# Patient Record
Sex: MALE | Race: WHITE | NOT HISPANIC OR LATINO | Employment: OTHER | URBAN - NONMETROPOLITAN AREA
[De-identification: names, ages, dates, MRNs, and addresses within clinical notes are randomized per-mention and may not be internally consistent; named-entity substitution may affect disease eponyms.]

---

## 2017-04-13 ENCOUNTER — OFFICE VISIT (OUTPATIENT)
Dept: CARDIOLOGY | Facility: CLINIC | Age: 82
End: 2017-04-13

## 2017-04-13 VITALS
HEIGHT: 72 IN | HEART RATE: 56 BPM | WEIGHT: 242 LBS | BODY MASS INDEX: 32.78 KG/M2 | DIASTOLIC BLOOD PRESSURE: 90 MMHG | SYSTOLIC BLOOD PRESSURE: 138 MMHG

## 2017-04-13 DIAGNOSIS — R06.02 SHORTNESS OF BREATH: ICD-10-CM

## 2017-04-13 DIAGNOSIS — I27.20 PULMONARY HTN (HCC): Primary | ICD-10-CM

## 2017-04-13 DIAGNOSIS — I10 ESSENTIAL HYPERTENSION: ICD-10-CM

## 2017-04-13 DIAGNOSIS — E78.49 OTHER HYPERLIPIDEMIA: ICD-10-CM

## 2017-04-13 PROBLEM — K21.9 GERD (GASTROESOPHAGEAL REFLUX DISEASE): Status: ACTIVE | Noted: 2017-04-13

## 2017-04-13 PROBLEM — E78.5 HYPERLIPEMIA: Status: ACTIVE | Noted: 2017-04-13

## 2017-04-13 PROCEDURE — 99214 OFFICE O/P EST MOD 30 MIN: CPT | Performed by: NURSE PRACTITIONER

## 2017-04-13 RX ORDER — NITROGLYCERIN 0.4 MG/1
TABLET SUBLINGUAL
Qty: 30 TABLET | Refills: 1 | Status: SHIPPED | OUTPATIENT
Start: 2017-04-13 | End: 2018-08-21 | Stop reason: SDUPTHER

## 2017-04-13 RX ORDER — LISINOPRIL 20 MG/1
20 TABLET ORAL DAILY
Qty: 90 TABLET | Refills: 2 | Status: SHIPPED | OUTPATIENT
Start: 2017-04-13 | End: 2018-01-09 | Stop reason: SDUPTHER

## 2017-04-13 RX ORDER — ASCORBIC ACID 500 MG
1000 TABLET ORAL DAILY
COMMUNITY

## 2017-04-13 RX ORDER — ATORVASTATIN CALCIUM 40 MG/1
40 TABLET, FILM COATED ORAL NIGHTLY
Qty: 90 TABLET | Refills: 2 | Status: SHIPPED | OUTPATIENT
Start: 2017-04-13 | End: 2017-10-09 | Stop reason: ALTCHOICE

## 2017-04-13 NOTE — PROGRESS NOTES
Chief Complaint   Patient presents with   • Follow-up     Denies chest pain or palpitations. Needs refills on cardiac meds for 90 days to [facility] pharmacy. Labs per PCP about 6 months ago.    • Shortness of Breath     Has been having problems with being short of breath. States that he had this same problem before but seems to have worsened. When he feels short of breath he feels tight in his chest.        Cardiac Complaints  dyspnea      Subjective   Carlos Preston is a 89 y.o. male with a history of ischemic heart disease diagnosed in 2007 when he underwent bypass surgery followed by cardiac catheterization once and found the grafts were patent. In March 2015, a stress test and echocardiogram were repeated which showed fixed defect and no ischemia and slight improvement of his LV function, his echo did show some mild pulmonary HTN.  He returns today for follow up and reports labs with PCP.  Refills of cardiac meds requested.  He does return today for follow up and denies any chest pain or palpitations.  He does admit to some shortness of breath and states that it seems to have worsened since his last visit.  He states he notices more tightness in his chest with exertion as he is exerting himself.          Cardiac History  Past Surgical History:   Procedure Laterality Date   • CARDIAC CATHETERIZATION  05/16/2007    Dr. Mace: 60% LM and 70% LCX.   • CARDIAC CATHETERIZATION  08/19/2009    %, OM 85-90%,Patent grafts.   • CARDIAC SURGERY  2006    Bypass   • CARDIOVASCULAR STRESS TEST  08/05/2009    ROBIN George: Dr. Mace- EF 29%, Diffuse ischemia.   • CARDIOVASCULAR STRESS TEST  11/07/2012    L. Myoview- Inferoseptal infarct with jon infarct ischemia. EF 41%.   • CARDIOVASCULAR STRESS TEST  03/23/2015    L. Myoview- EF51%,fixed defect , no ischemia burden.   • CORONARY ARTERY BYPASS GRAFT  05/17/2007    CABG:  SVG to LAD and LCX.   • ECHO - CONVERTED  08/06/2009    Dr. Mace- EF 50%.   • ECHO - CONVERTED   02/14/2011    EF 50%, Septal WMA.   • ECHO - CONVERTED  11/07/2012    EF 40-45%, RVSP 33 mmHg.   • ECHO - CONVERTED  03/23/2015    EF 45-50%, RVSP 40mmHg,mild MR, mild PHT   • LUMBAR LAMINECTOMY  2005    Unsure of date   • US CAROTID UNILATERAL  04/16/2007    Mild Plaque in Rt. bulb and RECA.       Current Outpatient Prescriptions   Medication Sig Dispense Refill   • aspirin 81 MG chewable tablet Chew 81 mg daily.     • atorvastatin (LIPITOR) 40 MG tablet Take 1 tablet by mouth Every Night. 1/2 tablet once daily 90 tablet 2   • busPIRone (BUSPAR) 5 MG tablet Take 5 mg by mouth 2 (two) times a day.     • Cholecalciferol (VITAMIN D3) 2000 UNITS tablet Take  by mouth daily.     • lisinopril (PRINIVIL,ZESTRIL) 20 MG tablet Take 1 tablet by mouth Daily. 90 tablet 2   • loratadine (CLARITIN) 10 MG tablet Take 10 mg by mouth daily.     • metoprolol tartrate (LOPRESSOR) 25 MG tablet Take 1 tablet by mouth 2 (Two) Times a Day. 1/2 tablet twice daily 180 tablet 2   • Multiple Vitamins-Minerals (VISION VITAMINS) tablet Take  by mouth 2 (two) times a day.     • omeprazole (PriLOSEC) 20 MG capsule Take 20 mg by mouth daily.     • vitamin C (ASCORBIC ACID) 500 MG tablet Take 500 mg by mouth Daily.     • nitroglycerin (NITROSTAT) 0.4 MG SL tablet 1 under the tongue as needed for angina, may repeat q5mins for up three doses 30 tablet 1     No current facility-administered medications for this visit.        Review of patient's allergies indicates no known allergies.    Past Medical History:   Diagnosis Date   • Cancer    • H/O prostatectomy    • Hearing loss    • Heart disease    • History of back surgery    • Hypercholesterolemia    • Hyperlipidemia    • Hypertension    • IHD (ischemic heart disease)     S/P CABG   • Pulmonary hypertension    • S/P knee replacement    • Sinus disorder    • thyroid ultrasound 08/28/2009    Normal       Social History     Social History   • Marital status:      Spouse name: N/A   • Number of  "children: N/A   • Years of education: N/A     Occupational History   • Not on file.     Social History Main Topics   • Smoking status: Former Smoker     Quit date: 9/22/1996   • Smokeless tobacco: Never Used   • Alcohol use No   • Drug use: No   • Sexual activity: Not on file     Other Topics Concern   • Not on file     Social History Narrative       History reviewed. No pertinent family history.    Review of Systems   Constitutional: Negative.    HENT: Negative.    Respiratory: Positive for shortness of breath.    Cardiovascular: Negative.    Gastrointestinal: Negative.    Endocrine: Negative.    Musculoskeletal: Negative.    Neurological: Negative.    Psychiatric/Behavioral: Negative.        DiabetesNo  Thyroidnormal    Objective     /90  Pulse 56  Ht 72\" (182.9 cm)  Wt 242 lb (110 kg)  BMI 32.82 kg/m2    Physical Exam   Constitutional: He is oriented to person, place, and time. He appears well-developed and well-nourished.   HENT:   Head: Normocephalic and atraumatic.   Eyes: EOM are normal. Pupils are equal, round, and reactive to light.   Neck: Normal range of motion. Neck supple.   Cardiovascular: Normal rate and regular rhythm.    Murmur heard.  Pulmonary/Chest: Effort normal and breath sounds normal.   Abdominal: Soft.   Musculoskeletal: Normal range of motion.   Neurological: He is alert and oriented to person, place, and time.   Skin: Skin is warm and dry.   Psychiatric: He has a normal mood and affect.       Procedures    Assessment/Plan     HR is slightly bradycardic today, most likely related to beta blocker therapy.  BP is stable.  Echo will be advised today to reassess for any worsening pulmonary HTN, more recommendations to follow since he is still short of breath.  Labs he reports with you, could we get next copy?  Good cardiac diet and activity as tolerated advised.  Cardiac refills sent.  6 month follow up advised or sooner if needed.      Problems Addressed this Visit        " Cardiovascular and Mediastinum    Pulmonary HTN - Primary    Relevant Orders    Adult Transthoracic Echo Complete    Hyperlipemia    Relevant Medications    atorvastatin (LIPITOR) 40 MG tablet    HTN (hypertension)    Relevant Medications    metoprolol tartrate (LOPRESSOR) 25 MG tablet    lisinopril (PRINIVIL,ZESTRIL) 20 MG tablet      Other Visit Diagnoses     Shortness of breath        Relevant Orders    Adult Transthoracic Echo Complete                  Electronically signed by JESSA Moreno April 13, 2017 10:09 AM

## 2017-04-17 RX ORDER — BUSPIRONE HYDROCHLORIDE 5 MG/1
TABLET ORAL
Qty: 180 TABLET | Refills: 0 | OUTPATIENT
Start: 2017-04-17

## 2017-04-17 RX ORDER — ISOSORBIDE MONONITRATE 30 MG/1
TABLET, EXTENDED RELEASE ORAL
Qty: 90 TABLET | Refills: 0 | OUTPATIENT
Start: 2017-04-17

## 2017-05-01 ENCOUNTER — HOSPITAL ENCOUNTER (OUTPATIENT)
Dept: CARDIOLOGY | Facility: HOSPITAL | Age: 82
Discharge: HOME | End: 2017-05-01
Admitting: NURSE PRACTITIONER

## 2017-05-01 ENCOUNTER — OUTSIDE FACILITY SERVICE (OUTPATIENT)
Dept: CARDIOLOGY | Facility: CLINIC | Age: 82
End: 2017-05-01

## 2017-05-01 DIAGNOSIS — R06.02 SHORTNESS OF BREATH: ICD-10-CM

## 2017-05-01 DIAGNOSIS — I27.20 PULMONARY HTN (HCC): ICD-10-CM

## 2017-05-01 PROCEDURE — 93306 TTE W/DOPPLER COMPLETE: CPT

## 2017-05-01 PROCEDURE — 93306 TTE W/DOPPLER COMPLETE: CPT | Performed by: INTERNAL MEDICINE

## 2017-05-01 RX ORDER — BUSPIRONE HYDROCHLORIDE 5 MG/1
TABLET ORAL
Qty: 180 TABLET | Refills: 0 | OUTPATIENT
Start: 2017-05-01

## 2017-05-08 RX ORDER — ISOSORBIDE MONONITRATE 30 MG/1
TABLET, EXTENDED RELEASE ORAL
Qty: 90 TABLET | Refills: 0 | OUTPATIENT
Start: 2017-05-08

## 2017-06-26 RX ORDER — OMEPRAZOLE 20 MG/1
CAPSULE, DELAYED RELEASE ORAL
Qty: 90 CAPSULE | Refills: 0 | OUTPATIENT
Start: 2017-06-26

## 2017-10-09 ENCOUNTER — OFFICE VISIT (OUTPATIENT)
Dept: CARDIOLOGY | Facility: CLINIC | Age: 82
End: 2017-10-09

## 2017-10-09 VITALS
BODY MASS INDEX: 32.91 KG/M2 | SYSTOLIC BLOOD PRESSURE: 144 MMHG | HEIGHT: 72 IN | HEART RATE: 55 BPM | WEIGHT: 243 LBS | DIASTOLIC BLOOD PRESSURE: 82 MMHG

## 2017-10-09 DIAGNOSIS — I25.118 CORONARY ARTERY DISEASE INVOLVING NATIVE CORONARY ARTERY OF NATIVE HEART WITH OTHER FORM OF ANGINA PECTORIS (HCC): ICD-10-CM

## 2017-10-09 DIAGNOSIS — R06.02 SHORTNESS OF BREATH: ICD-10-CM

## 2017-10-09 DIAGNOSIS — E78.49 OTHER HYPERLIPIDEMIA: ICD-10-CM

## 2017-10-09 DIAGNOSIS — I10 ESSENTIAL HYPERTENSION: ICD-10-CM

## 2017-10-09 DIAGNOSIS — R94.31 ABNORMAL ELECTROCARDIOGRAM (ECG) (EKG): ICD-10-CM

## 2017-10-09 DIAGNOSIS — I48.91 NEW ONSET ATRIAL FIBRILLATION (HCC): Primary | ICD-10-CM

## 2017-10-09 DIAGNOSIS — R00.1 SINUS BRADYCARDIA: ICD-10-CM

## 2017-10-09 PROCEDURE — 93000 ELECTROCARDIOGRAM COMPLETE: CPT | Performed by: NURSE PRACTITIONER

## 2017-10-09 PROCEDURE — 99214 OFFICE O/P EST MOD 30 MIN: CPT | Performed by: NURSE PRACTITIONER

## 2017-10-09 NOTE — PROGRESS NOTES
Chief Complaint   Patient presents with   • Follow-up     for cardiac management   • Atrial Fibrillation     new onset, see ER notes and EKG.  Went to ER with chest pressure, extremely SOB, elevated BP.  Diagnosed with new onset Afib.  Xarelto started.    • Hypertension     running around 140's-170's/ 70's to 80's at home since trip to ER.    • Medication Problem     concerned due to numerous bruised areas on his hands since starting Xarelto. Also  taking ASA.  Cost is also an issure,  will be about $400.00 a month.        Andres Preston is a 89 y.o. male  with a history of ischemic heart disease diagnosed in 2007 when he underwent bypass surgery followed by cardiac catheterization once and found the grafts were patent. In March 2015, a stress test and echocardiogram were repeated which showed fixed defect and no ischemia and slight improvement of his LV function. At his last office visit he reported shortness of breath. Repeat echocardiogram shows LV ejection fraction in normal range, no AS, mild MR, mild AR and no PHT noted.   Recently on October 3rd, he developed chest pain with increased blood pressure for which he went to ER. Labs were stable. CXR was negative for acute disease or change. EKG showed new onset atrial fibrillation. Xarelto was started.   Today he comes to the office in follow up for new onset atrial fibrillation. He continues Xarelto with report of increased bruising but no active signs of bleeding. Shortness of breath with mild exertion associated chest heaviness and tightness persist. He has not felt palpitations. In general he has more weakness.     HPI         Cardiac History:    Past Surgical History:   Procedure Laterality Date   • CARDIAC CATHETERIZATION  05/16/2007    Dr. Mace: 60% LM and 70% LCX.   • CARDIAC CATHETERIZATION  08/19/2009    %, OM 85-90%,Patent grafts.   • CARDIAC SURGERY  2006    Bypass   • CARDIOVASCULAR STRESS TEST  08/05/2009    A. Myoview:  Dr. Mace- EF 29%, Diffuse ischemia.   • CARDIOVASCULAR STRESS TEST  11/07/2012    L. Myoview- Inferoseptal infarct with jon infarct ischemia. EF 41%.   • CARDIOVASCULAR STRESS TEST  03/23/2015    L. Myoview- EF51%,fixed defect , no ischemia burden.   • CORONARY ARTERY BYPASS GRAFT  05/17/2007    CABG:  SVG to LAD and LCX.   • ECHO - CONVERTED  08/06/2009    Dr. Mace- EF 50%.   • ECHO - CONVERTED  02/14/2011    EF 50%, Septal WMA.   • ECHO - CONVERTED  11/07/2012    EF 40-45%, RVSP 33 mmHg.   • ECHO - CONVERTED  03/23/2015    EF 45-50%, RVSP 40mmHg,mild MR, mild PHT   • ECHO - CONVERTED  05/01/2017    EF 50-55%, mild MR   • LUMBAR LAMINECTOMY  2005    Unsure of date   • US CAROTID UNILATERAL  04/16/2007    Mild Plaque in Rt. bulb and RECA.       Current Outpatient Prescriptions   Medication Sig Dispense Refill   • busPIRone (BUSPAR) 5 MG tablet Take 5 mg by mouth 2 (two) times a day.     • Cholecalciferol (VITAMIN D3) 2000 UNITS tablet Take  by mouth daily.     • lisinopril (PRINIVIL,ZESTRIL) 20 MG tablet Take 1 tablet by mouth Daily. 90 tablet 2   • loratadine (CLARITIN) 10 MG tablet Take 10 mg by mouth daily.     • metoprolol tartrate (LOPRESSOR) 25 MG tablet Take 1 tablet by mouth 2 (Two) Times a Day. 1/2 tablet twice daily 180 tablet 2   • Multiple Vitamins-Minerals (VISION VITAMINS) tablet Take  by mouth 2 (two) times a day.     • nitroglycerin (NITROSTAT) 0.4 MG SL tablet 1 under the tongue as needed for angina, may repeat q5mins for up three doses 30 tablet 1   • omeprazole (PriLOSEC) 20 MG capsule Take 20 mg by mouth daily.     • vitamin C (ASCORBIC ACID) 500 MG tablet Take 500 mg by mouth Daily.     • rivaroxaban (XARELTO) 20 MG tablet Take 1 tablet by mouth Daily. 30 tablet      No current facility-administered medications for this visit.        Review of patient's allergies indicates no known allergies.    Past Medical History:   Diagnosis Date   • Cancer    • H/O prostatectomy    • Hearing loss    •  Heart disease    • History of back surgery    • Hypercholesterolemia    • Hyperlipidemia    • Hypertension    • IHD (ischemic heart disease)     S/P CABG   • Pulmonary hypertension    • S/P knee replacement    • Sinus disorder    • thyroid ultrasound 08/28/2009    Normal       Social History     Social History   • Marital status:      Spouse name: N/A   • Number of children: N/A   • Years of education: N/A     Occupational History   • Not on file.     Social History Main Topics   • Smoking status: Former Smoker     Quit date: 9/22/1996   • Smokeless tobacco: Never Used   • Alcohol use No   • Drug use: No   • Sexual activity: Not on file     Other Topics Concern   • Not on file     Social History Narrative       History reviewed. No pertinent family history.    Review of Systems   Constitution: Positive for weakness and malaise/fatigue. Negative for decreased appetite.   HENT: Negative for congestion, nosebleeds and sore throat.    Eyes: Negative for blurred vision.   Cardiovascular: Positive for chest pain (Heaviness), dyspnea on exertion and irregular heartbeat. Negative for leg swelling, near-syncope and palpitations.   Respiratory: Positive for shortness of breath. Negative for cough and snoring.    Endocrine: Negative for cold intolerance and heat intolerance.   Hematologic/Lymphatic: Negative for bleeding problem. Bruises/bleeds easily.   Skin: Negative for itching and nail changes.   Musculoskeletal: Positive for arthritis. Negative for falls and myalgias.   Gastrointestinal: Negative for abdominal pain, dysphagia, heartburn, melena and nausea.   Genitourinary: Negative for dysuria and hematuria.   Neurological: Negative for dizziness, light-headedness, seizures and vertigo.   Psychiatric/Behavioral: Negative for altered mental status and memory loss. The patient does not have insomnia.    Allergic/Immunologic: Negative for environmental allergies and hives.    Diabetes- No  Thyroid-normal    Objective  "    /82  Pulse 55  Ht 72\" (182.9 cm)  Wt 243 lb (110 kg)  BMI 32.96 kg/m2    Physical Exam   Constitutional: He is oriented to person, place, and time. He appears well-nourished.   HENT:   Head: Normocephalic.   Eyes: Conjunctivae are normal. Pupils are equal, round, and reactive to light.   Neck: Normal range of motion. No JVD present. Carotid bruit is not present.   Cardiovascular: Normal rate, regular rhythm, S1 normal and S2 normal.    No murmur heard.  Pulmonary/Chest: Breath sounds normal. He has no wheezes. He has no rales.   Abdominal: Soft. Bowel sounds are normal. He exhibits no distension. There is no tenderness.   Musculoskeletal: Normal range of motion. He exhibits no edema.   Neurological: He is alert and oriented to person, place, and time.   Skin: Skin is warm and dry. No rash noted. No pallor.   Psychiatric: He has a normal mood and affect. His behavior is normal. Thought content normal.       ECG 12 Lead  Date/Time: 10/9/2017 4:20 PM  Performed by: PEPITO ADORNO  Authorized by: PEPITO ADORNO   Interpreted by ED physician  Comparison: compared with previous ECG from 10/3/2017  Comparison to previous ECG: Atrial fibrillation, 63 bpm  Rhythm: sinus bradycardia  Rate: bradycardic  BPM: 55  QRS axis: normal  Q waves: II, III and aVF  Clinical impression: abnormal ECG                  Assessment/Plan      Carlos was seen today for follow-up, atrial fibrillation, hypertension and medication problem.    Diagnoses and all orders for this visit:    New onset atrial fibrillation  -     rivaroxaban (XARELTO) 20 MG tablet; Take 1 tablet by mouth Daily.  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  -     Adult Transthoracic Echo Complete W/ Cont if Necessary Per Protocol; " Future  -     Stress Test With Myocardial Perfusion One Day; Future    Essential hypertension  -     Adult Transthoracic Echo Complete W/ Cont if Necessary Per Protocol; Future  -     Stress Test With Myocardial Perfusion One Day; Future    Other hyperlipidemia  -     Adult Transthoracic Echo Complete W/ Cont if Necessary Per Protocol; Future  -     Stress Test With Myocardial Perfusion One Day; Future    Shortness of breath  -     TSH; Future  -     Adult Transthoracic Echo Complete W/ Cont if Necessary Per Protocol; Future  -     Stress Test With Myocardial Perfusion One Day; Future    Sinus bradycardia    Abnormal electrocardiogram (ECG) (EKG)    Other orders  -     ECG 12 Lead    EKG today shows sinus angie with heart rate 55 bpm. Blood pressure is stable. At this time, we will continue same cardiac medications. I have requested PA for Xarelto. His creatinine level at ER visit was in normal range therefore will continue same dose of 20 mg. He has increased bruising on his arms and advised to stop aspirin. To assess for ischemic cause of arrhythmia, shortness of breath and chest tightness, a Lexiscan stress test ordered. To reassess LV function and PA pressure an echocardiogram ordered. Further recommendations based on test results.                Electronically signed by JESSA Oh,  October 9, 2017 4:26 PM

## 2017-10-11 ENCOUNTER — HOSPITAL ENCOUNTER (OUTPATIENT)
Dept: CARDIOLOGY | Facility: HOSPITAL | Age: 82
Discharge: HOME | End: 2017-10-11

## 2017-10-11 ENCOUNTER — OUTSIDE FACILITY SERVICE (OUTPATIENT)
Dept: CARDIOLOGY | Facility: CLINIC | Age: 82
End: 2017-10-11

## 2017-10-11 DIAGNOSIS — E78.49 OTHER HYPERLIPIDEMIA: ICD-10-CM

## 2017-10-11 DIAGNOSIS — I48.91 NEW ONSET ATRIAL FIBRILLATION (HCC): ICD-10-CM

## 2017-10-11 DIAGNOSIS — I25.118 CORONARY ARTERY DISEASE INVOLVING NATIVE CORONARY ARTERY OF NATIVE HEART WITH OTHER FORM OF ANGINA PECTORIS (HCC): ICD-10-CM

## 2017-10-11 DIAGNOSIS — I10 ESSENTIAL HYPERTENSION: ICD-10-CM

## 2017-10-11 DIAGNOSIS — R06.02 SHORTNESS OF BREATH: ICD-10-CM

## 2017-10-11 LAB
MAXIMAL PREDICTED HEART RATE: 131 BPM
STRESS TARGET HR: 111 BPM

## 2017-10-11 PROCEDURE — 93017 CV STRESS TEST TRACING ONLY: CPT

## 2017-10-11 PROCEDURE — 93306 TTE W/DOPPLER COMPLETE: CPT

## 2017-10-11 PROCEDURE — 25010000002 REGADENOSON 0.4 MG/5ML SOLUTION: Performed by: INTERNAL MEDICINE

## 2017-10-11 PROCEDURE — 93018 CV STRESS TEST I&R ONLY: CPT | Performed by: INTERNAL MEDICINE

## 2017-10-11 PROCEDURE — 78452 HT MUSCLE IMAGE SPECT MULT: CPT

## 2017-10-11 PROCEDURE — 93306 TTE W/DOPPLER COMPLETE: CPT | Performed by: INTERNAL MEDICINE

## 2017-10-11 PROCEDURE — A9500 TC99M SESTAMIBI: HCPCS | Performed by: INTERNAL MEDICINE

## 2017-10-11 PROCEDURE — 0 TECHNETIUM SESTAMIBI: Performed by: INTERNAL MEDICINE

## 2017-10-11 PROCEDURE — 78452 HT MUSCLE IMAGE SPECT MULT: CPT | Performed by: INTERNAL MEDICINE

## 2017-10-11 RX ADMIN — TECHNETIUM TC-99M SESTAMIBI 1 DOSE: 1 INJECTION INTRAVENOUS at 09:30

## 2017-10-11 RX ADMIN — REGADENOSON 0.4 MG: 0.08 INJECTION, SOLUTION INTRAVENOUS at 09:30

## 2017-10-16 ENCOUNTER — TELEPHONE (OUTPATIENT)
Dept: CARDIOLOGY | Facility: CLINIC | Age: 82
End: 2017-10-16

## 2017-10-16 ENCOUNTER — DOCUMENTATION (OUTPATIENT)
Dept: CARDIOLOGY | Facility: CLINIC | Age: 82
End: 2017-10-16

## 2017-10-16 RX ORDER — ISOSORBIDE MONONITRATE 30 MG/1
30 TABLET, EXTENDED RELEASE ORAL DAILY
Qty: 30 TABLET | Refills: 4 | Status: SHIPPED | OUTPATIENT
Start: 2017-10-16 | End: 2018-01-09 | Stop reason: DRUGHIGH

## 2017-10-16 NOTE — TELEPHONE ENCOUNTER
Patient aware of stress test and echo results and recommendations.  Add Imdur 30 mg daily.  Patient aware to call the office if symptoms persist.

## 2017-10-16 NOTE — PROGRESS NOTES
PA for Xarelto 20 mg completed through [employer] and approved. Attempted to reach patient, no answer unable to leave message

## 2017-11-09 ENCOUNTER — TELEPHONE (OUTPATIENT)
Dept: CARDIOLOGY | Facility: CLINIC | Age: 82
End: 2017-11-09

## 2017-11-09 DIAGNOSIS — I48.91 NEW ONSET ATRIAL FIBRILLATION (HCC): ICD-10-CM

## 2017-11-09 NOTE — TELEPHONE ENCOUNTER
Patients daughter called requesting refill on Xarelto 20mg daily, she states has only been getting 14 tablets at the pharmacy.  Refill on Xarelto 20mg daily sent to pharmacy.

## 2017-11-13 ENCOUNTER — TELEPHONE (OUTPATIENT)
Dept: CARDIOLOGY | Facility: CLINIC | Age: 82
End: 2017-11-13

## 2017-11-13 NOTE — TELEPHONE ENCOUNTER
Dr Garcia's office called asking for cardiac clearance and to hold Xarelto 48 hours prior to epidural steroid injection.

## 2017-11-30 RX ORDER — BUSPIRONE HYDROCHLORIDE 5 MG/1
TABLET ORAL
Qty: 180 TABLET | Refills: 0 | OUTPATIENT
Start: 2017-11-30

## 2018-01-09 ENCOUNTER — OFFICE VISIT (OUTPATIENT)
Dept: CARDIOLOGY | Facility: CLINIC | Age: 83
End: 2018-01-09

## 2018-01-09 ENCOUNTER — TELEPHONE (OUTPATIENT)
Dept: CARDIOLOGY | Facility: CLINIC | Age: 83
End: 2018-01-09

## 2018-01-09 VITALS
HEIGHT: 72 IN | HEART RATE: 56 BPM | BODY MASS INDEX: 32.91 KG/M2 | DIASTOLIC BLOOD PRESSURE: 80 MMHG | SYSTOLIC BLOOD PRESSURE: 150 MMHG | WEIGHT: 243 LBS

## 2018-01-09 DIAGNOSIS — I10 ESSENTIAL HYPERTENSION: ICD-10-CM

## 2018-01-09 DIAGNOSIS — I48.91 NEW ONSET ATRIAL FIBRILLATION (HCC): Primary | ICD-10-CM

## 2018-01-09 DIAGNOSIS — E78.2 MIXED HYPERLIPIDEMIA: ICD-10-CM

## 2018-01-09 DIAGNOSIS — I48.0 PAROXYSMAL ATRIAL FIBRILLATION (HCC): ICD-10-CM

## 2018-01-09 DIAGNOSIS — I25.9 IHD (ISCHEMIC HEART DISEASE): ICD-10-CM

## 2018-01-09 PROCEDURE — 99213 OFFICE O/P EST LOW 20 MIN: CPT | Performed by: NURSE PRACTITIONER

## 2018-01-09 RX ORDER — NAPROXEN SODIUM 220 MG
TABLET ORAL
COMMUNITY
End: 2018-01-09

## 2018-01-09 RX ORDER — ISOSORBIDE MONONITRATE 30 MG/1
30 TABLET, EXTENDED RELEASE ORAL EVERY MORNING
Qty: 90 TABLET | Refills: 3 | Status: SHIPPED | OUTPATIENT
Start: 2018-01-09 | End: 2019-01-08 | Stop reason: SDUPTHER

## 2018-01-09 RX ORDER — ISOSORBIDE MONONITRATE 10 MG/1
10 TABLET ORAL DAILY
Qty: 90 TABLET | Refills: 3 | Status: SHIPPED | OUTPATIENT
Start: 2018-01-09 | End: 2018-01-09 | Stop reason: DRUGHIGH

## 2018-01-09 RX ORDER — LISINOPRIL 20 MG/1
20 TABLET ORAL DAILY
Qty: 90 TABLET | Refills: 2 | Status: SHIPPED | OUTPATIENT
Start: 2018-01-09 | End: 2018-07-03

## 2018-01-09 RX ORDER — ISOSORBIDE MONONITRATE 10 MG/1
10 TABLET ORAL DAILY
COMMUNITY
End: 2018-01-09 | Stop reason: SDUPTHER

## 2018-01-09 NOTE — TELEPHONE ENCOUNTER
Jannet,    Can you call Dr. Preston office to see when he had labs?  He thinks it was 2-3 months ago.  If so, could we get a copy?  I need a CBC.     Thank you

## 2018-01-09 NOTE — PROGRESS NOTES
Chief Complaint   Patient presents with   • Follow-up     For cardiac management. He reports having weakness and pain in legs since starting Xarelto. He reports last 3 months ago with PCP.   • Shortness of Breath     He states about the same, nothing any worse.   • Med Refill     Needs refills on cardiac medication-90 day.       Subjective       Carlos Preston is a 89 y.o. male with a history of ischemic heart disease diagnosed in 2007 when he underwent bypass surgery.  Repeat cath in 2009 showed patent grafts.  He has been managed medically and has remained stable.  On 10/3/17 he developed chest pain and elevated blood pressure, presented to the ER, labs were stable, CXR unremarkable, but he was noted to be in atrial fibrillation with controlled ventricular response which was a new finding.  He did not have any palpitations.  Xarelto was initiated.  He followed up here with work up ordered.  He was back in sinus when he came in for evaluation.  TSH was normal at .97 in August.  He underwent stress test and echocardiogram which showed small inferior wall ischemia with EF around 54%. Echocardiogram also showed LV function normal at 55%.  Imdur was added with plan for cardiac cath if symptoms persisted.  He came in today for his follow up visit.  He has not had any more chest pain.  Shortness of breath is present and unchanged from his baseline.  He has continued Xarelto but discontinued aspirin with our recommendation secondary to excessive bruising.  He generally feels a little fatigued but tries to exercise regularly.  He reports pain and weakness in his legs and has had lumbar injections for spinal problems.  Refill s requested on cardiac meds.  Labs have been managed by his primary care.  Copy received showing 8/25/17 BUN/Cr 23/1.0, glucose 80, electrolytes normal, H/H 13.9/46.9, platelets 145.  He is planning a trip to [place] later today.      HPI         Cardiac History:    Past Surgical History:   Procedure  Laterality Date   • CARDIAC CATHETERIZATION  05/16/2007    Dr. Mace: 60% LM and 70% LCX.   • CARDIAC CATHETERIZATION  08/19/2009    %, OM 85-90%,Patent grafts.   • CARDIAC SURGERY  2006    Bypass   • CARDIOVASCULAR STRESS TEST  08/05/2009    [facility]: Dr. Mace- EF 29%, Diffuse ischemia.   • CARDIOVASCULAR STRESS TEST  11/07/2012    L. Myoview- Inferoseptal infarct with jon infarct ischemia. EF 41%.   • CARDIOVASCULAR STRESS TEST  03/23/2015    L. Myoview- EF51%,fixed defect , no ischemia burden.   • CARDIOVASCULAR STRESS TEST  10/11/2017    L.Myoview- EF 54%. Small inferior Ischemia.   • CORONARY ARTERY BYPASS GRAFT  05/17/2007    CABG:  SVG to LAD and LCX.   • ECHO - CONVERTED  08/06/2009    Dr. Mace- EF 50%.   • ECHO - CONVERTED  02/14/2011    EF 50%, Septal WMA.   • ECHO - CONVERTED  11/07/2012    EF 40-45%, RVSP 33 mmHg.   • ECHO - CONVERTED  03/23/2015    EF 45-50%, RVSP 40mmHg,mild MR, mild PHT   • ECHO - CONVERTED  05/01/2017    EF 50-55%, mild MR   • ECHO - CONVERTED  10/11/2017    EF 55%   • LUMBAR LAMINECTOMY  2005    Unsure of date   • US CAROTID UNILATERAL  04/16/2007    Mild Plaque in Rt. bulb and RECA.       Current Outpatient Prescriptions   Medication Sig Dispense Refill   • busPIRone (BUSPAR) 5 MG tablet Take 5 mg by mouth 2 (two) times a day.     • Cholecalciferol (VITAMIN D3) 2000 UNITS tablet Take  by mouth daily.     • lisinopril (PRINIVIL,ZESTRIL) 20 MG tablet Take 1 tablet by mouth Daily. 90 tablet 2   • loratadine (CLARITIN) 10 MG tablet Take 10 mg by mouth daily.     • metoprolol tartrate (LOPRESSOR) 25 MG tablet Take 1 tablet by mouth Every 12 (Twelve) Hours. 180 tablet 3   • Multiple Vitamins-Minerals (VISION VITAMINS) tablet Take  by mouth 2 (two) times a day.     • nitroglycerin (NITROSTAT) 0.4 MG SL tablet 1 under the tongue as needed for angina, may repeat q5mins for up three doses 30 tablet 1   • omeprazole (PriLOSEC) 20 MG capsule Take 20 mg by mouth daily.     •  rivaroxaban (XARELTO) 20 MG tablet Take 1 tablet by mouth Daily. 90 tablet 3   • vitamin C (ASCORBIC ACID) 500 MG tablet Take 500 mg by mouth Daily.     • isosorbide mononitrate (IMDUR) 30 MG 24 hr tablet Take 1 tablet by mouth Every Morning. 90 tablet 3     No current facility-administered medications for this visit.        Review of patient's allergies indicates no known allergies.    Past Medical History:   Diagnosis Date   • Cancer    • H/O prostatectomy    • Hearing loss    • Heart disease    • History of back surgery    • Hypercholesterolemia    • Hyperlipidemia    • Hypertension    • IHD (ischemic heart disease)     S/P CABG   • Pulmonary hypertension    • S/P knee replacement    • Sinus disorder    • thyroid ultrasound 08/28/2009    Normal       Social History     Social History   • Marital status:      Spouse name: N/A   • Number of children: N/A   • Years of education: N/A     Occupational History   • Not on file.     Social History Main Topics   • Smoking status: Former Smoker     Quit date: 9/22/1996   • Smokeless tobacco: Never Used   • Alcohol use No   • Drug use: No   • Sexual activity: Not on file     Other Topics Concern   • Not on file     Social History Narrative       History reviewed. No pertinent family history.    Review of Systems   Constitution: Negative for weakness and malaise/fatigue.   HENT: Negative.    Eyes: Negative.    Cardiovascular: Negative for chest pain, dyspnea on exertion, leg swelling, orthopnea and palpitations.   Respiratory: Negative for cough and shortness of breath.    Endocrine: Negative.    Hematologic/Lymphatic: Negative for bleeding problem. Bruises/bleeds easily.   Skin: Negative.    Musculoskeletal: Positive for back pain and muscle weakness (bilaterally, legs feel weak ).   Gastrointestinal: Positive for melena (a couple of dark stool, no obvious blood ). Negative for abdominal pain, dysphagia and heartburn.   Genitourinary: Negative for dysuria and  "hematuria.   Neurological: Positive for numbness (mild numbness, tingling in legs bilaterally ).   Psychiatric/Behavioral: Negative for altered mental status and depression.   Allergic/Immunologic: Negative.         Diabetes- No  Thyroid-normal    Objective     /80 (BP Location: Left arm)  Pulse 56  Ht 182.9 cm (72.01\")  Wt 110 kg (243 lb)  BMI 32.95 kg/m2    Physical Exam   Constitutional: He is oriented to person, place, and time. He appears well-developed and well-nourished.   HENT:   Head: Normocephalic.   Eyes: Pupils are equal, round, and reactive to light.   Neck: Normal range of motion.   Cardiovascular: Normal rate, regular rhythm and intact distal pulses.    Murmur heard.  Pulmonary/Chest: Effort normal and breath sounds normal. No respiratory distress. He has no wheezes. He has no rales.   Abdominal: Soft. Bowel sounds are normal. He exhibits no distension. There is no tenderness.   Musculoskeletal: Normal range of motion. He exhibits no edema.   Neurological: He is alert and oriented to person, place, and time.   Skin: Skin is warm and dry. No pallor.   Psychiatric: He has a normal mood and affect.   Nursing note and vitals reviewed.       ECG 12 Lead  Date/Time: 1/12/2018 12:12 PM  Performed by: ANDRÉS FLANNERY  Authorized by: ANDRÉS FLANNERY   Comparison: compared with previous ECG from 10/9/2017  Similar to previous ECG  Comparison to previous ECG:  ms, now first degree AV block   Rhythm: sinus rhythm and sinus bradycardia  Rate: bradycardic  BPM: 55  Conduction: 1st degree  Clinical impression: non-specific ECG  Comments: NV interval 221 ms  QTc 437 ms                 Assessment/Plan    Heart rate stable.  Blood pressure borderline elevated.  He is advised to monitor and report if goes higher than 150 systolic.  Limit sodium intake.  Pedal pulse strong, no edema noted.  His leg weakness could be coming from his lumbar spine. He was advised to discuss with you whether he may need MRI.  He " has noted two separate occasions of dark stool, not tarry or bloody, but not recent.  If reoccurs, he needs to have CBC checked to monitor to blood loss/anemia.  He feels his chest pain symptoms are well controlled at this time and cardiac cath will be deferred.  He was encouraged to remain active and continue regular exercise.   Labs have been managed by you and he would like to continue with this.  With his history of IHD, he is not on a statin.  Could we get a copy of lipids?  He was advised to report any change in symptoms.  Refills were sent.  We will see him back in six months or sooner if needed.       Carlos was seen today for follow-up, shortness of breath and med refill.    Diagnoses and all orders for this visit:    New onset atrial fibrillation  -     rivaroxaban (XARELTO) 20 MG tablet; Take 1 tablet by mouth Daily.    Other orders  -     Discontinue: isosorbide mononitrate (ISMO,MONOKET) 10 MG tablet; Take 1 tablet by mouth Daily.  -     lisinopril (PRINIVIL,ZESTRIL) 20 MG tablet; Take 1 tablet by mouth Daily.  -     metoprolol tartrate (LOPRESSOR) 25 MG tablet; Take 1 tablet by mouth Every 12 (Twelve) Hours.  -     isosorbide mononitrate (IMDUR) 30 MG 24 hr tablet; Take 1 tablet by mouth Every Morning.  -     ECG 12 Lead                    Electronically signed by JESAS Macdonald,  January 12, 2018 12:20 PM

## 2018-01-10 NOTE — TELEPHONE ENCOUNTER
Phoned PCP to obtain labs, office closed until Monday, Reina made aware will continue to try to obtain labs.

## 2018-01-12 PROCEDURE — 93000 ELECTROCARDIOGRAM COMPLETE: CPT | Performed by: NURSE PRACTITIONER

## 2018-07-03 ENCOUNTER — TELEPHONE (OUTPATIENT)
Dept: CARDIOLOGY | Facility: CLINIC | Age: 83
End: 2018-07-03

## 2018-07-03 ENCOUNTER — OFFICE VISIT (OUTPATIENT)
Dept: CARDIOLOGY | Facility: CLINIC | Age: 83
End: 2018-07-03

## 2018-07-03 VITALS
WEIGHT: 242 LBS | HEIGHT: 72 IN | SYSTOLIC BLOOD PRESSURE: 208 MMHG | BODY MASS INDEX: 32.78 KG/M2 | HEART RATE: 91 BPM | DIASTOLIC BLOOD PRESSURE: 102 MMHG

## 2018-07-03 DIAGNOSIS — Z95.1 HX OF CABG: ICD-10-CM

## 2018-07-03 DIAGNOSIS — Z79.01 CURRENT USE OF LONG TERM ANTICOAGULATION: ICD-10-CM

## 2018-07-03 DIAGNOSIS — I25.9 IHD (ISCHEMIC HEART DISEASE): ICD-10-CM

## 2018-07-03 DIAGNOSIS — R00.1 SINUS BRADYCARDIA: ICD-10-CM

## 2018-07-03 DIAGNOSIS — I44.0 FIRST DEGREE AV BLOCK: ICD-10-CM

## 2018-07-03 DIAGNOSIS — E78.2 MIXED HYPERLIPIDEMIA: ICD-10-CM

## 2018-07-03 DIAGNOSIS — R07.89 CHEST TIGHTNESS OR PRESSURE: ICD-10-CM

## 2018-07-03 DIAGNOSIS — I10 ESSENTIAL HYPERTENSION: ICD-10-CM

## 2018-07-03 DIAGNOSIS — R06.02 SHORTNESS OF BREATH: ICD-10-CM

## 2018-07-03 DIAGNOSIS — I48.0 PAROXYSMAL ATRIAL FIBRILLATION (HCC): ICD-10-CM

## 2018-07-03 DIAGNOSIS — I25.118 CORONARY ARTERY DISEASE INVOLVING NATIVE CORONARY ARTERY OF NATIVE HEART WITH OTHER FORM OF ANGINA PECTORIS (HCC): Primary | ICD-10-CM

## 2018-07-03 DIAGNOSIS — R53.83 OTHER FATIGUE: ICD-10-CM

## 2018-07-03 PROCEDURE — 99214 OFFICE O/P EST MOD 30 MIN: CPT | Performed by: NURSE PRACTITIONER

## 2018-07-03 PROCEDURE — 93000 ELECTROCARDIOGRAM COMPLETE: CPT | Performed by: NURSE PRACTITIONER

## 2018-07-03 RX ORDER — METOPROLOL TARTRATE 50 MG/1
50 TABLET, FILM COATED ORAL 2 TIMES DAILY
COMMUNITY
End: 2018-07-03

## 2018-07-03 RX ORDER — NIFEDIPINE 30 MG/1
30 TABLET, EXTENDED RELEASE ORAL DAILY
Qty: 30 TABLET | Refills: 6 | Status: SHIPPED | OUTPATIENT
Start: 2018-07-03 | End: 2018-08-21 | Stop reason: ALTCHOICE

## 2018-07-03 RX ORDER — NAPROXEN SODIUM 220 MG
220 TABLET ORAL 2 TIMES DAILY PRN
COMMUNITY
End: 2018-08-21 | Stop reason: HOSPADM

## 2018-07-03 NOTE — TELEPHONE ENCOUNTER
Please inform Mr. Preston that Dr. Castañeda agrees to proceed with cardiac cath. We had discussed at his visit this morning. Thank you.

## 2018-07-03 NOTE — PATIENT INSTRUCTIONS
Nifedipine capsules  What is this medicine?  NIFEDIPINE (jennifer mathias) is a calcium-channel blocker. It affects the amount of calcium found in your heart and muscle cells. This relaxes your blood vessels, which can reduce the amount of work the heart has to do. This medicine is used to treat chest pain caused by angina.  This medicine may be used for other purposes; ask your health care provider or pharmacist if you have questions.  COMMON BRAND NAME(S): Adalat, Procardia  What should I tell my health care provider before I take this medicine?  They need to know if you have any of these conditions:  -heart problems, low blood pressure, slow or irregular heartbeat  -kidney disease  -liver disease  -previous heart attack  -an unusual or allergic reaction to nifedipine, other medicines, foods, dyes, or preservatives  -pregnant or trying to get pregnant  -breast-feeding  How should I use this medicine?  Take this medicine by mouth with a glass of water. Follow the directions on the prescription label. Swallow whole. Take your doses at regular intervals. Do not take your medicine more often then directed. Do not suddenly stop taking this medicine. Your doctor will tell you how much medicine to take. If your doctor wants you to stop the medicine, the dose will be slowly lowered over time to avoid any side effects.  Talk to your pediatrician regarding the use of this medicine in children. Special care may be needed.  Overdosage: If you think you have taken too much of this medicine contact a poison control center or emergency room at once.  NOTE: This medicine is only for you. Do not share this medicine with others.  What if I miss a dose?  If you miss a dose, take it as soon as you can. If it is almost time for your next dose, take only that dose. Do not take double or extra doses.  What may interact with this medicine?  Do not take this medicine with any of the following medications:  -certain medicines for seizures  like carbamazepine, phenobarbital, phenytoin  -lumacaftor; ivacaftor  -rifabutin  -rifampin  -rifapentine  -Aundrea's Wort  This medicine may also interact with the following medications:  -antiviral medicines for HIV or AIDS  -certain medicines for blood pressure  -certain medicines for diabetes  -certain medicines for erectile dysfunction  -certain medicines for fungal infections like ketoconazole, fluconazole, and itraconazole  -certain medicines for irregular heart beat like flecainide and quinidine  -certain medicines that treat or prevent blood clots like warfarin  -clarithromycin  -digoxin  -dolasetron  -erythromycin  -fluoxetine  -grapefruit juice  -local or general anesthetics  -nefazodone  -orlistat  -quinupristin; dalfopristin  -sirolimus  -stomach acid blockers like cimetidine, ranitidine, omeprazole, or pantoprazole  -tacrolimus  -valproic acid  This list may not describe all possible interactions. Give your health care provider a list of all the medicines, herbs, non-prescription drugs, or dietary supplements you use. Also tell them if you smoke, drink alcohol, or use illegal drugs. Some items may interact with your medicine.  What should I watch for while using this medicine?  Visit your doctor or health care professional for regular check ups. Check your blood pressure and pulse rate regularly. Ask your doctor or health care professional what your blood pressure and pulse rate should be and when you should contact him or her.  You may get drowsy or dizzy. Do not drive, use machinery, or do anything that needs mental alertness until you know how this medicine affects you. Do not stand or sit up quickly, especially if you are an older patient. This reduces the risk of dizzy or fainting spells. Alcohol may interfere with the effect of this medicine. Avoid alcoholic drinks.  What side effects may I notice from receiving this medicine?  Side effects that you should report to your doctor or health care  professional as soon as possible:  -blood in the urine  -difficulty breathing  -fast heartbeat, palpitations, irregular heartbeat, chest pain  -redness, blistering, peeling or loosening of the skin, including inside the mouth  -reduced amount of urine passed  -skin rash  -swelling of the legs and ankles  Side effects that usually do not require medical attention (report to your doctor or health care professional if they continue or are bothersome):  -constipation  -facial flushing  -headache  -weakness or tiredness  This list may not describe all possible side effects. Call your doctor for medical advice about side effects. You may report side effects to FDA at 0-746-FDA-6404.  Where should I keep my medicine?  Keep out of the reach of children.  Store at room temperature between 15 and 25 degrees C (59 and 77 degrees F). Protect from light and moisture. Keep container tightly closed. Throw away any unused medicine after the expiration date.  NOTE: This sheet is a summary. It may not cover all possible information. If you have questions about this medicine, talk to your doctor, pharmacist, or health care provider.  © 2018 [employer] (2016-02-22 10:24:24)  Azilsartan oral tablet  What is this medicine?  AZILSARTAN (AY zil JOANA tan) is used to treat high blood pressure.  This medicine may be used for other purposes; ask your health care provider or pharmacist if you have questions.  COMMON BRAND NAME(S): Amaris  What should I tell my health care provider before I take this medicine?  They need to know if you have any of these conditions:  -heart disease  -low levels of sodium in the blood  -kidney disease  -an unusual or allergic reaction to azilsartan, other medicines, foods, dyes, or preservatives  -pregnant or trying to get pregnant  How should I use this medicine?  Take this medicine by mouth with a glass of water. Follow the directions on the prescription label. You can take it with or without food. If  it upsets your stomach, take it with food. Take your medicine at regular intervals. Do not take it more often than directed. Do not stop taking except on your doctor's advice.  Talk to your pediatrician regarding the use of this medicine in children. Special care may be needed.  Overdosage: If you think you have taken too much of this medicine contact a poison control center or emergency room at once.  NOTE: This medicine is only for you. Do not share this medicine with others.  What if I miss a dose?  If you miss a dose, take it as soon as you can. If it is almost time for your next dose, take only that dose. Do not take double or extra doses.  What may interact with this medicine?  -celecoxib  -certain medicines for blood pressure, heart disease, irregular heart beat  -diuretics  -NSAIDS, medicines for pain and inflammation, like ibuprofen or naproxen  This list may not describe all possible interactions. Give your health care provider a list of all the medicines, herbs, non-prescription drugs, or dietary supplements you use. Also tell them if you smoke, drink alcohol, or use illegal drugs. Some items may interact with your medicine.  What should I watch for while using this medicine?  Visit your doctor or health care professional for regular checks on your progress. Check your blood pressure as directed. Ask your doctor or health care professional what your blood pressure should be and when you should contact him or her.  Do not treat yourself for coughs, colds, or pain while you are using this medicine without asking your doctor or health care professional for advice. Some ingredients may increase your blood pressure.  You may get dizzy. Do not drive, use machinery, or do anything that needs mental alertness until you know how this medicine affects you. Do not stand or sit up quickly, especially if you are an older patient. This reduces the risk of dizzy or fainting spells. Avoid alcoholic drinks; they can make  you more dizzy.  Women should inform their doctor if they wish to become pregnant or think they might be pregnant. There is a potential for serious side effects to an unborn child. Talk to your health care professional or pharmacist for more information.  What side effects may I notice from receiving this medicine?  Side effects that you should report to your doctor or health care professional as soon as possible:  -allergic reactions like skin rash, itching or hives, swelling of the face, lips, or tongue  -dizziness  -feeling faint or lightheaded, falls  Side effects that usually do not require medical attention (report to your doctor or health care professional if they continue or are bothersome):  -diarrhea  -nausea, vomiting  -tiredness  This list may not describe all possible side effects. Call your doctor for medical advice about side effects. You may report side effects to FDA at 1-996-FDA-4391.  Where should I keep my medicine?  Keep out of the reach of children.  Store at room temperature between 15 and 30 degrees C (59 and 86 degrees F). Throw away any unused medicine after the expiration date.  Store this medicine in the original container that you received from your pharmacist or doctor. Do not put this medicine into a different container.  NOTE: This sheet is a summary. It may not cover all possible information. If you have questions about this medicine, talk to your doctor, pharmacist, or health care provider.  © 2018 [employer] (2017-01-19 12:11:05)

## 2018-07-03 NOTE — PROGRESS NOTES
Chief Complaint   Patient presents with   • Follow-up     For cardiac management. Labs per PCP 2 weeks ago. Needs refills on cardiac meds for 90 days to [facility]. Also needs new RX of Nitro.    • Blood pressure issues     BP has been running high. PCP increased lisinopril to 20 mg BID 2 weeks ago. Saw PCP yesterday and advised to increase metoprolol to 50 mg BID. Wanted to ask your opinion first before increasing.    • Dizziness     Dizziness in the morning for the past month.       Subjective       Carlos Preston is a 89 y.o. male  with a history of ischemic heart disease diagnosed in 2007 when he underwent bypass surgery.  Repeat cath in 2009 showed patent grafts. On 10/3/17 he developed chest pain and elevated blood pressure, presented to the ER, labs were stable, CXR unremarkable, but he was noted to be in atrial fibrillation with controlled ventricular response which was a new finding.  He did not have any palpitations.  Xarelto was initiated.  At office followed up he was back in sinus. On 10/11/17, he underwent stress test and echocardiogram which showed small inferior wall ischemia with EF around 54%. Echocardiogram showed LV function normal at 55%.  Imdur was added with plan for cardiac cath if symptoms persisted.  Today he comes to the office for a follow up visit and reports concern over chest tightness, feeling more shortness of breath and becoming more easily fatigued. His blood pressure at home has been increased with systolic 180s /190s and diastolic 100-110 often. He does have a few readings of systolic in 130s and diastolic in 80s. He also has some episodes of dizziness, which he feels is related to blood pressure.      HPI     Cardiac History:    Past Surgical History:   Procedure Laterality Date   • CARDIAC CATHETERIZATION  05/16/2007    Dr. Mace: 60% LM and 70% LCX.   • CARDIAC CATHETERIZATION  08/19/2009    %, OM 85-90%,Patent grafts.   • CARDIAC SURGERY  2006    Bypass   •  CARDIOVASCULAR STRESS TEST  08/05/2009    ROBIN George: Dr. Mace- EF 29%, Diffuse ischemia.   • CARDIOVASCULAR STRESS TEST  11/07/2012    L. Myoview- Inferoseptal infarct with jon infarct ischemia. EF 41%.   • CARDIOVASCULAR STRESS TEST  03/23/2015    L. Myoview- EF51%,fixed defect , no ischemia burden.   • CARDIOVASCULAR STRESS TEST  10/11/2017    L.Myoview- EF 54%. Small inferior Ischemia.   • CORONARY ARTERY BYPASS GRAFT  05/17/2007    CABG:  SVG to LAD and LCX.   • ECHO - CONVERTED  08/06/2009    Dr. Mace- EF 50%.   • ECHO - CONVERTED  02/14/2011    EF 50%, Septal WMA.   • ECHO - CONVERTED  11/07/2012    EF 40-45%, RVSP 33 mmHg.   • ECHO - CONVERTED  03/23/2015    EF 45-50%, RVSP 40mmHg,mild MR, mild PHT   • ECHO - CONVERTED  05/01/2017    EF 50-55%, mild MR   • ECHO - CONVERTED  10/11/2017    EF 55%   • LUMBAR LAMINECTOMY  2005    Unsure of date   • US CAROTID UNILATERAL  04/16/2007    Mild Plaque in Rt. bulb and RECA.       Current Outpatient Prescriptions   Medication Sig Dispense Refill   • busPIRone (BUSPAR) 5 MG tablet Take 5 mg by mouth 2 (two) times a day.     • Cholecalciferol (VITAMIN D3) 2000 UNITS tablet Take  by mouth daily.     • isosorbide mononitrate (IMDUR) 30 MG 24 hr tablet Take 1 tablet by mouth Every Morning. 90 tablet 3   • loratadine (CLARITIN) 10 MG tablet Take 10 mg by mouth daily.     • Multiple Vitamins-Minerals (VISION VITAMINS) tablet Take  by mouth 2 (two) times a day.     • naproxen sodium (ALEVE) 220 MG tablet Take 220 mg by mouth 2 (Two) Times a Day As Needed.     • nitroglycerin (NITROSTAT) 0.4 MG SL tablet 1 under the tongue as needed for angina, may repeat q5mins for up three doses 30 tablet 1   • omeprazole (PriLOSEC) 20 MG capsule Take 20 mg by mouth daily.     • rivaroxaban (XARELTO) 20 MG tablet Take 1 tablet by mouth Daily. 90 tablet 3   • vitamin C (ASCORBIC ACID) 500 MG tablet Take 500 mg by mouth Daily.     • azilsartan medoxomil (EDARBI) 80 MG tablet tablet Take 1  "tablet by mouth Daily. 21 tablet 0   • metoprolol tartrate (LOPRESSOR) 25 MG tablet Take 1 tablet by mouth 2 (Two) Times a Day. 60 tablet 11   • NIFEdipine XL (PROCARDIA XL) 30 MG 24 hr tablet Take 1 tablet by mouth Daily. Hold if systolic blood pressure is less than 120. 30 tablet 6     No current facility-administered medications for this visit.        Patient has no known allergies.    Past Medical History:   Diagnosis Date   • Cancer (CMS/HCC)    • H/O prostatectomy    • Hearing loss    • Heart disease    • History of back surgery    • Hypercholesterolemia    • Hyperlipidemia    • Hypertension    • IHD (ischemic heart disease)     S/P CABG   • Pulmonary hypertension    • S/P knee replacement    • Sinus disorder    • thyroid ultrasound 08/28/2009    Normal       Social History     Social History   • Marital status:      Spouse name: N/A   • Number of children: N/A   • Years of education: N/A     Occupational History   • Not on file.     Social History Main Topics   • Smoking status: Former Smoker     Quit date: 9/22/1996   • Smokeless tobacco: Never Used   • Alcohol use No   • Drug use: No   • Sexual activity: Not on file     Other Topics Concern   • Not on file     Social History Narrative   • No narrative on file       History reviewed. No pertinent family history.    Review of Systems   Constitution: Positive for weakness and malaise/fatigue. Negative for decreased appetite.   HENT: Negative for congestion, hoarse voice and nosebleeds.    Eyes: Negative for redness and visual disturbance.   Cardiovascular: Positive for chest pain (\"tightness/pressure\", substernal) and dyspnea on exertion. Negative for irregular heartbeat, leg swelling, near-syncope and palpitations.   Respiratory: Positive for shortness of breath. Negative for cough and sputum production.    Endocrine: Negative for polydipsia, polyphagia and polyuria.   Hematologic/Lymphatic: Negative for bleeding problem. Bruises/bleeds easily.   Skin: " "Negative for dry skin and itching.   Musculoskeletal: Positive for falls. Negative for muscle cramps.   Gastrointestinal: Negative for change in bowel habit, heartburn, melena and nausea.   Genitourinary: Negative for dysuria and hematuria.   Neurological: Positive for dizziness.   Psychiatric/Behavioral: Negative for memory loss. The patient does not have insomnia and is not nervous/anxious.         Objective     BP (!) 208/102   Pulse 91   Ht 182.9 cm (72\")   Wt 110 kg (242 lb)   BMI 32.82 kg/m²     Physical Exam   Constitutional: He is oriented to person, place, and time. He does not appear ill. No distress.   HENT:   Head: Normocephalic.   Eyes: Conjunctivae are normal. Pupils are equal, round, and reactive to light.   Neck: Neck supple. No JVD present. Carotid bruit is not present. No thyromegaly present.   Cardiovascular: Regular rhythm and S1 normal.  Bradycardia present.    Murmur heard.   Systolic murmur is present with a grade of 2/6   Pulses:       Radial pulses are 2+ on the right side, and 2+ on the left side.   Loud S2   Pulmonary/Chest: Effort normal and breath sounds normal. He has no wheezes. He has no rales.   Abdominal: Soft. He exhibits no distension. There is no tenderness.   Musculoskeletal: He exhibits no edema or tenderness.   Neurological: He is alert and oriented to person, place, and time.   Skin: Skin is warm and dry. No rash noted. No pallor.   Psychiatric: His behavior is normal.        ECG 12 Lead  Date/Time: 7/3/2018 8:02 AM  Performed by: PEPITO ADORNO  Authorized by: PEPITO ADORNO   Comparison: compared with previous ECG from 1/12/2018  Comparison to previous ECG: Sinus angie, first degree AV block, 55 bpm  Rhythm: sinus bradycardia  Rate: bradycardic  BPM: 50  Conduction: 1st degree            Assessment/Plan      Carlos was seen today for follow-up, blood pressure issues and dizziness.    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  -     NIFEdipine XL (PROCARDIA XL) 30 MG 24 hr tablet; Take 1 tablet by mouth Daily. Hold if systolic blood pressure is less than 120.  -     Discontinue: azilsartan medoxomil (EDARBI) 80 MG tablet tablet; Take 1 tablet by mouth Daily.      Patient's Body mass index is 32.82 kg/m². BMI is above normal parameters. Recommendations include: nutrition counseling. Heart healthy diet encouraged.     Initial blood pressure elevated. EKG for management of PAF and medication shows sinus angie with first degree AV block. CHADVASc score is 4, managed with Xarelto.   He denies signs of bleeding but does bruise easily. Advised to try tylenol in place of Aleve. Advised to continue Xarelto. Due to bradycardia and first degree block, advised to decrease beta blocker to 25 mg bid. We will try changing ACE inhibitor to stronger therapy in form of Edrabi 80 mg daily. Samples given. We will also add low dose CCB in form of Procardia and he understands to hold if systolic < 120.     Prior to leaving office BP rechecked being 150/82.     Mr. Preston admits to symptoms suggestive of ischemia. His stress test done 8 months ago showed infarct with mild jon-infarct ischemia. His case was discussed with Dr. Castañeda and advised to proceed with cardiac catheterization. This will be scheduled and patient will be informed of specifics.     He will be seen in the office in follow up after cardiac cath.               Electronically signed by JESSA Oh,  July 3, 2018 3:11 PM

## 2018-07-03 NOTE — TELEPHONE ENCOUNTER
Mr. Muse came to office today for elevated BP. He also admitted to worsening shortness of breath, chest tightness/pressure and fatigue. October stress showed jon-infarct ischemia. EKG today showed sinus angie 50s and first degree AV block.   I changed lisinopril to edarbi, decreased metoprolol and added procardia.   Cath due to worsening symptoms?   thanks

## 2018-07-05 NOTE — TELEPHONE ENCOUNTER
Patient aware cardiac catheterization is scheduled for 7/23/18.  I mailed patient instructions today.  Advised patient when he receives instruction letter, if he has any questions to call me.  We discussed he will need to hold Xarelto 2 days prior to cath and this is on his instruction letter.

## 2018-07-05 NOTE — TELEPHONE ENCOUNTER
I spoke with patient regarding scheduling cardiac catheterization.  We tried to schedule on 7/20/18, but cath schedule at [facility] was full.  Cardiac cath scheduled on 7/23/18.  I will call patient with instructions.

## 2018-07-23 ENCOUNTER — TELEPHONE (OUTPATIENT)
Dept: CARDIOLOGY | Facility: CLINIC | Age: 83
End: 2018-07-23

## 2018-07-23 ENCOUNTER — OUTSIDE FACILITY SERVICE (OUTPATIENT)
Dept: CARDIOLOGY | Facility: CLINIC | Age: 83
End: 2018-07-23

## 2018-07-23 PROCEDURE — 93459 L HRT ART/GRFT ANGIO: CPT | Performed by: INTERNAL MEDICINE

## 2018-08-17 NOTE — PROGRESS NOTES
Chief Complaint   Patient presents with   • Hospital Follow-up     Patient had cardiac cath on 07/23/18 with no stenting to be managed medically. Reports that he does have shortness of breath, but reports that it is no worse than before.    • Aspirin     Patient is not on aspirin.    • Med Refill     Needs refill on nitroglycerin. Would like samples of Edarbi 80 mg if we have, if not would like script sent in to [facility] for 30 day supplys.       Cardiac Complaints  dyspnea      Subjective   Carlos Preston is a 89 y.o. male with HTN, hyperlipidemia, PAF, and ischemic heart disease diagnosed in 2007 when he underwent bypass surgery.  Repeat cath in 2009 showed patent grafts. On 10/3/17 he developed chest pain and elevated blood pressure, presented to the ER, labs were stable, CXR unremarkable, but he was noted to be in atrial fibrillation with controlled ventricular response which was a new finding.  He did not have any palpitations. Xarelto was initiated.  At office followed up he was back in sinus. On 10/11/17, he underwent stress test and echocardiogram which showed small inferior wall ischemia with EF around 54%. Echocardiogram showed LV function normal at 55%.  Imdur was added with plan for cardiac cath if symptoms persisted.  ACE was changed to edarbi for better blood pressure control. Cath was then advised as symptoms persisted and showed EF of 55% and patent grafts.  He comes today for follow up and denies any new concerns.  He does continue to report shortness of breath but states it is no worse than has been for sometime.  He does state he is often dizzy at home and has a blood pressure log that shows elevated blood pressures but bradycardia in the 40's and 50's.  He has remained off of ASA since being on xarelto therapy for PAF management.  Patient would like to have samples of edarbi if possible.  Refills of cardiac meds requested.  Labs done with PCP.          Cardiac History  Past  Surgical History:   Procedure Laterality Date   • CARDIAC CATHETERIZATION  05/16/2007    Dr. Mace: 60% LM and 70% LCX.   • CARDIAC CATHETERIZATION  08/19/2009    %, OM 85-90%,Patent grafts.   • CARDIAC CATHETERIZATION  07/23/2018    Patent Grafts   • CARDIAC SURGERY  2006    Bypass   • CARDIOVASCULAR STRESS TEST  08/05/2009    ROBIN George: Dr. Mace- EF 29%, Diffuse ischemia.   • CARDIOVASCULAR STRESS TEST  11/07/2012    L. Myoview- Inferoseptal infarct with jon infarct ischemia. EF 41%.   • CARDIOVASCULAR STRESS TEST  03/23/2015    L. Myoview- EF51%,fixed defect , no ischemia burden.   • CARDIOVASCULAR STRESS TEST  10/11/2017    L.Myoview- EF 54%. Small inferior Ischemia.   • CORONARY ARTERY BYPASS GRAFT  05/17/2007    CABG:  SVG to LAD and LCX.   • ECHO - CONVERTED  08/06/2009    Dr. Mace- EF 50%.   • ECHO - CONVERTED  02/14/2011    EF 50%, Septal WMA.   • ECHO - CONVERTED  11/07/2012    EF 40-45%, RVSP 33 mmHg.   • ECHO - CONVERTED  03/23/2015    EF 45-50%, RVSP 40mmHg,mild MR, mild PHT   • ECHO - CONVERTED  05/01/2017    EF 50-55%, mild MR   • ECHO - CONVERTED  10/11/2017    EF 55%   • LUMBAR LAMINECTOMY  2005    Unsure of date   • US CAROTID UNILATERAL  04/16/2007    Mild Plaque in Rt. bulb and RECA.       Current Outpatient Prescriptions   Medication Sig Dispense Refill   • azilsartan medoxomil (EDARBI) 80 MG tablet tablet Take 1 tablet by mouth Daily. 14 tablet 0   • busPIRone (BUSPAR) 5 MG tablet Take 5 mg by mouth 2 (two) times a day.     • Cholecalciferol (VITAMIN D3) 2000 UNITS tablet Take  by mouth daily.     • isosorbide mononitrate (IMDUR) 30 MG 24 hr tablet Take 1 tablet by mouth Every Morning. 90 tablet 3   • loratadine (CLARITIN) 10 MG tablet Take 10 mg by mouth daily.     • Multiple Vitamins-Minerals (VISION VITAMINS) tablet Take  by mouth 2 (two) times a day.     • NIFEdipine XL (PROCARDIA XL) 60 MG 24 hr tablet Take 60 mg by mouth Daily.     • nitroglycerin (NITROSTAT) 0.4 MG SL tablet 1  under the tongue as needed for angina, may repeat q5mins for up three doses 30 tablet 1   • omeprazole (PriLOSEC) 20 MG capsule Take 20 mg by mouth daily.     • rivaroxaban (XARELTO) 20 MG tablet Take 1 tablet by mouth Daily. 90 tablet 3   • vitamin C (ASCORBIC ACID) 500 MG tablet Take 500 mg by mouth Daily.     • meloxicam (MOBIC) 7.5 MG tablet Take 1 tablet by mouth Daily. 30 tablet 8   • metoprolol succinate XL (TOPROL-XL) 25 MG 24 hr tablet Take 1 tablet by mouth Daily. 90 tablet 3     No current facility-administered medications for this visit.        Patient has no known allergies.    Past Medical History:   Diagnosis Date   • Cancer (CMS/HCC)    • H/O prostatectomy    • Hearing loss    • Heart disease    • History of back surgery    • Hypercholesterolemia    • Hyperlipidemia    • Hypertension    • IHD (ischemic heart disease)     S/P CABG   • Pulmonary hypertension    • S/P knee replacement    • Sinus disorder    • thyroid ultrasound 08/28/2009    Normal       Social History     Social History   • Marital status:      Spouse name: N/A   • Number of children: N/A   • Years of education: N/A     Occupational History   • Not on file.     Social History Main Topics   • Smoking status: Former Smoker     Quit date: 9/22/1996   • Smokeless tobacco: Never Used   • Alcohol use No   • Drug use: No   • Sexual activity: Not on file     Other Topics Concern   • Not on file     Social History Narrative   • No narrative on file       History reviewed. No pertinent family history.    Review of Systems   Constitution: Negative for weakness and malaise/fatigue.   Cardiovascular: Positive for dyspnea on exertion. Negative for chest pain, irregular heartbeat, near-syncope, orthopnea, palpitations and syncope.   Respiratory: Positive for shortness of breath. Negative for cough and wheezing.    Gastrointestinal: Negative for anorexia, heartburn, nausea and vomiting.   Genitourinary: Negative for dysuria, hematuria,  "hesitancy and nocturia.   Neurological: Positive for dizziness. Negative for light-headedness and loss of balance.           Objective     /98 (BP Location: Left arm)   Pulse (!) 49   Ht 182.9 cm (72.01\")   Wt 111 kg (244 lb)   BMI 33.08 kg/m²     Physical Exam   Constitutional: He is oriented to person, place, and time. He appears well-developed and well-nourished.   HENT:   Head: Normocephalic and atraumatic.   Eyes: Pupils are equal, round, and reactive to light. EOM are normal.   Neck: Normal range of motion. Neck supple.   Cardiovascular: Regular rhythm.  Bradycardia present.    Murmur heard.  Pulmonary/Chest: Effort normal and breath sounds normal.   Musculoskeletal: Normal range of motion.   Neurological: He is alert and oriented to person, place, and time.   Skin: Skin is warm and dry.   Psychiatric: He has a normal mood and affect. His behavior is normal.         ECG 12 Lead  Date/Time: 8/21/2018 10:10 AM  Performed by: PABLO JAY  Authorized by: PABLO JAY   Rhythm: sinus bradycardia  BPM: 49  Conduction: 1st degree  Clinical impression: abnormal ECG  Comments: Normal QT            Assessment/Plan     HR is low today at 49.  Metoprolol therapy will be changed to lopressor 25mg XL as patient does report being symptomatic with dizziness with bradycardia.  EKG done today for PAF shows sinus angie with first degree block.  He will continue on xarelto therapy for anticoagulation.  BP remains elevated at 160/98, patient will be encouraged to d/c his aleve therapy in favor of mobic for better blood pressure control.  He will return on Friday for both EKG and BP check.  If needed, further medication changes will be recommended at that time.  Limited sodium intake recommended. He continues use of CPAP therapy for sleep apnea management. Most recent cath findings with patent grafts and lower limit of normal LV function discussed with patient and daughter. No new cardiac workup will be " recommended at this time as new/worsening cardiac symptoms are denied. Labs are done with your office, could we have most recent for our review?  Refills of cardiac meds sent per request.  BMI elevated at 33.08, good cardiac diet with limited caloric intake, starches, and limited fat recommended.  6 month follow up advised or sooner if needed.         Problems Addressed this Visit        Cardiovascular and Mediastinum    Hyperlipemia    HTN (hypertension)    Relevant Medications    NIFEdipine XL (PROCARDIA XL) 60 MG 24 hr tablet    metoprolol succinate XL (TOPROL-XL) 25 MG 24 hr tablet    Paroxysmal atrial fibrillation (CMS/HCC)    Relevant Medications    NIFEdipine XL (PROCARDIA XL) 60 MG 24 hr tablet    metoprolol succinate XL (TOPROL-XL) 25 MG 24 hr tablet    nitroglycerin (NITROSTAT) 0.4 MG SL tablet    IHD (ischemic heart disease)    Relevant Medications    NIFEdipine XL (PROCARDIA XL) 60 MG 24 hr tablet    metoprolol succinate XL (TOPROL-XL) 25 MG 24 hr tablet    nitroglycerin (NITROSTAT) 0.4 MG SL tablet       Other    Hx of CABG      Other Visit Diagnoses     Sinus bradycardia    -  Primary    Relevant Medications    NIFEdipine XL (PROCARDIA XL) 60 MG 24 hr tablet    metoprolol succinate XL (TOPROL-XL) 25 MG 24 hr tablet    nitroglycerin (NITROSTAT) 0.4 MG SL tablet    Other Relevant Orders    ECG 12 Lead    Dizziness        Obstructive sleep apnea syndrome        Overweight              Patient's Body mass index is 33.08 kg/m². BMI is above normal parameters. Recommendations include: nutrition counseling.          Electronically signed by JESSA Moreno August 21, 2018 11:47 AM

## 2018-08-21 ENCOUNTER — TELEPHONE (OUTPATIENT)
Dept: CARDIOLOGY | Facility: CLINIC | Age: 83
End: 2018-08-21

## 2018-08-21 ENCOUNTER — OFFICE VISIT (OUTPATIENT)
Dept: CARDIOLOGY | Facility: CLINIC | Age: 83
End: 2018-08-21

## 2018-08-21 VITALS
HEIGHT: 72 IN | BODY MASS INDEX: 33.05 KG/M2 | SYSTOLIC BLOOD PRESSURE: 160 MMHG | DIASTOLIC BLOOD PRESSURE: 98 MMHG | WEIGHT: 244 LBS | HEART RATE: 49 BPM

## 2018-08-21 DIAGNOSIS — R00.1 SINUS BRADYCARDIA: Primary | ICD-10-CM

## 2018-08-21 DIAGNOSIS — E78.00 PURE HYPERCHOLESTEROLEMIA: ICD-10-CM

## 2018-08-21 DIAGNOSIS — I10 ESSENTIAL HYPERTENSION: ICD-10-CM

## 2018-08-21 DIAGNOSIS — R42 DIZZINESS: ICD-10-CM

## 2018-08-21 DIAGNOSIS — I25.9 IHD (ISCHEMIC HEART DISEASE): ICD-10-CM

## 2018-08-21 DIAGNOSIS — G47.33 OBSTRUCTIVE SLEEP APNEA SYNDROME: ICD-10-CM

## 2018-08-21 DIAGNOSIS — I48.0 PAROXYSMAL ATRIAL FIBRILLATION (HCC): ICD-10-CM

## 2018-08-21 DIAGNOSIS — E66.3 OVERWEIGHT: ICD-10-CM

## 2018-08-21 DIAGNOSIS — Z95.1 HX OF CABG: ICD-10-CM

## 2018-08-21 PROCEDURE — 99214 OFFICE O/P EST MOD 30 MIN: CPT | Performed by: NURSE PRACTITIONER

## 2018-08-21 PROCEDURE — 93000 ELECTROCARDIOGRAM COMPLETE: CPT | Performed by: NURSE PRACTITIONER

## 2018-08-21 RX ORDER — NITROGLYCERIN 0.4 MG/1
TABLET SUBLINGUAL
Qty: 30 TABLET | Refills: 1 | Status: SHIPPED | OUTPATIENT
Start: 2018-08-21 | End: 2019-07-09 | Stop reason: SDUPTHER

## 2018-08-21 RX ORDER — NIFEDIPINE 60 MG/1
60 TABLET, EXTENDED RELEASE ORAL DAILY
COMMUNITY
End: 2018-11-27 | Stop reason: SDUPTHER

## 2018-08-21 RX ORDER — MELOXICAM 7.5 MG/1
7.5 TABLET ORAL DAILY
Qty: 30 TABLET | Refills: 8 | Status: SHIPPED | OUTPATIENT
Start: 2018-08-21 | End: 2019-01-08

## 2018-08-21 RX ORDER — METOPROLOL SUCCINATE 25 MG/1
25 TABLET, EXTENDED RELEASE ORAL DAILY
Qty: 90 TABLET | Refills: 3 | Status: SHIPPED | OUTPATIENT
Start: 2018-08-21 | End: 2019-01-08 | Stop reason: SDUPTHER

## 2018-08-24 ENCOUNTER — CLINICAL SUPPORT (OUTPATIENT)
Dept: CARDIOLOGY | Facility: CLINIC | Age: 83
End: 2018-08-24

## 2018-08-24 ENCOUNTER — TELEPHONE (OUTPATIENT)
Dept: CARDIOLOGY | Facility: CLINIC | Age: 83
End: 2018-08-24

## 2018-08-24 DIAGNOSIS — I48.0 PAF (PAROXYSMAL ATRIAL FIBRILLATION) (HCC): Primary | ICD-10-CM

## 2018-08-24 PROCEDURE — 93000 ELECTROCARDIOGRAM COMPLETE: CPT | Performed by: NURSE PRACTITIONER

## 2018-08-24 NOTE — PROGRESS NOTES
Procedure     ECG 12 Lead  Date/Time: 8/24/2018 10:56 AM  Performed by: PABLO JAY  Authorized by: PABLO JAY   Rhythm: sinus rhythm  BPM: 63  Conduction: 1st degree  Clinical impression: abnormal ECG

## 2018-09-06 ENCOUNTER — TELEPHONE (OUTPATIENT)
Dept: CARDIOLOGY | Facility: CLINIC | Age: 83
End: 2018-09-06

## 2018-09-06 NOTE — TELEPHONE ENCOUNTER
Patient's daughter, Bettye Hollingsworth, called stating that patient BP seems to have been well controlled with the Edarbi 80 mg QD. She was asking us to send in script to the [facility]. Script sent.

## 2018-11-27 RX ORDER — NIFEDIPINE 60 MG/1
TABLET, EXTENDED RELEASE ORAL
Qty: 30 TABLET | Refills: 11 | Status: SHIPPED | OUTPATIENT
Start: 2018-11-27 | End: 2019-01-08 | Stop reason: SDUPTHER

## 2019-01-07 PROCEDURE — 93000 ELECTROCARDIOGRAM COMPLETE: CPT | Performed by: NURSE PRACTITIONER

## 2019-01-07 NOTE — PROGRESS NOTES
Chief Complaint   Patient presents with   • Follow-up     cardiac management  Pt having hip pain.  He has had testing and is scheduled to see ortho in [place] on Jan. 31st.  pt states he has had labs with PCP within the last few months.  He took meds about 7:30 am.  BP rechecked still 178/98   • Med Refill     pt brought a med list.  Needs refills on Imdur, metoprolol, nifedipine, 90 days [place]        Subjective       Carlos Preston is a 89 y.o. male  with HTN, hyperlipidemia, PAF, and ischemic heart disease diagnosed in 2007 when he underwent bypass surgery.  Repeat cath in 2009 showed patent grafts. On 10/3/17 he developed chest pain and elevated blood pressure, presented to the ER, labs were stable, CXR unremarkable, but he was noted to be in atrial fibrillation with controlled ventricular response which was a new finding.  He did not have any palpitations. Xarelto was initiated.  At office followed up he was back in sinus. On 10/11/17, he underwent stress test and echocardiogram which showed small inferior wall ischemia with EF around 54%. Echocardiogram showed LV function normal at 55%.  Imdur was added with plan for cardiac cath if symptoms persisted.  ACE was changed to edarbi for better blood pressure control. Cath was then advised as symptoms persisted which showed EF of 55% and patent grafts. At his August 2018 office visit, EKG showed sinus angie with first degree AV block and the dose of beta blocker was decreased.     Today he comes to the office for a follow up visit and no cardiac concerns are voiced. His BP is increased today. At home, he reports blood pressure can be low normal 100/50 at times and elevated at times. He admits to taking medication routinely. He continues Xarelto without signs of bleeding. His main complaint today is pain in his hip for which he has had a recent MRI awaiting results. He reports improvement of hip since treatments by chiropractor and does not anticipate  surgery.     HPI     Cardiac History:    Past Surgical History:   Procedure Laterality Date   • CARDIAC CATHETERIZATION  05/16/2007    Dr. Mace: 60% LM and 70% LCX.   • CARDIAC CATHETERIZATION  08/19/2009    %, OM 85-90%,Patent grafts.   • CARDIAC CATHETERIZATION  07/23/2018    Patent Grafts   • CARDIAC SURGERY  2006    Bypass   • CARDIOVASCULAR STRESS TEST  08/05/2009    ROBIN George: Dr. Mace- EF 29%, Diffuse ischemia.   • CARDIOVASCULAR STRESS TEST  11/07/2012    L. Myoview- Inferoseptal infarct with jon infarct ischemia. EF 41%.   • CARDIOVASCULAR STRESS TEST  03/23/2015    L. Myoview- EF51%,fixed defect , no ischemia burden.   • CARDIOVASCULAR STRESS TEST  10/11/2017    L.Myoview- EF 54%. Small inferior Ischemia.   • CORONARY ARTERY BYPASS GRAFT  05/17/2007    CABG:  SVG to LAD and LCX.   • ECHO - CONVERTED  08/06/2009    Dr. Mace- EF 50%.   • ECHO - CONVERTED  02/14/2011    EF 50%, Septal WMA.   • ECHO - CONVERTED  11/07/2012    EF 40-45%, RVSP 33 mmHg.   • ECHO - CONVERTED  03/23/2015    EF 45-50%, RVSP 40mmHg,mild MR, mild PHT   • ECHO - CONVERTED  05/01/2017    EF 50-55%, mild MR   • ECHO - CONVERTED  10/11/2017    EF 55%   • LUMBAR LAMINECTOMY  2005    Unsure of date   • US CAROTID UNILATERAL  04/16/2007    Mild Plaque in Rt. bulb and RECA.       Current Outpatient Medications   Medication Sig Dispense Refill   • acetaminophen (TYLENOL) 650 MG 8 hr tablet Take 650 mg by mouth Daily.     • azilsartan medoxomil (EDARBI) 80 MG tablet tablet Take 1 tablet by mouth Daily. 90 tablet 3   • busPIRone (BUSPAR) 5 MG tablet Take 5 mg by mouth 2 (two) times a day.     • Cholecalciferol (VITAMIN D3) 2000 UNITS tablet Take  by mouth daily.     • isosorbide mononitrate (IMDUR) 30 MG 24 hr tablet Take 1 tablet by mouth Every Morning. 90 tablet 3   • loratadine (CLARITIN) 10 MG tablet Take 10 mg by mouth daily.     • metoprolol succinate XL (TOPROL-XL) 25 MG 24 hr tablet Take 1 tablet by mouth Daily. 90 tablet 3    • Multiple Vitamins-Minerals (VISION VITAMINS) tablet Take  by mouth 2 (two) times a day.     • NIFEdipine XL (PROCARDIA XL) 60 MG 24 hr tablet Take 1 tablet by mouth Daily. 90 tablet 3   • nitroglycerin (NITROSTAT) 0.4 MG SL tablet 1 under the tongue as needed for angina, may repeat q5mins for up three doses 30 tablet 1   • omeprazole (PriLOSEC) 20 MG capsule Take 20 mg by mouth daily.     • rivaroxaban (XARELTO) 20 MG tablet Take 1 tablet by mouth Daily. 90 tablet 3   • vitamin C (ASCORBIC ACID) 500 MG tablet Take 500 mg by mouth Daily.       No current facility-administered medications for this visit.        Patient has no known allergies.    Past Medical History:   Diagnosis Date   • Cancer (CMS/HCC)    • H/O prostatectomy    • Hearing loss    • Heart disease    • History of back surgery    • Hypercholesterolemia    • Hyperlipidemia    • Hypertension    • IHD (ischemic heart disease)     S/P CABG   • Pulmonary hypertension (CMS/HCC)    • S/P knee replacement    • Sinus disorder    • thyroid ultrasound 2009    Normal       Social History     Socioeconomic History   • Marital status:      Spouse name: Not on file   • Number of children: Not on file   • Years of education: Not on file   • Highest education level: Not on file   Social Needs   • Financial resource strain: Not on file   • Food insecurity - worry: Not on file   • Food insecurity - inability: Not on file   • Transportation needs - medical: Not on file   • Transportation needs - non-medical: Not on file   Occupational History   • Not on file   Tobacco Use   • Smoking status: Former Smoker     Last attempt to quit: 1996     Years since quittin.3   • Smokeless tobacco: Never Used   Substance and Sexual Activity   • Alcohol use: No   • Drug use: No   • Sexual activity: Not on file   Other Topics Concern   • Not on file   Social History Narrative   • Not on file       History reviewed. No pertinent family history.    Review of  "Systems   Constitution: Positive for weight gain. Negative for decreased appetite.   HENT: Negative for congestion and nosebleeds.    Eyes: Negative for redness and visual disturbance.   Cardiovascular: Negative for chest pain, leg swelling and palpitations.   Respiratory: Positive for shortness of breath (same). Negative for cough.    Endocrine: Negative for polydipsia, polyphagia and polyuria.   Hematologic/Lymphatic: Negative for bleeding problem. Bruises/bleeds easily.   Skin: Negative for color change and itching.   Musculoskeletal: Positive for joint pain and stiffness. Negative for falls (using cane).   Gastrointestinal: Negative for dysphagia, melena and nausea.   Genitourinary: Negative for dysuria and hematuria.   Neurological: Positive for light-headedness (when BP goes low). Negative for headaches.      Objective     /98 (BP Location: Left arm)   Pulse 65   Ht 182.9 cm (72\")   Wt 114 kg (252 lb)   BMI 34.18 kg/m²     Physical Exam   Constitutional: He is oriented to person, place, and time. He appears well-nourished. No distress.   HENT:   Head: Normocephalic.   Eyes: Conjunctivae are normal. Pupils are equal, round, and reactive to light.   Neck: Normal range of motion. Neck supple. Carotid bruit is not present.   Cardiovascular: Normal rate, regular rhythm and S1 normal.   No murmur heard.  Pulses:       Radial pulses are 2+ on the right side, and 2+ on the left side.   LOud S2   Pulmonary/Chest: Breath sounds normal. He has no wheezes. He has no rales.   Abdominal: Soft. Bowel sounds are normal. He exhibits no distension.   Musculoskeletal: Normal range of motion. He exhibits no edema.   Neurological: He is alert and oriented to person, place, and time.   Skin: Skin is warm and dry. No pallor.   Psychiatric: He has a normal mood and affect. His speech is normal and behavior is normal. Thought content normal.        ECG 12 Lead  Date/Time: 1/7/2019 12:15 PM  Performed by: Ella Escalante, " "APRN  Authorized by: Ella Escalante APRN   Comparison: compared with previous ECG from 8/21/2018  Comparison to previous ECG: Sinus angie, first degree AV block  Rhythm: sinus rhythm  Rate: normal  BPM: 65  Conduction: 1st degree            Assessment/Plan      Carlos was seen today for follow-up and med refill.    Diagnoses and all orders for this visit:    Paroxysmal atrial fibrillation (CMS/HCC)    First degree AV block    Essential hypertension    IHD (ischemic heart disease)    Pure hypercholesterolemia    Hx of CABG    Current use of long term anticoagulation    Other orders  -     ECG 12 Lead  -     isosorbide mononitrate (IMDUR) 30 MG 24 hr tablet; Take 1 tablet by mouth Every Morning.  -     metoprolol succinate XL (TOPROL-XL) 25 MG 24 hr tablet; Take 1 tablet by mouth Daily.  -     NIFEdipine XL (PROCARDIA XL) 60 MG 24 hr tablet; Take 1 tablet by mouth Daily.    EKG for management of PAF and medication today shows sinus with mild first degree AV block. Same dose Metoprolol advised.     His blood pressure is increased today. He monitors BP at home and some days it is lower at which time he does feel \"weaker\". I am afraid to increase BP medication at this time. Mr. Preston agree to monitor BP at home and to call if remains elevated. Consider changing edarbi to edarbyclor if labs are stable.     Mr. Preston states he did have labs, ordered by ortho, drawn at [facility] prior to MRI, but I do not have a copy of results.     Patient's Body mass index is 34.18 kg/m². BMI is above normal parameters. Recommendations include: nutrition counseling.     The report of his cardiac catheterization done about 6 months ago was reviewed which showed patent grafts. He currently denies cardiac concerns or symptoms. No further testing advised today. We will plan to see him back in 6 months or sooner for problems.           Electronically signed by JESSA Oh,  January 8, 2019 11:03 AM  "

## 2019-01-08 ENCOUNTER — OFFICE VISIT (OUTPATIENT)
Dept: CARDIOLOGY | Facility: CLINIC | Age: 84
End: 2019-01-08

## 2019-01-08 VITALS
HEIGHT: 72 IN | BODY MASS INDEX: 34.13 KG/M2 | HEART RATE: 65 BPM | WEIGHT: 252 LBS | SYSTOLIC BLOOD PRESSURE: 178 MMHG | DIASTOLIC BLOOD PRESSURE: 98 MMHG

## 2019-01-08 DIAGNOSIS — I44.0 FIRST DEGREE AV BLOCK: ICD-10-CM

## 2019-01-08 DIAGNOSIS — I48.0 PAROXYSMAL ATRIAL FIBRILLATION (HCC): Primary | ICD-10-CM

## 2019-01-08 DIAGNOSIS — Z79.01 CURRENT USE OF LONG TERM ANTICOAGULATION: ICD-10-CM

## 2019-01-08 DIAGNOSIS — E78.00 PURE HYPERCHOLESTEROLEMIA: ICD-10-CM

## 2019-01-08 DIAGNOSIS — Z95.1 HX OF CABG: ICD-10-CM

## 2019-01-08 DIAGNOSIS — I25.9 IHD (ISCHEMIC HEART DISEASE): ICD-10-CM

## 2019-01-08 DIAGNOSIS — I10 ESSENTIAL HYPERTENSION: ICD-10-CM

## 2019-01-08 PROCEDURE — 99213 OFFICE O/P EST LOW 20 MIN: CPT | Performed by: NURSE PRACTITIONER

## 2019-01-08 RX ORDER — NIFEDIPINE 60 MG/1
60 TABLET, EXTENDED RELEASE ORAL DAILY
Qty: 90 TABLET | Refills: 3 | Status: SHIPPED | OUTPATIENT
Start: 2019-01-08 | End: 2019-07-09 | Stop reason: SDUPTHER

## 2019-01-08 RX ORDER — ISOSORBIDE MONONITRATE 30 MG/1
30 TABLET, EXTENDED RELEASE ORAL EVERY MORNING
Qty: 90 TABLET | Refills: 3 | Status: SHIPPED | OUTPATIENT
Start: 2019-01-08 | End: 2019-07-09 | Stop reason: SDUPTHER

## 2019-01-08 RX ORDER — METOPROLOL SUCCINATE 25 MG/1
25 TABLET, EXTENDED RELEASE ORAL DAILY
Qty: 90 TABLET | Refills: 3 | Status: SHIPPED | OUTPATIENT
Start: 2019-01-08 | End: 2019-07-09 | Stop reason: SDUPTHER

## 2019-01-08 RX ORDER — SENNOSIDES 8.6 MG
650 CAPSULE ORAL DAILY
COMMUNITY
End: 2022-09-08

## 2019-02-12 ENCOUNTER — TELEPHONE (OUTPATIENT)
Dept: CARDIOLOGY | Facility: CLINIC | Age: 84
End: 2019-02-12

## 2019-02-12 DIAGNOSIS — I48.91 NEW ONSET ATRIAL FIBRILLATION (HCC): ICD-10-CM

## 2019-02-12 NOTE — TELEPHONE ENCOUNTER
Patient is needing RF on Xarelto #90. Send to [facility] If any questions [facility] patient [phone]

## 2019-05-28 RX ORDER — MELOXICAM 7.5 MG/1
7.5 TABLET ORAL DAILY
Qty: 30 TABLET | Refills: 0 | OUTPATIENT
Start: 2019-05-28

## 2019-07-09 ENCOUNTER — OFFICE VISIT (OUTPATIENT)
Dept: CARDIOLOGY | Facility: CLINIC | Age: 84
End: 2019-07-09

## 2019-07-09 VITALS
WEIGHT: 242 LBS | HEART RATE: 104 BPM | SYSTOLIC BLOOD PRESSURE: 130 MMHG | HEIGHT: 72 IN | BODY MASS INDEX: 32.78 KG/M2 | DIASTOLIC BLOOD PRESSURE: 80 MMHG

## 2019-07-09 DIAGNOSIS — I48.0 PAROXYSMAL ATRIAL FIBRILLATION (HCC): ICD-10-CM

## 2019-07-09 DIAGNOSIS — Z79.01 LONG TERM (CURRENT) USE OF ANTICOAGULANTS: ICD-10-CM

## 2019-07-09 DIAGNOSIS — I25.9 IHD (ISCHEMIC HEART DISEASE): Primary | ICD-10-CM

## 2019-07-09 DIAGNOSIS — I10 ESSENTIAL HYPERTENSION: ICD-10-CM

## 2019-07-09 DIAGNOSIS — Z95.1 HX OF CABG: ICD-10-CM

## 2019-07-09 DIAGNOSIS — E78.00 PURE HYPERCHOLESTEROLEMIA: ICD-10-CM

## 2019-07-09 DIAGNOSIS — M54.50 BACK PAIN OF THORACOLUMBAR REGION: ICD-10-CM

## 2019-07-09 DIAGNOSIS — I48.0 PAF (PAROXYSMAL ATRIAL FIBRILLATION) (HCC): ICD-10-CM

## 2019-07-09 DIAGNOSIS — M54.6 BACK PAIN OF THORACOLUMBAR REGION: ICD-10-CM

## 2019-07-09 DIAGNOSIS — R06.02 SHORTNESS OF BREATH: ICD-10-CM

## 2019-07-09 DIAGNOSIS — I20.8 CHRONIC STABLE ANGINA (HCC): ICD-10-CM

## 2019-07-09 DIAGNOSIS — E66.9 OBESITY (BMI 30.0-34.9): ICD-10-CM

## 2019-07-09 PROCEDURE — 99214 OFFICE O/P EST MOD 30 MIN: CPT | Performed by: NURSE PRACTITIONER

## 2019-07-09 PROCEDURE — 93000 ELECTROCARDIOGRAM COMPLETE: CPT | Performed by: NURSE PRACTITIONER

## 2019-07-09 RX ORDER — METOPROLOL SUCCINATE 25 MG/1
25 TABLET, EXTENDED RELEASE ORAL DAILY
Qty: 90 TABLET | Refills: 3 | Status: SHIPPED | OUTPATIENT
Start: 2019-07-09 | End: 2020-09-29

## 2019-07-09 RX ORDER — NIFEDIPINE 60 MG/1
60 TABLET, EXTENDED RELEASE ORAL DAILY
Qty: 90 TABLET | Refills: 3 | Status: SHIPPED | OUTPATIENT
Start: 2019-07-09 | End: 2020-03-31 | Stop reason: SDUPTHER

## 2019-07-09 RX ORDER — ISOSORBIDE MONONITRATE 30 MG/1
30 TABLET, EXTENDED RELEASE ORAL EVERY MORNING
Qty: 90 TABLET | Refills: 3 | Status: SHIPPED | OUTPATIENT
Start: 2019-07-09 | End: 2020-09-29

## 2019-07-09 RX ORDER — LISINOPRIL 20 MG/1
20 TABLET ORAL DAILY
COMMUNITY
End: 2019-09-24 | Stop reason: ALTCHOICE

## 2019-07-09 RX ORDER — NITROGLYCERIN 0.4 MG/1
TABLET SUBLINGUAL
Qty: 30 TABLET | Refills: 1 | Status: SHIPPED | OUTPATIENT
Start: 2019-07-09 | End: 2020-01-14 | Stop reason: SDUPTHER

## 2019-07-09 NOTE — PROGRESS NOTES
Chief Complaint   Patient presents with   • Follow-up     For cardiac management. Patient is not on aspirin. Reports that when he gets up at night and lays back down he gets some chest pain, relieved by taking some deep breaths. Reports that he is very short of breath, walking short distances makes him short of breath. Last lab work was done about 6 months ago per PCP, not in chart.    • Med Refill     Needs refills on cardiac medications including nitroglycerin. 90 day supplies to [facility].        Cardiac Complaints  chest pressure/discomfort and dyspnea      Subjective   Carlos Preston is a 89 y.o. male with HTN, hyperlipidemia, PAF, and ischemic heart disease diagnosed in 2007 when he underwent bypass surgery.  Repeat cath in 2009 showed patent grafts. On 10/3/17 he developed chest pain and elevated blood pressure, presented to the ER, labs were stable, CXR unremarkable, but he was noted to be in atrial fibrillation with controlled ventricular response which was a new finding.  He did not have any palpitations. Xarelto was initiated.  At office followed up he was back in sinus. On 10/11/17, he underwent stress test and echocardiogram which showed small inferior wall ischemia with EF around 54%. Echocardiogram showed LV function normal at 55%.  Imdur was added with plan for cardiac cath if symptoms persisted.  ACE was changed to edarbi for better blood pressure control. Cath was then advised as symptoms persisted which showed EF of 55% and patent grafts. At his August 2018 office visit, EKG showed sinus angie with first degree AV block and the dose of beta blocker was decreased. He returns today for follow up and reports some issues with chest pain when he gets up at night relieved by deep breaths.  He does admit to being very winded with any exertion, even short distances.  He is not sure if some of this is caused by his back as he has recently been struggling with a great deal of back pain from  arthritis which he plans to discuss with PCP.  Labs he maintains are followed by PCP and were done most recently 6 months ago.  Refills of cardiac meds requested.                Cardiac History  Past Surgical History:   Procedure Laterality Date   • CARDIAC CATHETERIZATION  05/16/2007    Dr. Mace: 60% LM and 70% LCX.   • CARDIAC CATHETERIZATION  08/19/2009    %, OM 85-90%,Patent grafts.   • CARDIAC CATHETERIZATION  07/23/2018    Patent Grafts   • CARDIAC SURGERY  2006    Bypass   • CARDIOVASCULAR STRESS TEST  08/05/2009    ROBIN George: Dr. Mace- EF 29%, Diffuse ischemia.   • CARDIOVASCULAR STRESS TEST  11/07/2012    L. Myoview- Inferoseptal infarct with jon infarct ischemia. EF 41%.   • CARDIOVASCULAR STRESS TEST  03/23/2015    L. Myoview- EF51%,fixed defect , no ischemia burden.   • CARDIOVASCULAR STRESS TEST  10/11/2017    L.Myoview- EF 54%. Small inferior Ischemia.   • CORONARY ARTERY BYPASS GRAFT  05/17/2007    CABG:  SVG to LAD and LCX.   • ECHO - CONVERTED  08/06/2009    Dr. Mace- EF 50%.   • ECHO - CONVERTED  02/14/2011    EF 50%, Septal WMA.   • ECHO - CONVERTED  11/07/2012    EF 40-45%, RVSP 33 mmHg.   • ECHO - CONVERTED  03/23/2015    EF 45-50%, RVSP 40mmHg,mild MR, mild PHT   • ECHO - CONVERTED  05/01/2017    EF 50-55%, mild MR   • ECHO - CONVERTED  10/11/2017    EF 55%   • LUMBAR LAMINECTOMY  2005    Unsure of date   • US CAROTID UNILATERAL  04/16/2007    Mild Plaque in Rt. bulb and RECA.       Current Outpatient Medications   Medication Sig Dispense Refill   • acetaminophen (TYLENOL) 650 MG 8 hr tablet Take 650 mg by mouth Daily.     • azilsartan medoxomil (EDARBI) 80 MG tablet tablet Take 1 tablet by mouth Daily. 90 tablet 3   • busPIRone (BUSPAR) 5 MG tablet Take 5 mg by mouth 2 (two) times a day.     • Cholecalciferol (VITAMIN D3) 2000 UNITS tablet Take  by mouth daily.     • isosorbide mononitrate (IMDUR) 30 MG 24 hr tablet Take 1 tablet by mouth Every Morning. 90 tablet 3   • lisinopril  (PRINIVIL,ZESTRIL) 20 MG tablet Take 20 mg by mouth Daily.     • loratadine (CLARITIN) 10 MG tablet Take 10 mg by mouth daily.     • metoprolol succinate XL (TOPROL-XL) 25 MG 24 hr tablet Take 1 tablet by mouth Daily. 90 tablet 3   • Multiple Vitamins-Minerals (VISION VITAMINS) tablet Take  by mouth 2 (two) times a day.     • NAPROXEN PO Take  by mouth.     • NIFEdipine XL (PROCARDIA XL) 60 MG 24 hr tablet Take 1 tablet by mouth Daily. 90 tablet 3   • nitroglycerin (NITROSTAT) 0.4 MG SL tablet 1 under the tongue as needed for angina, may repeat q5mins for up three doses 30 tablet 1   • omeprazole (PriLOSEC) 20 MG capsule Take 20 mg by mouth daily.     • rivaroxaban (XARELTO) 20 MG tablet Take 1 tablet by mouth Daily. 90 tablet 3   • vitamin C (ASCORBIC ACID) 500 MG tablet Take 500 mg by mouth Daily.       No current facility-administered medications for this visit.        Patient has no known allergies.    Past Medical History:   Diagnosis Date   • Cancer (CMS/HCC)    • H/O prostatectomy    • Hearing loss    • Heart disease    • History of back surgery    • Hypercholesterolemia    • Hyperlipidemia    • Hypertension    • IHD (ischemic heart disease)     S/P CABG   • Pulmonary hypertension (CMS/HCC)    • S/P knee replacement    • Sinus disorder    • thyroid ultrasound 2009    Normal       Social History     Socioeconomic History   • Marital status:      Spouse name: Not on file   • Number of children: Not on file   • Years of education: Not on file   • Highest education level: Not on file   Tobacco Use   • Smoking status: Former Smoker     Last attempt to quit: 1996     Years since quittin.8   • Smokeless tobacco: Never Used   Substance and Sexual Activity   • Alcohol use: No   • Drug use: No       History reviewed. No pertinent family history.    Review of Systems   Constitution: Positive for malaise/fatigue. Negative for weakness.   Cardiovascular: Positive for chest pain and dyspnea on  "exertion. Negative for claudication, irregular heartbeat, leg swelling, near-syncope, orthopnea, palpitations and syncope.   Respiratory: Positive for shortness of breath. Negative for cough and wheezing.    Musculoskeletal: Positive for back pain and stiffness.   Gastrointestinal: Negative for anorexia, heartburn, nausea and vomiting.   Genitourinary: Negative for dysuria, hematuria, hesitancy and nocturia.   Neurological: Negative for dizziness, headaches, light-headedness and loss of balance.   Psychiatric/Behavioral: Negative for depression and memory loss. The patient is not nervous/anxious.            Objective     /80 (BP Location: Left arm)   Pulse 104   Ht 182.9 cm (72.01\")   Wt 110 kg (242 lb)   BMI 32.81 kg/m²     Physical Exam   Constitutional: He is oriented to person, place, and time. He appears well-developed and well-nourished.   HENT:   Head: Normocephalic and atraumatic.   Eyes: EOM are normal. Pupils are equal, round, and reactive to light.   Neck: Normal range of motion. Neck supple.   Cardiovascular: Normal rate and regular rhythm.   Murmur heard.  Pulmonary/Chest: Effort normal and breath sounds normal.   Abdominal: Soft.   Musculoskeletal: Normal range of motion.   Neurological: He is alert and oriented to person, place, and time.   Skin: Skin is warm.   Psychiatric: He has a normal mood and affect. His behavior is normal.         ECG 12 Lead  Date/Time: 7/9/2019 8:46 AM  Performed by: Shania Perdomo APRN  Authorized by: Shania Perdomo APRN   Comparison: compared with previous ECG from 1/8/2019  Similar to previous ECG  Rhythm: sinus rhythm  BPM: 80  Conduction: non-specific intraventricular conduction delay    Clinical impression: abnormal EKG  Comments: Normal QT            Assessment/Plan     HR is stable today with regular rhythm noted.  EKG done today for PAF shows a NSR with IVCD and normal QT. Xarelto therapy to be continued for anticoagulation as bleeding and bruising " are denied and metoprolol therapy for rate control.  HTN is well managed at present but patient does report a great deal of back pain and states often it is elevated in regards. For now, HTN regimen continued, but patient encouraged to discuss pain management with your office. We discussed repeat cardiac workup vs increase of imdur therapy as he does report chest discomfort and shortness of breath. Patient does not want to do either at present as he does not feel pain is bad enough to warrant and feels most of the symptoms are from pain.  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 He remains off ASA therapy for history of IHD as he is on xarelto therapy as well for PAF and this would increase chance of bleed. Patient aware to call if symptoms should worsen for further recommendations.  Hyperlipidemia is being managed with diet alone.  No recent FLP available but he reports he will be seeing you today for labs and pain management options as it is affecting his daily life. BMI noted at 32.81, good cardiac diet with limited carbs, calories, and activity as tolerated advised. 6 month follow up recommended or sooner if needed. Cardiac refills sent per request.          Problems Addressed this Visit        Cardiovascular and Mediastinum    Hyperlipemia    HTN (hypertension)    Relevant Medications    lisinopril (PRINIVIL,ZESTRIL) 20 MG tablet    azilsartan medoxomil (EDARBI) 80 MG tablet tablet    metoprolol succinate XL (TOPROL-XL) 25 MG 24 hr tablet    NIFEdipine XL (PROCARDIA XL) 60 MG 24 hr tablet    Paroxysmal atrial fibrillation (CMS/HCC)    Relevant Medications    isosorbide mononitrate (IMDUR) 30 MG 24 hr tablet    metoprolol succinate XL (TOPROL-XL) 25 MG 24 hr tablet    NIFEdipine XL (PROCARDIA XL) 60 MG 24 hr tablet    nitroglycerin (NITROSTAT) 0.4 MG SL tablet    Other Relevant Orders    ECG 12 Lead    IHD (ischemic heart disease) - Primary    Relevant Medications    isosorbide mononitrate  (IMDUR) 30 MG 24 hr tablet    metoprolol succinate XL (TOPROL-XL) 25 MG 24 hr tablet    NIFEdipine XL (PROCARDIA XL) 60 MG 24 hr tablet    nitroglycerin (NITROSTAT) 0.4 MG SL tablet       Other    Hx of CABG      Other Visit Diagnoses     Chronic stable angina (CMS/[facility])        Relevant Medications    isosorbide mononitrate (IMDUR) 30 MG 24 hr tablet    metoprolol succinate XL (TOPROL-XL) 25 MG 24 hr tablet    NIFEdipine XL (PROCARDIA XL) 60 MG 24 hr tablet    nitroglycerin (NITROSTAT) 0.4 MG SL tablet    Shortness of breath        PAF (paroxysmal atrial fibrillation) (CMS/[facility])        Relevant Medications    isosorbide mononitrate (IMDUR) 30 MG 24 hr tablet    metoprolol succinate XL (TOPROL-XL) 25 MG 24 hr tablet    NIFEdipine XL (PROCARDIA XL) 60 MG 24 hr tablet    rivaroxaban (XARELTO) 20 MG tablet    nitroglycerin (NITROSTAT) 0.4 MG SL tablet    Long term (current) use of anticoagulants        Back pain of thoracolumbar region        Obesity (BMI 30.0-34.9)              Patient's Body mass index is 32.81 kg/m². BMI is above normal parameters. Recommendations include: nutrition counseling.              Electronically signed by JESSA Moreno July 9, 2019 7:31 PM

## 2019-09-17 RX ORDER — AZILSARTAN KAMEDOXOMIL 80 MG/1
TABLET ORAL
Qty: 360 TABLET | Refills: 11 | OUTPATIENT
Start: 2019-09-17

## 2019-09-20 ENCOUNTER — TELEPHONE (OUTPATIENT)
Dept: CARDIOLOGY | Facility: CLINIC | Age: 84
End: 2019-09-20

## 2019-09-20 RX ORDER — AZILSARTAN KAMEDOXOMIL 80 MG/1
TABLET ORAL
Qty: 360 TABLET | Refills: 0 | Status: SHIPPED | OUTPATIENT
Start: 2019-09-20 | End: 2020-07-16 | Stop reason: SDUPTHER

## 2019-09-20 NOTE — TELEPHONE ENCOUNTER
Jonathan, the speciality pharmacy for [facility] requested refills. You had previously sent in refills but not to them.  When approving the refills there was a contraindication note popped up that pt is also on Lisinopril. Do you want him on both?

## 2019-09-23 NOTE — TELEPHONE ENCOUNTER
On his last visit (7-9-19) they were both actually listed.  It looks like Ella changed his ACE to Edarbi on 7/3/18. Lisinopril just reappeared on his list at this last visit.

## 2019-09-23 NOTE — TELEPHONE ENCOUNTER
I did not send any refills of edarbi.  I only had he was on lisinopril and that is what I refilled.  Edarbi not in his list.

## 2019-09-24 NOTE — TELEPHONE ENCOUNTER
Spoke with patient. He doesn't think he is on Lisinopril but will double check and stop if he is. Aware to continue the Edarbi, stop Lisinopril if indeed taking it.

## 2020-01-14 ENCOUNTER — OFFICE VISIT (OUTPATIENT)
Dept: CARDIOLOGY | Facility: CLINIC | Age: 85
End: 2020-01-14

## 2020-01-14 VITALS
HEART RATE: 60 BPM | HEIGHT: 72 IN | SYSTOLIC BLOOD PRESSURE: 142 MMHG | WEIGHT: 240 LBS | DIASTOLIC BLOOD PRESSURE: 80 MMHG | BODY MASS INDEX: 32.51 KG/M2

## 2020-01-14 DIAGNOSIS — Z95.1 HX OF CABG: ICD-10-CM

## 2020-01-14 DIAGNOSIS — I10 ESSENTIAL HYPERTENSION: ICD-10-CM

## 2020-01-14 DIAGNOSIS — E78.00 PURE HYPERCHOLESTEROLEMIA: ICD-10-CM

## 2020-01-14 DIAGNOSIS — I48.0 PAROXYSMAL ATRIAL FIBRILLATION (HCC): ICD-10-CM

## 2020-01-14 DIAGNOSIS — I25.9 IHD (ISCHEMIC HEART DISEASE): Primary | ICD-10-CM

## 2020-01-14 PROCEDURE — 93000 ELECTROCARDIOGRAM COMPLETE: CPT | Performed by: NURSE PRACTITIONER

## 2020-01-14 PROCEDURE — 99213 OFFICE O/P EST LOW 20 MIN: CPT | Performed by: NURSE PRACTITIONER

## 2020-01-14 RX ORDER — NITROGLYCERIN 0.4 MG/1
TABLET SUBLINGUAL
Qty: 30 TABLET | Refills: 1 | Status: SHIPPED | OUTPATIENT
Start: 2020-01-14

## 2020-01-14 NOTE — PROGRESS NOTES
Chief Complaint   Patient presents with   • Follow-up     Cardiac management.   • Lab     Last labs 3 months ago per PCP and [facility] in [place].   • Edema     He had swelling in right leg [facility], reports had broke blood vessel that was causing the swelling, no clot.   • Fatigue     He has noticed feeling more tired and some weakness in legs at times.       Subjective       Carlos Preston is a 89 y.o. male with HTN, hyperlipidemia, PAF, and IHD diagnosed in 2007 when he underwent bypass surgery.  Repeat cath in 2009 showed patent grafts. On 10/3/17 he developed chest pain and elevated blood pressure, presented to the ER, labs were stable, CXR unremarkable, but he was noted to be in atrial fibrillation with controlled ventricular response which was a new finding.  He did not have any palpitations. Xarelto was initiated.  At office followed up he was back in sinus. On 10/11/17, he underwent stress test and echocardiogram which showed small inferior wall ischemia with EF around 54%. Echocardiogram showed LV function normal at 55%.  Imdur was added with plan for cardiac cath if symptoms persisted.  ACE was changed to Edarbi for better blood pressure control. Cath was then advised as symptoms persisted which showed EF of 55% and patent grafts. At his August 2018 office visit, EKG showed sinus angie with first degree AV block and the dose of beta blocker was decreased. He came today for follow up. He has no cardiac complaints today. No CP, SOB. Denies palpitations, TIA or bleed with Xarelto. He has lumbar spine problems with leg weakness. E is planning for another injection in his back. He was seen at [facility] with R leg swelling, DVT ruled out. Was told he had a broken blood vessel. Labs followed by Dr. Preston.     HPI         Cardiac History:    Past Surgical History:   Procedure Laterality Date   • CARDIAC CATHETERIZATION  05/16/2007    Dr. Mace: 60% LM and 70% LCX.   • CARDIAC  CATHETERIZATION  08/19/2009    %, OM 85-90%,Patent grafts.   • CARDIAC CATHETERIZATION  07/23/2018    Patent Grafts   • CARDIOVASCULAR STRESS TEST  08/05/2009    ROBIN George: Dr. Mace- EF 29%, Diffuse ischemia.   • CARDIOVASCULAR STRESS TEST  11/07/2012    [facility]- Inferoseptal infarct with jon infarct ischemia. EF 41%.   • CARDIOVASCULAR STRESS TEST  03/23/2015    [facility]- EF51%,fixed defect , no ischemia burden.   • CARDIOVASCULAR STRESS TEST  10/11/2017    L.Myoview- EF 54%. Small inferior Ischemia.   • CORONARY ARTERY BYPASS GRAFT  05/17/2007    CABG:  SVG to LAD and LCX.   • ECHO - CONVERTED  08/06/2009    Dr. Mace- EF 50%.   • ECHO - CONVERTED  02/14/2011    EF 50%, Septal WMA.   • ECHO - CONVERTED  11/07/2012    EF 40-45%, RVSP 33 mmHg.   • ECHO - CONVERTED  03/23/2015    EF 45-50%, RVSP 40mmHg,mild MR, mild PHT   • ECHO - CONVERTED  05/01/2017    EF 50-55%, mild MR   • ECHO - CONVERTED  10/11/2017    EF 55%   • US CAROTID UNILATERAL  04/16/2007    Mild Plaque in Rt. bulb and RECA.       Current Outpatient Medications   Medication Sig Dispense Refill   • acetaminophen (TYLENOL) 650 MG 8 hr tablet Take 650 mg by mouth Daily.     • busPIRone (BUSPAR) 5 MG tablet Take 5 mg by mouth 2 (two) times a day.     • Cholecalciferol (VITAMIN D3) 2000 UNITS tablet Take  by mouth daily.     • EDARBI 80 MG tablet tablet TAKE ONE TABLET BY MOUTH DAILY 360 tablet 0   • isosorbide mononitrate (IMDUR) 30 MG 24 hr tablet Take 1 tablet by mouth Every Morning. 90 tablet 3   • loratadine (CLARITIN) 10 MG tablet Take 10 mg by mouth daily.     • metoprolol succinate XL (TOPROL-XL) 25 MG 24 hr tablet Take 1 tablet by mouth Daily. 90 tablet 3   • Multiple Vitamins-Minerals (VISION VITAMINS) tablet Take  by mouth 2 (two) times a day.     • NIFEdipine XL (PROCARDIA XL) 60 MG 24 hr tablet Take 1 tablet by mouth Daily. 90 tablet 3   • nitroglycerin (NITROSTAT) 0.4 MG SL tablet 1 under the tongue as needed for angina, may  repeat q5mins for up three doses 30 tablet 1   • omeprazole (PriLOSEC) 20 MG capsule Take 20 mg by mouth daily.     • rivaroxaban (XARELTO) 20 MG tablet Take 1 tablet by mouth Daily. 90 tablet 3   • vitamin C (ASCORBIC ACID) 500 MG tablet Take 1,000 mg by mouth Daily.       No current facility-administered medications for this visit.        Patient has no known allergies.    Past Medical History:   Diagnosis Date   • Cancer (CMS/HCC)    • H/O prostatectomy    • Hearing loss    • Heart disease    • History of back surgery    • Hypercholesterolemia    • Hyperlipidemia    • Hypertension    • IHD (ischemic heart disease)     S/P CABG   • Osteoarthritis of spine with myelopathy, lumbar region    • Pulmonary hypertension (CMS/HCC)    • S/P knee replacement    • Sinus disorder    • thyroid ultrasound 2009    Normal       Social History     Socioeconomic History   • Marital status:      Spouse name: Not on file   • Number of children: Not on file   • Years of education: Not on file   • Highest education level: Not on file   Tobacco Use   • Smoking status: Former Smoker     Last attempt to quit: 1996     Years since quittin.3   • Smokeless tobacco: Never Used   Substance and Sexual Activity   • Alcohol use: No   • Drug use: No       History reviewed. No pertinent family history.    Review of Systems   Constitution: Positive for weight loss (down 2 lb ). Negative for decreased appetite and malaise/fatigue.   HENT: Negative.    Eyes: Negative.    Cardiovascular: Positive for leg swelling. Negative for chest pain, dyspnea on exertion, palpitations and syncope.   Respiratory: Negative for cough and shortness of breath.    Endocrine: Negative.    Hematologic/Lymphatic: Negative.    Skin: Negative.    Musculoskeletal: Positive for arthritis and back pain. Negative for falls and myalgias.   Gastrointestinal: Negative for abdominal pain and melena.   Genitourinary: Negative for dysuria and hematuria.    "  Neurological: Negative for dizziness.   Psychiatric/Behavioral: Negative for altered mental status and depression.   Allergic/Immunologic: Negative.       Diabetes- No  Thyroid-normal    Objective     /80 (BP Location: Left arm)   Pulse 60   Ht 182.9 cm (72.01\")   Wt 109 kg (240 lb)   BMI 32.54 kg/m²     Physical Exam   Constitutional: He is oriented to person, place, and time. He appears well-developed and well-nourished. No distress.   HENT:   Head: Normocephalic.   Eyes: Pupils are equal, round, and reactive to light.   Neck: Normal range of motion.   Cardiovascular: Normal rate, regular rhythm and intact distal pulses.   Pulmonary/Chest: Effort normal and breath sounds normal. No respiratory distress. He has no wheezes. He has no rales.   Abdominal: Soft. Bowel sounds are normal.   Musculoskeletal: Normal range of motion. He exhibits edema (mild LE edema ).   Neurological: He is alert and oriented to person, place, and time.   Skin: Skin is warm and dry. He is not diaphoretic.   Psychiatric: He has a normal mood and affect.   Nursing note and vitals reviewed.       ECG 12 Lead  Date/Time: 1/20/2020 11:11 AM  Performed by: Reina Moran APRN  Authorized by: Reina Moran APRN   Comparison: compared with previous ECG from 7/9/2019  Similar to previous ECG  Rhythm: sinus rhythm  Rate: normal  BPM: 60  Conduction: 1st degree AV block    Clinical impression: non-specific ECG  Comments:  ms  QTc 437 ms                   Assessment/Plan      Carlos was seen today for follow-up, lab, edema and fatigue.    Diagnoses and all orders for this visit:    IHD (ischemic heart disease)    Essential hypertension    Pure hypercholesterolemia    Paroxysmal atrial fibrillation (CMS/HCC)    Hx of CABG    Other orders  -     nitroglycerin (NITROSTAT) 0.4 MG SL tablet; 1 under the tongue as needed for angina, may repeat q5mins for up three doses  -     ECG 12 Lead    Heart rate normal, rhythm regular. Blood pressure " stable. Continue Edarbi, nifedipine, and metoprolol. Most recent cardiac cath reviewed showing patent grafts. Continue long-acting nitrates for management of chronic stable angina. Continue Xarelto for PAF and CVA prophylaxis. He appears to be in sinus today. He denies bleeding. Will continue current dose. Samples given. He is not on statin therapy and he isn't sure why. He thinks he was taken off but unsure the reason. Labs are followed by PCP. With hx of IHD s/p CABG, statin is indicated unless there is a contraindication. He is going to discuss further with you. SL nitro renewed. He is encouraged to follow heart healthy diet. Remain active. He may hold Xarelto 2-3 days prior to lumbar spine injection as requested. Follow up in six months.     Patient's Body mass index is 32.54 kg/m². BMI is above normal parameters. Recommendations include: nutrition counseling.              Electronically signed by JESSA Macdonald,  January 20, 2020 11:12 AM

## 2020-01-22 ENCOUNTER — TELEPHONE (OUTPATIENT)
Dept: CARDIOLOGY | Facility: CLINIC | Age: 85
End: 2020-01-22

## 2020-03-31 RX ORDER — NIFEDIPINE 60 MG/1
60 TABLET, EXTENDED RELEASE ORAL DAILY
Qty: 90 TABLET | Refills: 3 | Status: SHIPPED | OUTPATIENT
Start: 2020-03-31 | End: 2021-01-18 | Stop reason: SDUPTHER

## 2020-03-31 NOTE — TELEPHONE ENCOUNTER
Nnamdi with [facility] called requesting refills for Nifedipine ER 60 mg daily. Script sent for 90 days supply.

## 2020-06-22 DIAGNOSIS — I48.0 PAF (PAROXYSMAL ATRIAL FIBRILLATION) (HCC): ICD-10-CM

## 2020-06-22 RX ORDER — RIVAROXABAN 20 MG/1
TABLET, FILM COATED ORAL
Qty: 90 TABLET | Refills: 1 | Status: SHIPPED | OUTPATIENT
Start: 2020-06-22 | End: 2021-03-24

## 2020-07-16 ENCOUNTER — OFFICE VISIT (OUTPATIENT)
Dept: CARDIOLOGY | Facility: CLINIC | Age: 85
End: 2020-07-16

## 2020-07-16 VITALS
HEIGHT: 72 IN | TEMPERATURE: 98.6 F | HEART RATE: 66 BPM | SYSTOLIC BLOOD PRESSURE: 132 MMHG | DIASTOLIC BLOOD PRESSURE: 70 MMHG | WEIGHT: 241.2 LBS | BODY MASS INDEX: 32.67 KG/M2

## 2020-07-16 DIAGNOSIS — E78.00 PURE HYPERCHOLESTEROLEMIA: ICD-10-CM

## 2020-07-16 DIAGNOSIS — I48.0 PAROXYSMAL ATRIAL FIBRILLATION (HCC): ICD-10-CM

## 2020-07-16 DIAGNOSIS — Z95.1 HX OF CABG: ICD-10-CM

## 2020-07-16 DIAGNOSIS — I25.9 IHD (ISCHEMIC HEART DISEASE): ICD-10-CM

## 2020-07-16 DIAGNOSIS — I10 ESSENTIAL HYPERTENSION: ICD-10-CM

## 2020-07-16 DIAGNOSIS — Z79.01 CURRENT USE OF LONG TERM ANTICOAGULATION: ICD-10-CM

## 2020-07-16 DIAGNOSIS — E66.9 OBESITY (BMI 30.0-34.9): ICD-10-CM

## 2020-07-16 PROCEDURE — 99214 OFFICE O/P EST MOD 30 MIN: CPT | Performed by: NURSE PRACTITIONER

## 2020-07-16 PROCEDURE — 93000 ELECTROCARDIOGRAM COMPLETE: CPT | Performed by: NURSE PRACTITIONER

## 2020-07-16 RX ORDER — AZILSARTAN KAMEDOXOMIL 80 MG/1
80 TABLET ORAL DAILY
Qty: 90 TABLET | Refills: 3 | Status: SHIPPED | OUTPATIENT
Start: 2020-07-16 | End: 2021-01-18 | Stop reason: SDUPTHER

## 2020-07-16 NOTE — PROGRESS NOTES
Chief Complaint   Patient presents with   • Follow-up     Cardiac mangement . Has no cardiac complaints today   • Med Refill     Needs refills for edarbi 90 day supply to [facility]       Subjective       Carlos Preston is a 89 y.o. male  with HTN, hyperlipidemia, PAF, and IHD diagnosed in 2007 when he underwent bypass surgery.  Repeat cath in 2009 showed patent grafts. On 10/3/17 he developed chest pain and elevated blood pressure, presented to the ER, labs were stable, CXR unremarkable, but he was noted to be in atrial fibrillation with controlled ventricular response which was a new finding.  He did not have any palpitations. Xarelto was initiated.  At office followed up he was back in sinus. On 10/11/17, he underwent stress test and echocardiogram which showed small inferior wall ischemia with EF around 54%. Echocardiogram showed LV function normal at 55%.  Imdur was added with plan for cardiac cath if symptoms persisted.  ACE was changed to Edarbi for better blood pressure control. Cath was then advised as symptoms persisted which showed EF of 55% and patent grafts. At his August 2018 office visit, EKG showed sinus angie with first degree AV block and the dose of beta blocker was decreased.     Today he comes to the office for follow-up visit.  He denies cardiac symptoms or concerns.  His main complaint is arthritic joint pain especially in his hands and lower back.  Currently he uses an over-the-counter salve with some benefit.  He did ask about taking a medication as needed other than Tylenol as it has little benefit.    HPI     Cardiac History:    Past Surgical History:   Procedure Laterality Date   • CARDIAC CATHETERIZATION  05/16/2007    Dr. Mace: 60% LM and 70% LCX.   • CARDIAC CATHETERIZATION  08/19/2009    %, OM 85-90%,Patent grafts.   • CARDIAC CATHETERIZATION  07/23/2018    Patent Grafts   • CARDIOVASCULAR STRESS TEST  08/05/2009    ROBIN George: Dr. Mace- EF 29%, Diffuse ischemia.   •  CARDIOVASCULAR STRESS TEST  11/07/2012    L. Myoview- Inferoseptal infarct with jon infarct ischemia. EF 41%.   • CARDIOVASCULAR STRESS TEST  03/23/2015    [facility] Myoview- EF51%,fixed defect , no ischemia burden.   • CARDIOVASCULAR STRESS TEST  10/11/2017    L.Myoview- EF 54%. Small inferior Ischemia.   • CORONARY ARTERY BYPASS GRAFT  05/17/2007    CABG:  SVG to LAD and LCX.   • ECHO - CONVERTED  08/06/2009    Dr. Mace- EF 50%.   • ECHO - CONVERTED  02/14/2011    EF 50%, Septal WMA.   • ECHO - CONVERTED  11/07/2012    EF 40-45%, RVSP 33 mmHg.   • ECHO - CONVERTED  03/23/2015    EF 45-50%, RVSP 40mmHg,mild MR, mild PHT   • ECHO - CONVERTED  05/01/2017    EF 50-55%, mild MR   • ECHO - CONVERTED  10/11/2017    EF 55%   • US CAROTID UNILATERAL  04/16/2007    Mild Plaque in Rt. bulb and RECA.       Current Outpatient Medications   Medication Sig Dispense Refill   • acetaminophen (TYLENOL) 650 MG 8 hr tablet Take 650 mg by mouth Daily.     • busPIRone (BUSPAR) 5 MG tablet Take 5 mg by mouth 2 (two) times a day.     • Cholecalciferol (VITAMIN D3) 2000 UNITS tablet Take  by mouth daily.     • EDARBI 80 MG tablet tablet Take 1 tablet by mouth Daily. 90 tablet 3   • isosorbide mononitrate (IMDUR) 30 MG 24 hr tablet Take 1 tablet by mouth Every Morning. 90 tablet 3   • loratadine (CLARITIN) 10 MG tablet Take 10 mg by mouth daily.     • metoprolol succinate XL (TOPROL-XL) 25 MG 24 hr tablet Take 1 tablet by mouth Daily. 90 tablet 3   • Multiple Vitamins-Minerals (VISION VITAMINS) tablet Take  by mouth 2 (two) times a day.     • NIFEdipine XL (PROCARDIA XL) 60 MG 24 hr tablet Take 1 tablet by mouth Daily. 90 tablet 3   • nitroglycerin (NITROSTAT) 0.4 MG SL tablet 1 under the tongue as needed for angina, may repeat q5mins for up three doses 30 tablet 1   • omeprazole (PriLOSEC) 20 MG capsule Take 20 mg by mouth daily.     • vitamin C (ASCORBIC ACID) 500 MG tablet Take 1,000 mg by mouth Daily.     • XARELTO 20 MG tablet TAKE 1  TABLET BY MOUTH EVERY DAY 90 tablet 1     No current facility-administered medications for this visit.        Patient has no known allergies.    Past Medical History:   Diagnosis Date   • Cancer (CMS/HCC)    • H/O prostatectomy    • Hearing loss    • Heart disease    • History of back surgery    • Hypercholesterolemia    • Hyperlipidemia    • Hypertension    • IHD (ischemic heart disease)     S/P CABG   • Osteoarthritis of spine with myelopathy, lumbar region    • Pulmonary hypertension (CMS/HCC)    • S/P knee replacement    • Sinus disorder    • thyroid ultrasound 2009    Normal       Social History     Socioeconomic History   • Marital status:      Spouse name: Not on file   • Number of children: Not on file   • Years of education: Not on file   • Highest education level: Not on file   Tobacco Use   • Smoking status: Former Smoker     Last attempt to quit: 1996     Years since quittin.8   • Smokeless tobacco: Never Used   Substance and Sexual Activity   • Alcohol use: No   • Drug use: No       History reviewed. No pertinent family history.    Review of Systems   Constitution: Positive for malaise/fatigue. Negative for decreased appetite, diaphoresis and fever.   HENT: Negative for congestion, hoarse voice, nosebleeds and sore throat.    Eyes: Negative for blurred vision and redness.   Cardiovascular: Negative for chest pain, leg swelling, near-syncope, palpitations and paroxysmal nocturnal dyspnea.   Respiratory: Positive for shortness of breath (with exertion, no worse). Negative for sleep disturbances due to breathing.    Endocrine: Negative for cold intolerance and heat intolerance.   Hematologic/Lymphatic: Negative for bleeding problem. Does not bruise/bleed easily.   Skin: Positive for dry skin (uses daily moisturizer). Negative for color change, itching and rash.   Musculoskeletal: Positive for arthritis, back pain, joint pain and stiffness. Negative for muscle cramps and myalgias.    "  Gastrointestinal: Negative for abdominal pain, change in bowel habit, dysphagia, heartburn, melena and nausea.   Genitourinary: Negative for dysuria and hematuria.   Neurological: Negative for dizziness, headaches and light-headedness.   Psychiatric/Behavioral: Negative for altered mental status and memory loss. The patient does not have insomnia (states \"I sleep good\" ) and is not nervous/anxious.    Allergic/Immunologic: Negative for hives and persistent infections.        Objective     /70 (BP Location: Left arm)   Pulse 66   Temp 98.6 °F (37 °C)   Ht 182.9 cm (72.01\")   Wt 109 kg (241 lb 3.2 oz)   BMI 32.70 kg/m²     Physical Exam   Constitutional: He is oriented to person, place, and time. He appears well-nourished.   HENT:   Head: Normocephalic.   Eyes: Pupils are equal, round, and reactive to light. Conjunctivae are normal.   Neck: Normal range of motion. Neck supple. Carotid bruit is not present.   Cardiovascular: Normal rate, regular rhythm, S1 normal and S2 normal.   No murmur heard.  Pulmonary/Chest: Breath sounds normal. He has no wheezes. He has no rales.   Abdominal: Soft. Bowel sounds are normal.   Musculoskeletal: Normal range of motion. He exhibits no edema.   Neurological: He is alert and oriented to person, place, and time.   Skin: Skin is warm. No pallor.   Psychiatric: He has a normal mood and affect. His behavior is normal.          ECG 12 Lead  Date/Time: 7/16/2020 10:26 AM  Performed by: Ella Escalante APRN  Authorized by: Ella Escalante APRN   Comparison: compared with previous ECG from 1/20/2020  Similar to previous ECG  Comparison to previous ECG: Both show mild first degree AV block and normal QTc  Rhythm: sinus rhythm  BPM: 65  Conduction: 1st degree AV block                Assessment/Plan      Carlos was seen today for follow-up and med refill.    Diagnoses and all orders for this visit:    IHD (ischemic heart disease)    Hx of CABG    Essential hypertension  -     " EDARBI 80 MG tablet tablet; Take 1 tablet by mouth Daily.    Pure hypercholesterolemia    Obesity (BMI 30.0-34.9)    Paroxysmal atrial fibrillation (CMS/HCC)    Current use of long term anticoagulation    Other orders  -     ECG 12 Lead      Patient's main complaint today is arthritic pain which limits his activities during the day.  He is taking Tylenol with little benefit.  He is using over-the-counter ointment with some benefit.  I advised him to further discuss with you.  He is aware to avoid oral NSAIDs as he is on anticoagulation therapy. Consider Voltaren gel?     IHD- cath 2 years ago showed patent grafts.  Patient presents today without cardiac complaints or concerns.  No repeat cardiac testing advised at this time.  He is not on aspirin therapy as he is on anticoagulation therapy for PAF.    Hypertension-his blood pressure today is normal.  Continue same antihypertensives.  Refills for Edarbi faxed to his pharmacy.    Hypercholesterolemia- manages by diet.  I requested a copy of recent labs.  Lipids are in normal range.  No change in management advised at this time.    Obesity-patient's Body mass index is 32.7 kg/m². BMI is above normal parameters. Recommendations include: nutrition counseling.  His weight is same as last visit.  Encouraged diet for weight loss with goal BMI of less than 30.    PAF/medication management- EKG today shows sinus rhythm with mild first-degree AV block.  Normal QTc interval noted.  For stroke prophylaxis he is on Xarelto therapy without signs of bleeding.  Continue same.  Patient denies palpitations or irregular heartbeats.  No further cardiac evaluation ordered at this time.     Carlos RENEA Preston  reports that he quit smoking about 23 years ago. He has never used smokeless tobacco.    A 6-month follow-up visit scheduled.  Please call sooner for any cardiac concerns.           Electronically signed by JESSA Oh,  July 16, 2020 10:54

## 2020-09-10 ENCOUNTER — TELEPHONE (OUTPATIENT)
Dept: CARDIOLOGY | Facility: CLINIC | Age: 85
End: 2020-09-10

## 2020-09-10 NOTE — TELEPHONE ENCOUNTER
Received fax from Dr. Elisa Rojas for cardiac clearance for patient to have dental implant surgery with extraction. Patient is on Xarelto and they are asking how long they can hold AND when to resume. According to our records, I do not see where patient has had any stenting, but patient has had a CABG on 05/17/07. Patient has a history of PAF.      Fax [phone]

## 2020-09-29 RX ORDER — ISOSORBIDE MONONITRATE 30 MG/1
30 TABLET, EXTENDED RELEASE ORAL EVERY MORNING
Qty: 90 TABLET | Refills: 1 | Status: SHIPPED | OUTPATIENT
Start: 2020-09-29 | End: 2021-01-18 | Stop reason: SDUPTHER

## 2020-09-29 RX ORDER — METOPROLOL SUCCINATE 25 MG/1
TABLET, EXTENDED RELEASE ORAL
Qty: 90 TABLET | Refills: 1 | Status: SHIPPED | OUTPATIENT
Start: 2020-09-29 | End: 2021-01-18 | Stop reason: SDUPTHER

## 2021-01-15 NOTE — PROGRESS NOTES
Chief Complaint   Patient presents with   • Follow-up     For cardiac management. Patient is not on aspirin. Last lab work was done sometime last year per PCP, not in chart. States that BP is normal when he wakes up, but runs low after he takes his medication.    • Med Refill     Needs refills on cardiac medications. 90 day supplies to [facility]. Brought medication list with visit.        Cardiac Complaints  none      Subjective   Carlos Preston is a 89 y.o. male  with HTN, hyperlipidemia, PAF, and IHD diagnosed in 2007 when he underwent bypass surgery.  Repeat cath in 2009 showed patent grafts. On 10/3/17 he developed chest pain and elevated blood pressure, presented to the ER, labs were stable, CXR unremarkable, but he was noted to be in atrial fibrillation with controlled ventricular response which was a new finding.  He did not have any palpitations. Xarelto was initiated.  At office followed up he was back in sinus. On 10/11/17, he underwent stress test and echocardiogram which showed small inferior wall ischemia with EF around 54%. Echocardiogram showed LV function normal at 55%.  Imdur was added with plan for cardiac cath if symptoms persisted.  ACE was changed to Edarbi for better blood pressure control. Cath was then advised as symptoms persisted which showed EF of 55% and patent grafts. At his August 2018 office visit, EKG showed sinus angie with first degree AV block and the dose of beta blocker was decreased.     Patient returns today for follow up and denies any new concerns. Chest pain, palpitations, dizziness, syncope, and SOA denied. He does report some issues with hypotension after taking his medication. Labs remain followed by PCP and were done sometimes over the last 6 months, patient can not recall when. Refills of cardiac meds requested for 90 day supply.        Cardiac History  Past Surgical History:   Procedure Laterality Date   • CARDIAC CATHETERIZATION  05/16/2007    Dr. Mace: 60% LM  and 70% LCX.   • CARDIAC CATHETERIZATION  08/19/2009    %, OM 85-90%,Patent grafts.   • CARDIAC CATHETERIZATION  07/23/2018    Patent Grafts   • CARDIOVASCULAR STRESS TEST  08/05/2009    [facility] George: Dr. Mace- EF 29%, Diffuse ischemia.   • CARDIOVASCULAR STRESS TEST  11/07/2012    [facility]- Inferoseptal infarct with jon infarct ischemia. EF 41%.   • CARDIOVASCULAR STRESS TEST  03/23/2015    L. Myoview- EF51%,fixed defect , no ischemia burden.   • CARDIOVASCULAR STRESS TEST  10/11/2017    L.Myoview- EF 54%. Small inferior Ischemia.   • CORONARY ARTERY BYPASS GRAFT  05/17/2007    CABG:  SVG to LAD and LCX.   • ECHO - CONVERTED  08/06/2009    Dr. Mace- EF 50%.   • ECHO - CONVERTED  02/14/2011    EF 50%, Septal WMA.   • ECHO - CONVERTED  11/07/2012    EF 40-45%, RVSP 33 mmHg.   • ECHO - CONVERTED  03/23/2015    EF 45-50%, RVSP 40mmHg,mild MR, mild PHT   • ECHO - CONVERTED  05/01/2017    EF 50-55%, mild MR   • ECHO - CONVERTED  10/11/2017    EF 55%   • US CAROTID UNILATERAL  04/16/2007    Mild Plaque in Rt. bulb and RECA.       Current Outpatient Medications   Medication Sig Dispense Refill   • acetaminophen (TYLENOL) 650 MG 8 hr tablet Take 650 mg by mouth Daily.     • busPIRone (BUSPAR) 5 MG tablet Take 5 mg by mouth 2 (two) times a day.     • Cholecalciferol (VITAMIN D3) 2000 UNITS tablet Take  by mouth daily.     • Edarbi 80 MG tablet tablet Take 1 tablet by mouth Daily. 1/2 to 1 tablet daily 90 tablet 3   • isosorbide mononitrate (IMDUR) 30 MG 24 hr tablet Take 1 tablet by mouth Every Morning. 90 tablet 2   • loratadine (CLARITIN) 10 MG tablet Take 10 mg by mouth daily.     • metoprolol succinate XL (TOPROL-XL) 25 MG 24 hr tablet Take 1 tablet by mouth Daily. 90 tablet 2   • Multiple Vitamins-Minerals (VISION VITAMINS) tablet Take  by mouth 2 (two) times a day.     • NIFEdipine XL (PROCARDIA XL) 60 MG 24 hr tablet Take 1 tablet by mouth Daily. 90 tablet 3   • nitroglycerin (NITROSTAT) 0.4 MG SL tablet 1  under the tongue as needed for angina, may repeat q5mins for up three doses 30 tablet 1   • omeprazole (PriLOSEC) 20 MG capsule Take 20 mg by mouth daily.     • vitamin C (ASCORBIC ACID) 500 MG tablet Take 1,000 mg by mouth Daily.     • XARELTO 20 MG tablet TAKE 1 TABLET BY MOUTH EVERY DAY 90 tablet 1     No current facility-administered medications for this visit.        Patient has no known allergies.    Past Medical History:   Diagnosis Date   • Cancer (CMS/HCC)    • H/O prostatectomy    • Hearing loss    • Heart disease    • History of back surgery    • Hypercholesterolemia    • Hyperlipidemia    • Hypertension    • IHD (ischemic heart disease)     S/P CABG   • Osteoarthritis of spine with myelopathy, lumbar region    • Pulmonary hypertension (CMS/HCC)    • S/P knee replacement    • Sinus disorder    • thyroid ultrasound 2009    Normal       Social History     Socioeconomic History   • Marital status:      Spouse name: Not on file   • Number of children: Not on file   • Years of education: Not on file   • Highest education level: Not on file   Tobacco Use   • Smoking status: Former Smoker     Quit date: 1996     Years since quittin.3   • Smokeless tobacco: Never Used   Substance and Sexual Activity   • Alcohol use: No   • Drug use: No       History reviewed. No pertinent family history.    Review of Systems   Constitution: Negative for malaise/fatigue and night sweats.   Cardiovascular: Negative for chest pain, claudication, dyspnea on exertion, irregular heartbeat, leg swelling, near-syncope, orthopnea, palpitations and syncope.   Respiratory: Negative for cough, shortness of breath and wheezing.    Musculoskeletal: Negative for back pain, joint pain and joint swelling.   Gastrointestinal: Negative for anorexia, heartburn, melena, nausea and vomiting.   Genitourinary: Negative for dysuria, hematuria, hesitancy and nocturia.   Neurological: Negative for dizziness, light-headedness, loss of  "balance and numbness.   Psychiatric/Behavioral: Negative for depression and memory loss. The patient is not nervous/anxious.            Objective     /60 (BP Location: Left arm)   Pulse 65   Temp 97.5 °F (36.4 °C)   Ht 182.9 cm (72.01\")   Wt 111 kg (244 lb)   BMI 33.08 kg/m²     Constitutional:       Appearance: Healthy appearance. Not in distress.   Eyes:      Pupils: Pupils are equal, round, and reactive to light.   HENT:      Nose: Nose normal.   Neck:      Musculoskeletal: Normal range of motion and neck supple.   Pulmonary:      Effort: Pulmonary effort is normal.      Breath sounds: Normal breath sounds.   Cardiovascular:      PMI at left midclavicular line. Normal rate. Regular rhythm.      Murmurs: There is a systolic murmur.   Abdominal:      Palpations: Abdomen is soft.   Musculoskeletal: Normal range of motion.   Skin:     General: Skin is warm and dry.   Neurological:      Mental Status: Oriented to person, place and time.           ECG 12 Lead    Date/Time: 1/18/2021 9:02 AM  Performed by: Shania Perdomo APRN  Authorized by: Shania Perdomo APRN   Comparison: compared with previous ECG from 7/16/2020  Similar to previous ECG  Rhythm: sinus rhythm  BPM: 65  Conduction: 1st degree AV block    Clinical impression: abnormal EKG  Comments: Normal QT            Assessment/Plan     HTN:  Blood pressure low normal. Dose of edarbi to be decreased to 1/2 tablet a day.  Log encouraged. If blood pressure remains low with the change, he will be encouraged to decrease procardia therapy to 1/2 tablet per day. Same metoprolol therapy will be continued.    IHD:  Status stable. No new concerns are voiced. Most recent cath in 2018 showed stents were patent, no repeat workup advised. He is not on ASA therapy as he is taking xarelto for PAF management. No chest pain reported on imdur therapy.    PAF:  EKG done today shows .  He remains on xarelto therapy for anticoagulation and metoprolol for rate control. " Bleeding and bruising denied.     Hyperlipidemia:  Diet controlled. FLP followed by your office. Can we have for review? Good cardiac diet encouraged.     BMI noted at 33.08, good cardiac diet with limited carbs, calories, and activity as tolerated advised.     Refills per request.    6 month follow up recommended or sooner if needed.        Problems Addressed this Visit        Other    Pulmonary HTN (CMS/HCC)    Hyperlipemia    HTN (hypertension)    Relevant Medications    Edarbi 80 MG tablet tablet    metoprolol succinate XL (TOPROL-XL) 25 MG 24 hr tablet    NIFEdipine XL (PROCARDIA XL) 60 MG 24 hr tablet    Paroxysmal atrial fibrillation (CMS/HCC)    Relevant Medications    isosorbide mononitrate (IMDUR) 30 MG 24 hr tablet    metoprolol succinate XL (TOPROL-XL) 25 MG 24 hr tablet    NIFEdipine XL (PROCARDIA XL) 60 MG 24 hr tablet    Other Relevant Orders    ECG 12 Lead    Hx of CABG    IHD (ischemic heart disease) - Primary    Relevant Medications    isosorbide mononitrate (IMDUR) 30 MG 24 hr tablet    metoprolol succinate XL (TOPROL-XL) 25 MG 24 hr tablet    NIFEdipine XL (PROCARDIA XL) 60 MG 24 hr tablet      Other Visit Diagnoses     Long term (current) use of anticoagulants        Obesity (BMI 30.0-34.9)          Diagnoses       Codes Comments    IHD (ischemic heart disease)    -  Primary ICD-10-CM: I25.9  ICD-9-CM: 414.9     Hx of CABG     ICD-10-CM: Z95.1  ICD-9-CM: V45.81     Paroxysmal atrial fibrillation (CMS/HCC)     ICD-10-CM: I48.0  ICD-9-CM: 427.31     Pulmonary HTN (CMS/HCC)     ICD-10-CM: I27.20  ICD-9-CM: 416.8     Essential hypertension     ICD-10-CM: I10  ICD-9-CM: 401.9     Pure hypercholesterolemia     ICD-10-CM: E78.00  ICD-9-CM: 272.0     Long term (current) use of anticoagulants     ICD-10-CM: Z79.01  ICD-9-CM: V58.61     Obesity (BMI 30.0-34.9)     ICD-10-CM: E66.9  ICD-9-CM: 278.00           Patient's Body mass index is 33.08 kg/m². BMI is above normal parameters. Recommendations include:  nutrition counseling.            Electronically signed by Shania Perdomo, JESSA January 18, 2021 10:57 EST

## 2021-01-18 ENCOUNTER — OFFICE VISIT (OUTPATIENT)
Dept: CARDIOLOGY | Facility: CLINIC | Age: 86
End: 2021-01-18

## 2021-01-18 VITALS
HEART RATE: 65 BPM | DIASTOLIC BLOOD PRESSURE: 60 MMHG | WEIGHT: 244 LBS | HEIGHT: 72 IN | BODY MASS INDEX: 33.05 KG/M2 | TEMPERATURE: 97.5 F | SYSTOLIC BLOOD PRESSURE: 100 MMHG

## 2021-01-18 DIAGNOSIS — I25.9 IHD (ISCHEMIC HEART DISEASE): Primary | ICD-10-CM

## 2021-01-18 DIAGNOSIS — E66.9 OBESITY (BMI 30.0-34.9): ICD-10-CM

## 2021-01-18 DIAGNOSIS — I10 ESSENTIAL HYPERTENSION: ICD-10-CM

## 2021-01-18 DIAGNOSIS — I48.0 PAROXYSMAL ATRIAL FIBRILLATION (HCC): ICD-10-CM

## 2021-01-18 DIAGNOSIS — E78.00 PURE HYPERCHOLESTEROLEMIA: ICD-10-CM

## 2021-01-18 DIAGNOSIS — I27.20 PULMONARY HTN (HCC): ICD-10-CM

## 2021-01-18 DIAGNOSIS — Z79.01 LONG TERM (CURRENT) USE OF ANTICOAGULANTS: ICD-10-CM

## 2021-01-18 DIAGNOSIS — Z95.1 HX OF CABG: ICD-10-CM

## 2021-01-18 PROCEDURE — 93000 ELECTROCARDIOGRAM COMPLETE: CPT | Performed by: NURSE PRACTITIONER

## 2021-01-18 PROCEDURE — 99214 OFFICE O/P EST MOD 30 MIN: CPT | Performed by: NURSE PRACTITIONER

## 2021-01-18 RX ORDER — AZILSARTAN KAMEDOXOMIL 80 MG/1
80 TABLET ORAL DAILY
Qty: 90 TABLET | Refills: 3 | Status: SHIPPED | OUTPATIENT
Start: 2021-01-18 | End: 2021-07-22 | Stop reason: SDUPTHER

## 2021-01-18 RX ORDER — NIFEDIPINE 60 MG/1
60 TABLET, EXTENDED RELEASE ORAL DAILY
Qty: 90 TABLET | Refills: 3 | Status: SHIPPED | OUTPATIENT
Start: 2021-01-18 | End: 2021-07-22 | Stop reason: SDUPTHER

## 2021-01-18 RX ORDER — ISOSORBIDE MONONITRATE 30 MG/1
30 TABLET, EXTENDED RELEASE ORAL EVERY MORNING
Qty: 90 TABLET | Refills: 2 | Status: SHIPPED | OUTPATIENT
Start: 2021-01-18 | End: 2021-07-22 | Stop reason: SDUPTHER

## 2021-01-18 RX ORDER — METOPROLOL SUCCINATE 25 MG/1
25 TABLET, EXTENDED RELEASE ORAL DAILY
Qty: 90 TABLET | Refills: 2 | Status: SHIPPED | OUTPATIENT
Start: 2021-01-18 | End: 2021-07-22 | Stop reason: SDUPTHER

## 2021-03-24 DIAGNOSIS — I48.0 PAF (PAROXYSMAL ATRIAL FIBRILLATION) (HCC): ICD-10-CM

## 2021-03-24 RX ORDER — RIVAROXABAN 20 MG/1
TABLET, FILM COATED ORAL
Qty: 90 TABLET | Refills: 3 | Status: SHIPPED | OUTPATIENT
Start: 2021-03-24 | End: 2022-03-01 | Stop reason: SDUPTHER

## 2021-07-22 ENCOUNTER — OFFICE VISIT (OUTPATIENT)
Dept: CARDIOLOGY | Facility: CLINIC | Age: 86
End: 2021-07-22

## 2021-07-22 VITALS
HEIGHT: 72 IN | WEIGHT: 233 LBS | SYSTOLIC BLOOD PRESSURE: 142 MMHG | DIASTOLIC BLOOD PRESSURE: 80 MMHG | HEART RATE: 72 BPM | BODY MASS INDEX: 31.56 KG/M2

## 2021-07-22 DIAGNOSIS — Z95.1 HX OF CABG: ICD-10-CM

## 2021-07-22 DIAGNOSIS — I10 ESSENTIAL HYPERTENSION: ICD-10-CM

## 2021-07-22 DIAGNOSIS — E78.00 PURE HYPERCHOLESTEROLEMIA: Primary | ICD-10-CM

## 2021-07-22 DIAGNOSIS — I25.9 IHD (ISCHEMIC HEART DISEASE): ICD-10-CM

## 2021-07-22 DIAGNOSIS — I48.0 PAROXYSMAL ATRIAL FIBRILLATION (HCC): ICD-10-CM

## 2021-07-22 PROCEDURE — 99214 OFFICE O/P EST MOD 30 MIN: CPT | Performed by: NURSE PRACTITIONER

## 2021-07-22 RX ORDER — METOPROLOL SUCCINATE 25 MG/1
25 TABLET, EXTENDED RELEASE ORAL DAILY
Qty: 90 TABLET | Refills: 3 | Status: SHIPPED | OUTPATIENT
Start: 2021-07-22 | End: 2022-03-01 | Stop reason: SDUPTHER

## 2021-07-22 RX ORDER — NIFEDIPINE 60 MG/1
60 TABLET, EXTENDED RELEASE ORAL DAILY
Qty: 90 TABLET | Refills: 3 | Status: SHIPPED | OUTPATIENT
Start: 2021-07-22 | End: 2022-03-01 | Stop reason: SDUPTHER

## 2021-07-22 RX ORDER — OMEPRAZOLE 20 MG/1
20 CAPSULE, DELAYED RELEASE ORAL DAILY
Qty: 90 CAPSULE | Refills: 3 | Status: SHIPPED | OUTPATIENT
Start: 2021-07-22 | End: 2023-03-14 | Stop reason: SDUPTHER

## 2021-07-22 RX ORDER — AZILSARTAN KAMEDOXOMIL 80 MG/1
TABLET ORAL
Qty: 90 TABLET | Refills: 3 | Status: SHIPPED | OUTPATIENT
Start: 2021-07-22 | End: 2022-03-01 | Stop reason: SDUPTHER

## 2021-07-22 RX ORDER — ISOSORBIDE MONONITRATE 30 MG/1
30 TABLET, EXTENDED RELEASE ORAL EVERY MORNING
Qty: 90 TABLET | Refills: 3 | Status: SHIPPED | OUTPATIENT
Start: 2021-07-22 | End: 2022-03-01 | Stop reason: SDUPTHER

## 2021-07-22 NOTE — PROGRESS NOTES
"Chief Complaint   Patient presents with   • Follow-up     Cardiac management   • Lab     Last labs in chart. He is to follow with Dr Robb next month.   • Shortness of Breath     Has gotten worse for the last couple months. Wears CPAP at night.   • Dizziness     Having some dizziness, nausea after taking morning medications.   • Med Refill     Needs refills on cardiac medications except Xarelto-90 day.     Subjective       Carlos Preston is a 89 y.o. male with HTN, hyperlipidemia, PAF, and IHD diagnosed in 2007 when he underwent bypass surgery.  Repeat cath in 2009 showed patent grafts. On 10/3/17 he developed chest pain and elevated blood pressure, presented to the ER, labs were stable, CXR unremarkable, but he was noted to be in atrial fibrillation with controlled ventricular response which was a new finding.  He did not have any palpitations. Xarelto was initiated.  At office followed up he was back in sinus. On 10/11/17, he underwent stress test and echocardiogram which showed small inferior wall ischemia with EF around 54%. Echocardiogram showed LV function normal at 55%.  Imdur was added with plan for cardiac cath if symptoms persisted.  ACE was changed to Edarbi for better blood pressure control. Cath was then advised as symptoms persisted which showed EF of 55% and patent grafts. At his August 2018 office visit, EKG showed sinus angie with first degree AV block and the dose of beta blocker was decreased.     He returns today for follow up. He feels well \"for his age\". Denies CP, SOB, palpitations, occasional dizziness, no syncope, no falls. Tolerating Xarelto without bleeding. BP log reviewed which shows bp well controlled.  LDL/HDL 71/40 in April 2021 without statin.          Cardiac History:    Past Surgical History:   Procedure Laterality Date   • CARDIAC CATHETERIZATION  05/16/2007    Dr. Mace: 60% LM and 70% LCX.   • CARDIAC CATHETERIZATION  08/19/2009    %, OM 85-90%,Patent grafts.   • " CARDIAC CATHETERIZATION  07/23/2018    Patent Grafts   • CARDIOVASCULAR STRESS TEST  08/05/2009    [facility] George: Dr. Mace- EF 29%, Diffuse ischemia.   • CARDIOVASCULAR STRESS TEST  11/07/2012    L. Myoview- Inferoseptal infarct with jon infarct ischemia. EF 41%.   • CARDIOVASCULAR STRESS TEST  03/23/2015    L. Myoview- EF51%,fixed defect , no ischemia burden.   • CARDIOVASCULAR STRESS TEST  10/11/2017    L.Myoview- EF 54%. Small inferior Ischemia.   • CORONARY ARTERY BYPASS GRAFT  05/17/2007    CABG:  SVG to LAD and LCX.   • ECHO - CONVERTED  08/06/2009    Dr. Mace- EF 50%.   • ECHO - CONVERTED  02/14/2011    EF 50%, Septal WMA.   • ECHO - CONVERTED  11/07/2012    EF 40-45%, RVSP 33 mmHg.   • ECHO - CONVERTED  03/23/2015    EF 45-50%, RVSP 40mmHg,mild MR, mild PHT   • ECHO - CONVERTED  05/01/2017    EF 50-55%, mild MR   • ECHO - CONVERTED  10/11/2017    EF 55%   • US CAROTID UNILATERAL  04/16/2007    Mild Plaque in Rt. bulb and RECA.     Current Outpatient Medications   Medication Sig Dispense Refill   • acetaminophen (TYLENOL) 650 MG 8 hr tablet Take 650 mg by mouth Daily.     • busPIRone (BUSPAR) 5 MG tablet Take 5 mg by mouth 2 (two) times a day.     • Cholecalciferol (VITAMIN D3) 2000 UNITS tablet Take  by mouth daily.     • Edarbi 80 MG tablet tablet 1/2 to 1 tablet daily 90 tablet 3   • isosorbide mononitrate (IMDUR) 30 MG 24 hr tablet Take 1 tablet by mouth Every Morning. 90 tablet 3   • loratadine (CLARITIN) 10 MG tablet Take 10 mg by mouth daily.     • metoprolol succinate XL (TOPROL-XL) 25 MG 24 hr tablet Take 1 tablet by mouth Daily. 90 tablet 3   • Multiple Vitamins-Minerals (VISION VITAMINS) tablet Take  by mouth 2 (two) times a day.     • NIFEdipine XL (PROCARDIA XL) 60 MG 24 hr tablet Take 1 tablet by mouth Daily. 90 tablet 3   • nitroglycerin (NITROSTAT) 0.4 MG SL tablet 1 under the tongue as needed for angina, may repeat q5mins for up three doses 30 tablet 1   • omeprazole (priLOSEC) 20 MG capsule  Take 1 capsule by mouth Daily. 90 capsule 3   • vitamin C (ASCORBIC ACID) 500 MG tablet Take 1,000 mg by mouth Daily.     • Xarelto 20 MG tablet TAKE 1 TABLET BY MOUTH EVERY DAY 90 tablet 3     No current facility-administered medications for this visit.     Patient has no known allergies.    Past Medical History:   Diagnosis Date   • Cancer (CMS/HCC)    • H/O prostatectomy    • Hearing loss    • Heart disease    • History of back surgery    • Hypercholesterolemia    • Hyperlipidemia    • Hypertension    • IHD (ischemic heart disease)     S/P CABG   • Osteoarthritis of spine with myelopathy, lumbar region    • Pulmonary hypertension (CMS/HCC)    • S/P knee replacement    • Sinus disorder    • thyroid ultrasound 2009    Normal     Social History     Socioeconomic History   • Marital status:      Spouse name: Not on file   • Number of children: Not on file   • Years of education: Not on file   • Highest education level: Not on file   Tobacco Use   • Smoking status: Former Smoker     Quit date: 1996     Years since quittin.8   • Smokeless tobacco: Never Used   Vaping Use   • Vaping Use: Never used   Substance and Sexual Activity   • Alcohol use: No   • Drug use: No     History reviewed. No pertinent family history.    Review of Systems   Constitutional: Negative for decreased appetite and malaise/fatigue.   HENT: Negative.    Eyes: Negative for blurred vision.   Cardiovascular: Negative for chest pain, dyspnea on exertion, leg swelling, palpitations and syncope.   Respiratory: Negative for shortness of breath and sleep disturbances due to breathing.    Endocrine: Negative.    Hematologic/Lymphatic: Negative for bleeding problem. Does not bruise/bleed easily.   Skin: Negative.    Musculoskeletal: Negative for falls and myalgias.   Gastrointestinal: Negative for abdominal pain, heartburn and melena.   Genitourinary: Negative for hematuria.   Neurological: Positive for light-headedness (occasional).  "Negative for dizziness.   Psychiatric/Behavioral: Negative for altered mental status.   Allergic/Immunologic: Negative.       Objective     /80 (BP Location: Right arm)   Pulse 72   Ht 182.9 cm (72.01\")   Wt 106 kg (233 lb)   BMI 31.59 kg/m²     Vitals and nursing note reviewed.   Constitutional:       General: Not in acute distress.     Appearance: Well-developed. Not diaphoretic.   Eyes:      Pupils: Pupils are equal, round, and reactive to light.   HENT:      Head: Normocephalic.   Pulmonary:      Effort: Pulmonary effort is normal. No respiratory distress.      Breath sounds: Normal breath sounds.   Cardiovascular:      Normal rate. Regular rhythm.      Murmurs: There is a systolic murmur.   Pulses:     Intact distal pulses.   Edema:     Peripheral edema absent.   Abdominal:      General: Bowel sounds are normal.      Palpations: Abdomen is soft.   Musculoskeletal: Normal range of motion.      Cervical back: Normal range of motion. Skin:     General: Skin is warm and dry.   Neurological:      Mental Status: Alert and oriented to person, place, and time.          ECG 12 Lead    Date/Time: 7/23/2021 11:55 AM  Performed by: Reina Moran APRN  Authorized by: Reina Moran APRN   Comparison: compared with previous ECG from 1/18/2021  Similar to previous ECG  Rhythm: sinus rhythm  Rate: normal  BPM: 72  Conduction: 1st degree AV block  ST Segments: ST segments normal    Clinical impression: non-specific ECG  Comments: QTc 440 ms   ms                   Problem List Items Addressed This Visit        Cardiac and Vasculature    Hyperlipemia - Primary    HTN (hypertension)    Relevant Medications    Edarbi 80 MG tablet tablet    metoprolol succinate XL (TOPROL-XL) 25 MG 24 hr tablet    NIFEdipine XL (PROCARDIA XL) 60 MG 24 hr tablet    Paroxysmal atrial fibrillation (CMS/HCC)    Relevant Medications    metoprolol succinate XL (TOPROL-XL) 25 MG 24 hr tablet    NIFEdipine XL (PROCARDIA XL) 60 MG 24 hr tablet "    isosorbide mononitrate (IMDUR) 30 MG 24 hr tablet    Hx of CABG    IHD (ischemic heart disease)    Relevant Medications    metoprolol succinate XL (TOPROL-XL) 25 MG 24 hr tablet    NIFEdipine XL (PROCARDIA XL) 60 MG 24 hr tablet    isosorbide mononitrate (IMDUR) 30 MG 24 hr tablet         1. IHD- s/p CABG, cath in 2018 showed patent grafts. Stable without angina, continue Imdur.     2. HTN- well controlled, continue Edarbi, nifedipine, metoprolol. Low Na. Continue weight loss.     3.  Hyperlipidemia- lipids appear to be well controlled without statin therapy. Continue heart healthy/meidterranean style diet.     4.  PAF- EKG showed he remains in sinus with beta blocker. Continue Xarelto.     He appears stable. Remains independent. No changes made. No new testing ordered as he is asymptomatic. FU in six months.     Patient's Body mass index is 31.59 kg/m². indicating that he is obese (BMI >30). Obesity-related health conditions include the following: obstructive sleep apnea, hypertension, coronary heart disease and dyslipidemias. Obesity is improving with lifestyle modifications. BMI is is above average; BMI management plan is completed. We discussed portion control and increasing exercise..            Electronically signed by JESSA Macdonald,  July 23, 2021 12:03 EDT

## 2021-07-23 PROCEDURE — 93000 ELECTROCARDIOGRAM COMPLETE: CPT | Performed by: NURSE PRACTITIONER

## 2021-07-28 ENCOUNTER — TELEPHONE (OUTPATIENT)
Dept: CARDIOLOGY | Facility: CLINIC | Age: 86
End: 2021-07-28

## 2021-07-28 NOTE — TELEPHONE ENCOUNTER
Daughter Bettye called concerning Imdur. Patient has been dizzy and weak after taking Imdur. He did not take for a day and had no dizziness or weakness. He is asking if could decrease to 1/2 tablet.     B/P has been 120/60 and 130/70.

## 2021-07-28 NOTE — TELEPHONE ENCOUNTER
Daughter Bettye made aware of recommendations to decrease Imdur to 1/2 tablet daily and take at bedtime, Bettye verbalized understanding.

## 2021-09-17 ENCOUNTER — TELEPHONE (OUTPATIENT)
Dept: CARDIOLOGY | Facility: CLINIC | Age: 86
End: 2021-09-17

## 2021-09-17 NOTE — TELEPHONE ENCOUNTER
Received fax from Dr. Lee for cardiac clearance for patient to have an extraction of tooth #24. Patient is on Xarelto and they are requesting to hold for 2 days prior. According to our records, I do not see where patient has had any stenting. Patient had a CABG on 05/17/07.        Fax [phone]

## 2021-11-16 ENCOUNTER — TELEPHONE (OUTPATIENT)
Dept: CARDIOLOGY | Facility: CLINIC | Age: 86
End: 2021-11-16

## 2022-03-01 ENCOUNTER — OFFICE VISIT (OUTPATIENT)
Dept: CARDIOLOGY | Facility: CLINIC | Age: 87
End: 2022-03-01

## 2022-03-01 ENCOUNTER — TELEPHONE (OUTPATIENT)
Dept: CARDIOLOGY | Facility: CLINIC | Age: 87
End: 2022-03-01

## 2022-03-01 VITALS
WEIGHT: 235.6 LBS | DIASTOLIC BLOOD PRESSURE: 70 MMHG | SYSTOLIC BLOOD PRESSURE: 136 MMHG | HEART RATE: 70 BPM | TEMPERATURE: 97.8 F | BODY MASS INDEX: 31.91 KG/M2 | HEIGHT: 72 IN

## 2022-03-01 DIAGNOSIS — R29.898 WEAKNESS OF BOTH LEGS: ICD-10-CM

## 2022-03-01 DIAGNOSIS — R06.02 SHORTNESS OF BREATH: ICD-10-CM

## 2022-03-01 DIAGNOSIS — I25.9 IHD (ISCHEMIC HEART DISEASE): Primary | ICD-10-CM

## 2022-03-01 DIAGNOSIS — E78.00 PURE HYPERCHOLESTEROLEMIA: ICD-10-CM

## 2022-03-01 DIAGNOSIS — I48.0 PAROXYSMAL ATRIAL FIBRILLATION: ICD-10-CM

## 2022-03-01 DIAGNOSIS — I10 PRIMARY HYPERTENSION: ICD-10-CM

## 2022-03-01 DIAGNOSIS — I48.0 PAF (PAROXYSMAL ATRIAL FIBRILLATION): ICD-10-CM

## 2022-03-01 DIAGNOSIS — I10 ESSENTIAL HYPERTENSION: ICD-10-CM

## 2022-03-01 DIAGNOSIS — Z95.1 HX OF CABG: ICD-10-CM

## 2022-03-01 PROCEDURE — 93000 ELECTROCARDIOGRAM COMPLETE: CPT | Performed by: NURSE PRACTITIONER

## 2022-03-01 PROCEDURE — 99214 OFFICE O/P EST MOD 30 MIN: CPT | Performed by: NURSE PRACTITIONER

## 2022-03-01 RX ORDER — AZILSARTAN KAMEDOXOMIL 80 MG/1
TABLET ORAL
Qty: 90 TABLET | Refills: 3 | Status: SHIPPED | OUTPATIENT
Start: 2022-03-01 | End: 2022-09-08 | Stop reason: SDUPTHER

## 2022-03-01 RX ORDER — NIFEDIPINE 60 MG/1
60 TABLET, EXTENDED RELEASE ORAL DAILY
Qty: 90 TABLET | Refills: 3 | Status: SHIPPED | OUTPATIENT
Start: 2022-03-01 | End: 2022-09-08 | Stop reason: SDUPTHER

## 2022-03-01 RX ORDER — ISOSORBIDE MONONITRATE 30 MG/1
30 TABLET, EXTENDED RELEASE ORAL EVERY MORNING
Qty: 90 TABLET | Refills: 3 | Status: SHIPPED | OUTPATIENT
Start: 2022-03-01 | End: 2022-09-08

## 2022-03-01 RX ORDER — METOPROLOL SUCCINATE 25 MG/1
25 TABLET, EXTENDED RELEASE ORAL DAILY
Qty: 90 TABLET | Refills: 3 | Status: SHIPPED | OUTPATIENT
Start: 2022-03-01 | End: 2022-09-08 | Stop reason: SDUPTHER

## 2022-03-21 ENCOUNTER — HOSPITAL ENCOUNTER (OUTPATIENT)
Dept: CARDIOLOGY | Facility: HOSPITAL | Age: 87
Discharge: HOME | End: 2022-03-21
Admitting: NURSE PRACTITIONER

## 2022-03-21 VITALS — BODY MASS INDEX: 31.95 KG/M2 | WEIGHT: 235.89 LBS | HEIGHT: 72 IN

## 2022-03-21 DIAGNOSIS — I48.0 PAROXYSMAL ATRIAL FIBRILLATION: ICD-10-CM

## 2022-03-21 DIAGNOSIS — R06.02 SHORTNESS OF BREATH: ICD-10-CM

## 2022-03-21 DIAGNOSIS — R29.898 WEAKNESS OF BOTH LEGS: ICD-10-CM

## 2022-03-21 LAB
AORTIC DIMENSIONLESS INDEX: 0.6 (DI)
BH CV ECHO MEAS - ACS: 2.22 CM
BH CV ECHO MEAS - AO MAX PG: 9.1 MMHG
BH CV ECHO MEAS - AO MEAN PG: 4.5 MMHG
BH CV ECHO MEAS - AO ROOT DIAM: 3.5 CM
BH CV ECHO MEAS - AO V2 MAX: 150.7 CM/SEC
BH CV ECHO MEAS - AO V2 VTI: 32.5 CM
BH CV ECHO MEAS - EDV(CUBED): 155.2 ML
BH CV ECHO MEAS - EF(MOD-BP): 50 %
BH CV ECHO MEAS - ESV(CUBED): 65 ML
BH CV ECHO MEAS - FS: 25.2 %
BH CV ECHO MEAS - IVS/LVPW: 0.89 CM
BH CV ECHO MEAS - IVSD: 1.02 CM
BH CV ECHO MEAS - LA DIMENSION: 5.2 CM
BH CV ECHO MEAS - LAT PEAK E' VEL: 8.2 CM/SEC
BH CV ECHO MEAS - LV MASS(C)D: 229.3 GRAMS
BH CV ECHO MEAS - LV MAX PG: 3.2 MMHG
BH CV ECHO MEAS - LV MEAN PG: 1.62 MMHG
BH CV ECHO MEAS - LV V1 MAX: 90 CM/SEC
BH CV ECHO MEAS - LV V1 VTI: 20.8 CM
BH CV ECHO MEAS - LVIDD: 5.4 CM
BH CV ECHO MEAS - LVIDS: 4 CM
BH CV ECHO MEAS - LVPWD: 1.15 CM
BH CV ECHO MEAS - MED PEAK E' VEL: 4.8 CM/SEC
BH CV ECHO MEAS - MV DEC SLOPE: 379.7 CM/SEC2
BH CV ECHO MEAS - MV DEC TIME: 0.29 MSEC
BH CV ECHO MEAS - MV E MAX VEL: 133 CM/SEC
BH CV ECHO MEAS - MV MAX PG: 8.7 MMHG
BH CV ECHO MEAS - MV MEAN PG: 2.08 MMHG
BH CV ECHO MEAS - MV P1/2T: 107 MSEC
BH CV ECHO MEAS - MV V2 VTI: 40 CM
BH CV ECHO MEAS - MVA(P1/2T): 2.1 CM2
BH CV ECHO MEAS - PA V2 MAX: 109.2 CM/SEC
BH CV ECHO MEAS - PI END-D VEL: 171 CM/SEC
BH CV ECHO MEAS - RAP SYSTOLE: 10 MMHG
BH CV ECHO MEAS - RV MAX PG: 1.77 MMHG
BH CV ECHO MEAS - RV V1 MAX: 66.5 CM/SEC
BH CV ECHO MEAS - RV V1 VTI: 14.7 CM
BH CV ECHO MEAS - RVSP: 44 MMHG
BH CV ECHO MEAS - TR MAX PG: 34 MMHG
BH CV ECHO MEAS - TR MAX VEL: 291.7 CM/SEC
BH CV ECHO MEASUREMENTS AVERAGE E/E' RATIO: 20.46
MAXIMAL PREDICTED HEART RATE: 126 BPM
SINUS: 3.2 CM
STRESS TARGET HR: 107 BPM

## 2022-03-21 PROCEDURE — 93306 TTE W/DOPPLER COMPLETE: CPT | Performed by: INTERNAL MEDICINE

## 2022-03-21 PROCEDURE — 93306 TTE W/DOPPLER COMPLETE: CPT

## 2022-06-14 ENCOUNTER — TELEPHONE (OUTPATIENT)
Dept: CARDIOLOGY | Facility: CLINIC | Age: 87
End: 2022-06-14

## 2022-06-14 NOTE — TELEPHONE ENCOUNTER
Received fax from Dr. Adolfo Garcia for cardiac clearance for patient to have a LESI. Patient is on Xarelto and they are requesting to hold for 2 days. According to our records, I do not see where patient has had any stenting. Patient had  CABG on 05/17/07. Patient is a history of PAF.         Fax [phone]

## 2022-08-02 NOTE — TELEPHONE ENCOUNTER
Cardiac clearance letter sent.   
OK to give  
Received fax from [facility] for cardiac clearance for patient to have an epidural steroid injection. They are requesting to hold Xarelto for 48 hours prior. According to our records, patient had a CABG on 05/17/07, I do not see where patient has had any stenting. Patient has a history of PAF.       Fax [phone]  
No

## 2022-09-07 DIAGNOSIS — I25.9 IHD (ISCHEMIC HEART DISEASE): ICD-10-CM

## 2022-09-07 NOTE — PROGRESS NOTES
Chief Complaint   Patient presents with   • Follow-up     Pt is here for cardiac follow up.  He states he does get SOB if he walks a long time.  He denies CP, dizziness or palpitations.  Pt does not take a daily ASA.   • Med Refill     Pt request 90 day refills to be sent to [facility].  Medications were verified by pt med list.     • Lab Work     Pt states their last labs were about a month ago with his PCP.         Cardiac Complaints  dyspnea      Subjective   Carlos Pretson is a 89 y.o. male  with HTN, hyperlipidemia, PAF, and IHD diagnosed in 2007 when he underwent bypass surgery.  Repeat cath in 2009 showed patent grafts. On 10/3/17 he developed chest pain and elevated blood pressure, presented to the ER, labs were stable, CXR unremarkable, but he was noted to be in atrial fibrillation with controlled ventricular response which was a new finding.  He did not have any palpitations. Xarelto was initiated.  At office followed up he was back in sinus. On 10/11/17, he underwent stress test and echocardiogram which showed small inferior wall ischemia with EF around 54%. Echocardiogram showed LV function normal at 55%.  Imdur was added with plan for cardiac cath if symptoms persisted.  ACE was changed to Edarbi for better blood pressure control. Cath in 2018 was then advised as symptoms persisted which showed EF of 55% and patent grafts. Most recent echo 2022 showed EF remained stable at 55%.     He comes today for follow up and denies any new concerns. He does report SOA if he overdoes it, but no worse than prior. No CP, palpitations, dizziness, or syncope noted. His main concern is back pain from his arthritis which he feels limits his mobility. Labs done with PCP about a month ago, no current available. Refills requested.         Cardiac History  Past Surgical History:   Procedure Laterality Date   • CARDIAC CATHETERIZATION  05/16/2007    Dr. Mace: 60% LM and 70% LCX.   • CARDIAC CATHETERIZATION  08/19/2009     %, OM 85-90%,Patent grafts.   • CARDIAC CATHETERIZATION  07/23/2018    Patent Grafts   • CARDIOVASCULAR STRESS TEST  08/05/2009    [facility]: Dr. Mace- EF 29%, Diffuse ischemia.   • CARDIOVASCULAR STRESS TEST  11/07/2012    L. Myoview- Inferoseptal infarct with jon infarct ischemia. EF 41%.   • CARDIOVASCULAR STRESS TEST  03/23/2015    L. Myoview- EF51%,fixed defect , no ischemia burden.   • CARDIOVASCULAR STRESS TEST  10/11/2017    [facility]- EF 54%. Small inferior Ischemia.   • CORONARY ARTERY BYPASS GRAFT  05/17/2007    CABG:  SVG to LAD and LCX.   • ECHO - CONVERTED  08/06/2009    Dr. Mace- EF 50%.   • ECHO - CONVERTED  02/14/2011    EF 50%, Septal WMA.   • ECHO - CONVERTED  11/07/2012    EF 40-45%, RVSP 33 mmHg.   • ECHO - CONVERTED  03/23/2015    EF 45-50%, RVSP 40mmHg,mild MR, mild PHT   • ECHO - CONVERTED  05/01/2017    EF 50-55%, mild MR   • ECHO - CONVERTED  10/11/2017    EF 55%   • ECHO - CONVERTED  03/21/2022    TLS. EF 55%. LA- 5.2 cm. Mild MR. RVSP- 44 mmHg   • US CAROTID UNILATERAL  04/16/2007    Mild Plaque in Rt. bulb and RECA.       Current Outpatient Medications   Medication Sig Dispense Refill   • busPIRone (BUSPAR) 5 MG tablet Take 5 mg by mouth 2 (two) times a day.     • Cholecalciferol (VITAMIN D3) 2000 UNITS tablet Take  by mouth daily.     • Edarbi 80 MG tablet tablet Take 1 tablet by mouth Daily. 90 tablet 2   • isosorbide mononitrate (IMDUR) 30 MG 24 hr tablet Take 1 tablet by mouth Every Morning. 90 tablet 2   • loratadine (CLARITIN) 10 MG tablet Take 10 mg by mouth daily.     • metoprolol succinate XL (TOPROL-XL) 25 MG 24 hr tablet Take 1 tablet by mouth Daily. 90 tablet 3   • Multiple Vitamins-Minerals (VISION VITAMINS) tablet Take  by mouth 2 (two) times a day.     • NIFEdipine XL (PROCARDIA XL) 60 MG 24 hr tablet Take 1 tablet by mouth Daily. 90 tablet 3   • nitroglycerin (NITROSTAT) 0.4 MG SL tablet 1 under the tongue as needed for angina, may repeat q5mins for up three  doses 30 tablet 1   • omeprazole (priLOSEC) 20 MG capsule Take 1 capsule by mouth Daily. 90 capsule 3   • rivaroxaban (Xarelto) 20 MG tablet Take 1 tablet by mouth Daily. 90 tablet 3   • vitamin C (ASCORBIC ACID) 500 MG tablet Take 1,000 mg by mouth Daily.       No current facility-administered medications for this visit.       Patient has no known allergies.    Past Medical History:   Diagnosis Date   • Cancer (HCC)    • H/O prostatectomy    • Hearing loss    • Heart disease    • History of back surgery    • Hypercholesterolemia    • Hyperlipidemia    • Hypertension    • IHD (ischemic heart disease)     S/P CABG   • Osteoarthritis of spine with myelopathy, lumbar region    • Pulmonary hypertension (HCC)    • S/P knee replacement    • Sinus disorder    • thyroid ultrasound 2009    Normal       Social History     Socioeconomic History   • Marital status:    Tobacco Use   • Smoking status: Former Smoker     Quit date: 1996     Years since quittin.9   • Smokeless tobacco: Never Used   Vaping Use   • Vaping Use: Never used   Substance and Sexual Activity   • Alcohol use: No   • Drug use: No       History reviewed. No pertinent family history.    Review of Systems   Constitutional: Positive for malaise/fatigue. Negative for night sweats.   Cardiovascular: Positive for dyspnea on exertion. Negative for chest pain, claudication, irregular heartbeat, leg swelling, near-syncope, orthopnea, palpitations and syncope.   Respiratory: Positive for shortness of breath. Negative for cough and wheezing.    Musculoskeletal: Positive for back pain, joint pain and stiffness.   Gastrointestinal: Negative for anorexia, heartburn, melena, nausea and vomiting.   Genitourinary: Negative for dysuria, hematuria, hesitancy and nocturia.   Neurological: Negative for dizziness, headaches and light-headedness.   Psychiatric/Behavioral: Negative for depression and memory loss. The patient is not nervous/anxious.   "          Objective     BP (!) 164/102 (BP Location: Left arm, Patient Position: Sitting)   Pulse 74   Ht 180.3 cm (71\")   Wt 103 kg (226 lb 9.6 oz)   BMI 31.60 kg/m²     Constitutional:       Appearance: Not in distress.   Eyes:      Pupils: Pupils are equal, round, and reactive to light.   HENT:      Nose: Nose normal.   Pulmonary:      Effort: Pulmonary effort is normal.      Breath sounds: Normal breath sounds.   Cardiovascular:      PMI at left midclavicular line. Normal rate. Irregular rhythm.      Murmurs: There is a systolic murmur.   Abdominal:      Palpations: Abdomen is soft.   Musculoskeletal: Normal range of motion.      Cervical back: Normal range of motion and neck supple. Skin:     General: Skin is warm and dry.   Neurological:      Mental Status: Alert and oriented to person, place and time.           ECG 12 Lead    Date/Time: 9/8/2022 9:22 AM  Performed by: Shania Perdomo APRN  Authorized by: Shania Perdomo APRN   Comparison: compared with previous ECG from 3/1/2022  Similar to previous ECG  Rhythm: atrial fibrillation  BPM: 74    Clinical impression: abnormal EKG                 Diagnoses and all orders for this visit:    1. Paroxysmal atrial fibrillation (HCC) (Primary)  -     ECG 12 Lead    2. IHD (ischemic heart disease)  -     metoprolol succinate XL (TOPROL-XL) 25 MG 24 hr tablet; Take 1 tablet by mouth Daily.  Dispense: 90 tablet; Refill: 3  -     isosorbide mononitrate (IMDUR) 30 MG 24 hr tablet; Take 1 tablet by mouth Every Morning.  Dispense: 90 tablet; Refill: 2    3. Hx of CABG    4. Primary hypertension    5. Pure hypercholesterolemia    6. Essential hypertension  -     NIFEdipine XL (PROCARDIA XL) 60 MG 24 hr tablet; Take 1 tablet by mouth Daily.  Dispense: 90 tablet; Refill: 3  -     metoprolol succinate XL (TOPROL-XL) 25 MG 24 hr tablet; Take 1 tablet by mouth Daily.  Dispense: 90 tablet; Refill: 3  -     Edarbi 80 MG tablet tablet; Take 1 tablet by mouth Daily.  Dispense: " 90 tablet; Refill: 2    7. Prolonged Q-T interval on ECG  -     Magnesium; Future    8. PAF (paroxysmal atrial fibrillation) (HCC)  -     rivaroxaban (Xarelto) 20 MG tablet; Take 1 tablet by mouth Daily.  Dispense: 90 tablet; Refill: 3    9. Obesity (BMI 30.0-34.9)             IHD-status post CABG.  Cath 2018 showed patent grafts.  Chest pain denied on imdur, same continued. He is not on aspirin due to Xarelto therapy.     Hypertension-blood pressure elevated initially, recheck at 149/82, he thinks much of this was from rushing around. Patient reports at home well managed. He will continue to monitor.  Continue current dose Toprol-XL, Edarbi and nifedipine XL. Limited sodium advised.      Hyperlipidemia- Diet controlled. FLP with your office. Limited carb/fried fatty food in his diet advised.      PAF-EKG shows atrial fibrillation with prolonged QTc of 499ms, we will urge to check Magnesium level to assess for concerns. He will remain on xarelto for anticoagulation as bleed and bruise denied.     Refills per request.     BMI noted at 31.60, good cardiac diet with limited carbs, calories, and walking regimen urged. Patient praised for weight loss efforts.    6 month follow up advised or sooner if needed.           Problems Addressed this Visit        Cardiac and Vasculature    Hyperlipemia    HTN (hypertension)    Relevant Medications    NIFEdipine XL (PROCARDIA XL) 60 MG 24 hr tablet    metoprolol succinate XL (TOPROL-XL) 25 MG 24 hr tablet    Edarbi 80 MG tablet tablet    Paroxysmal atrial fibrillation (HCC) - Primary    Relevant Medications    NIFEdipine XL (PROCARDIA XL) 60 MG 24 hr tablet    metoprolol succinate XL (TOPROL-XL) 25 MG 24 hr tablet    isosorbide mononitrate (IMDUR) 30 MG 24 hr tablet    Other Relevant Orders    ECG 12 Lead    Hx of CABG    IHD (ischemic heart disease)    Relevant Medications    NIFEdipine XL (PROCARDIA XL) 60 MG 24 hr tablet    metoprolol succinate XL (TOPROL-XL) 25 MG 24 hr tablet     isosorbide mononitrate (IMDUR) 30 MG 24 hr tablet      Other Visit Diagnoses     Essential hypertension        Relevant Medications    NIFEdipine XL (PROCARDIA XL) 60 MG 24 hr tablet    metoprolol succinate XL (TOPROL-XL) 25 MG 24 hr tablet    Edarbi 80 MG tablet tablet    Prolonged Q-T interval on ECG        Relevant Orders    Magnesium    PAF (paroxysmal atrial fibrillation) (HCC)        Relevant Medications    NIFEdipine XL (PROCARDIA XL) 60 MG 24 hr tablet    rivaroxaban (Xarelto) 20 MG tablet    metoprolol succinate XL (TOPROL-XL) 25 MG 24 hr tablet    isosorbide mononitrate (IMDUR) 30 MG 24 hr tablet    Obesity (BMI 30.0-34.9)          Diagnoses       Codes Comments    Paroxysmal atrial fibrillation (HCC)    -  Primary ICD-10-CM: I48.0  ICD-9-CM: 427.31     IHD (ischemic heart disease)     ICD-10-CM: I25.9  ICD-9-CM: 414.9     Hx of CABG     ICD-10-CM: Z95.1  ICD-9-CM: V45.81     Primary hypertension     ICD-10-CM: I10  ICD-9-CM: 401.9     Pure hypercholesterolemia     ICD-10-CM: E78.00  ICD-9-CM: 272.0     Essential hypertension     ICD-10-CM: I10  ICD-9-CM: 401.9     Prolonged Q-T interval on ECG     ICD-10-CM: R94.31  ICD-9-CM: 794.31     PAF (paroxysmal atrial fibrillation) (HCC)     ICD-10-CM: I48.0  ICD-9-CM: 427.31     Obesity (BMI 30.0-34.9)     ICD-10-CM: E66.9  ICD-9-CM: 278.00                   Electronically signed by Shania Perdomo, APRN September 8, 2022 09:53 EDT

## 2022-09-08 ENCOUNTER — OFFICE VISIT (OUTPATIENT)
Dept: CARDIOLOGY | Facility: CLINIC | Age: 87
End: 2022-09-08

## 2022-09-08 VITALS
WEIGHT: 226.6 LBS | SYSTOLIC BLOOD PRESSURE: 164 MMHG | BODY MASS INDEX: 31.72 KG/M2 | DIASTOLIC BLOOD PRESSURE: 102 MMHG | HEIGHT: 71 IN | HEART RATE: 74 BPM

## 2022-09-08 DIAGNOSIS — I10 ESSENTIAL HYPERTENSION: ICD-10-CM

## 2022-09-08 DIAGNOSIS — R94.31 PROLONGED Q-T INTERVAL ON ECG: ICD-10-CM

## 2022-09-08 DIAGNOSIS — E66.9 OBESITY (BMI 30.0-34.9): ICD-10-CM

## 2022-09-08 DIAGNOSIS — I10 PRIMARY HYPERTENSION: ICD-10-CM

## 2022-09-08 DIAGNOSIS — I25.9 IHD (ISCHEMIC HEART DISEASE): ICD-10-CM

## 2022-09-08 DIAGNOSIS — I48.0 PAF (PAROXYSMAL ATRIAL FIBRILLATION): ICD-10-CM

## 2022-09-08 DIAGNOSIS — E78.00 PURE HYPERCHOLESTEROLEMIA: ICD-10-CM

## 2022-09-08 DIAGNOSIS — Z95.1 HX OF CABG: ICD-10-CM

## 2022-09-08 DIAGNOSIS — I48.0 PAROXYSMAL ATRIAL FIBRILLATION: Primary | ICD-10-CM

## 2022-09-08 PROCEDURE — 99214 OFFICE O/P EST MOD 30 MIN: CPT | Performed by: NURSE PRACTITIONER

## 2022-09-08 PROCEDURE — 93000 ELECTROCARDIOGRAM COMPLETE: CPT | Performed by: NURSE PRACTITIONER

## 2022-09-08 RX ORDER — ISOSORBIDE MONONITRATE 30 MG/1
30 TABLET, EXTENDED RELEASE ORAL EVERY MORNING
Qty: 90 TABLET | Refills: 2 | Status: SHIPPED | OUTPATIENT
Start: 2022-09-08

## 2022-09-08 RX ORDER — AZILSARTAN KAMEDOXOMIL 80 MG/1
80 TABLET ORAL DAILY
Qty: 90 TABLET | Refills: 2 | Status: SHIPPED | OUTPATIENT
Start: 2022-09-08 | End: 2023-02-28

## 2022-09-08 RX ORDER — NIFEDIPINE 60 MG/1
60 TABLET, EXTENDED RELEASE ORAL DAILY
Qty: 90 TABLET | Refills: 3 | Status: SHIPPED | OUTPATIENT
Start: 2022-09-08

## 2022-09-08 RX ORDER — ISOSORBIDE MONONITRATE 30 MG/1
TABLET, EXTENDED RELEASE ORAL
Qty: 90 TABLET | Refills: 2 | Status: SHIPPED | OUTPATIENT
Start: 2022-09-08 | End: 2022-09-08 | Stop reason: SDUPTHER

## 2022-09-08 RX ORDER — METOPROLOL SUCCINATE 25 MG/1
25 TABLET, EXTENDED RELEASE ORAL DAILY
Qty: 90 TABLET | Refills: 3 | Status: SHIPPED | OUTPATIENT
Start: 2022-09-08

## 2022-10-04 DIAGNOSIS — R94.31 PROLONGED Q-T INTERVAL ON ECG: ICD-10-CM

## 2022-12-07 ENCOUNTER — TELEPHONE (OUTPATIENT)
Dept: CARDIOLOGY | Facility: CLINIC | Age: 87
End: 2022-12-07

## 2022-12-07 NOTE — TELEPHONE ENCOUNTER
Caller: Bettye Hollingsworth    Relationship to patient: Emergency Contact    Best call back number: [phone]    Patient is needing: PATIENT HAD A BLADDER BIOPSY DONE LAST Monday. WHEN HE STARTED HIS BLOOD THINNERS BACK LAST Tuesday HE BEGAN PASSING BLOOD IN HIS URINE AND HAS BEEN PASSING BLOOD EVER SINCE. PATIENT IS CONCERNED THAT IT IS RELATED TO HIS BLOOD THINNERS.

## 2022-12-07 NOTE — TELEPHONE ENCOUNTER
Have him hold for the next two days, then resume. Please advise his afib is chronic, so it increases risk of CVA to hold, but with active bleed try holding for two days.

## 2022-12-16 ENCOUNTER — TELEPHONE (OUTPATIENT)
Dept: CARDIOLOGY | Facility: CLINIC | Age: 87
End: 2022-12-16

## 2022-12-16 NOTE — TELEPHONE ENCOUNTER
Patient's daughter, Bettye, called [facility] stating that patient went back on Xarelto after holding it for another 2 days after he had procedure on bladder. She states that when patient urinates it is all blood. She states that Dr. Smith's office told them that it was normal to have bleeding up to 2 weeks after procedure, but she states that it is a lot of blood. She is asking if patient could hold Xarelto for a few more days?

## 2022-12-16 NOTE — TELEPHONE ENCOUNTER
I would recommend holding through the weekend, please be advised, longer it is held higher CVA risk, but if actively bleeding, hold.

## 2022-12-16 NOTE — TELEPHONE ENCOUNTER
Patient's daughter, Bettye, was made aware for patient to hold Xarelto this weekend and see how bleeding is. She was made aware that if when he goes back on Xarelto on Monday if bleeding continues to let us know. She was made aware that longer Xarelto has to be held it does put him at an increase risk of CVA.

## 2023-02-17 DIAGNOSIS — I10 ESSENTIAL HYPERTENSION: ICD-10-CM

## 2023-02-28 RX ORDER — AZILSARTAN KAMEDOXOMIL 80 MG/1
TABLET ORAL
Qty: 180 TABLET | Refills: 0 | Status: SHIPPED | OUTPATIENT
Start: 2023-02-28

## 2023-02-28 NOTE — TELEPHONE ENCOUNTER
[facility] pharmacy called to check status of refill request.  The message stated patient's gets #180 for a 6 month supply at a time.  Refill sent to provider for cosign.

## 2023-03-14 ENCOUNTER — OFFICE VISIT (OUTPATIENT)
Dept: CARDIOLOGY | Facility: CLINIC | Age: 88
End: 2023-03-14
Payer: MEDICARE

## 2023-03-14 VITALS
SYSTOLIC BLOOD PRESSURE: 140 MMHG | BODY MASS INDEX: 29.2 KG/M2 | WEIGHT: 215.6 LBS | HEART RATE: 60 BPM | DIASTOLIC BLOOD PRESSURE: 60 MMHG | HEIGHT: 72 IN

## 2023-03-14 DIAGNOSIS — I25.9 IHD (ISCHEMIC HEART DISEASE): Primary | ICD-10-CM

## 2023-03-14 DIAGNOSIS — E78.00 PURE HYPERCHOLESTEROLEMIA: ICD-10-CM

## 2023-03-14 DIAGNOSIS — Z95.1 HX OF CABG: ICD-10-CM

## 2023-03-14 DIAGNOSIS — K21.9 GASTROESOPHAGEAL REFLUX DISEASE, UNSPECIFIED WHETHER ESOPHAGITIS PRESENT: ICD-10-CM

## 2023-03-14 DIAGNOSIS — I48.0 PAROXYSMAL ATRIAL FIBRILLATION: ICD-10-CM

## 2023-03-14 DIAGNOSIS — I10 PRIMARY HYPERTENSION: ICD-10-CM

## 2023-03-14 PROCEDURE — 93000 ELECTROCARDIOGRAM COMPLETE: CPT | Performed by: NURSE PRACTITIONER

## 2023-03-14 PROCEDURE — 99214 OFFICE O/P EST MOD 30 MIN: CPT | Performed by: NURSE PRACTITIONER

## 2023-03-14 RX ORDER — OMEPRAZOLE 20 MG/1
20 CAPSULE, DELAYED RELEASE ORAL DAILY
Qty: 90 CAPSULE | Refills: 3 | Status: SHIPPED | OUTPATIENT
Start: 2023-03-14

## 2023-05-24 ENCOUNTER — TELEPHONE (OUTPATIENT)
Dept: CARDIOLOGY | Facility: CLINIC | Age: 88
End: 2023-05-24

## 2023-05-24 NOTE — TELEPHONE ENCOUNTER
Caller: Bettye Hollingsworth    Relationship to patient: Emergency Contact    Best call back number: [phone]    Chief complaint: LOW HR WHEN WALKING    Type of visit: FOLLOW UP     Requested date: ASAP    If rescheduling, when is the original appointment: 10.9.23    Additional notes: PT'S DAUGHTER IS CALLING TO SEE IF THE PT CAN BE SEEN SOONER THAN 10.9.23. SHE SAID PHYSICAL THERAPY WAS HELPING HIM WALK AND HIS HEART RATE WENT DOWN TO 48 AND HAS SOB. PT ALSO HAS BEEN FEELING WEAK IN HIS LEGS WHEN WALKING AND HAS TROUBLE WALKING FROM ROOM TO ROOM.

## 2023-05-25 DIAGNOSIS — R00.1 BRADYCARDIA: ICD-10-CM

## 2023-05-25 DIAGNOSIS — I48.0 PAROXYSMAL ATRIAL FIBRILLATION: Primary | ICD-10-CM

## 2023-05-25 NOTE — TELEPHONE ENCOUNTER
I  spoke with Bettye and she is aware to hold Metoprolol and of ordered 3 day Cardiac monitor,he also has follow up appointment  June 19,2023 at 12:15 pm

## 2023-08-01 ENCOUNTER — OFFICE VISIT (OUTPATIENT)
Dept: CARDIOLOGY | Facility: CLINIC | Age: 88
End: 2023-08-01
Payer: MEDICARE

## 2023-08-01 VITALS
HEIGHT: 72 IN | SYSTOLIC BLOOD PRESSURE: 126 MMHG | BODY MASS INDEX: 29.72 KG/M2 | HEART RATE: 68 BPM | DIASTOLIC BLOOD PRESSURE: 70 MMHG | WEIGHT: 219.4 LBS

## 2023-08-01 DIAGNOSIS — Z95.1 HX OF CABG: ICD-10-CM

## 2023-08-01 DIAGNOSIS — I25.9 IHD (ISCHEMIC HEART DISEASE): Primary | ICD-10-CM

## 2023-08-01 DIAGNOSIS — R00.1 BRADYCARDIA: ICD-10-CM

## 2023-08-01 DIAGNOSIS — I48.20 CHRONIC ATRIAL FIBRILLATION: ICD-10-CM

## 2023-08-01 DIAGNOSIS — I10 PRIMARY HYPERTENSION: ICD-10-CM

## 2023-08-01 DIAGNOSIS — K21.9 GASTROESOPHAGEAL REFLUX DISEASE, UNSPECIFIED WHETHER ESOPHAGITIS PRESENT: ICD-10-CM

## 2023-08-01 DIAGNOSIS — F41.9 ANXIETY: ICD-10-CM

## 2023-08-01 PROCEDURE — 99214 OFFICE O/P EST MOD 30 MIN: CPT | Performed by: NURSE PRACTITIONER

## 2023-08-01 RX ORDER — NIFEDIPINE 30 MG/1
30 TABLET, EXTENDED RELEASE ORAL DAILY
Qty: 90 TABLET | Refills: 1 | Status: SHIPPED | OUTPATIENT
Start: 2023-08-01

## 2023-08-01 RX ORDER — BUSPIRONE HYDROCHLORIDE 5 MG/1
5 TABLET ORAL 2 TIMES DAILY
Qty: 180 TABLET | Refills: 2 | Status: SHIPPED | OUTPATIENT
Start: 2023-08-01

## 2023-08-01 RX ORDER — OMEPRAZOLE 20 MG/1
20 CAPSULE, DELAYED RELEASE ORAL DAILY
Qty: 90 CAPSULE | Refills: 3 | Status: SHIPPED | OUTPATIENT
Start: 2023-08-01

## 2023-08-02 PROCEDURE — 93000 ELECTROCARDIOGRAM COMPLETE: CPT | Performed by: NURSE PRACTITIONER

## 2023-08-21 DIAGNOSIS — I10 ESSENTIAL HYPERTENSION: ICD-10-CM

## 2023-08-28 RX ORDER — AZILSARTAN KAMEDOXOMIL 80 MG/1
TABLET ORAL
Qty: 180 TABLET | Refills: 0 | Status: SHIPPED | OUTPATIENT
Start: 2023-08-28

## 2023-08-31 RX ORDER — METOPROLOL SUCCINATE 25 MG/1
TABLET, EXTENDED RELEASE ORAL
Qty: 90 TABLET | Refills: 2 | OUTPATIENT
Start: 2023-08-31

## 2024-01-02 ENCOUNTER — TELEPHONE (OUTPATIENT)
Dept: CARDIOLOGY | Facility: CLINIC | Age: 89
End: 2024-01-02

## 2024-01-02 NOTE — TELEPHONE ENCOUNTER
Caller: Bettye Hollingsworth    Relationship to patient: Emergency Contact    Best call back number: [phone]    Chief complaint: PT'S DAUGHTER HAS NOTICED THAT HER FATHER HAS BEGUN LOSING BREATH AND BECOME VERY LETHARGIC, EVEN GOING TO THE BATHROOM WILL CAUSE HIM TO NEED A REST. LAST WEEKEND THEY WENT TO THE ED AND CAME OUT WITH NO ANSWERS.     Type of visit: FOLLOW UP    Requested date: ASAP     If rescheduling, when is the original appointment: PT HAS APPT IN FEB

## 2024-01-03 ENCOUNTER — CLINICAL SUPPORT (OUTPATIENT)
Dept: CARDIOLOGY | Facility: CLINIC | Age: 89
End: 2024-01-03
Payer: MEDICARE

## 2024-01-03 DIAGNOSIS — I10 PRIMARY HYPERTENSION: ICD-10-CM

## 2024-01-03 DIAGNOSIS — R93.89 ABNORMAL CXR: ICD-10-CM

## 2024-01-03 DIAGNOSIS — R06.02 SHORTNESS OF BREATH: ICD-10-CM

## 2024-01-03 DIAGNOSIS — R04.2 HEMOPTYSIS: ICD-10-CM

## 2024-01-03 DIAGNOSIS — I48.20 CHRONIC ATRIAL FIBRILLATION: Primary | ICD-10-CM

## 2024-01-03 PROCEDURE — 93000 ELECTROCARDIOGRAM COMPLETE: CPT | Performed by: NURSE PRACTITIONER

## 2024-01-03 NOTE — TELEPHONE ENCOUNTER
[facility]- ER  notes obtained and I spoke with patients daughter Bettye and patient is scheduled to come in office this afternoon at 4 :00 pm for EKG

## 2024-01-03 NOTE — PROGRESS NOTES
Procedure     ECG 12 Lead    Date/Time: 1/3/2024 5:12 PM  Performed by: Ella Escalante APRN    Authorized by: Ella Escalante APRN  Comparison: compared with previous ECG from 8/2/2023  Similar to previous ECG  Rhythm: atrial fibrillation  BPM: 76

## 2024-01-10 ENCOUNTER — OFFICE VISIT (OUTPATIENT)
Dept: PULMONOLOGY | Facility: CLINIC | Age: 89
End: 2024-01-10
Payer: MEDICARE

## 2024-01-10 VITALS
TEMPERATURE: 96.8 F | WEIGHT: 215 LBS | SYSTOLIC BLOOD PRESSURE: 110 MMHG | HEIGHT: 71 IN | DIASTOLIC BLOOD PRESSURE: 68 MMHG | HEART RATE: 84 BPM | BODY MASS INDEX: 30.1 KG/M2 | OXYGEN SATURATION: 93 %

## 2024-01-10 DIAGNOSIS — K22.2 ESOPHAGEAL STRICTURE: ICD-10-CM

## 2024-01-10 DIAGNOSIS — R93.89 ABNORMAL CHEST X-RAY: ICD-10-CM

## 2024-01-10 DIAGNOSIS — R06.02 SOB (SHORTNESS OF BREATH): Primary | ICD-10-CM

## 2024-01-10 DIAGNOSIS — I27.20 PULMONARY HTN: ICD-10-CM

## 2024-01-10 NOTE — PROGRESS NOTES
Subjective    Carlos Preston presents for the following Coughing Up Blood, Shortness of Breath, Abnormal Imaging, ESOPHAGEAL STRICTURE , and Sleep Apnea (On CPAP )  .    History of Present Illness   Were you born premature?  no    Any Childhood infections? no      Breathing problems when you were a child? no    Any childhood allergies?    yes             At what age did you begin smoking? 16    Smoking marijuana? no    Any IV drugs? no    How many packs per day? 1    Lung Function Test? NO  Chest X-Ray? yes    CT Chest? no Allergy Test? no    Family hx of Lung disease or Lung Cancer?no    If FHx is posivitive for lung cancer, what is the relationship of the family member? na    Any hospitalization in the last year? no    How far can you walk without getting short of breath? 15 feet    Any coughing? yes    Any wheezing? no    Acid Reflux? yes    Do you snore? no    Daytime Fatigue? no    Any pets? no   Any pet allergies? 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 no    What inhalers are you currently using? None   Above-mentioned questionnaire was reviewed by me in great detail.  Review of Systems  Positive for SOB   Active Problems:  Problem List Items Addressed This Visit          Gastrointestinal Abdominal     Esophageal stricture       Pulmonary and Pneumonias    Pulmonary HTN    SOB (shortness of breath) - Primary    Relevant Orders    CT Chest Without Contrast    Miscellaneous DME    Complete PFT - Pre & Post Bronchodilator    Abnormal chest x-ray       Past Medical History:  Past Medical History:   Diagnosis Date    Cancer     H/O prostatectomy     Hearing loss     Heart disease     History of back surgery     Hypercholesterolemia     Hyperlipidemia     Hypertension     IHD (ischemic heart disease)     S/P CABG    Osteoarthritis of spine with myelopathy, lumbar region     Pulmonary hypertension     S/P knee replacement     Sinus disorder     thyroid ultrasound  2009    Normal       Family History:  Family History   Problem Relation Age of Onset    No Known Problems Mother     No Known Problems Father        Social History:  Social History     Tobacco Use    Smoking status: Former     Packs/day: 1     Types: Cigarettes     Start date:      Quit date: 1996     Years since quittin.3     Passive exposure: Past    Smokeless tobacco: Never   Substance Use Topics    Alcohol use: No       Current Medications:  Current Outpatient Medications   Medication Sig Dispense Refill    albuterol sulfate  (90 Base) MCG/ACT inhaler Inhale 2 puffs Every 4 (Four) Hours As Needed for Wheezing.      busPIRone (BUSPAR) 5 MG tablet Take 1 tablet by mouth 2 (Two) Times a Day. 180 tablet 2    Cholecalciferol (VITAMIN D3) 2000 UNITS tablet Take  by mouth daily.      Edarbi 80 MG tablet tablet TAKE 1/2 TO 1 TABLET BY MOUTH ONCE DAILY 180 tablet 0    ferrous sulfate 324 MG tablet delayed-release Take 1 tablet by mouth Daily With Breakfast.      isosorbide mononitrate (IMDUR) 30 MG 24 hr tablet Take 1 tablet by mouth Every Morning. 90 tablet 3    loratadine (CLARITIN) 10 MG tablet Take 1 tablet by mouth Daily.      Multiple Vitamins-Minerals (VISION VITAMINS) tablet Take  by mouth 2 (two) times a day.      NIFEdipine XL (PROCARDIA XL) 30 MG 24 hr tablet Take 1 tablet by mouth Daily. 90 tablet 1    nitroglycerin (NITROSTAT) 0.4 MG SL tablet 1 under the tongue as needed for angina, may repeat q5mins for up three doses 30 tablet 1    omeprazole (priLOSEC) 20 MG capsule Take 1 capsule by mouth Daily. 90 capsule 3    rivaroxaban (Xarelto) 20 MG tablet Take 1 tablet by mouth Daily. 90 tablet 3    vitamin C (ASCORBIC ACID) 500 MG tablet Take 2 tablets by mouth Daily.      ipratropium (ATROVENT HFA) 17 MCG/ACT inhaler Inhale 2 puffs 4 (Four) Times a Day. 12.9 g 11     No current facility-administered medications for this visit.       Allergies:  No Known Allergies    Vitals:  /68  "  Pulse 84   Temp 96.8 °F (36 °C)   Ht 180.3 cm (71\")   Wt 97.5 kg (215 lb)   SpO2 93%   BMI 29.99 kg/m²     Imaging:    Imaging Results (Most Recent)       None            Pulmonary Functions Testing Results:    No results found for: \"FEV1\", \"FVC\", \"BBF3DGJ\", \"TLC\", \"DLCO\"    Results for orders placed or performed during the hospital encounter of 05/26/23   Holter Monitor - 72 Hour Up To 15 Days   Result Value Ref Range    Target HR (85%) 106 bpm    Max. Pred. HR (100%) 125 bpm       Objective   Physical Exam  General- normal in appearance, not in any acute distress    HEENT- pupils equally reactive to light, normal in size, no scleral icterus    Neck-supple    Respiratory-respirations normal-on auscultation no wheezing no crackles,     Cardiovascular-  Normal S1 and S2. No S3, S4 or murmurs. No JVD, no carotid bruit and no edema, pulses normal bilaterally     GI-nontender nondistended bowel sounds positive    CNS-nonfocal    Musculoskeletal -no edema  Extremities- no obvious deformity noticed     Psychiatric-mood good, good eye contact, alert awake oriented  Skin- no visible rash                Assessment & Plan   Shortness of breath-likely multifactorial likely due to advanced age, deconditioning and recurrent episodes of pneumonia which are likely aspiration pneumonias.  Chest x-ray reviewed and discussed with patient and patient's daughter.  Will get CT chest.  Will get complete pulmonary function test  Will prescribe nebulizer with Atrovent nebulizer.  Will avoid albuterol as patient has history of atrial fibrillation      Esophageal stricture-as per patient's daughter he had the balloon dilatation done few years back and he had a speech and swallow done recently which showed that he is aspirating.  I advised her to follow-up with the GI doctor for the evaluation of esophageal stricture.    Overweight-advised him to cut down on the carbs and stay active.    Pulmonary hypertension-seems to be from " multiple causes due to left atrial cavity dilatation and changes present in the lungs.  Recommend repeating the echo if still present and patient wants treatment will consider right heart cath to look for specific cause.    Results for orders placed during the hospital encounter of 03/21/22    Adult Transthoracic Echo Complete W/ Cont if Necessary Per Protocol    Interpretation Summary  · The study is technically difficult  · Left ventricular wall thickness is consistent with mild concentric hypertrophy.  · Left ventricular ejection fraction appears to be 51 - 55%.  · Left ventricular diastolic function is consistent with (grade I) impaired relaxation.  · The left atrial cavity is moderately dilated. 5.2 Cm  · The right atrial cavity is mildly dilated.  · No aortic valve regurgitation or stenosis is present  · Mild mitral valve regurgitation is present.  · Mild to moderate tricuspid valve regurgitation is present. RVSP- 44 mmHg       ICD-10-CM ICD-9-CM   1. SOB (shortness of breath)  R06.02 786.05   2. Pulmonary HTN  I27.20 416.8   3. Abnormal chest x-ray  R93.89 793.2   4. Esophageal stricture  K22.2 530.3       Return in about 3 months (around 4/10/2024).

## 2024-01-22 ENCOUNTER — TELEPHONE (OUTPATIENT)
Dept: PULMONOLOGY | Facility: CLINIC | Age: 89
End: 2024-01-22

## 2024-01-22 NOTE — TELEPHONE ENCOUNTER
Provider: DR MAXIMILIANO KLINE     Caller: LOUIS WYNN    Relationship to Patient: DAUGHTER    Pharmacy: [facility]     Phone Number: [phone]    Reason for Call: PT RECEIVED NEBULIZER MACHINE TODAY WITH NO MEDICATION AND IT HAS NO INSTRUCTIONS PT'S DAUGHTER WOULD LIKE MORE THEN A 30 DAY SUPPLY PLEASE ADVISE

## 2024-01-22 NOTE — TELEPHONE ENCOUNTER
Caller: LOUIS WYNN     Relationship:DAUGHTER    Callback number: [phone]   Is it ok to leave a message: [x] Yes [] No    Requested medication for samples: ipratropium (ATROVENT HFA) 17 MCG/ACT inhaler     How much medication does the patient currently have left: 0    Who will be picking up the samples: LOUIS WYNN     Do you need information about patient financial assistance for this medication: [] Yes [x] No    Additional details provided: PT'S CO-PAY  AND HE CAN'T AFFORD THIS

## 2024-01-23 ENCOUNTER — TELEPHONE (OUTPATIENT)
Dept: PULMONOLOGY | Facility: CLINIC | Age: 89
End: 2024-01-23
Payer: MEDICARE

## 2024-01-23 RX ORDER — IPRATROPIUM BROMIDE AND ALBUTEROL SULFATE 2.5; .5 MG/3ML; MG/3ML
3 SOLUTION RESPIRATORY (INHALATION)
Qty: 120 ML | Refills: 5 | Status: SHIPPED | OUTPATIENT
Start: 2024-01-23

## 2024-01-23 NOTE — TELEPHONE ENCOUNTER
Called and spoke with patients Daughter to let her know that I spoke with the pharmacy and his inhaler should be cheaper next month. If not we will send in something more affordable. I also let her know that I have sent Dr. Echols a message to request the nebulizer solution be sent in and to check with her pharmacy this afternoon for .

## 2024-02-14 ENCOUNTER — OFFICE VISIT (OUTPATIENT)
Dept: CARDIOLOGY | Facility: CLINIC | Age: 89
End: 2024-02-14
Payer: MEDICARE

## 2024-02-14 VITALS
HEART RATE: 76 BPM | SYSTOLIC BLOOD PRESSURE: 126 MMHG | BODY MASS INDEX: 29.34 KG/M2 | HEIGHT: 71 IN | DIASTOLIC BLOOD PRESSURE: 60 MMHG | WEIGHT: 209.6 LBS

## 2024-02-14 DIAGNOSIS — Z95.1 HX OF CABG: ICD-10-CM

## 2024-02-14 DIAGNOSIS — R04.2 HEMOPTYSIS: ICD-10-CM

## 2024-02-14 DIAGNOSIS — R06.02 SHORTNESS OF BREATH: Primary | ICD-10-CM

## 2024-02-14 DIAGNOSIS — I27.20 PULMONARY HTN: ICD-10-CM

## 2024-02-14 DIAGNOSIS — R93.89 ABNORMAL CXR: ICD-10-CM

## 2024-02-14 DIAGNOSIS — I25.9 IHD (ISCHEMIC HEART DISEASE): ICD-10-CM

## 2024-02-14 DIAGNOSIS — I48.20 CHRONIC ATRIAL FIBRILLATION: ICD-10-CM

## 2024-02-14 DIAGNOSIS — G47.34 NOCTURNAL OXYGEN DESATURATION: ICD-10-CM

## 2024-02-14 DIAGNOSIS — I10 PRIMARY HYPERTENSION: ICD-10-CM

## 2024-02-14 PROCEDURE — 93000 ELECTROCARDIOGRAM COMPLETE: CPT | Performed by: NURSE PRACTITIONER

## 2024-02-14 PROCEDURE — 1159F MED LIST DOCD IN RCRD: CPT | Performed by: NURSE PRACTITIONER

## 2024-02-14 PROCEDURE — 99214 OFFICE O/P EST MOD 30 MIN: CPT | Performed by: NURSE PRACTITIONER

## 2024-02-14 PROCEDURE — 1160F RVW MEDS BY RX/DR IN RCRD: CPT | Performed by: NURSE PRACTITIONER

## 2024-02-15 ENCOUNTER — HOSPITAL ENCOUNTER (OUTPATIENT)
Dept: CARDIOLOGY | Facility: HOSPITAL | Age: 89
Discharge: HOME | End: 2024-02-15
Admitting: NURSE PRACTITIONER
Payer: MEDICARE

## 2024-02-15 VITALS — HEIGHT: 71 IN | WEIGHT: 209.66 LBS | BODY MASS INDEX: 29.35 KG/M2

## 2024-02-15 DIAGNOSIS — Z95.1 HX OF CABG: ICD-10-CM

## 2024-02-15 DIAGNOSIS — I10 PRIMARY HYPERTENSION: ICD-10-CM

## 2024-02-15 DIAGNOSIS — R93.89 ABNORMAL CXR: ICD-10-CM

## 2024-02-15 DIAGNOSIS — R06.02 SHORTNESS OF BREATH: ICD-10-CM

## 2024-02-15 LAB
AORTIC DIMENSIONLESS INDEX: 0.69 (DI)
BH CV ECHO MEAS - ACS: 2.01 CM
BH CV ECHO MEAS - AO MAX PG: 6.9 MMHG
BH CV ECHO MEAS - AO MEAN PG: 3.3 MMHG
BH CV ECHO MEAS - AO ROOT DIAM: 3.8 CM
BH CV ECHO MEAS - AO V2 MAX: 131.4 CM/SEC
BH CV ECHO MEAS - AO V2 VTI: 24.6 CM
BH CV ECHO MEAS - EDV(CUBED): 104.6 ML
BH CV ECHO MEAS - EF(MOD-BP): 52 %
BH CV ECHO MEAS - EF_3D-VOL: 53 %
BH CV ECHO MEAS - ESV(CUBED): 41 ML
BH CV ECHO MEAS - FS: 26.8 %
BH CV ECHO MEAS - IVS/LVPW: 1.08 CM
BH CV ECHO MEAS - IVSD: 1.4 CM
BH CV ECHO MEAS - LA DIMENSION: 5 CM
BH CV ECHO MEAS - LAT PEAK E' VEL: 9.7 CM/SEC
BH CV ECHO MEAS - LV MASS(C)D: 252 GRAMS
BH CV ECHO MEAS - LV MAX PG: 3.3 MMHG
BH CV ECHO MEAS - LV MEAN PG: 1.5 MMHG
BH CV ECHO MEAS - LV V1 MAX: 90.4 CM/SEC
BH CV ECHO MEAS - LV V1 VTI: 16.9 CM
BH CV ECHO MEAS - LVIDD: 4.7 CM
BH CV ECHO MEAS - LVIDS: 3.4 CM
BH CV ECHO MEAS - LVPWD: 1.3 CM
BH CV ECHO MEAS - MED PEAK E' VEL: 4.8 CM/SEC
BH CV ECHO MEAS - MR MAX PG: 42.9 MMHG
BH CV ECHO MEAS - MR MAX VEL: 327.5 CM/SEC
BH CV ECHO MEAS - MV A MAX VEL: 43.1 CM/SEC
BH CV ECHO MEAS - MV DEC SLOPE: 666.7 CM/SEC2
BH CV ECHO MEAS - MV DEC TIME: 0.24 SEC
BH CV ECHO MEAS - MV E MAX VEL: 139 CM/SEC
BH CV ECHO MEAS - MV E/A: 3.2
BH CV ECHO MEAS - MV MAX PG: 9.9 MMHG
BH CV ECHO MEAS - MV MEAN PG: 2.44 MMHG
BH CV ECHO MEAS - MV P1/2T: 78.1 MSEC
BH CV ECHO MEAS - MV V2 VTI: 40.2 CM
BH CV ECHO MEAS - MVA(P1/2T): 2.8 CM2
BH CV ECHO MEAS - PA V2 MAX: 132.3 CM/SEC
BH CV ECHO MEAS - PI END-D VEL: 143.9 CM/SEC
BH CV ECHO MEAS - RAP SYSTOLE: 8 MMHG
BH CV ECHO MEAS - RV MAX PG: 0.63 MMHG
BH CV ECHO MEAS - RV V1 MAX: 39.6 CM/SEC
BH CV ECHO MEAS - RV V1 VTI: 7.9 CM
BH CV ECHO MEAS - RVDD: 3.7 CM
BH CV ECHO MEAS - RVSP: 55.3 MMHG
BH CV ECHO MEAS - TAPSE (>1.6): 1.28 CM
BH CV ECHO MEAS - TR MAX PG: 47.3 MMHG
BH CV ECHO MEAS - TR MAX VEL: 343.9 CM/SEC
BH CV ECHO MEASUREMENTS AVERAGE E/E' RATIO: 19.17
BH CV XLRA - TDI S': 6.2 CM/SEC
SINUS: 3.2 CM

## 2024-02-15 PROCEDURE — 93306 TTE W/DOPPLER COMPLETE: CPT

## 2024-02-19 DIAGNOSIS — I10 PRIMARY HYPERTENSION: ICD-10-CM

## 2024-02-19 DIAGNOSIS — R00.1 BRADYCARDIA: ICD-10-CM

## 2024-02-19 RX ORDER — NIFEDIPINE 60 MG/1
60 TABLET, EXTENDED RELEASE ORAL DAILY
Qty: 90 TABLET | Refills: 2 | OUTPATIENT
Start: 2024-02-19

## 2024-02-19 RX ORDER — NIFEDIPINE 30 MG/1
30 TABLET, EXTENDED RELEASE ORAL DAILY
Qty: 90 TABLET | Refills: 1 | Status: SHIPPED | OUTPATIENT
Start: 2024-02-19

## 2024-05-14 ENCOUNTER — TELEPHONE (OUTPATIENT)
Dept: CARDIOLOGY | Facility: CLINIC | Age: 89
End: 2024-05-14

## 2024-05-14 NOTE — TELEPHONE ENCOUNTER
Caller: Bettye Hollingsworth    Relationship to patient: Emergency Contact    Best call back number:  [phone]    Patient is needing: TO HAVE A RX CALLED IN TO HELP MONITOR HIS ARRHYTHMIA AND WOULD LIKE A CALL BACK TO DISCUSS HIS BREATHING DUE TO THE PT NOT BEING ABLE TO GET ANY OXYGEN. PLEASE ADVISE THANK YOU

## 2024-05-15 DIAGNOSIS — R00.1 BRADYCARDIA: Primary | ICD-10-CM

## 2024-05-15 DIAGNOSIS — R06.02 SHORTNESS OF BREATH: ICD-10-CM

## 2024-05-15 DIAGNOSIS — I48.20 CHRONIC ATRIAL FIBRILLATION: ICD-10-CM

## 2024-05-15 NOTE — TELEPHONE ENCOUNTER
I spoke with Bettye and she will bring her father her  tomorrow afternoon after he has his PFT to have monitor put on.

## 2024-05-15 NOTE — TELEPHONE ENCOUNTER
I spoke with  patient's daughter Bettye , he is currently following with Dr Bartlett pulmonology  and is scheduled for a PFT tomorrow  . He had been using oxygen but has since stopped using it due to feeling it did not help him.   She did ask if he could tolerate or benefit from  an antiarrhythmic to help control AFIB.

## 2024-05-20 ENCOUNTER — TELEPHONE (OUTPATIENT)
Dept: CARDIOLOGY | Facility: CLINIC | Age: 89
End: 2024-05-20
Payer: MEDICARE

## 2024-05-20 NOTE — TELEPHONE ENCOUNTER
Name: Bettye Hollingsworth      Relationship: Emergency Contact      Best Callback Number: [phone]       HUB PROVIDED THE RELAY MESSAGE FROM THE OFFICE      PATIENT: HAS FURTHER QUESTIONS AND WOULD LIKE A CALL BACK AT THE FOLLOWING PHONE [phone]. PATIENT WAS TOLD HE WOULD BE PRESCRIBED FLECAINIDE TO [facility].

## 2024-05-20 NOTE — TELEPHONE ENCOUNTER
Hub to relay    Patient is to wear holter for 3 days so he can take off today. Also, we are currently out of the samples of Xarelto 20 mg daily.

## 2024-05-20 NOTE — TELEPHONE ENCOUNTER
Bettye was made aware that Ella wanted to see what monitor showed before starting new medication. She states that she will go send it back today.

## 2024-05-20 NOTE — TELEPHONE ENCOUNTER
Brenna, I am not sure about the flecainide. I do not see in Ella's note. Do you care to call back?

## 2024-05-29 DIAGNOSIS — I25.9 IHD (ISCHEMIC HEART DISEASE): ICD-10-CM

## 2024-05-29 RX ORDER — ISOSORBIDE MONONITRATE 30 MG/1
30 TABLET, EXTENDED RELEASE ORAL EVERY MORNING
Qty: 90 TABLET | Refills: 2 | Status: SHIPPED | OUTPATIENT
Start: 2024-05-29

## 2024-06-20 ENCOUNTER — TELEPHONE (OUTPATIENT)
Dept: CARDIOLOGY | Facility: CLINIC | Age: 89
End: 2024-06-20
Payer: MEDICARE

## 2024-06-20 NOTE — TELEPHONE ENCOUNTER
Last stress test showed ischemia. Flecainide would be contraindicated with IHD. We can stop edarbi and add Entresto 24/26 mg bid for BP and heart function and monitor for any improvement of symptoms and stable vital signs.

## 2024-06-20 NOTE — TELEPHONE ENCOUNTER
Caller: Bettye Hollingsworth    Relationship: Emergency Contact    Best call back number: [phone]    Caller requesting test results:     What test was performed: MONITOR    When was the test performed: 1 MONTH    Where was the test performed:     Additional notes: ALSO ASKING IF YOU CAN PUT HIM ON FLECANIDE FOR ARRHYTHMIA. PLEASE CALL THE ABOVE TO DISCUSS AND TEST RESULTS.

## 2024-06-20 NOTE — TELEPHONE ENCOUNTER
I spoke with patient's daughter  Bettye about option to change Edarbi to Entresto.   She will discuss this with him but she feels he will prefer to continue taking Edarbi due to cost of entresto.  She said  he  usually does not qualify for any patient assistance . She is open to further discuss at next office visit in August 2024

## 2024-08-17 DIAGNOSIS — I48.0 PAF (PAROXYSMAL ATRIAL FIBRILLATION): ICD-10-CM

## 2024-08-19 RX ORDER — RIVAROXABAN 20 MG/1
20 TABLET, FILM COATED ORAL DAILY
Qty: 30 TABLET | Refills: 0 | Status: SHIPPED | OUTPATIENT
Start: 2024-08-19 | End: 2024-08-21 | Stop reason: SDUPTHER

## 2024-08-21 ENCOUNTER — OFFICE VISIT (OUTPATIENT)
Dept: CARDIOLOGY | Facility: CLINIC | Age: 89
End: 2024-08-21
Payer: MEDICARE

## 2024-08-21 VITALS
HEIGHT: 71 IN | SYSTOLIC BLOOD PRESSURE: 142 MMHG | DIASTOLIC BLOOD PRESSURE: 70 MMHG | HEART RATE: 71 BPM | BODY MASS INDEX: 27.33 KG/M2 | WEIGHT: 195.2 LBS

## 2024-08-21 DIAGNOSIS — F41.9 ANXIETY: ICD-10-CM

## 2024-08-21 DIAGNOSIS — I25.9 IHD (ISCHEMIC HEART DISEASE): ICD-10-CM

## 2024-08-21 DIAGNOSIS — K21.9 GASTROESOPHAGEAL REFLUX DISEASE, UNSPECIFIED WHETHER ESOPHAGITIS PRESENT: ICD-10-CM

## 2024-08-21 DIAGNOSIS — I48.20 ATRIAL FIBRILLATION, CHRONIC: Primary | ICD-10-CM

## 2024-08-21 PROBLEM — I48.0 PAROXYSMAL ATRIAL FIBRILLATION: Status: RESOLVED | Noted: 2018-07-03 | Resolved: 2024-08-21

## 2024-08-21 PROCEDURE — 99214 OFFICE O/P EST MOD 30 MIN: CPT | Performed by: NURSE PRACTITIONER

## 2024-08-21 PROCEDURE — 93000 ELECTROCARDIOGRAM COMPLETE: CPT | Performed by: NURSE PRACTITIONER

## 2024-08-21 PROCEDURE — 1159F MED LIST DOCD IN RCRD: CPT | Performed by: NURSE PRACTITIONER

## 2024-08-21 PROCEDURE — 1160F RVW MEDS BY RX/DR IN RCRD: CPT | Performed by: NURSE PRACTITIONER

## 2024-08-21 RX ORDER — OMEPRAZOLE 20 MG/1
20 CAPSULE, DELAYED RELEASE ORAL DAILY
Qty: 90 CAPSULE | Refills: 3 | Status: SHIPPED | OUTPATIENT
Start: 2024-08-21

## 2024-08-21 RX ORDER — ISOSORBIDE MONONITRATE 30 MG/1
30 TABLET, EXTENDED RELEASE ORAL EVERY MORNING
Qty: 90 TABLET | Refills: 3 | Status: SHIPPED | OUTPATIENT
Start: 2024-08-21

## 2024-08-21 RX ORDER — BUSPIRONE HYDROCHLORIDE 5 MG/1
5 TABLET ORAL 2 TIMES DAILY
Qty: 180 TABLET | Refills: 3 | Status: SHIPPED | OUTPATIENT
Start: 2024-08-21

## 2024-08-21 RX ORDER — IBUPROFEN 800 MG/1
800 TABLET ORAL EVERY 8 HOURS PRN
COMMUNITY

## 2024-08-21 RX ORDER — HYDROCODONE BITARTRATE AND ACETAMINOPHEN 5; 325 MG/1; MG/1
2 TABLET ORAL EVERY 8 HOURS PRN
COMMUNITY

## 2024-08-21 NOTE — TELEPHONE ENCOUNTER
Patient requested samples while in office.    Xarelto 20 mg x14 tablets, LOT: [id number] EXP: 09/2026

## 2024-08-21 NOTE — PROGRESS NOTES
"Chief Complaint   Patient presents with    Follow-up     Cardiac management     Atrial Fibrillation     Patient reports he always is in AFIB and feels it. He has SOA and uses oxygen at night      LABS     Current labs December 2023 , results in chart    Med Refill     Needs refills on Buspar, omeprazole, nifedipine , Imdur and xarelto 90 day supply to [facility] . Also need Nitro Sl tabs to pharmacy        Subjective       Carlos Preston is a 89 y.o. male with HTN, hyperlipidemia, PAF, IHD diagnosed 2007 when he underwent CABG.  In 2017 Xarelto started for PAF.  Cardiac catheterization in 2018 showed EF 55% and patent grafts.  Echo 2021 showed EF stable at 55.  June 2023 cardiac monitor showed 3-second pause, beta-blocker discontinued.  He had anemia secondary to blood loss.  Watchman device considered.  He continued Xarelto and iron supplements.  Due to shortness of breath he is evaluated by pulmonologist.  He is now using supplemental oxygen during the day and mask at night.  Cardiac monitor showed baseline atrial fibrillation and short run of VT.  No pauses were noted.    Today he returns to the office for a follow-up visit.  He admits to tolerating oxygen therapy well, \"seems to be helping\".  Heart rhythm remains irregular but rate controlled.  Blood pressure stable.  He continues Edarbi at half tablet.  Shortness of breath is unchanged and no significant swelling noted.  He admits to generalized weakness but no dizziness or TIA symptoms.  He bruises easily, other signs of bleeding denied.    Cardiac History:    Past Surgical History:   Procedure Laterality Date    CARDIAC CATHETERIZATION  05/16/2007    Dr. Mace: 60% LM and 70% LCX.    CARDIAC CATHETERIZATION  08/19/2009    %, OM 85-90%,Patent grafts.    CARDIAC CATHETERIZATION  07/23/2018    Patent Grafts    CARDIOVASCULAR STRESS TEST  08/05/2009    [facility]: Dr. Mace- EF 29%, Diffuse ischemia.    CARDIOVASCULAR STRESS TEST  11/07/2012    BRYAN" Myoview- Inferoseptal infarct with jon infarct ischemia. EF 41%.    CARDIOVASCULAR STRESS TEST  03/23/2015    L. Myoview- EF51%,fixed defect , no ischemia burden.    CARDIOVASCULAR STRESS TEST  10/11/2017    L.Myoview- EF 54%. Small inferior Ischemia.    CONVERTED (HISTORICAL) HOLTER  06/12/2023    3 days. A.Fib. Avg 60. . 3 VT. One 3 Sec Pause    CONVERTED (HISTORICAL) HOLTER  05/28/2024    3 Days. A.Fib. Avg 70. . 4.6% PVC. 2 VT    CORONARY ARTERY BYPASS GRAFT  05/17/2007    CABG:  SVG to LAD and LCX.    ECHO - CONVERTED  08/06/2009    Dr. Mace- EF 50%.    ECHO - CONVERTED  02/14/2011    EF 50%, Septal WMA.    ECHO - CONVERTED  11/07/2012    EF 40-45%, RVSP 33 mmHg.    ECHO - CONVERTED  03/23/2015    EF 45-50%, RVSP 40mmHg,mild MR, mild PHT    ECHO - CONVERTED  05/01/2017    EF 50-55%, mild MR    ECHO - CONVERTED  10/11/2017    EF 55%    ECHO - CONVERTED  03/21/2022    TLS. EF 55%. LA- 5.2 cm. Mild MR. RVSP- 44 mmHg    ECHO - CONVERTED  02/15/2024    TLS. EF 55%.RHE. LA- 5.0. Mild MR.AO- 3.8. RVSP- 56 mmHg    US CAROTID UNILATERAL  04/16/2007    Mild Plaque in Rt. bulb and RECA.       Current Outpatient Medications   Medication Sig Dispense Refill    albuterol sulfate  (90 Base) MCG/ACT inhaler Inhale 2 puffs Every 4 (Four) Hours As Needed for Wheezing.      busPIRone (BUSPAR) 5 MG tablet Take 1 tablet by mouth 2 (Two) Times a Day. 180 tablet 3    Cholecalciferol (VITAMIN D3) 2000 UNITS tablet Take  by mouth daily.      Edarbi 80 MG tablet tablet TAKE 1/2 TO 1 TABLET BY MOUTH ONCE DAILY 180 tablet 0    ferrous sulfate 324 MG tablet delayed-release Take 1 tablet by mouth Daily With Breakfast.      HYDROcodone-acetaminophen (NORCO) 5-325 MG per tablet Take 2 tablets by mouth Every 8 (Eight) Hours As Needed for Severe Pain.      ibuprofen (ADVIL,MOTRIN) 800 MG tablet Take 1 tablet by mouth Every 8 (Eight) Hours As Needed for Mild Pain.      ipratropium (ATROVENT HFA) 17 MCG/ACT inhaler Inhale 2  puffs 4 (Four) Times a Day. 12.9 g 11    ipratropium-albuterol (DUO-NEB) 0.5-2.5 mg/3 ml nebulizer Take 3 mL by nebulization 4 (Four) Times a Day. 120 mL 5    isosorbide mononitrate (IMDUR) 30 MG 24 hr tablet Take 1 tablet by mouth Every Morning. 90 tablet 3    loratadine (CLARITIN) 10 MG tablet Take 1 tablet by mouth Daily.      Multiple Vitamins-Minerals (VISION VITAMINS) tablet Take  by mouth 2 (two) times a day.      NIFEdipine XL (PROCARDIA XL) 30 MG 24 hr tablet Take 1 tablet by mouth Daily. 90 tablet 1    nitroglycerin (NITROSTAT) 0.4 MG SL tablet 1 under the tongue as needed for angina, may repeat q5mins for up three doses 30 tablet 1    omeprazole (priLOSEC) 20 MG capsule Take 1 capsule by mouth Daily. 90 capsule 3    rivaroxaban (Xarelto) 20 MG tablet Take 1 tablet by mouth Daily. 90 tablet 3    vitamin C (ASCORBIC ACID) 500 MG tablet Take 2 tablets by mouth Daily.       No current facility-administered medications for this visit.       Patient has no known allergies.    Past Medical History:   Diagnosis Date    Cancer     H/O prostatectomy     Hearing loss     Heart disease     History of back surgery     Hypercholesterolemia     Hyperlipidemia     Hypertension     IHD (ischemic heart disease)     S/P CABG    Osteoarthritis of spine with myelopathy, lumbar region     Paroxysmal atrial fibrillation 2018    Pulmonary hypertension     S/P knee replacement     Sinus disorder     thyroid ultrasound 2009    Normal       Social History     Socioeconomic History    Marital status:    Tobacco Use    Smoking status: Former     Current packs/day: 0.00     Average packs/day: 1 pack/day for 52.7 years (52.7 ttl pk-yrs)     Types: Cigarettes     Start date:      Quit date: 1996     Years since quittin.9     Passive exposure: Past    Smokeless tobacco: Never   Vaping Use    Vaping status: Never Used   Substance and Sexual Activity    Alcohol use: No    Drug use: No    Sexual activity:  "Defer       Family History   Problem Relation Age of Onset    No Known Problems Mother     No Known Problems Father        Review of Systems   Constitutional: Positive for malaise/fatigue and weight loss. Negative for diaphoresis and fever.   Cardiovascular:  Positive for irregular heartbeat, leg swelling (Mild, no worse) and palpitations. Negative for chest pain and near-syncope.   Respiratory:  Positive for shortness of breath and sleep disturbances due to breathing.    Hematologic/Lymphatic: Negative for bleeding problem. Bruises/bleeds easily.   Musculoskeletal:  Negative for falls.   Neurological:  Positive for loss of balance (Ambulates with use of cane) and weakness. Negative for dizziness.   Psychiatric/Behavioral:  Negative for memory loss.         BP Readings from Last 5 Encounters:   08/21/24 142/70   02/14/24 126/60   01/10/24 110/68   08/01/23 126/70   06/19/23 110/60       Wt Readings from Last 5 Encounters:   08/21/24 88.5 kg (195 lb 3.2 oz)   02/15/24 95.1 kg (209 lb 10.5 oz)   02/14/24 95.1 kg (209 lb 9.6 oz)   01/10/24 97.5 kg (215 lb)   08/01/23 99.5 kg (219 lb 6.4 oz)       Objective     /70 (BP Location: Left arm, Patient Position: Sitting)   Pulse 71   Ht 180 cm (70.87\")   Wt 88.5 kg (195 lb 3.2 oz)   BMI 27.32 kg/m²     Vitals and nursing note reviewed.   Constitutional:       Appearance: Not in distress. Frail.   Eyes:      Conjunctiva/sclera: Conjunctivae normal.      Pupils: Pupils are equal, round, and reactive to light.   HENT:      Head: Normocephalic.   Neck:      Vascular: No carotid bruit.   Pulmonary:      Effort: Pulmonary effort is normal.      Breath sounds: Decreased breath sounds present. No rales.   Cardiovascular:      PMI at left midclavicular line. Normal rate. Irregular rhythm.   Edema:     Ankle: bilateral trace edema of the ankle.  Abdominal:      General: Bowel sounds are normal.      Palpations: Abdomen is soft.   Musculoskeletal:      Cervical back: Neck " supple. Skin:     General: Skin is warm and dry.   Neurological:      Mental Status: Alert, oriented to person, place, and time and oriented to person, place and time.   Psychiatric:         Mood and Affect: Mood normal.         Speech: Speech normal.         Behavior: Behavior normal.         Cognition and Memory: Memory normal.            ECG 12 Lead    Date/Time: 8/21/2024 10:13 AM  Performed by: Ella Escalante APRN    Authorized by: Ella Escalante APRN  Comparison: compared with previous ECG from 2/14/2024  Similar to previous ECG  Rhythm: atrial fibrillation  Rate: normal  BPM: 71               Assessment & Plan   Diagnoses and all orders for this visit:    1. Atrial fibrillation, chronic (Primary)  -     rivaroxaban (Xarelto) 20 MG tablet; Take 1 tablet by mouth Daily.  Dispense: 90 tablet; Refill: 3  -     ECG 12 Lead    2. IHD (ischemic heart disease)  -     isosorbide mononitrate (IMDUR) 30 MG 24 hr tablet; Take 1 tablet by mouth Every Morning.  Dispense: 90 tablet; Refill: 3    3. Gastroesophageal reflux disease, unspecified whether esophagitis present  -     omeprazole (priLOSEC) 20 MG capsule; Take 1 capsule by mouth Daily.  Dispense: 90 capsule; Refill: 3    4. Anxiety  -     busPIRone (BUSPAR) 5 MG tablet; Take 1 tablet by mouth 2 (Two) Times a Day.  Dispense: 180 tablet; Refill: 3      Chronic atrial fibrillation  -EKG A-fib with normal ventricular rate  -Denies signs of bleeding.  Continue Xarelto for stroke prevention    IHD/history CABG  -Denies anginal symptoms  -Nitrostat is up-to-date    HTN  -BP well-controlled  -Continue Procardia, low-dose ARB    GERD  -Denies recent heartburn  -Continue omeprazole  -Admits to decrease in appetite  -Has history of swallowing problems  -Encouraged supplemental dietary drinks such as Ensure    SOA  -Followed by pulmonologist  -Compliant with oxygen therapy    Patient appears stable.  Continue current cardiac plan of care.  Patient and his daughter agree to  call sooner for any cardiac concerns.    Patient's PCP has recently moved and plans to reestablish care.  They understand to contact the office once new PCP known.             Electronically signed by JESSA Oh,  August 21, 2024 10:19 EDT    Dictated Utilizing Dragon Dictation: Part of this note may be an electronic transcription/translation of spoken language to printed text using the Dragon Dictation System.

## 2024-08-27 DIAGNOSIS — R00.1 BRADYCARDIA: ICD-10-CM

## 2024-08-27 DIAGNOSIS — I10 PRIMARY HYPERTENSION: ICD-10-CM

## 2024-08-28 RX ORDER — NIFEDIPINE 30 MG/1
30 TABLET, EXTENDED RELEASE ORAL DAILY
Qty: 90 TABLET | Refills: 2 | Status: SHIPPED | OUTPATIENT
Start: 2024-08-28

## 2025-03-06 ENCOUNTER — TELEPHONE (OUTPATIENT)
Dept: CARDIOLOGY | Facility: CLINIC | Age: OVER 89
End: 2025-03-06
Payer: MEDICARE

## 2025-03-06 NOTE — TELEPHONE ENCOUNTER
Received office note from Dr Hankins .  Patient had been lightheaded , PCP had him to break the edarbi in 1/4 piece to equal 20 mg and to see you in 1 month

## 2025-03-11 ENCOUNTER — APPOINTMENT (OUTPATIENT)
Facility: HOSPITAL | Age: OVER 89
End: 2025-03-11
Payer: MEDICARE

## 2025-03-11 ENCOUNTER — HOSPITAL ENCOUNTER (INPATIENT)
Facility: HOSPITAL | Age: OVER 89
LOS: 15 days | Discharge: HOME | End: 2025-03-26
Attending: EMERGENCY MEDICINE | Admitting: INTERNAL MEDICINE
Payer: MEDICARE

## 2025-03-11 DIAGNOSIS — R79.89 ELEVATED TROPONIN: ICD-10-CM

## 2025-03-11 DIAGNOSIS — J21.0 RSV (ACUTE BRONCHIOLITIS DUE TO RESPIRATORY SYNCYTIAL VIRUS): ICD-10-CM

## 2025-03-11 DIAGNOSIS — M54.50 LOW BACK PAIN, UNSPECIFIED BACK PAIN LATERALITY, UNSPECIFIED CHRONICITY, UNSPECIFIED WHETHER SCIATICA PRESENT: ICD-10-CM

## 2025-03-11 DIAGNOSIS — J18.9 PNEUMONIA OF BOTH LUNGS DUE TO INFECTIOUS ORGANISM, UNSPECIFIED PART OF LUNG: Primary | ICD-10-CM

## 2025-03-11 DIAGNOSIS — Z78.9 IMPAIRED MOBILITY AND ADLS: ICD-10-CM

## 2025-03-11 DIAGNOSIS — T79.6XXA TRAUMATIC RHABDOMYOLYSIS, INITIAL ENCOUNTER: ICD-10-CM

## 2025-03-11 DIAGNOSIS — R13.12 OROPHARYNGEAL DYSPHAGIA: ICD-10-CM

## 2025-03-11 DIAGNOSIS — Z74.09 IMPAIRED MOBILITY AND ADLS: ICD-10-CM

## 2025-03-11 PROBLEM — M62.82 RHABDOMYOLYSIS: Status: ACTIVE | Noted: 2025-03-11

## 2025-03-11 LAB
ALBUMIN SERPL-MCNC: 3.1 G/DL (ref 3.5–5.2)
ALBUMIN/GLOB SERPL: 0.8 G/DL
ALP SERPL-CCNC: 135 U/L (ref 39–117)
ALT SERPL W P-5'-P-CCNC: 59 U/L (ref 1–41)
AMORPH URATE CRY URNS QL MICRO: ABNORMAL /HPF
ANION GAP SERPL CALCULATED.3IONS-SCNC: 14.7 MMOL/L (ref 5–15)
AST SERPL-CCNC: 172 U/L (ref 1–40)
BACTERIA UR QL AUTO: ABNORMAL /HPF
BASOPHILS # BLD AUTO: 0.01 10*3/MM3 (ref 0–0.2)
BASOPHILS NFR BLD AUTO: 0 % (ref 0–1.5)
BILIRUB SERPL-MCNC: 1.3 MG/DL (ref 0–1.2)
BILIRUB UR QL STRIP: ABNORMAL
BUN SERPL-MCNC: 63 MG/DL (ref 8–23)
BUN/CREAT SERPL: 44.4 (ref 7–25)
CALCIUM SPEC-SCNC: 9.5 MG/DL (ref 8.2–9.6)
CHLORIDE SERPL-SCNC: 108 MMOL/L (ref 98–107)
CK SERPL-CCNC: 2458 U/L (ref 20–200)
CLARITY UR: CLEAR
CO2 SERPL-SCNC: 20.3 MMOL/L (ref 22–29)
COLOR UR: ABNORMAL
CREAT SERPL-MCNC: 1.42 MG/DL (ref 0.76–1.27)
D-LACTATE SERPL-SCNC: 1.8 MMOL/L (ref 0.5–2)
DEPRECATED RDW RBC AUTO: 53.5 FL (ref 37–54)
EGFRCR SERPLBLD CKD-EPI 2021: 45.2 ML/MIN/1.73
EOSINOPHIL # BLD AUTO: 0 10*3/MM3 (ref 0–0.4)
EOSINOPHIL NFR BLD AUTO: 0 % (ref 0.3–6.2)
ERYTHROCYTE [DISTWIDTH] IN BLOOD BY AUTOMATED COUNT: 15.8 % (ref 12.3–15.4)
FLUAV RNA RESP QL NAA+PROBE: NOT DETECTED
FLUBV RNA RESP QL NAA+PROBE: NOT DETECTED
GEN 5 1HR TROPONIN T REFLEX: 123 NG/L
GLOBULIN UR ELPH-MCNC: 3.8 GM/DL
GLUCOSE SERPL-MCNC: 108 MG/DL (ref 65–99)
GLUCOSE UR STRIP-MCNC: NEGATIVE MG/DL
HCT VFR BLD AUTO: 29.6 % (ref 37.5–51)
HGB BLD-MCNC: 9.2 G/DL (ref 13–17.7)
HGB UR QL STRIP.AUTO: ABNORMAL
HOLD SPECIMEN: NORMAL
HYALINE CASTS UR QL AUTO: ABNORMAL /LPF
IMM GRANULOCYTES # BLD AUTO: 0.07 10*3/MM3 (ref 0–0.05)
IMM GRANULOCYTES NFR BLD AUTO: 0.3 % (ref 0–0.5)
KETONES UR QL STRIP: ABNORMAL
LEUKOCYTE ESTERASE UR QL STRIP.AUTO: NEGATIVE
LYMPHOCYTES # BLD AUTO: 0.45 10*3/MM3 (ref 0.7–3.1)
LYMPHOCYTES NFR BLD AUTO: 1.9 % (ref 19.6–45.3)
MAGNESIUM SERPL-MCNC: 2.3 MG/DL (ref 1.7–2.3)
MCH RBC QN AUTO: 28.8 PG (ref 26.6–33)
MCHC RBC AUTO-ENTMCNC: 31.1 G/DL (ref 31.5–35.7)
MCV RBC AUTO: 92.8 FL (ref 79–97)
MONOCYTES # BLD AUTO: 1.13 10*3/MM3 (ref 0.1–0.9)
MONOCYTES NFR BLD AUTO: 4.8 % (ref 5–12)
MUCOUS THREADS URNS QL MICRO: ABNORMAL /HPF
NEUTROPHILS NFR BLD AUTO: 21.84 10*3/MM3 (ref 1.7–7)
NEUTROPHILS NFR BLD AUTO: 93 % (ref 42.7–76)
NITRITE UR QL STRIP: NEGATIVE
NT-PROBNP SERPL-MCNC: ABNORMAL PG/ML (ref 0–1800)
PH UR STRIP.AUTO: 5.5 [PH] (ref 5–8)
PLATELET # BLD AUTO: 103 10*3/MM3 (ref 140–450)
PMV BLD AUTO: 12.1 FL (ref 6–12)
POTASSIUM SERPL-SCNC: 3.8 MMOL/L (ref 3.5–5.2)
PROCALCITONIN SERPL-MCNC: 1.17 NG/ML (ref 0–0.25)
PROT SERPL-MCNC: 6.9 G/DL (ref 6–8.5)
PROT UR QL STRIP: ABNORMAL
RBC # BLD AUTO: 3.19 10*6/MM3 (ref 4.14–5.8)
RBC # UR STRIP: ABNORMAL /HPF
REF LAB TEST METHOD: ABNORMAL
RSV RNA RESP QL NAA+PROBE: DETECTED
SARS-COV-2 RNA RESP QL NAA+PROBE: NOT DETECTED
SODIUM SERPL-SCNC: 143 MMOL/L (ref 136–145)
SP GR UR STRIP: >=1.03 (ref 1–1.03)
SQUAMOUS #/AREA URNS HPF: ABNORMAL /HPF
TROPONIN T % DELTA: -1
TROPONIN T NUMERIC DELTA: -1 NG/L
TROPONIN T SERPL HS-MCNC: 124 NG/L
UROBILINOGEN UR QL STRIP: ABNORMAL
WBC # UR STRIP: ABNORMAL /HPF
WBC NRBC COR # BLD AUTO: 23.5 10*3/MM3 (ref 3.4–10.8)
WHOLE BLOOD HOLD COAG: NORMAL
WHOLE BLOOD HOLD SPECIMEN: NORMAL

## 2025-03-11 PROCEDURE — 71045 X-RAY EXAM CHEST 1 VIEW: CPT

## 2025-03-11 PROCEDURE — 25010000002 AZITHROMYCIN PER 500 MG: Performed by: PHYSICIAN ASSISTANT

## 2025-03-11 PROCEDURE — 36415 COLL VENOUS BLD VENIPUNCTURE: CPT

## 2025-03-11 PROCEDURE — 84145 PROCALCITONIN (PCT): CPT | Performed by: PHYSICIAN ASSISTANT

## 2025-03-11 PROCEDURE — 83880 ASSAY OF NATRIURETIC PEPTIDE: CPT | Performed by: PHYSICIAN ASSISTANT

## 2025-03-11 PROCEDURE — 80053 COMPREHEN METABOLIC PANEL: CPT | Performed by: EMERGENCY MEDICINE

## 2025-03-11 PROCEDURE — 99223 1ST HOSP IP/OBS HIGH 75: CPT | Performed by: FAMILY MEDICINE

## 2025-03-11 PROCEDURE — 81001 URINALYSIS AUTO W/SCOPE: CPT | Performed by: EMERGENCY MEDICINE

## 2025-03-11 PROCEDURE — 74176 CT ABD & PELVIS W/O CONTRAST: CPT

## 2025-03-11 PROCEDURE — 93005 ELECTROCARDIOGRAM TRACING: CPT | Performed by: EMERGENCY MEDICINE

## 2025-03-11 PROCEDURE — 87040 BLOOD CULTURE FOR BACTERIA: CPT | Performed by: PHYSICIAN ASSISTANT

## 2025-03-11 PROCEDURE — 83735 ASSAY OF MAGNESIUM: CPT | Performed by: EMERGENCY MEDICINE

## 2025-03-11 PROCEDURE — 71250 CT THORAX DX C-: CPT

## 2025-03-11 PROCEDURE — 25810000003 SODIUM CHLORIDE 0.9 % SOLUTION: Performed by: PHYSICIAN ASSISTANT

## 2025-03-11 PROCEDURE — 51798 US URINE CAPACITY MEASURE: CPT

## 2025-03-11 PROCEDURE — 84484 ASSAY OF TROPONIN QUANT: CPT | Performed by: EMERGENCY MEDICINE

## 2025-03-11 PROCEDURE — 82550 ASSAY OF CK (CPK): CPT | Performed by: PHYSICIAN ASSISTANT

## 2025-03-11 PROCEDURE — 73502 X-RAY EXAM HIP UNI 2-3 VIEWS: CPT

## 2025-03-11 PROCEDURE — 25810000003 SODIUM CHLORIDE 0.9 % SOLUTION 250 ML FLEX CONT: Performed by: PHYSICIAN ASSISTANT

## 2025-03-11 PROCEDURE — 85025 COMPLETE CBC W/AUTO DIFF WBC: CPT | Performed by: EMERGENCY MEDICINE

## 2025-03-11 PROCEDURE — 70450 CT HEAD/BRAIN W/O DYE: CPT

## 2025-03-11 PROCEDURE — 25010000002 CEFTRIAXONE PER 250 MG: Performed by: PHYSICIAN ASSISTANT

## 2025-03-11 PROCEDURE — 99285 EMERGENCY DEPT VISIT HI MDM: CPT

## 2025-03-11 PROCEDURE — 87637 SARSCOV2&INF A&B&RSV AMP PRB: CPT | Performed by: PHYSICIAN ASSISTANT

## 2025-03-11 PROCEDURE — 83605 ASSAY OF LACTIC ACID: CPT | Performed by: PHYSICIAN ASSISTANT

## 2025-03-11 RX ORDER — CETIRIZINE HYDROCHLORIDE 10 MG/1
10 TABLET ORAL DAILY
Status: DISCONTINUED | OUTPATIENT
Start: 2025-03-12 | End: 2025-03-26 | Stop reason: HOSPADM

## 2025-03-11 RX ORDER — TRAMADOL HYDROCHLORIDE 50 MG/1
50 TABLET ORAL EVERY 12 HOURS PRN
Status: DISCONTINUED | OUTPATIENT
Start: 2025-03-11 | End: 2025-03-15

## 2025-03-11 RX ORDER — BISACODYL 10 MG
10 SUPPOSITORY, RECTAL RECTAL DAILY PRN
Status: DISCONTINUED | OUTPATIENT
Start: 2025-03-11 | End: 2025-03-15

## 2025-03-11 RX ORDER — SODIUM CHLORIDE 9 MG/ML
40 INJECTION, SOLUTION INTRAVENOUS AS NEEDED
Status: DISCONTINUED | OUTPATIENT
Start: 2025-03-11 | End: 2025-03-26 | Stop reason: HOSPADM

## 2025-03-11 RX ORDER — SODIUM CHLORIDE 0.9 % (FLUSH) 0.9 %
10 SYRINGE (ML) INJECTION EVERY 12 HOURS SCHEDULED
Status: DISCONTINUED | OUTPATIENT
Start: 2025-03-11 | End: 2025-03-26 | Stop reason: HOSPADM

## 2025-03-11 RX ORDER — NIFEDIPINE 30 MG/1
30 TABLET, EXTENDED RELEASE ORAL DAILY
Status: DISCONTINUED | OUTPATIENT
Start: 2025-03-12 | End: 2025-03-26 | Stop reason: HOSPADM

## 2025-03-11 RX ORDER — POLYETHYLENE GLYCOL 3350 17 G/17G
17 POWDER, FOR SOLUTION ORAL DAILY PRN
Status: DISCONTINUED | OUTPATIENT
Start: 2025-03-11 | End: 2025-03-15

## 2025-03-11 RX ORDER — SODIUM CHLORIDE 9 MG/ML
100 INJECTION, SOLUTION INTRAVENOUS CONTINUOUS
Status: ACTIVE | OUTPATIENT
Start: 2025-03-11 | End: 2025-03-13

## 2025-03-11 RX ORDER — SODIUM CHLORIDE 0.9 % (FLUSH) 0.9 %
10 SYRINGE (ML) INJECTION AS NEEDED
Status: DISCONTINUED | OUTPATIENT
Start: 2025-03-11 | End: 2025-03-11

## 2025-03-11 RX ORDER — AMOXICILLIN 250 MG
2 CAPSULE ORAL 2 TIMES DAILY PRN
Status: DISCONTINUED | OUTPATIENT
Start: 2025-03-11 | End: 2025-03-15

## 2025-03-11 RX ORDER — VALSARTAN 160 MG/1
320 TABLET ORAL
Status: DISCONTINUED | OUTPATIENT
Start: 2025-03-12 | End: 2025-03-12

## 2025-03-11 RX ORDER — SODIUM CHLORIDE 0.9 % (FLUSH) 0.9 %
10 SYRINGE (ML) INJECTION AS NEEDED
Status: DISCONTINUED | OUTPATIENT
Start: 2025-03-11 | End: 2025-03-26 | Stop reason: HOSPADM

## 2025-03-11 RX ORDER — BISACODYL 5 MG/1
5 TABLET, DELAYED RELEASE ORAL DAILY PRN
Status: DISCONTINUED | OUTPATIENT
Start: 2025-03-11 | End: 2025-03-15

## 2025-03-11 RX ORDER — DOXYCYCLINE 100 MG/1
100 CAPSULE ORAL EVERY 12 HOURS SCHEDULED
Status: DISCONTINUED | OUTPATIENT
Start: 2025-03-12 | End: 2025-03-11

## 2025-03-11 RX ORDER — ISOSORBIDE MONONITRATE 30 MG/1
30 TABLET, EXTENDED RELEASE ORAL EVERY MORNING
Status: DISCONTINUED | OUTPATIENT
Start: 2025-03-12 | End: 2025-03-26 | Stop reason: HOSPADM

## 2025-03-11 RX ORDER — NITROGLYCERIN 0.4 MG/1
0.4 TABLET SUBLINGUAL
Status: DISCONTINUED | OUTPATIENT
Start: 2025-03-11 | End: 2025-03-26 | Stop reason: HOSPADM

## 2025-03-11 RX ORDER — IPRATROPIUM BROMIDE AND ALBUTEROL SULFATE 2.5; .5 MG/3ML; MG/3ML
3 SOLUTION RESPIRATORY (INHALATION) EVERY 4 HOURS PRN
Status: DISCONTINUED | OUTPATIENT
Start: 2025-03-11 | End: 2025-03-26 | Stop reason: HOSPADM

## 2025-03-11 RX ORDER — BUSPIRONE HYDROCHLORIDE 10 MG/1
5 TABLET ORAL
Status: DISCONTINUED | OUTPATIENT
Start: 2025-03-12 | End: 2025-03-16

## 2025-03-11 RX ORDER — DOXYCYCLINE 100 MG/1
100 CAPSULE ORAL EVERY 12 HOURS SCHEDULED
Status: DISCONTINUED | OUTPATIENT
Start: 2025-03-11 | End: 2025-03-11

## 2025-03-11 RX ORDER — HYDROMORPHONE HYDROCHLORIDE 1 MG/ML
0.5 INJECTION, SOLUTION INTRAMUSCULAR; INTRAVENOUS; SUBCUTANEOUS
Status: DISCONTINUED | OUTPATIENT
Start: 2025-03-11 | End: 2025-03-15

## 2025-03-11 RX ADMIN — SODIUM CHLORIDE 1000 ML: 900 INJECTION, SOLUTION INTRAVENOUS at 18:35

## 2025-03-11 RX ADMIN — AZITHROMYCIN DIHYDRATE 500 MG: 500 INJECTION, POWDER, LYOPHILIZED, FOR SOLUTION INTRAVENOUS at 17:11

## 2025-03-11 RX ADMIN — SODIUM CHLORIDE 2000 MG: 900 INJECTION INTRAVENOUS at 16:03

## 2025-03-11 NOTE — [facility] PROVIDER NOTE
Subjective  History of Present Illness:    This is a 89-year-old male who presents with complaints of generalized weakness, multiple falls over the past 2 days and cough.  Patient's daughter states that she found him on the ground twice over the past 2 days.  He has been laying on the ground for hours at a time.  Today, he had worsening cough and weakness.  She noticed that his speech seemed slightly slurred which she states has been ongoing for the past 7 days but gotten worse over the past couple of days.  Patient has not had a fever.  Denies any nausea or vomiting.  Patient has no significant past long history.  History of hypertension, hyperlipidemia, coronary artery disease, status post CABG, atrial fibrillation.  He is on Eliquis.  He denies hitting his head or losing consciousness.  He is not sure what made him fall.      Nurses Notes reviewed and agree, including vitals, allergies, social history and prior medical history.     REVIEW OF SYSTEMS: All systems reviewed and not pertinent unless noted.  Review of Systems    Past Medical History:   Diagnosis Date    Cancer     H/O prostatectomy     Hearing loss     Heart disease     History of back surgery     Hypercholesterolemia     Hyperlipidemia     Hypertension     IHD (ischemic heart disease)     S/P CABG    Osteoarthritis of spine with myelopathy, lumbar region     Paroxysmal atrial fibrillation 07/03/2018    Pulmonary hypertension     S/P knee replacement     Sinus disorder     thyroid ultrasound 08/28/2009    Normal       Allergies:    Patient has no known allergies.      Past Surgical History:   Procedure Laterality Date    CARDIAC CATHETERIZATION  05/16/2007    Dr. Mace: 60% LM and 70% LCX.    CARDIAC CATHETERIZATION  08/19/2009    %, OM 85-90%,Patent grafts.    CARDIAC CATHETERIZATION  07/23/2018    Patent Grafts    CARDIOVASCULAR STRESS TEST  08/05/2009    [facility]: Dr. Mace- EF 29%, Diffuse ischemia.    CARDIOVASCULAR STRESS TEST   "2012    L. Myoview- Inferoseptal infarct with jon infarct ischemia. EF 41%.    CARDIOVASCULAR STRESS TEST  2015    L. Myoview- EF51%,fixed defect , no ischemia burden.    CARDIOVASCULAR STRESS TEST  10/11/2017    L.Myoview- EF 54%. Small inferior Ischemia.    CONVERTED (HISTORICAL) HOLTER  2023    3 days. A.Fib. Avg 60. . 3 VT. One 3 Sec Pause    CONVERTED (HISTORICAL) HOLTER  2024    3 Days. A.Fib. Avg 70. . 4.6% PVC. 2 VT    CORONARY ARTERY BYPASS GRAFT  2007    CABG:  SVG to LAD and LCX.    ECHO - CONVERTED  2009    Dr. Mace- EF 50%.    ECHO - CONVERTED  2011    EF 50%, Septal WMA.    ECHO - CONVERTED  2012    EF 40-45%, RVSP 33 mmHg.    ECHO - CONVERTED  2015    EF 45-50%, RVSP 40mmHg,mild MR, mild PHT    ECHO - CONVERTED  2017    EF 50-55%, mild MR    ECHO - CONVERTED  10/11/2017    EF 55%    ECHO - CONVERTED  2022    TLS. EF 55%. LA- 5.2 cm. Mild MR. RVSP- 44 mmHg    ECHO - CONVERTED  02/15/2024    TLS. EF 55%.RHE. LA- 5.0. Mild MR.AO- 3.8. RVSP- 56 mmHg    US CAROTID UNILATERAL  2007    Mild Plaque in Rt. bulb and RECA.         Social History     Socioeconomic History    Marital status:    Tobacco Use    Smoking status: Former     Current packs/day: 0.00     Average packs/day: 1 pack/day for 52.7 years (52.7 ttl pk-yrs)     Types: Cigarettes     Start date:      Quit date: 1996     Years since quittin.4     Passive exposure: Past    Smokeless tobacco: Never   Vaping Use    Vaping status: Never Used   Substance and Sexual Activity    Alcohol use: No    Drug use: No    Sexual activity: Defer         Family History   Problem Relation Age of Onset    No Known Problems Mother     No Known Problems Father        Objective  Physical Exam:  /61   Pulse 93   Temp 97.5 °F (36.4 °C) (Oral)   Resp 16   Ht 182.9 cm (72\")   Wt 83.9 kg (185 lb)   SpO2 95%   BMI 25.09 kg/m²      Physical Exam  Vitals and " nursing note reviewed.   Constitutional:       Appearance: He is ill-appearing.   HENT:      Mouth/Throat:      Mouth: Mucous membranes are moist.   Cardiovascular:      Rate and Rhythm: Normal rate. Rhythm irregular.   Pulmonary:      Effort: Pulmonary effort is normal.      Breath sounds: Rhonchi (Bilaterally, worse in the left lung fields) present.   Abdominal:      General: Abdomen is flat.      Palpations: Abdomen is soft.   Skin:     General: Skin is warm.   Neurological:      Mental Status: He is alert.      Comments: Right upper extremity ataxia         Procedures    ED Course:    ED Course as of 03/11/25 2020   Tue Mar 11, 2025   1624 RSV, PCR(!): Detected [EM]   1624 Procalcitonin(!): 1.17 [EM]   1624 HS Troponin T(!!): 124 [EM]      ED Course User Index  [EM] Bisi Rosas PA-C       Lab Results (last 24 hours)       Procedure Component Value Units Date/Time    CBC & Differential [[id number]]  (Abnormal) Collected: 03/11/25 1430    Specimen: Blood Updated: 03/11/25 1436    Narrative:      The following orders were created for panel order CBC & Differential.  Procedure                               Abnormality         Status                     ---------                               -----------         ------                     CBC Auto Differential[[id number]]        Abnormal            Final result                 Please view results for these tests on the individual orders.    Comprehensive Metabolic Panel [[id number]]  (Abnormal) Collected: 03/11/25 1430    Specimen: Blood Updated: 03/11/25 1514     Glucose 108 mg/dL      BUN 63 mg/dL      Creatinine 1.42 mg/dL      Sodium 143 mmol/L      Potassium 3.8 mmol/L      Chloride 108 mmol/L      CO2 20.3 mmol/L      Calcium 9.5 mg/dL      Total Protein 6.9 g/dL      Albumin 3.1 g/dL      ALT (SGPT) 59 U/L      AST (SGOT) 172 U/L      Alkaline Phosphatase 135 U/L      Total Bilirubin 1.3 mg/dL      Globulin 3.8 gm/dL      A/G Ratio 0.8 g/dL      BUN/Creatinine  Ratio 44.4     Anion Gap 14.7 mmol/L      eGFR 45.2 mL/min/1.73     Narrative:      GFR Categories in Chronic Kidney Disease (CKD)      GFR Category          GFR (mL/min/1.73)    Interpretation  G1                     90 or greater         Normal or high (1)  G2                      60-89                Mild decrease (1)  G3a                   45-59                Mild to moderate decrease  G3b                   30-44                Moderate to severe decrease  G4                    15-29                Severe decrease  G5                    14 or less           Kidney failure          (1)In the absence of evidence of kidney disease, neither GFR category G1 or G2 fulfill the criteria for CKD.    eGFR calculation 2021 CKD-EPI creatinine equation, which does not include race as a factor    High Sensitivity Troponin T [[id number]]  (Abnormal) Collected: 03/11/25 1430    Specimen: Blood Updated: 03/11/25 1456     HS Troponin T 124 ng/L     Magnesium [[id number]]  (Normal) Collected: 03/11/25 1430    Specimen: Blood Updated: 03/11/25 1513     Magnesium 2.3 mg/dL     CBC Auto Differential [[id number]]  (Abnormal) Collected: 03/11/25 1430    Specimen: Blood Updated: 03/11/25 1436     WBC 23.50 10*3/mm3      RBC 3.19 10*6/mm3      Hemoglobin 9.2 g/dL      Hematocrit 29.6 %      MCV 92.8 fL      MCH 28.8 pg      MCHC 31.1 g/dL      RDW 15.8 %      RDW-SD 53.5 fl      MPV 12.1 fL      Platelets 103 10*3/mm3      Neutrophil % 93.0 %      Lymphocyte % 1.9 %      Monocyte % 4.8 %      Eosinophil % 0.0 %      Basophil % 0.0 %      Immature Grans % 0.3 %      Neutrophils, Absolute 21.84 10*3/mm3      Lymphocytes, Absolute 0.45 10*3/mm3      Monocytes, Absolute 1.13 10*3/mm3      Eosinophils, Absolute 0.00 10*3/mm3      Basophils, Absolute 0.01 10*3/mm3      Immature Grans, Absolute 0.07 10*3/mm3     Lactic Acid, Plasma [[id number]]  (Normal) Collected: 03/11/25 1430    Specimen: Blood Updated: 03/11/25 1450     Lactate 1.8 mmol/L      Procalcitonin [[id number]]  (Abnormal) Collected: 03/11/25 1430    Specimen: Blood Updated: 03/11/25 1459     Procalcitonin 1.17 ng/mL     CK [[id number]]  (Abnormal) Collected: 03/11/25 1430    Specimen: Blood Updated: 03/11/25 1527     Creatine Kinase 2,458 U/L     BNP [[id number]]  (Abnormal) Collected: 03/11/25 1430    Specimen: Blood Updated: 03/11/25 1601     proBNP 29,350.0 pg/mL     Narrative:      This assay is used as an aid in the diagnosis of individuals suspected of having heart failure. It can be used as an aid in the diagnosis of acute decompensated heart failure (ADHF) in patients presenting with signs and symptoms of ADHF to the emergency department (ED). In addition, NT-proBNP of <300 pg/mL indicates ADHF is not likely.    Age Range Result Interpretation  NT-proBNP Concentration (pg/mL:      <50             Positive            >450                   Gray                 300-450                    Negative             <300    50-75           Positive            >900                  Gray                300-900                  Negative            <300      >75             Positive            >1800                  Gray                300-1800                  Negative            <300    COVID-19, FLU A/B, RSV PCR 1 HR TAT - Swab, Nasopharynx [[id number]]  (Abnormal) Collected: 03/11/25 1431    Specimen: Swab from Nasopharynx Updated: 03/11/25 1518     COVID19 Not Detected     Influenza A PCR Not Detected     Influenza B PCR Not Detected     RSV, PCR Detected    Narrative:      Fact sheet for providers: https://www.fda.gov/media/704197/download    Fact sheet for patients: https://www.fda.gov/media/722755/download    Test performed by PCR.    High Sensitivity Troponin T 1Hr [[id number]]  (Abnormal) Collected: 03/11/25 1528    Specimen: Blood from Arm, Left Updated: 03/11/25 1555     HS Troponin T 123 ng/L      Troponin T Numeric Delta -1 ng/L      Troponin T % Delta -1    Narrative:      High Sensitive  Troponin T Reference Range:  <14.0 ng/L- Negative Female for AMI  <22.0 ng/L- Negative Male for AMI  >=14 - Abnormal Female indicating possible myocardial injury.  >=22 - Abnormal Male indicating possible myocardial injury.   Clinicians would have to utilize clinical acumen, EKG, Troponin, and serial changes to determine if it is an Acute Myocardial Infarction or myocardial injury due to an underlying chronic condition.         Urinalysis With Microscopic If Indicated (No Culture) - Urine, Clean Catch [[id number]]  (Abnormal) Collected: 03/11/25 1604    Specimen: Urine, Clean Catch Updated: 03/11/25 1631     Color, UA Callahan     Appearance, UA Clear     pH, UA 5.5     Specific Gravity, UA >=1.030     Glucose, UA Negative     Ketones, UA Trace     Bilirubin, UA Small (1+)     Blood, UA Moderate (2+)     Protein, UA 30 mg/dL (1+)     Leuk Esterase, UA Negative     Nitrite, UA Negative     Urobilinogen, UA 1.0 E.U./dL    Urinalysis, Microscopic Only - Urine, Clean Catch [[id number]]  (Abnormal) Collected: 03/11/25 1604    Specimen: Urine, Clean Catch Updated: 03/11/25 1636     RBC, UA 3-5 /HPF      WBC, UA 0-2 /HPF      Bacteria, UA Trace /HPF      Squamous Epithelial Cells, UA 0-2 /HPF      Hyaline Casts, UA 0-2 /LPF      Amorphous Crystals, UA Small/1+ /HPF      Mucus, UA Trace /HPF      Methodology Manual Light Microscopy             CT Chest Without Contrast Diagnostic  Result Date: 3/11/2025  CT CHEST WO CONTRAST DIAGNOSTIC, CT ABDOMEN PELVIS WO CONTRAST Date of Exam: 3/11/2025 3:42 PM EDT Indication: cough, shortness of breath. Elevated liver enzymes Comparison: None available. Technique: Axial CT images were obtained of the chest, abdomen/pelvis without contrast administration.  Reconstructed coronal and sagittal images were also obtained. Automated exposure control and iterative construction methods were used. Findings: CT SCAN OF THE CHEST WITHOUT CONTRAST: Chronic appearing changes of fibrosis are seen in the  lung periphery. There is moderate cylindrical bronchiectasis of the lower lobes, on the right with no obvious inflammation, on the left with evidence of active lingular and lower lobe bronchial inflammation and some patchy airspace disease consistent with active infection. The larger order airways appear normally patent. The small diameter peripheral airways in the left lower lobe appear partially opacified. There is no pleural effusion. Images of the mediastinum show shotty rounded preaortic lymph nodes, which may be reactive. Poststernotomy changes are noted. Bony structures appear to be intact.     Impression: 1. Chronic appearing peripheral lung changes of the lower lungs, consistent with fibrosis, and moderate cylindrical bronchiectasis. 2. Superimposed left lower lobe bronchitis perhaps and suspect early changes of bronchopneumonia. Mild reactive mediastinal lymphadenopathy. CT SCAN OF THE ABDOMEN PELVIS WITHOUT CONTRAST: CT scan  film shows diffusely increased bowel gas in a pattern suggesting ileus. Axial images show moderate gaseous distention of nondependent large and small bowel loops again favoring ileus, without an obvious transition point. There is mild distention of the sigmoid and rectum, with what appears to be semisolid stool rather than formed stool, and very little evidence of stool elsewhere. Impaction is not suspected. No bowel wall edema or pneumatosis  is seen. Image detail of the liver is limited both due to patient motion and nonenhanced scan. No gross abnormalities are appreciated. Gallbladder is surgically absent. Common duct appears dilated to approximately 2 cm, somewhat greater than would be expected for  previous cholecystectomy. There is no obvious distal common duct stone, mass or other potential cause of obstruction. Pancreas is atrophic, and the low-density nodular appearance of the pancreatic tail, images 44 through 35 suggest diffuse ductal dilatation and side branch  dilatation of unknown cause. No pancreatic mass or inflammation is apparent. Spleen, adrenal glands, and kidneys appear unremarkable except for a large benign-appearing water density exophytic cyst in the left renal midpole. No upper abdominal mass, adenopathy or acute inflammatory change is seen. Regarding the lower abdomen and pelvis, the bladder is moderately distended but normal in appearance. Prostate and seminal vesicles are not enlarged, actually small and difficult to delineate. No intrapelvic mass or inflammatory change is seen. Bony structures appear to be intact. There is advanced multilevel degenerative disc disease throughout the lumbar spine. Impression: 1. Previous cholecystectomy. Greater than expected dilatation of the common bile duct for cholecystectomy, up to approximately 2 cm. No obvious cause of obstruction. 2. Atrophic pancreas, and what appears to be diffuse ductal dilatation and cystic change, at least of the pancreatic tail, and of uncertain etiology. No directly visible mass or inflammation. 3. Increased bowel gas in a pattern favoring mild ileus over obstruction. No visible bowel wall inflammation. Electronically Signed: Marlon Álvarez MD  3/11/2025 4:21 PM EDT  Workstation ID: [id number]    CT Abdomen Pelvis Without Contrast  Result Date: 3/11/2025  CT CHEST WO CONTRAST DIAGNOSTIC, CT ABDOMEN PELVIS WO CONTRAST Date of Exam: 3/11/2025 3:42 PM EDT Indication: cough, shortness of breath. Elevated liver enzymes Comparison: None available. Technique: Axial CT images were obtained of the chest, abdomen/pelvis without contrast administration.  Reconstructed coronal and sagittal images were also obtained. Automated exposure control and iterative construction methods were used. Findings: CT SCAN OF THE CHEST WITHOUT CONTRAST: Chronic appearing changes of fibrosis are seen in the lung periphery. There is moderate cylindrical bronchiectasis of the lower lobes, on the right with no obvious inflammation,  on the left with evidence of active lingular and lower lobe bronchial inflammation and some patchy airspace disease consistent with active infection. The larger order airways appear normally patent. The small diameter peripheral airways in the left lower lobe appear partially opacified. There is no pleural effusion. Images of the mediastinum show shotty rounded preaortic lymph nodes, which may be reactive. Poststernotomy changes are noted. Bony structures appear to be intact.     Impression: 1. Chronic appearing peripheral lung changes of the lower lungs, consistent with fibrosis, and moderate cylindrical bronchiectasis. 2. Superimposed left lower lobe bronchitis perhaps and suspect early changes of bronchopneumonia. Mild reactive mediastinal lymphadenopathy. CT SCAN OF THE ABDOMEN PELVIS WITHOUT CONTRAST: CT scan  film shows diffusely increased bowel gas in a pattern suggesting ileus. Axial images show moderate gaseous distention of nondependent large and small bowel loops again favoring ileus, without an obvious transition point. There is mild distention of the sigmoid and rectum, with what appears to be semisolid stool rather than formed stool, and very little evidence of stool elsewhere. Impaction is not suspected. No bowel wall edema or pneumatosis  is seen. Image detail of the liver is limited both due to patient motion and nonenhanced scan. No gross abnormalities are appreciated. Gallbladder is surgically absent. Common duct appears dilated to approximately 2 cm, somewhat greater than would be expected for  previous cholecystectomy. There is no obvious distal common duct stone, mass or other potential cause of obstruction. Pancreas is atrophic, and the low-density nodular appearance of the pancreatic tail, images 44 through 35 suggest diffuse ductal dilatation and side branch dilatation of unknown cause. No pancreatic mass or inflammation is apparent. Spleen, adrenal glands, and kidneys appear  unremarkable except for a large benign-appearing water density exophytic cyst in the left renal midpole. No upper abdominal mass, adenopathy or acute inflammatory change is seen. Regarding the lower abdomen and pelvis, the bladder is moderately distended but normal in appearance. Prostate and seminal vesicles are not enlarged, actually small and difficult to delineate. No intrapelvic mass or inflammatory change is seen. Bony structures appear to be intact. There is advanced multilevel degenerative disc disease throughout the lumbar spine. Impression: 1. Previous cholecystectomy. Greater than expected dilatation of the common bile duct for cholecystectomy, up to approximately 2 cm. No obvious cause of obstruction. 2. Atrophic pancreas, and what appears to be diffuse ductal dilatation and cystic change, at least of the pancreatic tail, and of uncertain etiology. No directly visible mass or inflammation. 3. Increased bowel gas in a pattern favoring mild ileus over obstruction. No visible bowel wall inflammation. Electronically Signed: Marlon Álvarez MD  3/11/2025 4:21 PM EDT  Workstation ID: [id number]    XR Hip With or Without Pelvis 2 - 3 View Right  Result Date: 3/11/2025  XR HIP W OR WO PELVIS 2-3 VIEW RIGHT Date of Exam: 3/11/2025 2:30 PM EDT Indication: Fall, hip pain Comparison: None available. Findings: There is narrowing of both hip joints. An acute osseous abnormality of the right hip is not identified. There are surgical clips within the pelvis. The pelvic bones look intact. There are multilevel degenerative changes involving the lumbar spine.     Impression: Impression: 1.There is some narrowing of both hip joints. 2.Multilevel degenerative change lumbar spine. Electronically Signed: Virgil Leon MD  3/11/2025 3:19 PM EDT  Workstation ID: [id number]    XR Chest 1 View  Result Date: 3/11/2025  XR CHEST 1 VW Date of Exam: 3/11/2025 2:10 PM EDT Indication: Weak/Dizzy/AMS triage protocol Comparison: None available.  Findings: Sternotomy wires are noted. The heart is not enlarged. There are some interstitial change within the left lung which are nonspecific. This could be seen with inflammatory infectious process, sequela to recent trauma, atelectasis or some underlying edema.  There are no pleural effusions. The right lung seems relatively clear.     Impression: Impression: There are some interstitial change within the left lung which are nonspecific and should be correlated with patient history Electronically Signed: Virgil Leon MD  3/11/2025 3:16 PM EDT  Workstation ID: [id number]    CT Head Without Contrast  Result Date: 3/11/2025  CT HEAD WO CONTRAST Date of Exam: 3/11/2025 2:43 PM EDT Indication: AMS, falls, weakness. Comparison: None available. Technique: Axial CT images were obtained of the head without contrast administration.  Automated exposure control and iterative construction methods were used. Findings: There is no evidence of hemorrhage. There is no mass effect or midline shift. Age-related involutional changes are visualized. Vascular calcifications are noted. There is no extra-axial collections. Ventricles are normal in size and configuration for patient's stated age. Posterior fossa is within normal limits. Calvarium and skull base appear intact. Visualized sinuses show no air fluid levels. The mastoid air cells are clear. Visualized orbits are unremarkable.     Impression: Impression: No acute intracranial abnormality identified. Electronically Signed: Javier Osorio MD  3/11/2025 3:07 PM EDT  Workstation ID: [id number]         MDM     Amount and/or Complexity of Data Reviewed  Clinical lab tests: reviewed  Tests in the radiology section of CPT®: reviewed  Tests in the medicine section of CPT®: reviewed        Initial impression of presenting illness: This is a 89-year-old male presents with complaints of weakness, frequent falls, cough.  Patient found to have rhabdomyolysis, elevated troponin at 124, RSV,  pneumonia on CT.  White blood cell count was 23, he was given Zithromax and Rocephin along with a liter of fluids.  I spoke with Dr. Clifton at [facility] who excepted patient for admission.  I spoke with the family who are agreeable to admission.    DDX: includes but is not limited to: Pneumonia, rhabdomyolysis, NSTEMI, urinary tract infection      Medications   sodium chloride 0.9 % flush 10 mL (has no administration in time range)   cefTRIAXone (ROCEPHIN) 2,000 mg in sodium chloride 0.9 % 100 mL MBP (0 mg Intravenous Stopped 3/11/25 1645)   azithromycin (ZITHROMAX) 500 mg in sodium chloride 0.9 % 250 mL IVPB-VTB (0 mg Intravenous Stopped 3/11/25 1835)   sodium chloride 0.9 % bolus 1,000 mL (0 mL Intravenous Stopped 3/11/25 1940)       Results/clinical rationale were discussed with Dr. Mullins, recommended we treat the elevated troponin as a demand ischemia, did not recommend further anticoagulation      Data interpreted: Nursing notes reviewed, vital signs reviewed.  Labs independently interpreted by me (CBC, CMP, lipase, UA, troponin, ABG, lactic acid, procalcitonin).  Imaging independently interpreted by me (x-ray, CT scan).  EKG independently interpreted by me.  O2 saturation: 92    Counseling: Discussed the results above with the patient regarding need for admission or discharge.  Patient understands and agrees plan of care.      -----  ED Disposition       ED Disposition   Decision to Admit    Condition   --    Comment   Level of Care: Telemetry [5]   Accepting Provider:: NEYDA CLIFTON [[id number]]               Final diagnoses:   Pneumonia of both lungs due to infectious organism, unspecified part of lung   Traumatic rhabdomyolysis, initial encounter   Elevated troponin   RSV (acute bronchiolitis due to respiratory syncytial virus)     Your Follow-Up Providers    Follow-up information has not been specified.       Contact information for after-discharge care    Follow-up information has not been  specified.          Your medication list        CONTINUE taking these medications        Instructions Last Dose Given Next Dose Due   albuterol sulfate  (90 Base) MCG/ACT inhaler  Commonly known as: PROVENTIL HFA;VENTOLIN HFA;PROAIR HFA      Inhale 2 puffs Every 4 (Four) Hours As Needed for Wheezing.       busPIRone 5 MG tablet  Commonly known as: BUSPAR      Take 1 tablet by mouth 2 (Two) Times a Day.       Edarbi 80 MG tablet tablet  Generic drug: azilsartan medoxomil      TAKE 1/2 TO 1 TABLET BY MOUTH ONCE DAILY       ferrous sulfate 324 MG tablet delayed-release      Take 1 tablet by mouth Daily With Breakfast.       HYDROcodone-acetaminophen 5-325 MG per tablet  Commonly known as: NORCO      Take 2 tablets by mouth Every 8 (Eight) Hours As Needed for Severe Pain.       ibuprofen 800 MG tablet  Commonly known as: ADVIL,MOTRIN      Take 1 tablet by mouth Every 8 (Eight) Hours As Needed for Mild Pain.       ipratropium 17 MCG/ACT inhaler  Commonly known as: ATROVENT HFA      Inhale 2 puffs 4 (Four) Times a Day.       ipratropium-albuterol 0.5-2.5 mg/3 ml nebulizer  Commonly known as: DUO-NEB      Take 3 mL by nebulization 4 (Four) Times a Day.       isosorbide mononitrate 30 MG 24 hr tablet  Commonly known as: IMDUR      Take 1 tablet by mouth Every Morning.       loratadine 10 MG tablet  Commonly known as: CLARITIN      Take 1 tablet by mouth Daily.       NIFEdipine XL 30 MG 24 hr tablet  Commonly known as: PROCARDIA XL      TAKE 1 TABLET BY MOUTH EVERY DAY       nitroglycerin 0.4 MG SL tablet  Commonly known as: NITROSTAT      1 under the tongue as needed for angina, may repeat q5mins for up three doses       omeprazole 20 MG capsule  Commonly known as: priLOSEC      Take 1 capsule by mouth Daily.       rivaroxaban 20 MG tablet  Commonly known as: Xarelto      Take 1 tablet by mouth Daily.       Vision Vitamins tablet tablet  Generic drug: multivitamin with minerals      Take  by mouth 2 (two) times a  day.       vitamin C 500 MG tablet  Commonly known as: ASCORBIC ACID      Take 2 tablets by mouth Daily.       Vitamin D3 50 MCG (2000 UT) tablet      Take  by mouth daily.

## 2025-03-11 NOTE — LETTER
EMS Transport Request  For use at [facility], [place], [place], [place], and [place] only   Patient Name: Carlos Preston : 3/20/1928   Weight:87.4 kg (192 lb 9.6 oz) Pick-up Location: [facility] BLS/ALS: BLS/ALS: BLS   Insurance: MEDICARE Auth End Date:    Pre-Cert #: D/C Summary complete:    Destination: Home How many stairs 0, Will the patient be on the main level yes, Is there a ramp available no, Can the patient stand and pivot no, Address [address] [place] [address] , and Name/contact number for who will be present Bettye Hollingsworth [phone]   Contact Precautions: None   Equipment (O2, Fluids, etc.): None - will have PEG tube    Arrive By Date/Time: Tomorrow, preferably after 3 PM Stretcher/WC: Stretcher   CM Requesting: Agnes Hodge RN Ext: [phone]   Notes/Medical Necessity: Weak as a kitten     ______________________________________________________________________    *Only 2 patient bags OR 1 carry-on size bag are permitted.  Wheelchairs and walkers CANNOT transported with the patient. Acknowledge: Yes

## 2025-03-11 NOTE — LETTER
EMS Transport Request  For use at [facility], [place], [place], [place], and [place] only   Patient Name: Carlos Preston : 3/20/1928   Weight:83.5 kg (184 lb) Pick-up Location: [facility] BLS/ALS: BLS/ALS: BLS   Insurance: MEDICARE Auth End Date:    Pre-Cert #: D/C Summary complete:    Destination: Home How many stairs zero, Will the patient be on the main level yes, Is there a ramp available no, Can the patient stand and pivot no, Address [address] [place] [address] [place], and Name/contact number for who will be present Bettye Hollingsworth [phone]   Contact Precautions: None   Equipment (O2, Fluids, etc.): None   Arrive By Date/Time:  Stretcher/WC: Stretcher   [place] Requesting: Agnes Hodge RN Ext: [phone]   Notes/Medical Necessity: weakness     ______________________________________________________________________    *Only 2 patient bags OR 1 carry-on size bag are permitted.  Wheelchairs and walkers CANNOT transported with the patient. Acknowledge: Yes

## 2025-03-12 LAB
ABO GROUP BLD: NORMAL
ABO GROUP BLD: NORMAL
ALBUMIN SERPL-MCNC: 2.5 G/DL (ref 3.5–5.2)
ALBUMIN SERPL-MCNC: 2.6 G/DL (ref 3.5–5.2)
ALBUMIN SERPL-MCNC: 2.7 G/DL (ref 3.5–5.2)
ALBUMIN/GLOB SERPL: 0.7 G/DL
ALBUMIN/GLOB SERPL: 0.8 G/DL
ALP SERPL-CCNC: 108 U/L (ref 39–117)
ALP SERPL-CCNC: 114 U/L (ref 39–117)
ALP SERPL-CCNC: 126 U/L (ref 39–117)
ALP SERPL-CCNC: 127 U/L (ref 39–117)
ALP SERPL-CCNC: 135 U/L (ref 39–117)
ALT SERPL W P-5'-P-CCNC: 46 U/L (ref 1–41)
ALT SERPL W P-5'-P-CCNC: 49 U/L (ref 1–41)
ALT SERPL W P-5'-P-CCNC: 51 U/L (ref 1–41)
ALT SERPL W P-5'-P-CCNC: 51 U/L (ref 1–41)
ALT SERPL W P-5'-P-CCNC: 53 U/L (ref 1–41)
ANION GAP SERPL CALCULATED.3IONS-SCNC: 11 MMOL/L (ref 5–15)
ANION GAP SERPL CALCULATED.3IONS-SCNC: 12 MMOL/L (ref 5–15)
ANION GAP SERPL CALCULATED.3IONS-SCNC: 13 MMOL/L (ref 5–15)
ANION GAP SERPL CALCULATED.3IONS-SCNC: 13 MMOL/L (ref 5–15)
ANION GAP SERPL CALCULATED.3IONS-SCNC: 16 MMOL/L (ref 5–15)
AST SERPL-CCNC: 103 U/L (ref 1–40)
AST SERPL-CCNC: 105 U/L (ref 1–40)
AST SERPL-CCNC: 106 U/L (ref 1–40)
AST SERPL-CCNC: 111 U/L (ref 1–40)
AST SERPL-CCNC: 132 U/L (ref 1–40)
BILIRUB SERPL-MCNC: 0.8 MG/DL (ref 0–1.2)
BILIRUB SERPL-MCNC: 0.9 MG/DL (ref 0–1.2)
BILIRUB SERPL-MCNC: 0.9 MG/DL (ref 0–1.2)
BILIRUB SERPL-MCNC: 1 MG/DL (ref 0–1.2)
BILIRUB SERPL-MCNC: 1 MG/DL (ref 0–1.2)
BLD GP AB SCN SERPL QL: NEGATIVE
BUN SERPL-MCNC: 61 MG/DL (ref 8–23)
BUN SERPL-MCNC: 62 MG/DL (ref 8–23)
BUN SERPL-MCNC: 63 MG/DL (ref 8–23)
BUN SERPL-MCNC: 65 MG/DL (ref 8–23)
BUN SERPL-MCNC: 68 MG/DL (ref 8–23)
BUN/CREAT SERPL: 50.8 (ref 7–25)
BUN/CREAT SERPL: 53.8 (ref 7–25)
BUN/CREAT SERPL: 55.4 (ref 7–25)
BUN/CREAT SERPL: 57 (ref 7–25)
BUN/CREAT SERPL: 61.8 (ref 7–25)
CALCIUM SPEC-SCNC: 8.4 MG/DL (ref 8.2–9.6)
CALCIUM SPEC-SCNC: 9 MG/DL (ref 8.2–9.6)
CALCIUM SPEC-SCNC: 9.1 MG/DL (ref 8.2–9.6)
CHLORIDE SERPL-SCNC: 113 MMOL/L (ref 98–107)
CHLORIDE SERPL-SCNC: 114 MMOL/L (ref 98–107)
CHLORIDE SERPL-SCNC: 114 MMOL/L (ref 98–107)
CK SERPL-CCNC: 1213 U/L (ref 20–200)
CO2 SERPL-SCNC: 17 MMOL/L (ref 22–29)
CO2 SERPL-SCNC: 17 MMOL/L (ref 22–29)
CO2 SERPL-SCNC: 18 MMOL/L (ref 22–29)
CO2 SERPL-SCNC: 19 MMOL/L (ref 22–29)
CO2 SERPL-SCNC: 20 MMOL/L (ref 22–29)
CREAT SERPL-MCNC: 1.1 MG/DL (ref 0.76–1.27)
CREAT SERPL-MCNC: 1.12 MG/DL (ref 0.76–1.27)
CREAT SERPL-MCNC: 1.14 MG/DL (ref 0.76–1.27)
CREAT SERPL-MCNC: 1.17 MG/DL (ref 0.76–1.27)
CREAT SERPL-MCNC: 1.2 MG/DL (ref 0.76–1.27)
DEPRECATED RDW RBC AUTO: 54.7 FL (ref 37–54)
EGFRCR SERPLBLD CKD-EPI 2021: 55.3 ML/MIN/1.73
EGFRCR SERPLBLD CKD-EPI 2021: 57.1 ML/MIN/1.73
EGFRCR SERPLBLD CKD-EPI 2021: 58.9 ML/MIN/1.73
EGFRCR SERPLBLD CKD-EPI 2021: 60.1 ML/MIN/1.73
EGFRCR SERPLBLD CKD-EPI 2021: 61.4 ML/MIN/1.73
ERYTHROCYTE [DISTWIDTH] IN BLOOD BY AUTOMATED COUNT: 15.9 % (ref 12.3–15.4)
FERRITIN SERPL-MCNC: 238.6 NG/ML (ref 30–400)
FOLATE SERPL-MCNC: 8.44 NG/ML (ref 4.78–24.2)
GLOBULIN UR ELPH-MCNC: 3.1 GM/DL
GLOBULIN UR ELPH-MCNC: 3.5 GM/DL
GLOBULIN UR ELPH-MCNC: 3.6 GM/DL
GLUCOSE BLDC GLUCOMTR-MCNC: 106 MG/DL (ref 70–130)
GLUCOSE BLDC GLUCOMTR-MCNC: 72 MG/DL (ref 70–130)
GLUCOSE BLDC GLUCOMTR-MCNC: 82 MG/DL (ref 70–130)
GLUCOSE BLDC GLUCOMTR-MCNC: 93 MG/DL (ref 70–130)
GLUCOSE SERPL-MCNC: 75 MG/DL (ref 65–99)
GLUCOSE SERPL-MCNC: 80 MG/DL (ref 65–99)
GLUCOSE SERPL-MCNC: 81 MG/DL (ref 65–99)
GLUCOSE SERPL-MCNC: 81 MG/DL (ref 65–99)
GLUCOSE SERPL-MCNC: 86 MG/DL (ref 65–99)
HCT VFR BLD AUTO: 25.7 % (ref 37.5–51)
HCT VFR BLD AUTO: 27.7 % (ref 37.5–51)
HCT VFR BLD AUTO: 27.9 % (ref 37.5–51)
HGB BLD-MCNC: 7.6 G/DL (ref 13–17.7)
HGB BLD-MCNC: 8.3 G/DL (ref 13–17.7)
HGB BLD-MCNC: 8.3 G/DL (ref 13–17.7)
IRON 24H UR-MRATE: 9 MCG/DL (ref 59–158)
IRON SATN MFR SERPL: 4 % (ref 20–50)
LDH SERPL-CCNC: 340 U/L (ref 135–225)
MCH RBC QN AUTO: 28.1 PG (ref 26.6–33)
MCHC RBC AUTO-ENTMCNC: 29.6 G/DL (ref 31.5–35.7)
MCV RBC AUTO: 95.2 FL (ref 79–97)
MRSA DNA SPEC QL NAA+PROBE: NEGATIVE
PLATELET # BLD AUTO: 93 10*3/MM3 (ref 140–450)
PMV BLD AUTO: 13.2 FL (ref 6–12)
POTASSIUM SERPL-SCNC: 3.7 MMOL/L (ref 3.5–5.2)
POTASSIUM SERPL-SCNC: 3.7 MMOL/L (ref 3.5–5.2)
POTASSIUM SERPL-SCNC: 3.8 MMOL/L (ref 3.5–5.2)
POTASSIUM SERPL-SCNC: 3.8 MMOL/L (ref 3.5–5.2)
POTASSIUM SERPL-SCNC: 4.5 MMOL/L (ref 3.5–5.2)
PROT SERPL-MCNC: 5.6 G/DL (ref 6–8.5)
PROT SERPL-MCNC: 6.1 G/DL (ref 6–8.5)
PROT SERPL-MCNC: 6.2 G/DL (ref 6–8.5)
PROT SERPL-MCNC: 6.2 G/DL (ref 6–8.5)
PROT SERPL-MCNC: 6.3 G/DL (ref 6–8.5)
RBC # BLD AUTO: 2.7 10*6/MM3 (ref 4.14–5.8)
RETICS # AUTO: 0.08 10*6/MM3 (ref 0.02–0.13)
RETICS/RBC NFR AUTO: 3.12 % (ref 0.7–1.9)
RH BLD: POSITIVE
RH BLD: POSITIVE
SODIUM SERPL-SCNC: 143 MMOL/L (ref 136–145)
SODIUM SERPL-SCNC: 144 MMOL/L (ref 136–145)
SODIUM SERPL-SCNC: 144 MMOL/L (ref 136–145)
SODIUM SERPL-SCNC: 146 MMOL/L (ref 136–145)
SODIUM SERPL-SCNC: 146 MMOL/L (ref 136–145)
T&S EXPIRATION DATE: NORMAL
TIBC SERPL-MCNC: 222 MCG/DL (ref 298–536)
TRANSFERRIN SERPL-MCNC: 149 MG/DL (ref 200–360)
TROPONIN T SERPL HS-MCNC: 96 NG/L
VIT B12 BLD-MCNC: 1188 PG/ML (ref 211–946)
WBC NRBC COR # BLD AUTO: 15.52 10*3/MM3 (ref 3.4–10.8)

## 2025-03-12 PROCEDURE — 87449 NOS EACH ORGANISM AG IA: CPT | Performed by: FAMILY MEDICINE

## 2025-03-12 PROCEDURE — 82948 REAGENT STRIP/BLOOD GLUCOSE: CPT

## 2025-03-12 PROCEDURE — 80053 COMPREHEN METABOLIC PANEL: CPT | Performed by: FAMILY MEDICINE

## 2025-03-12 PROCEDURE — 97161 PT EVAL LOW COMPLEX 20 MIN: CPT

## 2025-03-12 PROCEDURE — 85018 HEMOGLOBIN: CPT | Performed by: INTERNAL MEDICINE

## 2025-03-12 PROCEDURE — 94640 AIRWAY INHALATION TREATMENT: CPT

## 2025-03-12 PROCEDURE — 99222 1ST HOSP IP/OBS MODERATE 55: CPT | Performed by: PHYSICIAN ASSISTANT

## 2025-03-12 PROCEDURE — 25810000003 SODIUM CHLORIDE 0.9 % SOLUTION 250 ML FLEX CONT: Performed by: INTERNAL MEDICINE

## 2025-03-12 PROCEDURE — 83540 ASSAY OF IRON: CPT | Performed by: INTERNAL MEDICINE

## 2025-03-12 PROCEDURE — 85014 HEMATOCRIT: CPT | Performed by: INTERNAL MEDICINE

## 2025-03-12 PROCEDURE — 25810000003 SODIUM CHLORIDE 0.9 % SOLUTION: Performed by: FAMILY MEDICINE

## 2025-03-12 PROCEDURE — 94664 DEMO&/EVAL PT USE INHALER: CPT

## 2025-03-12 PROCEDURE — 25010000002 HYDROMORPHONE PER 4 MG: Performed by: FAMILY MEDICINE

## 2025-03-12 PROCEDURE — 94799 UNLISTED PULMONARY SVC/PX: CPT

## 2025-03-12 PROCEDURE — 82746 ASSAY OF FOLIC ACID SERUM: CPT | Performed by: INTERNAL MEDICINE

## 2025-03-12 PROCEDURE — 86923 COMPATIBILITY TEST ELECTRIC: CPT

## 2025-03-12 PROCEDURE — 83615 LACTATE (LD) (LDH) ENZYME: CPT | Performed by: INTERNAL MEDICINE

## 2025-03-12 PROCEDURE — 86900 BLOOD TYPING SEROLOGIC ABO: CPT | Performed by: INTERNAL MEDICINE

## 2025-03-12 PROCEDURE — 25010000002 NA FERRIC GLUC CPLX PER 12.5 MG: Performed by: INTERNAL MEDICINE

## 2025-03-12 PROCEDURE — 85027 COMPLETE CBC AUTOMATED: CPT | Performed by: FAMILY MEDICINE

## 2025-03-12 PROCEDURE — 84466 ASSAY OF TRANSFERRIN: CPT | Performed by: INTERNAL MEDICINE

## 2025-03-12 PROCEDURE — 25010000002 PIPERACILLIN SOD-TAZOBACTAM PER 1 G: Performed by: INTERNAL MEDICINE

## 2025-03-12 PROCEDURE — 25010000002 METHYLPREDNISOLONE PER 40 MG: Performed by: INTERNAL MEDICINE

## 2025-03-12 PROCEDURE — 25010000002 HEPARIN (PORCINE) PER 1000 UNITS: Performed by: FAMILY MEDICINE

## 2025-03-12 PROCEDURE — 84484 ASSAY OF TROPONIN QUANT: CPT | Performed by: FAMILY MEDICINE

## 2025-03-12 PROCEDURE — 99232 SBSQ HOSP IP/OBS MODERATE 35: CPT | Performed by: INTERNAL MEDICINE

## 2025-03-12 PROCEDURE — 87899 AGENT NOS ASSAY W/OPTIC: CPT | Performed by: FAMILY MEDICINE

## 2025-03-12 PROCEDURE — 92610 EVALUATE SWALLOWING FUNCTION: CPT

## 2025-03-12 PROCEDURE — 82728 ASSAY OF FERRITIN: CPT | Performed by: INTERNAL MEDICINE

## 2025-03-12 PROCEDURE — 86900 BLOOD TYPING SEROLOGIC ABO: CPT

## 2025-03-12 PROCEDURE — 25010000002 AMPICILLIN-SULBACTAM PER 1.5 G: Performed by: FAMILY MEDICINE

## 2025-03-12 PROCEDURE — 87641 MR-STAPH DNA AMP PROBE: CPT | Performed by: FAMILY MEDICINE

## 2025-03-12 PROCEDURE — 94761 N-INVAS EAR/PLS OXIMETRY MLT: CPT

## 2025-03-12 PROCEDURE — 97535 SELF CARE MNGMENT TRAINING: CPT | Performed by: OCCUPATIONAL THERAPIST

## 2025-03-12 PROCEDURE — 86901 BLOOD TYPING SEROLOGIC RH(D): CPT | Performed by: INTERNAL MEDICINE

## 2025-03-12 PROCEDURE — 87070 CULTURE OTHR SPECIMN AEROBIC: CPT | Performed by: INTERNAL MEDICINE

## 2025-03-12 PROCEDURE — 36430 TRANSFUSION BLD/BLD COMPNT: CPT

## 2025-03-12 PROCEDURE — 86850 RBC ANTIBODY SCREEN: CPT | Performed by: INTERNAL MEDICINE

## 2025-03-12 PROCEDURE — 82607 VITAMIN B-12: CPT | Performed by: INTERNAL MEDICINE

## 2025-03-12 PROCEDURE — 87205 SMEAR GRAM STAIN: CPT | Performed by: INTERNAL MEDICINE

## 2025-03-12 PROCEDURE — 86901 BLOOD TYPING SEROLOGIC RH(D): CPT

## 2025-03-12 PROCEDURE — P9016 RBC LEUKOCYTES REDUCED: HCPCS

## 2025-03-12 PROCEDURE — 85045 AUTOMATED RETICULOCYTE COUNT: CPT | Performed by: INTERNAL MEDICINE

## 2025-03-12 PROCEDURE — 82550 ASSAY OF CK (CPK): CPT | Performed by: FAMILY MEDICINE

## 2025-03-12 PROCEDURE — 97166 OT EVAL MOD COMPLEX 45 MIN: CPT | Performed by: OCCUPATIONAL THERAPIST

## 2025-03-12 RX ORDER — IBUPROFEN 600 MG/1
1 TABLET ORAL
Status: DISCONTINUED | OUTPATIENT
Start: 2025-03-12 | End: 2025-03-15 | Stop reason: SDUPTHER

## 2025-03-12 RX ORDER — NICOTINE POLACRILEX 4 MG
15 LOZENGE BUCCAL
Status: DISCONTINUED | OUTPATIENT
Start: 2025-03-12 | End: 2025-03-15 | Stop reason: SDUPTHER

## 2025-03-12 RX ORDER — HEPARIN SODIUM 5000 [USP'U]/ML
5000 INJECTION, SOLUTION INTRAVENOUS; SUBCUTANEOUS EVERY 12 HOURS SCHEDULED
Status: DISCONTINUED | OUTPATIENT
Start: 2025-03-12 | End: 2025-03-13

## 2025-03-12 RX ORDER — ONDANSETRON 2 MG/ML
4 INJECTION INTRAMUSCULAR; INTRAVENOUS EVERY 4 HOURS PRN
Status: DISCONTINUED | OUTPATIENT
Start: 2025-03-12 | End: 2025-03-26 | Stop reason: HOSPADM

## 2025-03-12 RX ORDER — METHYLPREDNISOLONE SODIUM SUCCINATE 40 MG/ML
40 INJECTION, POWDER, LYOPHILIZED, FOR SOLUTION INTRAMUSCULAR; INTRAVENOUS EVERY 12 HOURS
Status: DISCONTINUED | OUTPATIENT
Start: 2025-03-12 | End: 2025-03-15

## 2025-03-12 RX ORDER — DEXTROSE MONOHYDRATE 25 G/50ML
25 INJECTION, SOLUTION INTRAVENOUS
Status: DISCONTINUED | OUTPATIENT
Start: 2025-03-12 | End: 2025-03-15 | Stop reason: SDUPTHER

## 2025-03-12 RX ADMIN — HEPARIN SODIUM 5000 UNITS: 5000 INJECTION INTRAVENOUS; SUBCUTANEOUS at 09:29

## 2025-03-12 RX ADMIN — METHYLPREDNISOLONE SODIUM SUCCINATE 40 MG: 40 INJECTION INTRAMUSCULAR; INTRAVENOUS at 12:19

## 2025-03-12 RX ADMIN — HEPARIN SODIUM 5000 UNITS: 5000 INJECTION INTRAVENOUS; SUBCUTANEOUS at 20:34

## 2025-03-12 RX ADMIN — SODIUM CHLORIDE 250 MG: 9 INJECTION, SOLUTION INTRAVENOUS at 14:57

## 2025-03-12 RX ADMIN — PIPERACILLIN AND TAZOBACTAM 4.5 G: 4; .5 INJECTION, POWDER, FOR SOLUTION INTRAVENOUS at 21:40

## 2025-03-12 RX ADMIN — SODIUM CHLORIDE 100 ML/HR: 9 INJECTION, SOLUTION INTRAVENOUS at 00:08

## 2025-03-12 RX ADMIN — DOXYCYCLINE 100 MG: 100 INJECTION, POWDER, LYOPHILIZED, FOR SOLUTION INTRAVENOUS at 21:40

## 2025-03-12 RX ADMIN — Medication 10 ML: at 09:30

## 2025-03-12 RX ADMIN — IPRATROPIUM BROMIDE AND ALBUTEROL SULFATE 3 ML: 2.5; .5 SOLUTION RESPIRATORY (INHALATION) at 11:07

## 2025-03-12 RX ADMIN — SODIUM CHLORIDE 100 ML/HR: 9 INJECTION, SOLUTION INTRAVENOUS at 22:46

## 2025-03-12 RX ADMIN — HYDROMORPHONE HYDROCHLORIDE 0.5 MG: 1 INJECTION, SOLUTION INTRAMUSCULAR; INTRAVENOUS; SUBCUTANEOUS at 00:18

## 2025-03-12 RX ADMIN — SODIUM CHLORIDE 100 ML/HR: 9 INJECTION, SOLUTION INTRAVENOUS at 12:18

## 2025-03-12 RX ADMIN — AMPICILLIN SODIUM AND SULBACTAM SODIUM 3 G: 2; 1 INJECTION, POWDER, FOR SOLUTION INTRAMUSCULAR; INTRAVENOUS at 00:07

## 2025-03-12 RX ADMIN — HYDROMORPHONE HYDROCHLORIDE 0.5 MG: 1 INJECTION, SOLUTION INTRAMUSCULAR; INTRAVENOUS; SUBCUTANEOUS at 12:19

## 2025-03-12 RX ADMIN — AMPICILLIN SODIUM AND SULBACTAM SODIUM 3 G: 2; 1 INJECTION, POWDER, FOR SOLUTION INTRAMUSCULAR; INTRAVENOUS at 05:15

## 2025-03-12 RX ADMIN — METHYLPREDNISOLONE SODIUM SUCCINATE 40 MG: 40 INJECTION INTRAMUSCULAR; INTRAVENOUS at 22:47

## 2025-03-12 RX ADMIN — PIPERACILLIN AND TAZOBACTAM 4.5 G: 4; .5 INJECTION, POWDER, FOR SOLUTION INTRAVENOUS at 12:19

## 2025-03-12 RX ADMIN — HEPARIN SODIUM 5000 UNITS: 5000 INJECTION INTRAVENOUS; SUBCUTANEOUS at 00:49

## 2025-03-12 NOTE — THERAPY EVALUATION
Acute Care - Speech Language Pathology   Swallow Initial Evaluation [facility]     Patient Name: Carlos Preston  : 3/20/1928  MRN: [id number]  Today's Date: 3/12/2025               Admit Date: 3/11/2025    Visit Dx:     ICD-10-CM ICD-9-CM   1. Pneumonia of both lungs due to infectious organism, unspecified part of lung  J18.9 483.8   2. Traumatic rhabdomyolysis, initial encounter  T79.6XXA 958.6   3. Elevated troponin  R79.89 790.6   4. RSV (acute bronchiolitis due to respiratory syncytial virus)  J21.0 466.11   5. Impaired mobility and ADLs  Z74.09 V49.89    Z78.9      Patient Active Problem List   Diagnosis    Pulmonary HTN    Hyperlipemia    HTN (hypertension)    GERD (gastroesophageal reflux disease)    Hx of CABG    IHD (ischemic heart disease)    SOB (shortness of breath)    Abnormal chest x-ray    Esophageal stricture    Rhabdomyolysis     Past Medical History:   Diagnosis Date    Cancer     H/O prostatectomy     Hearing loss     Heart disease     History of back surgery     Hypercholesterolemia     Hyperlipidemia     Hypertension     IHD (ischemic heart disease)     S/P CABG    Osteoarthritis of spine with myelopathy, lumbar region     Paroxysmal atrial fibrillation 2018    Pulmonary hypertension     S/P knee replacement     Sinus disorder     thyroid ultrasound 2009    Normal     Past Surgical History:   Procedure Laterality Date    CARDIAC CATHETERIZATION  2007    Dr. Mace: 60% LM and 70% LCX.    CARDIAC CATHETERIZATION  2009    %, OM 85-90%,Patent grafts.    CARDIAC CATHETERIZATION  2018    Patent Grafts    CARDIOVASCULAR STRESS TEST  2009    ROBIN George: Dr. Mace- EF 29%, Diffuse ischemia.    CARDIOVASCULAR STRESS TEST  2012    L. Myoview- Inferoseptal infarct with jon infarct ischemia. EF 41%.    CARDIOVASCULAR STRESS TEST  2015    L. Myoview- EF51%,fixed defect , no ischemia burden.    CARDIOVASCULAR STRESS TEST  10/11/2017    L.Myoview-  EF 54%. Small inferior Ischemia.    CONVERTED (HISTORICAL) HOLTER  06/12/2023    3 days. A.Fib. Avg 60. . 3 VT. One 3 Sec Pause    CONVERTED (HISTORICAL) HOLTER  05/28/2024    3 Days. A.Fib. Avg 70. . 4.6% PVC. 2 VT    CORONARY ARTERY BYPASS GRAFT  05/17/2007    CABG:  SVG to LAD and LCX.    ECHO - CONVERTED  08/06/2009    Dr. Mace- EF 50%.    ECHO - CONVERTED  02/14/2011    EF 50%, Septal WMA.    ECHO - CONVERTED  11/07/2012    EF 40-45%, RVSP 33 mmHg.    ECHO - CONVERTED  03/23/2015    EF 45-50%, RVSP 40mmHg,mild MR, mild PHT    ECHO - CONVERTED  05/01/2017    EF 50-55%, mild MR    ECHO - CONVERTED  10/11/2017    EF 55%    ECHO - CONVERTED  03/21/2022    TLS. EF 55%. LA- 5.2 cm. Mild MR. RVSP- 44 mmHg    ECHO - CONVERTED  02/15/2024    TLS. EF 55%.RHE. LA- 5.0. Mild MR.AO- 3.8. RVSP- 56 mmHg    US CAROTID UNILATERAL  04/16/2007    Mild Plaque in Rt. bulb and RECA.       SLP Recommendation and Plan  SLP Swallowing Diagnosis: R/O pharyngeal dysphagia, suspected pharyngeal dysphagia, suspected esophageal dysphagia (03/12/25 1100)  SLP Diet Recommendation: NPO, ice chips between meals after oral care, with supervision, water between meals after oral care, with supervision (03/12/25 1100)  Recommended Precautions and Strategies: upright posture during/after eating, small bites of food and sips of liquid (sips and chips only) (03/12/25 1100)  SLP Rec. for Method of Medication Administration: meds crushed, with puree, meds via alternate route (03/12/25 1100)     Monitor for Signs of Aspiration: notify SLP if any concerns, yes (03/12/25 1100)  Recommended Diagnostics: reassess via clinical swallow evaluation (03/12/25 1100)  Swallow Criteria for Skilled Therapeutic Interventions Met: demonstrates skilled criteria (03/12/25 1100)  Anticipated Discharge Disposition (SLP): inpatient rehabilitation facility (03/12/25 1100)  Rehab Potential/Prognosis, Swallowing: adequate, monitor progress closely (03/12/25  1100)  Therapy Frequency (Swallow): PRN (03/12/25 1100)  Predicted Duration Therapy Intervention (Days): until discharge (03/12/25 1100)  Oral Care Recommendations: Oral Care BID/PRN, Swab, Suction toothbrush (03/12/25 1100)                                        Progress: no change      SWALLOW EVALUATION (Last 72 Hours)       SLP Adult Swallow Evaluation       Row Name 03/12/25 1100                   Rehab Evaluation    Document Type evaluation  -AW        Subjective Information no complaints  -AW        Patient Observations alert;cooperative  -AW        Patient/Family/Caregiver Comments/Observations grandchildren at bedside  -AW        Patient Effort adequate  -AW        Symptoms Noted During/After Treatment none  -AW        Oral Care swabbed with sterile water  -AW           General Information    Patient Profile Reviewed yes  -AW        Pertinent History Of Current Problem rhabdomylosis, RSV, pna  -AW        Current Method of Nutrition NPO  reg/thins at home  -AW        Precautions/Limitations, Vision WFL  -AW        Precautions/Limitations, Hearing WFL  -AW        Prior Level of Function-Communication WFL  -AW        Prior Level of Function-Swallowing no diet consistency restrictions  -AW        Plans/Goals Discussed with patient;patient and family;agreed upon  -AW        Barriers to Rehab medically complex;previous functional deficit  -AW        Patient's Goals for Discharge return to PO diet  -AW           Pain    Pretreatment Pain Rating 0/10 - no pain  -AW        Posttreatment Pain Rating 0/10 - no pain  -AW           Oral Motor Structure and Function    Dentition Assessment natural, present and adequate  -AW        Secretion Management WNL/WFL  -AW        Mucosal Quality moist, healthy  -AW        Volitional Swallow delayed;weak  -AW        Volitional Cough weak;WFL  -AW           Oral Musculature and Cranial Nerve Assessment    Oral Motor General Assessment WFL  -AW           General Eating/Swallowing  Observations    Respiratory Support Currently in Use nasal cannula  -AW        Eating/Swallowing Skills fed by SLP  -AW        Positioning During Eating upright in bed;upright 90 degree  -AW        Utensils Used spoon;straw  -AW        Consistencies Trialed thin liquids;ice chips  water only d/t GI concerns  -AW           Clinical Swallow Eval    Oral Prep Phase WFL  -AW        Oral Transit WFL  -AW        Oral Residue WFL  -AW        Pharyngeal Phase suspected pharyngeal impairment  -AW        Esophageal Phase suspected esophageal impairment  -AW        Clinical Swallow Evaluation Summary Pt with wet coughing, productive, suctioning mouth. He exh multiple swallows, cough with larger drinks of thin. Pt acknowledged that one of the sips didn''t go down well. Pt at high risk of aspiration at this time, family wants pt to be able to have some sips, which is reasonable to keep pt comfortable. Advised oral care prior. Will f/u after GI POC in place and give further trials of solids. Pt is too weak for an instrumental swallow exam at this time.  -AW           Pharyngeal Phase Concerns    Pharyngeal Phase Concerns wet vocal quality;multiple swallows;cough;throat clear;other (see comments)  dysphonia  -AW        Wet Vocal Quality thin  -AW        Multiple Swallows thin  -AW        Cough thin  -AW        Throat Clear thin  -AW           Esophageal Phase Concerns    Esophageal Phase Concerns belching  -AW        Belching thin  -AW           SLP Evaluation Clinical Impression    SLP Swallowing Diagnosis R/O pharyngeal dysphagia;suspected pharyngeal dysphagia;suspected esophageal dysphagia  -AW        Functional Impact risk of aspiration/pneumonia;risk of malnutrition;risk of dehydration  -AW        Rehab Potential/Prognosis, Swallowing adequate, monitor progress closely  -AW        Swallow Criteria for Skilled Therapeutic Interventions Met demonstrates skilled criteria  -AW           Recommendations    Therapy Frequency  (Swallow) PRN  -AW        Predicted Duration Therapy Intervention (Days) until discharge  -AW        SLP Diet Recommendation NPO;ice chips between meals after oral care, with supervision;water between meals after oral care, with supervision  -AW        Recommended Diagnostics reassess via clinical swallow evaluation  -AW        Recommended Precautions and Strategies upright posture during/after eating;small bites of food and sips of liquid  sips and chips only  -AW        Oral Care Recommendations Oral Care BID/PRN;Swab;Suction toothbrush  -AW        SLP Rec. for Method of Medication Administration meds crushed;with puree;meds via alternate route  -AW        Monitor for Signs of Aspiration notify SLP if any concerns;yes  -AW        Anticipated Discharge Disposition (SLP) inpatient rehabilitation facility  -                  User Key  (r) = Recorded By, (t) = Taken By, (c) = Cosigned By      Initials Name Effective Dates    Malena Urbina MS CCC-SLP 02/03/23 -                     EDUCATION  The patient has been educated in the following areas:   Dysphagia (Swallowing Impairment).                Time Calculation:    Time Calculation- SLP       Row Name 03/12/25 1116             Time Calculation- SLP    SLP Start Time 1010  -AW      SLP Stop Time 1110  -AW      SLP Time Calculation (min) 60 min  -AW      SLP Received On 03/12/25  -                User Key  (r) = Recorded By, (t) = Taken By, (c) = Cosigned By      Initials Name Provider Type    Malena Urbina MS CCC-SLP Speech and Language Pathologist                    Therapy Charges for Today       Code Description Service Date Service Provider Modifiers Qty    [id number] HC ST EVAL ORAL PHARYNG SWALLOW 4 3/12/2025 Malena Carmona MS CCC-SLP GN 1                 Malena Carmona MS CCC-SLP  3/12/2025

## 2025-03-12 NOTE — PLAN OF CARE
Problem: Adult Inpatient Plan of Care  Goal: Plan of Care Review  Recent Flowsheet Documentation  Taken 3/12/2025 0953 by Gianna Santiago OT  Progress: no change  Outcome Evaluation: OT IE completed. Pt. presents below functional baseline in strength, balance, occupational endurance, functional mobility, and ADL independence. Pt. requiring min A for EOB sitting balance, with posterior lean in sitting & standing. At baseline pt. requires no AD for functional moblity or assist for ADL's. OT skilled services are warranted to return to PLOF. Recommend IPRF upon d/c.   Goal Outcome Evaluation:  Plan of Care Reviewed With: patient, grandchild(ruma) (granddtr. present & answering questions)        Progress: no change  Outcome Evaluation: OT IE completed. Pt. presents below functional baseline in strength, balance, occupational endurance, functional mobility, and ADL independence. Pt. requiring min A for EOB sitting balance, with posterior lean in sitting & standing. At baseline pt. requires no AD for functional moblity or assist for ADL's. OT skilled services are warranted to return to PLOF. Recommend IPRF upon d/c.    Anticipated Discharge Disposition (OT): inpatient rehabilitation facility

## 2025-03-12 NOTE — ED NOTES
Carlos Preston    Nursing Report ED to Floor:  Mental status: A&OX4  Ambulatory status: X2 Assist  Oxygen Therapy:  RA  Cardiac Rhythm: Afib  Admitted from: [facility]  Safety Concerns:  Fall Risk  Precautions: Droplet & Contact  Social Issues: NA  ED Room #:  8    ED Nurse Phone Extension - [phone] or may call [phone].      HPI:   Chief Complaint   Patient presents with    Extremity Weakness    Fall       Past Medical History:  Past Medical History:   Diagnosis Date    Cancer     H/O prostatectomy     Hearing loss     Heart disease     History of back surgery     Hypercholesterolemia     Hyperlipidemia     Hypertension     IHD (ischemic heart disease)     S/P CABG    Osteoarthritis of spine with myelopathy, lumbar region     Paroxysmal atrial fibrillation 07/03/2018    Pulmonary hypertension     S/P knee replacement     Sinus disorder     thyroid ultrasound 08/28/2009    Normal        Past Surgical History:  Past Surgical History:   Procedure Laterality Date    CARDIAC CATHETERIZATION  05/16/2007    Dr. Mace: 60% LM and 70% LCX.    CARDIAC CATHETERIZATION  08/19/2009    %, OM 85-90%,Patent grafts.    CARDIAC CATHETERIZATION  07/23/2018    Patent Grafts    CARDIOVASCULAR STRESS TEST  08/05/2009    ROBIN George: Dr. Mace- EF 29%, Diffuse ischemia.    CARDIOVASCULAR STRESS TEST  11/07/2012    L. Myoview- Inferoseptal infarct with jon infarct ischemia. EF 41%.    CARDIOVASCULAR STRESS TEST  03/23/2015    [facility] [place]- EF51%,fixed defect , no ischemia burden.    CARDIOVASCULAR STRESS TEST  10/11/2017    [facility]- EF 54%. Small inferior Ischemia.    CONVERTED (HISTORICAL) HOLTER  06/12/2023    3 days. A.Fib. Avg 60. . 3 VT. One 3 Sec Pause    CONVERTED (HISTORICAL) HOLTER  05/28/2024    3 Days. A.Fib. Avg 70. . 4.6% PVC. 2 VT    CORONARY ARTERY BYPASS GRAFT  05/17/2007    CABG:  SVG to LAD and LCX.    ECHO - CONVERTED  08/06/2009    Dr. Mace- EF 50%.    ECHO - CONVERTED  02/14/2011    EF 50%, Septal WMA.     ECHO - CONVERTED  11/07/2012    EF 40-45%, RVSP 33 mmHg.    ECHO - CONVERTED  03/23/2015    EF 45-50%, RVSP 40mmHg,mild MR, mild PHT    ECHO - CONVERTED  05/01/2017    EF 50-55%, mild MR    ECHO - CONVERTED  10/11/2017    EF 55%    ECHO - CONVERTED  03/21/2022    TLS. EF 55%. LA- 5.2 cm. Mild MR. RVSP- 44 mmHg    ECHO - CONVERTED  02/15/2024    TLS. EF 55%.RHE. LA- 5.0. Mild MR.AO- 3.8. RVSP- 56 mmHg    US CAROTID UNILATERAL  04/16/2007    Mild Plaque in Rt. bulb and RECA.        Admitting Doctor:   No admitting provider for patient encounter.    Consulting Provider(s):  Consults       No orders found from 2/10/2025 to 3/12/2025.             Admitting Diagnosis:   The primary encounter diagnosis was Pneumonia of both lungs due to infectious organism, unspecified part of lung. Diagnoses of Traumatic rhabdomyolysis, initial encounter, Elevated troponin, and RSV (acute bronchiolitis due to respiratory syncytial virus) were also pertinent to this visit.    Most Recent Vitals:   Vitals:    03/11/25 1830 03/11/25 1900 03/11/25 1931 03/11/25 2009   BP: 114/88 114/55 122/61    BP Location:       Patient Position:       Pulse: 81 80 86 93   Resp:       Temp:       TempSrc:       SpO2: 92% 93% 95%    Weight:       Height:           Active LDAs/IV Access:   Lines, Drains & Airways       Active LDAs       Name Placement date Placement time Site Days    Peripheral IV 03/11/25 1435 Left Antecubital 03/11/25  1435  Antecubital  less than 1                    Labs (abnormal labs have a star):   Labs Reviewed   COVID-19/FLUA&B/RSV, NP SWAB IN TRANSPORT MEDIA 1 HR TAT - Abnormal; Notable for the following components:       Result Value    RSV, PCR Detected (*)     All other components within normal limits    Narrative:     Fact sheet for providers: https://www.fda.gov/media/481729/download    Fact sheet for patients: https://www.fda.gov/media/901801/download    Test performed by PCR.   COMPREHENSIVE METABOLIC PANEL - Abnormal;  Notable for the following components:    Glucose 108 (*)     BUN 63 (*)     Creatinine 1.42 (*)     Chloride 108 (*)     CO2 20.3 (*)     Albumin 3.1 (*)     ALT (SGPT) 59 (*)     AST (SGOT) 172 (*)     Alkaline Phosphatase 135 (*)     Total Bilirubin 1.3 (*)     BUN/Creatinine Ratio 44.4 (*)     eGFR 45.2 (*)     All other components within normal limits    Narrative:     GFR Categories in Chronic Kidney Disease (CKD)      GFR Category          GFR (mL/min/1.73)    Interpretation  G1                     90 or greater         Normal or high (1)  G2                      60-89                Mild decrease (1)  G3a                   45-59                Mild to moderate decrease  G3b                   30-44                Moderate to severe decrease  G4                    15-29                Severe decrease  G5                    14 or less           Kidney failure          (1)In the absence of evidence of kidney disease, neither GFR category G1 or G2 fulfill the criteria for CKD.    eGFR calculation 2021 CKD-EPI creatinine equation, which does not include race as a factor   TROPONIN - Abnormal; Notable for the following components:    HS Troponin T 124 (*)     All other components within normal limits   URINALYSIS W/ MICROSCOPIC IF INDICATED (NO CULTURE) - Abnormal; Notable for the following components:    Color, UA Orange (*)     Ketones, UA Trace (*)     Bilirubin, UA Small (1+) (*)     Blood, UA Moderate (2+) (*)     Protein, UA 30 mg/dL (1+) (*)     All other components within normal limits   CBC WITH AUTO DIFFERENTIAL - Abnormal; Notable for the following components:    WBC 23.50 (*)     RBC 3.19 (*)     Hemoglobin 9.2 (*)     Hematocrit 29.6 (*)     MCHC 31.1 (*)     RDW 15.8 (*)     MPV 12.1 (*)     Platelets 103 (*)     Neutrophil % 93.0 (*)     Lymphocyte % 1.9 (*)     Monocyte % 4.8 (*)     Eosinophil % 0.0 (*)     Neutrophils, Absolute 21.84 (*)     Lymphocytes, Absolute 0.45 (*)     Monocytes, Absolute  1.13 (*)     Immature Grans, Absolute 0.07 (*)     All other components within normal limits   PROCALCITONIN - Abnormal; Notable for the following components:    Procalcitonin 1.17 (*)     All other components within normal limits   CK - Abnormal; Notable for the following components:    Creatine Kinase 2,458 (*)     All other components within normal limits   HIGH SENSITIVITIY TROPONIN T 1HR - Abnormal; Notable for the following components:    HS Troponin T 123 (*)     All other components within normal limits    Narrative:     High Sensitive Troponin T Reference Range:  <14.0 ng/L- Negative Female for AMI  <22.0 ng/L- Negative Male for AMI  >=14 - Abnormal Female indicating possible myocardial injury.  >=22 - Abnormal Male indicating possible myocardial injury.   Clinicians would have to utilize clinical acumen, EKG, Troponin, and serial changes to determine if it is an Acute Myocardial Infarction or myocardial injury due to an underlying chronic condition.        BNP (IN-HOUSE) - Abnormal; Notable for the following components:    proBNP 29,350.0 (*)     All other components within normal limits    Narrative:     This assay is used as an aid in the diagnosis of individuals suspected of having heart failure. It can be used as an aid in the diagnosis of acute decompensated heart failure (ADHF) in patients presenting with signs and symptoms of ADHF to the emergency department (ED). In addition, NT-proBNP of <300 pg/mL indicates ADHF is not likely.    Age Range Result Interpretation  NT-proBNP Concentration (pg/mL:      <50             Positive            >450                   Gray                 300-450                    Negative             <300    50-75           Positive            >900                  Gray                300-900                  Negative            <300      >75             Positive            >1800                  Gray                300-1800                  Negative            <300    URINALYSIS, MICROSCOPIC ONLY - Abnormal; Notable for the following components:    RBC, UA 3-5 (*)     Bacteria, UA Trace (*)     All other components within normal limits   MAGNESIUM - Normal   LACTIC ACID, PLASMA - Normal   BLOOD CULTURE   BLOOD CULTURE   RAINBOW DRAW    Narrative:     The following orders were created for panel order [place] Draw.  Procedure                               Abnormality         Status                     ---------                               -----------         ------                     Green Top (Gel)[[id number]]                                  Final result               Lavender Top[[id number]]                                     Final result               Gold Top - SST[[id number]]                                   Final result               Gray Top[[id number]]                                         Final result               Light Blue Top[[id number]]                                   Final result                 Please view results for these tests on the individual orders.   CBC AND DIFFERENTIAL    Narrative:     The following orders were created for panel order CBC & Differential.  Procedure                               Abnormality         Status                     ---------                               -----------         ------                     CBC Auto Differential[[id number]]        Abnormal            Final result                 Please view results for these tests on the individual orders.   GREEN TOP   LAVENDER TOP   GOLD TOP - SST   GRAY TOP   LIGHT BLUE TOP       Meds Given in ED:   Medications   sodium chloride 0.9 % flush 10 mL (has no administration in time range)   cefTRIAXone (ROCEPHIN) 2,000 mg in sodium chloride 0.9 % 100 mL MBP (0 mg Intravenous Stopped 3/11/25 1645)   azithromycin (ZITHROMAX) 500 mg in sodium chloride 0.9 % 250 mL IVPB-VTB (0 mg Intravenous Stopped 3/11/25 1835)   sodium chloride 0.9 % bolus 1,000 mL (0 mL Intravenous Stopped 3/11/25  1940)           Last NIH score:                                                          Dysphagia screening results:        Harley Coma Scale:  No data recorded     CIWA:        Restraint Type:            Isolation Status:  Contact and Droplet

## 2025-03-12 NOTE — CONSULTS
[facility] Consult    Referring Provider: Maksim Delgado MD     PCP: Newton Hankins MD    Reason for Consultation: Dysphagia     Chief complaint: Weakness, recent falls.  Found down at home     History of present illness:    Carlos Preston is a 89 y.o. male who is admitted with rhabdomyolysis.   He has had two falls at home in the last four days and was found down by his daughter yesterday.   It was suspected he was on the floor for five hours.    Work up in the ER revealed rhabdomyolysis and pneumonia secondary to RSV.       As part of his ER work up he underwent a noncontrast CT of the abdomen that showed dilation of the common bile duct to 2 cm without obvious cause of obstruction.   He is status post remote cholecystectomy.   Atrophic changes of the pancreas noted with diffuse pancreatic duct dilation.   No visibile mass or inflammation.   There is also increased bowel gas suggestive of mild ileus.       The patient is currently lethargic and mildly disoriented upon waking.  History obtained by his daughter and grandchildren in the room.   They voice an 80 lb unintentional weight loss in the last year.   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       Patient is unable to provide details regarding dysphagia concerns.   He has been seen by speech therapy at the bedside earlier today with concerns of coughing while drinking.        Allergies:  Patient has no known allergies.    Scheduled Meds:  [Held by provider] busPIRone, 5 mg, Oral, Daily With Lunch  [Held by provider] cetirizine, 10 mg, Oral, Daily  ferric gluconate, 250 mg, Intravenous, Daily  heparin (porcine), 5,000 Units, Subcutaneous, Q12H  [Held by provider] isosorbide mononitrate, 30 mg, Oral, QAM  methylPREDNISolone sodium succinate, 40 mg, Intravenous, Q12H  [Held by provider] NIFEdipine XL, 30 mg, Oral, Daily  piperacillin-tazobactam, 4.5 g, Intravenous, Q8H  [Held by provider]  rivaroxaban, 20 mg, Oral, Daily  sodium chloride, 10 mL, Intravenous, Q12H         Infusions:  sodium chloride, 100 mL/hr, Last Rate: 100 mL/hr (03/12/25 1218)        PRN Meds:    senna-docusate sodium **AND** polyethylene glycol **AND** bisacodyl **AND** bisacodyl    dextrose    dextrose    glucagon (human recombinant)    HYDROmorphone    ipratropium-albuterol    melatonin    nitroglycerin    ondansetron    sodium chloride    sodium chloride    sodium chloride    traMADol    Home Meds:  Medications Prior to Admission   Medication Sig Dispense Refill Last Dose/Taking    albuterol sulfate  (90 Base) MCG/ACT inhaler Inhale 2 puffs Every 4 (Four) Hours As Needed for Wheezing.       busPIRone (BUSPAR) 5 MG tablet Take 1 tablet by mouth 2 (Two) Times a Day. 180 tablet 3     Cholecalciferol (VITAMIN D3) 2000 UNITS tablet Take  by mouth daily.       Edarbi 80 MG tablet tablet TAKE 1/2 TO 1 TABLET BY MOUTH ONCE DAILY 180 tablet 0     ferrous sulfate 324 MG tablet delayed-release Take 1 tablet by mouth Daily With Breakfast.       HYDROcodone-acetaminophen (NORCO) 5-325 MG per tablet Take 2 tablets by mouth Every 8 (Eight) Hours As Needed for Severe Pain.       ibuprofen (ADVIL,MOTRIN) 800 MG tablet Take 1 tablet by mouth Every 8 (Eight) Hours As Needed for Mild Pain.       ipratropium (ATROVENT HFA) 17 MCG/ACT inhaler Inhale 2 puffs 4 (Four) Times a Day. 12.9 g 11     ipratropium-albuterol (DUO-NEB) 0.5-2.5 mg/3 ml nebulizer Take 3 mL by nebulization 4 (Four) Times a Day. 120 mL 5     isosorbide mononitrate (IMDUR) 30 MG 24 hr tablet Take 1 tablet by mouth Every Morning. 90 tablet 3     loratadine (CLARITIN) 10 MG tablet Take 1 tablet by mouth Daily.       Multiple Vitamins-Minerals (VISION VITAMINS) tablet Take  by mouth 2 (two) times a day.       NIFEdipine XL (PROCARDIA XL) 30 MG 24 hr tablet TAKE 1 TABLET BY MOUTH EVERY DAY 90 tablet 2     nitroglycerin (NITROSTAT) 0.4 MG SL tablet 1 under the tongue as needed for  angina, may repeat q5mins for up three doses 30 tablet 1     omeprazole (priLOSEC) 20 MG capsule Take 1 capsule by mouth Daily. 90 capsule 3     rivaroxaban (Xarelto) 20 MG tablet Take 1 tablet by mouth Daily. 90 tablet 3     vitamin C (ASCORBIC ACID) 500 MG tablet Take 2 tablets by mouth Daily.          ROS: Review of Systems   Unable to perform ROS: Mental status change       PAST MED HX:  Past Medical History:   Diagnosis Date    Cancer     H/O prostatectomy     Hearing loss     Heart disease     History of back surgery     Hypercholesterolemia     Hyperlipidemia     Hypertension     IHD (ischemic heart disease)     S/P CABG    Osteoarthritis of spine with myelopathy, lumbar region     Paroxysmal atrial fibrillation 07/03/2018    Pulmonary hypertension     S/P knee replacement     Sinus disorder     thyroid ultrasound 08/28/2009    Normal       PAST SURG HX:  Past Surgical History:   Procedure Laterality Date    CARDIAC CATHETERIZATION  05/16/2007    Dr. Mace: 60% LM and 70% LCX.    CARDIAC CATHETERIZATION  08/19/2009    %, OM 85-90%,Patent grafts.    CARDIAC CATHETERIZATION  07/23/2018    Patent Grafts    CARDIOVASCULAR STRESS TEST  08/05/2009    ROBIN George: Dr. Mace- EF 29%, Diffuse ischemia.    CARDIOVASCULAR STRESS TEST  11/07/2012    L. Myoview- Inferoseptal infarct with jon infarct ischemia. EF 41%.    CARDIOVASCULAR STRESS TEST  03/23/2015    L. Myoview- EF51%,fixed defect , no ischemia burden.    CARDIOVASCULAR STRESS TEST  10/11/2017    L.Myoview- EF 54%. Small inferior Ischemia.    CONVERTED (HISTORICAL) HOLTER  06/12/2023    3 days. A.Fib. Avg 60. . 3 VT. One 3 Sec Pause    CONVERTED (HISTORICAL) HOLTER  05/28/2024    3 Days. A.Fib. Avg 70. . 4.6% PVC. 2 VT    CORONARY ARTERY BYPASS GRAFT  05/17/2007    CABG:  SVG to LAD and LCX.    ECHO - CONVERTED  08/06/2009    Dr. Mace- EF 50%.    ECHO - CONVERTED  02/14/2011    EF 50%, Septal WMA.    ECHO - CONVERTED  11/07/2012    EF  "40-45%, RVSP 33 mmHg.    ECHO - CONVERTED  2015    EF 45-50%, RVSP 40mmHg,mild MR, mild PHT    ECHO - CONVERTED  2017    EF 50-55%, mild MR    ECHO - CONVERTED  10/11/2017    EF 55%    ECHO - CONVERTED  2022    TLS. EF 55%. LA- 5.2 cm. Mild MR. RVSP- 44 mmHg    ECHO - CONVERTED  02/15/2024    TLS. EF 55%.RHE. LA- 5.0. Mild MR.AO- 3.8. RVSP- 56 mmHg    US CAROTID UNILATERAL  2007    Mild Plaque in Rt. bulb and RECA.       FAM HX:  Family History   Problem Relation Age of Onset    No Known Problems Mother     No Known Problems Father        SOC HX:  Social History     Socioeconomic History    Marital status:    Tobacco Use    Smoking status: Former     Current packs/day: 0.00     Average packs/day: 1 pack/day for 52.7 years (52.7 ttl pk-yrs)     Types: Cigarettes     Start date:      Quit date: 1996     Years since quittin.4     Passive exposure: Past    Smokeless tobacco: Never   Vaping Use    Vaping status: Never Used   Substance and Sexual Activity    Alcohol use: No    Drug use: No    Sexual activity: Defer       PHYSICAL EXAM  /56 (BP Location: Right arm, Patient Position: Lying)   Pulse 85   Temp 99.5 °F (37.5 °C) (Oral)   Resp 24   Ht 182.9 cm (72\")   Wt 83.9 kg (185 lb)   SpO2 100%   BMI 25.09 kg/m²   Wt Readings from Last 3 Encounters:   25 83.9 kg (185 lb)   24 88.5 kg (195 lb 3.2 oz)   02/15/24 95.1 kg (209 lb 10.5 oz)   ,body mass index is 25.09 kg/m².  Physical Exam  Constitutional:       Appearance: He is ill-appearing.   Cardiovascular:      Rate and Rhythm: Normal rate and regular rhythm.   Pulmonary:      Breath sounds: Rhonchi present.   Abdominal:      General: Bowel sounds are normal.      Palpations: Abdomen is soft.      Tenderness: There is no abdominal tenderness. There is no guarding.   Skin:     Coloration: Skin is pale.   Neurological:      Mental Status: He is lethargic.      Comments: Awakens at times and mildly " "disoriented.            Results Review:   I reviewed the patient's new clinical results.    Lab Results   Component Value Date    WBC 15.52 (H) 03/12/2025    HGB 8.3 (L) 03/12/2025    HGB 7.6 (L) 03/12/2025    HGB 9.2 (L) 03/11/2025    HCT 27.7 (L) 03/12/2025    MCV 95.2 03/12/2025    PLT 93 (L) 03/12/2025     No results found for: \"INR\"    Lab Results   Component Value Date    GLUCOSE 81 03/12/2025    BUN 63 (H) 03/12/2025    CREATININE 1.17 03/12/2025    BCR 53.8 (H) 03/12/2025     (H) 03/12/2025    K 3.8 03/12/2025    CO2 19.0 (L) 03/12/2025    CALCIUM 9.1 03/12/2025    ALBUMIN 2.7 (L) 03/12/2025    ALKPHOS 135 (H) 03/12/2025    BILITOT 1.0 03/12/2025    ALT 51 (H) 03/12/2025     (H) 03/12/2025     CT Abdomen/Pelvis (noncontrast)  T SCAN OF THE ABDOMEN PELVIS WITHOUT CONTRAST: CT scan  film shows diffusely increased bowel gas in a pattern suggesting ileus. Axial images show moderate gaseous distention of nondependent large and small bowel loops again favoring ileus, without   an obvious transition point. There is mild distention of the sigmoid and rectum, with what appears to be semisolid stool rather than formed stool, and very little evidence of stool elsewhere. Impaction is not suspected. No bowel wall edema or pneumatosis   is seen.     Image detail of the liver is limited both due to patient motion and nonenhanced scan. No gross abnormalities are appreciated. Gallbladder is surgically absent. Common duct appears dilated to approximately 2 cm, somewhat greater than would be expected for previous cholecystectomy. There is no obvious distal common duct stone, mass or other potential cause of obstruction.      Pancreas is atrophic, and the low-density nodular appearance of the pancreatic tail, images 44 through 35 suggest diffuse ductal dilatation and side branch dilatation of unknown cause. No pancreatic mass or inflammation is apparent.     Spleen, adrenal glands, and kidneys appear " unremarkable except for a large benign-appearing water density exophytic cyst in the left renal midpole. No upper abdominal mass, adenopathy or acute inflammatory change is seen.     Regarding the lower abdomen and pelvis, the bladder is moderately distended but normal in appearance. Prostate and seminal vesicles are not enlarged, actually small and difficult to delineate. No intrapelvic mass or inflammatory change is seen. Bony   structures appear to be intact. There is advanced multilevel degenerative disc disease throughout the lumbar spine.        Impression:     1. Previous cholecystectomy. Greater than expected dilatation of the common bile duct for cholecystectomy, up to approximately 2 cm. No obvious cause of obstruction.     2. Atrophic pancreas, and what appears to be diffuse ductal dilatation and cystic change, at least of the pancreatic tail, and of uncertain etiology. No directly visible mass or inflammation.     3. Increased bowel gas in a pattern favoring mild ileus over obstruction. No visible bowel wall inflammation.    ASSESSMENTS/PLANS    Abnormal CT abdomen with common bile duct dilation as well as pancreas duct dilation.   No focal mass or obstructive process seen on noncontrast CT.    Total bilirubin is normal.    Unintentional weight loss  Elevated LFTs  Rhabdomyolysis  RSV pneumonia  Atrial fibrillation, on anticoagulation with Eliquis   Recent falls   Dysphagia, suspect oropharyngeal at this time     Reviewed noncontrast abdominal CT findings with patient's family.    Common bile duct is dilated to 2 cm, greater than expected for post cholecystectomy state.   Pancreas is atrophic with diffuse ductal dilatation.    His unintentional weight loss raises concern of underlying malignancy which is not visualized on noncontrast imaging.       >> Consider MRI abdomen with and without contrast, with MRCP sequence when hemodynamically stable.      >> Speech therapy evaluation for dysphagia, concerns  of aspiration.      >> Patient had a large bowel movement last night.    Will continue bowel regimen.   >> Management of rhabdomyolysis and RSV pneumonia per primary team     I discussed the patient's findings and my recommendations with patient, his daughter and grandchildren whom are present at the bedside.      LEW Soto  03/12/25  13:20 EDT

## 2025-03-12 NOTE — THERAPY EVALUATION
Patient Name: Carlos Preston  : 3/20/1928    MRN: [id number]                              Today's Date: 3/12/2025       Admit Date: 3/11/2025    Visit Dx:     ICD-10-CM ICD-9-CM   1. Pneumonia of both lungs due to infectious organism, unspecified part of lung  J18.9 483.8   2. Traumatic rhabdomyolysis, initial encounter  T79.6XXA 958.6   3. Elevated troponin  R79.89 790.6   4. RSV (acute bronchiolitis due to respiratory syncytial virus)  J21.0 466.11   5. Impaired mobility and ADLs  Z74.09 V49.89    Z78.9      Patient Active Problem List   Diagnosis    Pulmonary HTN    Hyperlipemia    HTN (hypertension)    GERD (gastroesophageal reflux disease)    Hx of CABG    IHD (ischemic heart disease)    SOB (shortness of breath)    Abnormal chest x-ray    Esophageal stricture    Rhabdomyolysis     Past Medical History:   Diagnosis Date    Cancer     H/O prostatectomy     Hearing loss     Heart disease     History of back surgery     Hypercholesterolemia     Hyperlipidemia     Hypertension     IHD (ischemic heart disease)     S/P CABG    Osteoarthritis of spine with myelopathy, lumbar region     Paroxysmal atrial fibrillation 2018    Pulmonary hypertension     S/P knee replacement     Sinus disorder     thyroid ultrasound 2009    Normal     Past Surgical History:   Procedure Laterality Date    CARDIAC CATHETERIZATION  2007    Dr. Mace: 60% LM and 70% LCX.    CARDIAC CATHETERIZATION  2009    %, OM 85-90%,Patent grafts.    CARDIAC CATHETERIZATION  2018    Patent Grafts    CARDIOVASCULAR STRESS TEST  2009    [facility]: Dr. Mace- EF 29%, Diffuse ischemia.    CARDIOVASCULAR STRESS TEST  2012    L. Myoview- Inferoseptal infarct with jon infarct ischemia. EF 41%.    CARDIOVASCULAR STRESS TEST  2015    [facility]- EF51%,fixed defect , no ischemia burden.    CARDIOVASCULAR STRESS TEST  10/11/2017    [facility]- EF 54%. Small inferior Ischemia.    CONVERTED (HISTORICAL)  HOLTER  06/12/2023    3 days. A.Fib. Avg 60. . 3 VT. One 3 Sec Pause    CONVERTED (HISTORICAL) HOLTER  05/28/2024    3 Days. A.Fib. Avg 70. . 4.6% PVC. 2 VT    CORONARY ARTERY BYPASS GRAFT  05/17/2007    CABG:  SVG to LAD and LCX.    ECHO - CONVERTED  08/06/2009    Dr. Mace- EF 50%.    ECHO - CONVERTED  02/14/2011    EF 50%, Septal WMA.    ECHO - CONVERTED  11/07/2012    EF 40-45%, RVSP 33 mmHg.    ECHO - CONVERTED  03/23/2015    EF 45-50%, RVSP 40mmHg,mild MR, mild PHT    ECHO - CONVERTED  05/01/2017    EF 50-55%, mild MR    ECHO - CONVERTED  10/11/2017    EF 55%    ECHO - CONVERTED  03/21/2022    TLS. EF 55%. LA- 5.2 cm. Mild MR. RVSP- 44 mmHg    ECHO - CONVERTED  02/15/2024    TLS. EF 55%.RHE. LA- 5.0. Mild MR.AO- 3.8. RVSP- 56 mmHg    US CAROTID UNILATERAL  04/16/2007    Mild Plaque in Rt. bulb and RECA.      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  -SD     Existing Precautions/Restrictions fall;oxygen therapy device and L/min;other (see comments)  droplet/contact precautions  -SD     Barriers to Rehab none identified  -SD       Row Name 03/12/25 0853          Living Environment    Current Living Arrangements home  -SD     People in Home alone  -SD       Row Name 03/12/25 0853          Home Main Entrance    Number of Stairs, Main Entrance none  ramp  -SD     Stair Railings, Main Entrance none  -SD       Row Name 03/12/25 0853          Stairs Within Home, Primary    Number of Stairs,  Within Home, Primary none  -SD     Stair Railings, Within Home, Primary none  -SD       Row Name 03/12/25 0853          Cognition    Orientation Status (Cognition) oriented x 3  -SD       Row Name 03/12/25 0853          Safety Issues/Impairments Affecting Functional Mobility    Safety Issues Affecting Function (Mobility) insight into deficits/self-awareness;safety precaution awareness;safety precautions follow-through/compliance;sequencing abilities  -SD     Impairments Affecting Function (Mobility) balance;cognition;coordination;endurance/activity tolerance;shortness of breath;strength;postural/trunk control  -SD     Cognitive Impairments, Mobility Safety/Performance attention;insight into deficits/self-awareness;safety precaution awareness;safety precaution follow-through;sequencing abilities  -SD               User Key  (r) = Recorded By, (t) = Taken By, (c) = Cosigned By      Initials Name Provider Type    Gianna Toro OT Occupational Therapist                     Mobility/ADL's       Row Name 03/12/25 0945          Bed Mobility    Bed Mobility rolling right;scooting/bridging;supine-sit;sit-supine  -SD     Rolling Right [place] (Bed Mobility) minimum assist (75% patient effort);1 person assist;verbal cues;nonverbal cues (demo/gesture)  -SD     Scooting/Bridging [place] (Bed Mobility) moderate assist (50% patient effort);verbal cues;1 person assist  -SD     Supine-Sit [place] (Bed Mobility) moderate assist (50% patient effort);verbal cues;1 person assist  -SD     Sit-Supine [place] (Bed Mobility) maximum assist (25% patient effort);verbal cues;1 person assist  -SD     Bed Mobility, Safety Issues impaired trunk control for bed mobility  -SD     Assistive Device (Bed Mobility) bed rails;head of bed elevated;repositioning sheet  -SD       Row Name 03/12/25 3459          Transfers    Transfers sit-stand transfer;stand-sit transfer  -SD       Row Name 03/12/25 0961          Sit-Stand  Transfer    Sit-Stand [place] (Transfers) minimum assist (75% patient effort);2 person assist;verbal cues;nonverbal cues (demo/gesture)  -SD     Assistive Device (Sit-Stand Transfers) walker, front-wheeled  -SD       Row Name 03/12/25 0948          Stand-Sit Transfer    Stand-Sit [place] (Transfers) minimum assist (75% patient effort);2 person assist;nonverbal cues (demo/gesture)  -SD     Assistive Device (Stand-Sit Transfers) walker, front-wheeled  -SD     Comment, (Stand-Sit Transfer) in standing, pt. leaning posteriorly & unable to maintain space between his feet; returned to sitting EOB & pt. began to lean posteriorly and c/o feeling nauseated, returned pt. to supine  -SD       Row Name 03/12/25 0948          Functional Mobility    Patient was able to Ambulate no, other medical factors prevent ambulation  -SD     Reason Patient was unable to Ambulate Nausea/Vomiting;Excessive Weakness  -SD       Row Name 03/12/25 0948          Activities of Daily Living    BADL Assessment/Intervention upper body dressing;lower body dressing;grooming;toileting  -SD       Row Name 03/12/25 0948          Upper Body Dressing Assessment/Training    [place] Level (Upper Body Dressing) don;pajama/robe;moderate assist (50% patient effort);verbal cues  -SD     Position (Upper Body Dressing) edge of bed sitting  -SD       Row Name 03/12/25 0948          Lower Body Dressing Assessment/Training    [place] Level (Lower Body Dressing) doff;don;pants/bottoms;dependent (less than 25% patient effort);verbal cues  doff/don depends  -SD     Position (Lower Body Dressing) edge of bed sitting;supported standing  -SD       Row Name 03/12/25 0948          Grooming Assessment/Training    [place] Level (Grooming) wash face, hands;minimum assist (75% patient effort)  -SD     Position (Grooming) sitting up in bed  -SD       Row Name 03/12/25 0948          Toileting Assessment/Training    [place] Level (Toileting) moderate  assist (50% patient effort)  -SD     Assistive Devices (Toileting) urinal  -SD     Position (Toileting) sitting up in bed  -SD               User Key  (r) = Recorded By, (t) = Taken By, (c) = Cosigned By      Initials Name Provider Type    Gianna Toro OT Occupational Therapist                   Obj/Interventions       Row Name 03/12/25 0952          Sensory Assessment (Somatosensory)    Sensory Assessment (Somatosensory) UE sensation intact  -SD       Row Name 03/12/25 0952          Vision Assessment/Intervention    Visual Impairment/Limitations WFL  -SD       Row Name 03/12/25 0952          Range of Motion Comprehensive    General Range of Motion bilateral upper extremity ROM WFL  -SD       Row Name 03/12/25 0952          Strength Comprehensive (MMT)    General Manual Muscle Testing (MMT) Assessment upper extremity strength deficits identified  -SD     Comment, General Manual Muscle Testing (MMT) Assessment B UE Strength: 4-/5 grossly  -SD       Row Name 03/12/25 0952          Balance    Balance Assessment sitting static balance;sitting dynamic balance;sit to stand dynamic balance;standing static balance;standing dynamic balance  -SD     Static Sitting Balance contact guard  -SD     Dynamic Sitting Balance minimal assist  -SD     Position, Sitting Balance sitting edge of bed  -SD     Sit to Stand Dynamic Balance minimal assist;2-person assist;verbal cues  -SD     Static Standing Balance minimal assist;2-person assist  -SD     Position/Device Used, Standing Balance supported;walker, front-wheeled  -SD               User Key  (r) = Recorded By, (t) = Taken By, (c) = Cosigned By      Initials Name Provider Type    Gianna Toro OT Occupational Therapist                   Goals/Plan       Row Name 03/12/25 0957          Bed Mobility Goal 1 (OT)    Activity/Assistive Device (Bed Mobility Goal 1, OT) rolling to left;rolling to right;scooting;sit to supine/supine to sit  -SD     [place]  Level/Cues Needed (Bed Mobility Goal 1, OT) contact guard required;verbal cues required  -SD     Time Frame (Bed Mobility Goal 1, OT) long term goal (LTG);10 days  -SD     Progress/Outcomes (Bed Mobility Goal 1, OT) goal ongoing  -SD       Row Name 03/12/25 0957          Transfer Goal 1 (OT)    Activity/Assistive Device (Transfer Goal 1, OT) sit-to-stand/stand-to-sit;bed-to-chair/chair-to-bed;toilet;walker, rolling;commode;commode, bedside without drop arms  -SD     [place] Level/Cues Needed (Transfer Goal 1, OT) contact guard required;verbal cues required  -SD     Time Frame (Transfer Goal 1, OT) long term goal (LTG);10 days  -SD     Progress/Outcome (Transfer Goal 1, OT) goal ongoing  -SD       Row Name 03/12/25 0957          Dressing Goal 1 (OT)    Activity/Device (Dressing Goal 1, OT) dressing skills, all  -SD     [place]/Cues Needed (Dressing Goal 1, OT) minimum assist (75% or more patient effort);verbal cues required  -SD     Time Frame (Dressing Goal 1, OT) long term goal (LTG);10 days  -SD     Progress/Outcome (Dressing Goal 1, OT) goal ongoing  -SD       Row Name 03/12/25 0957          Toileting Goal 1 (OT)    Activity/Device (Toileting Goal 1, OT) toileting skills, all;adjust/manage clothing;perform perineal hygiene;commode;commode chair;grab bar/safety frame  -SD     [place] Level/Cues Needed (Toileting Goal 1, OT) contact guard required;verbal cues required  -SD     Time Frame (Toileting Goal 1, OT) long term goal (LTG);10 days  -SD     Progress/Outcome (Toileting Goal 1, OT) goal ongoing  -SD       Row Name 03/12/25 0957          Therapy Assessment/Plan (OT)    Planned Therapy Interventions (OT) activity tolerance training;BADL retraining;cognitive/visual perception retraining;functional balance retraining;occupation/activity based interventions;patient/caregiver education/training;ROM/therapeutic exercise;strengthening exercise;transfer/mobility retraining  -SD               User Key   (r) = Recorded By, (t) = Taken By, (c) = Cosigned By      Initials Name Provider Type    Gianna Toro, OT Occupational Therapist                   Clinical Impression       Row Name 03/12/25 0953          Pain Assessment    Pretreatment Pain Rating 0/10 - no pain  -SD     Posttreatment Pain Rating 0/10 - no pain  -SD       Row Name 03/12/25 0953          Plan of Care Review    Plan of Care Reviewed With patient;grandchild(ruma)  granddtr. present & answering questions  -SD     Progress no change  -SD     Outcome Evaluation OT IE completed. Pt. presents below functional baseline in strength, balance, occupational endurance, functional mobility, and ADL independence. Pt. requiring min A for EOB sitting balance, with posterior lean in sitting & standing. At baseline pt. requires no AD for functional moblity or assist for ADL's. OT skilled services are warranted to return to PLOF. Recommend IPRF upon d/c.  -SD       Row Name 03/12/25 0953          Therapy Assessment/Plan (OT)    Rehab Potential (OT) good  -SD     Criteria for Skilled Therapeutic Interventions Met (OT) yes;meets criteria;skilled treatment is necessary  -SD     Therapy Frequency (OT) daily  -SD       Row Name 03/12/25 0953          Therapy Plan Review/Discharge Plan (OT)    Anticipated Discharge Disposition (OT) inpatient rehabilitation facility  -SD       Row Name 03/12/25 0953          Vital Signs    Pre Systolic BP Rehab 110  -SD     Pre Treatment Diastolic BP 56  -SD     Post Systolic BP Rehab 131  -SD     Post Treatment Diastolic BP 78  -SD     Posttreatment Heart Rate (beats/min) 89  -SD     Pre SpO2 (%) 100  -SD     O2 Delivery Pre Treatment nasal cannula  -SD     O2 Delivery Intra Treatment nasal cannula  -SD     Post SpO2 (%) 96  -SD     O2 Delivery Post Treatment nasal cannula  -SD     Pre Patient Position Supine  -SD     Intra Patient Position Standing  -SD     Post Patient Position Supine  -SD       Row Name 03/12/25 0953           Positioning and Restraints    Pre-Treatment Position in bed  -SD     Post Treatment Position bed  -SD     In Bed notified nsg;fowlers;call light within reach;encouraged to call for assist;exit alarm on;with family/caregiver  -SD               User Key  (r) = Recorded By, (t) = Taken By, (c) = Cosigned By      Initials Name Provider Type    Gianna Toro OT Occupational Therapist                   Outcome Measures       Row Name 03/12/25 0853          How much help from another is currently needed...    Putting on and taking off regular lower body clothing? 1  -SD     Bathing (including washing, rinsing, and drying) 1  -SD     Toileting (which includes using toilet bed pan or urinal) 2  -SD     Putting on and taking off regular upper body clothing 2  -SD     Taking care of personal grooming (such as brushing teeth) 3  -SD     Eating meals 2  pt. is NPO  -SD     AM-PAC 6 Clicks Score (OT) 11  -SD       Row Name 03/11/25 2224          How much help from another person do you currently need...    Turning from your back to your side while in flat bed without using bedrails? 2  -EW     Moving from lying on back to sitting on the side of a flat bed without bedrails? 2  -EW     Moving to and from a bed to a chair (including a wheelchair)? 2  -EW     Standing up from a chair using your arms (e.g., wheelchair, bedside chair)? 2  -EW     Climbing 3-5 steps with a railing? 2  -EW     To walk in hospital room? 2  -EW     AM-PAC 6 Clicks Score (PT) 12  -EW       Row Name 03/12/25 0885          Functional Assessment    Outcome Measure Options AM-PAC 6 Clicks Daily Activity (OT)  -SD               User Key  (r) = Recorded By, (t) = Taken By, (c) = Cosigned By      Initials Name Provider Type    Gianna Toro OT Occupational Therapist    Anahi Diana RN Registered Nurse                    Occupational Therapy Education       Title: PT OT SLP Therapies (Done)       Topic: Occupational Therapy (Done)        Point: ADL training (Done)       Learning Progress Summary            Patient Acceptance, E, VU,NR by SD at 3/12/2025 0959    Comment: Pt. and granddtr. educated on role of OT and both are agreeable with OT POC.   Family Acceptance, E, VU,NR by SD at 3/12/2025 0959    Comment: Pt. and granddtr. educated on role of OT and both are agreeable with OT POC.                      Point: Home exercise program (Done)       Learning Progress Summary            Patient Acceptance, E, VU,NR by SD at 3/12/2025 0959    Comment: Pt. and granddtr. educated on role of OT and both are agreeable with OT POC.   Family Acceptance, E, VU,NR by SD at 3/12/2025 0959    Comment: Pt. and granddtr. educated on role of OT and both are agreeable with OT POC.                      Point: Precautions (Done)       Learning Progress Summary            Patient Acceptance, E, VU,NR by SD at 3/12/2025 0959    Comment: Pt. and granddtr. educated on role of OT and both are agreeable with OT POC.   Family Acceptance, E, VU,NR by SD at 3/12/2025 0959    Comment: Pt. and granddtr. educated on role of OT and both are agreeable with OT POC.                      Point: Body mechanics (Done)       Learning Progress Summary            Patient Acceptance, E, VU,NR by SD at 3/12/2025 0959    Comment: Pt. and granddtr. educated on role of OT and both are agreeable with OT POC.   Family Acceptance, E, VU,NR by SD at 3/12/2025 0959    Comment: Pt. and granddtr. educated on role of OT and both are agreeable with OT POC.                                      User Key       Initials Effective Dates Name Provider Type Discipline    SD 07/11/23 -  Gianna Santiago OT Occupational Therapist OT                  OT Recommendation and Plan  Planned Therapy Interventions (OT): activity tolerance training, BADL retraining, cognitive/visual perception retraining, functional balance retraining, occupation/activity based interventions, patient/caregiver  education/training, ROM/therapeutic exercise, strengthening exercise, transfer/mobility retraining  Therapy Frequency (OT): daily  Plan of Care Review  Plan of Care Reviewed With: patient, grandchild(ruma) (granddtr. present & answering questions)  Progress: no change  Outcome Evaluation: OT IE completed. Pt. presents below functional baseline in strength, balance, occupational endurance, functional mobility, and ADL independence. Pt. requiring min A for EOB sitting balance, with posterior lean in sitting & standing. At baseline pt. requires no AD for functional moblity or assist for ADL's. OT skilled services are warranted to return to PLOF. Recommend IPRF upon d/c.     Time Calculation:   Evaluation Complexity (OT)  Review Occupational Profile/Medical/Therapy History Complexity: expanded/moderate complexity  Assessment, Occupational Performance/Identification of Deficit Complexity: 5 or more performance deficits  Clinical Decision Making Complexity (OT): detailed assessment/moderate complexity  Overall Complexity of Evaluation (OT): moderate complexity     Time Calculation- OT       Row Name 03/12/25 1000             Time Calculation- OT    OT Received On 03/12/25  -SD      OT Goal Re-Cert Due Date 03/22/25  -SD         Timed Charges    29654 - OT Therapeutic Activity Minutes 10  -SD      57717 - OT Self Care/Mgmt Minutes 11  -SD         Untimed Charges    OT Eval/Re-eval Minutes 46  -SD         Total Minutes    Timed Charges Total Minutes 21  -SD      Untimed Charges Total Minutes 46  -SD       Total Minutes 67  -SD                User Key  (r) = Recorded By, (t) = Taken By, (c) = Cosigned By      Initials Name Provider Type    Gianna Toro OT Occupational Therapist                  Therapy Charges for Today       Code Description Service Date Service Provider Modifiers Qty    [id number]  OT SELF CARE/MGMT/TRAIN EA 15 MIN 3/12/2025 Gianna Santiago OT GO 1    [id number]  OT EVAL MOD  COMPLEXITY 4 3/12/2025 Gianna Santiago, OT GO 1                 Gianna Santiago, OT  3/12/2025

## 2025-03-12 NOTE — PROGRESS NOTES
[facility] Medicine Services  PROGRESS NOTE    Patient Name: Carlos Preston  : 3/20/1928  MRN: [id number]    Date of Admission: 3/11/2025  Primary Care Physician: Newton Hankins MD    Subjective   Subjective     CC:  Follow-up for shortness of breath    HPI:  Patient seen and examined this morning.  Very weak and debilitated.  Persistently coughing yellowish-brownish sputum at the time of the encounter.  No pain or discomfort.  Daughter at bedside updated      Objective   Objective     Vital Signs:   Temp:  [98.3 °F (36.8 °C)-100.2 °F (37.9 °C)] 100.2 °F (37.9 °C)  Heart Rate:  [78-99] 91  Resp:  [16-24] 18  BP: ()/(52-88) 121/64  Flow (L/min) (Oxygen Therapy):  [2] 2     Physical Exam:  General: Comfortable, not in distress, conversant and cooperative  Head: Atraumatic and normocephalic  Eyes: No Icterus. No pallor  Ears:  Ears appear intact with no abnormalities noted  Throat: No oral lesions, no thrush  Neck: Supple, trachea midline  Lungs: Bilateral basal coarse crackles with bronchial breathing.  Heart:  Normal S1 and S2, no murmur, no gallop, No JVD, no lower extremity swelling  Abdomen:  Soft, no tenderness, no organomegaly, normal bowel sounds, no organomegaly  Extremities: pulses equal bilaterally  Skin: No bleeding, bruising or rash, normal skin turgor and elasticity  Neurologic: Cranial nerves appear intact with no evidence of facial asymmetry, normal motor and sensory functions in all 4 extremities  Psych: Alert and oriented x 3, normal mood    Results Reviewed:  LAB RESULTS:      Lab 25  1530 25  1150 25  0417 25  1528 25  1430   WBC  --   --  15.52*  --  23.50*   HEMOGLOBIN 8.3* 8.3* 7.6*  --  9.2*   HEMATOCRIT 27.9* 27.7* 25.7*  --  29.6*   PLATELETS  --   --  93*  --  103*   NEUTROS ABS  --   --   --   --  21.84*   IMMATURE GRANS (ABS)  --   --   --   --  0.07*   LYMPHS ABS  --   --   --   --  0.45*   MONOS ABS  --   --   --   --   1.13*   EOS ABS  --   --   --   --  0.00   MCV  --   --  95.2  --  92.8   PROCALCITONIN  --   --   --   --  1.17*   LACTATE  --   --   --   --  1.8   LDH  --   --  340*  --   --    HSTROP T  --   --  96* 123* 124*         Lab 03/12/25  1151 03/12/25  0833 03/12/25 0417 03/12/25  0002 03/11/25  1430   SODIUM 146* 144 144 143 143   POTASSIUM 3.8 3.8 3.7 4.5 3.8   CHLORIDE 114* 113* 114* 113* 108*   CO2 19.0* 20.0* 17.0* 18.0* 20.3*   ANION GAP 13.0 11.0 13.0 12.0 14.7   BUN 63* 68* 65* 61* 63*   CREATININE 1.17 1.10 1.14 1.20 1.42*   EGFR 57.1* 61.4 58.9* 55.3* 45.2*   GLUCOSE 81 80 75 81 108*   CALCIUM 9.1 9.1 8.4 9.0 9.5   MAGNESIUM  --   --   --   --  2.3         Lab 03/12/25  1151 03/12/25  0833 03/12/25 0417 03/12/25  0002 03/11/25  1430   TOTAL PROTEIN 6.2 6.1 5.6* 6.2 6.9   ALBUMIN 2.7* 2.6* 2.5* 2.7* 3.1*   GLOBULIN 3.5 3.5 3.1 3.5 3.8   ALT (SGPT) 51* 49* 46* 53* 59*   AST (SGOT) 105* 106* 111* 132* 172*   BILIRUBIN 1.0 0.9 0.8 0.9 1.3*   ALK PHOS 135* 126* 108 114 135*         Lab 03/12/25  0417 03/11/25  1528 03/11/25  1430   PROBNP  --   --  29,350.0*   HSTROP T 96* 123* 124*             Lab 03/12/25  1150 03/12/25  0833 03/12/25 0417   IRON  --   --  9*   IRON SATURATION (TSAT)  --   --  4*   TIBC  --   --  222*   TRANSFERRIN  --   --  149*   FERRITIN  --   --  238.60   FOLATE  --  8.44  --    VITAMIN B 12  --  1,188*  --    ABO TYPING O  --  O   RH TYPING Positive  --  Positive   ANTIBODY SCREEN Negative  --   --          Brief Urine Lab Results  (Last result in the past 365 days)        Color   Clarity   Blood   Leuk Est   Nitrite   Protein   CREAT   Urine HCG        03/11/25 1604 [place]   Clear   Moderate (2+)   Negative   Negative   30 mg/dL (1+)                   Microbiology Results Abnormal       Procedure Component Value - Date/Time    COVID-19, FLU A/B, RSV PCR 1 HR TAT - Swab, Nasopharynx [[id number]]  (Abnormal) Collected: 03/11/25 1431    Lab Status: Final result Specimen: Swab from  Nasopharynx Updated: 03/11/25 1518     COVID19 Not Detected     Influenza A PCR Not Detected     Influenza B PCR Not Detected     RSV, PCR Detected    Narrative:      Fact sheet for providers: https://www.fda.gov/media/865980/download    Fact sheet for patients: https://www.fda.gov/media/439043/download    Test performed by PCR.            CT Chest Without Contrast Diagnostic  Result Date: 3/11/2025  CT CHEST WO CONTRAST DIAGNOSTIC, CT ABDOMEN PELVIS WO CONTRAST Date of Exam: 3/11/2025 3:42 PM EDT Indication: cough, shortness of breath. Elevated liver enzymes Comparison: None available. Technique: Axial CT images were obtained of the chest, abdomen/pelvis without contrast administration.  Reconstructed coronal and sagittal images were also obtained. Automated exposure control and iterative construction methods were used. Findings: CT SCAN OF THE CHEST WITHOUT CONTRAST: Chronic appearing changes of fibrosis are seen in the lung periphery. There is moderate cylindrical bronchiectasis of the lower lobes, on the right with no obvious inflammation, on the left with evidence of active lingular and lower lobe bronchial inflammation and some patchy airspace disease consistent with active infection. The larger order airways appear normally patent. The small diameter peripheral airways in the left lower lobe appear partially opacified. There is no pleural effusion. Images of the mediastinum show shotty rounded preaortic lymph nodes, which may be reactive. Poststernotomy changes are noted. Bony structures appear to be intact.     Impression: 1. Chronic appearing peripheral lung changes of the lower lungs, consistent with fibrosis, and moderate cylindrical bronchiectasis. 2. Superimposed left lower lobe bronchitis perhaps and suspect early changes of bronchopneumonia. Mild reactive mediastinal lymphadenopathy. CT SCAN OF THE ABDOMEN PELVIS WITHOUT CONTRAST: CT scan  film shows diffusely increased bowel gas in a pattern  suggesting ileus. Axial images show moderate gaseous distention of nondependent large and small bowel loops again favoring ileus, without an obvious transition point. There is mild distention of the sigmoid and rectum, with what appears to be semisolid stool rather than formed stool, and very little evidence of stool elsewhere. Impaction is not suspected. No bowel wall edema or pneumatosis  is seen. Image detail of the liver is limited both due to patient motion and nonenhanced scan. No gross abnormalities are appreciated. Gallbladder is surgically absent. Common duct appears dilated to approximately 2 cm, somewhat greater than would be expected for  previous cholecystectomy. There is no obvious distal common duct stone, mass or other potential cause of obstruction. Pancreas is atrophic, and the low-density nodular appearance of the pancreatic tail, images 44 through 35 suggest diffuse ductal dilatation and side branch dilatation of unknown cause. No pancreatic mass or inflammation is apparent. Spleen, adrenal glands, and kidneys appear unremarkable except for a large benign-appearing water density exophytic cyst in the left renal midpole. No upper abdominal mass, adenopathy or acute inflammatory change is seen. Regarding the lower abdomen and pelvis, the bladder is moderately distended but normal in appearance. Prostate and seminal vesicles are not enlarged, actually small and difficult to delineate. No intrapelvic mass or inflammatory change is seen. Bony structures appear to be intact. There is advanced multilevel degenerative disc disease throughout the lumbar spine. Impression: 1. Previous cholecystectomy. Greater than expected dilatation of the common bile duct for cholecystectomy, up to approximately 2 cm. No obvious cause of obstruction. 2. Atrophic pancreas, and what appears to be diffuse ductal dilatation and cystic change, at least of the pancreatic tail, and of uncertain etiology. No directly visible  mass or inflammation. 3. Increased bowel gas in a pattern favoring mild ileus over obstruction. No visible bowel wall inflammation. Electronically Signed: Marlon Álvarez MD  3/11/2025 4:21 PM EDT  Workstation ID: [id number]    CT Abdomen Pelvis Without Contrast  Result Date: 3/11/2025  CT CHEST WO CONTRAST DIAGNOSTIC, CT ABDOMEN PELVIS WO CONTRAST Date of Exam: 3/11/2025 3:42 PM EDT Indication: cough, shortness of breath. Elevated liver enzymes Comparison: None available. Technique: Axial CT images were obtained of the chest, abdomen/pelvis without contrast administration.  Reconstructed coronal and sagittal images were also obtained. Automated exposure control and iterative construction methods were used. Findings: CT SCAN OF THE CHEST WITHOUT CONTRAST: Chronic appearing changes of fibrosis are seen in the lung periphery. There is moderate cylindrical bronchiectasis of the lower lobes, on the right with no obvious inflammation, on the left with evidence of active lingular and lower lobe bronchial inflammation and some patchy airspace disease consistent with active infection. The larger order airways appear normally patent. The small diameter peripheral airways in the left lower lobe appear partially opacified. There is no pleural effusion. Images of the mediastinum show shotty rounded preaortic lymph nodes, which may be reactive. Poststernotomy changes are noted. Bony structures appear to be intact.     Impression: 1. Chronic appearing peripheral lung changes of the lower lungs, consistent with fibrosis, and moderate cylindrical bronchiectasis. 2. Superimposed left lower lobe bronchitis perhaps and suspect early changes of bronchopneumonia. Mild reactive mediastinal lymphadenopathy. CT SCAN OF THE ABDOMEN PELVIS WITHOUT CONTRAST: CT scan  film shows diffusely increased bowel gas in a pattern suggesting ileus. Axial images show moderate gaseous distention of nondependent large and small bowel loops again favoring  ileus, without an obvious transition point. There is mild distention of the sigmoid and rectum, with what appears to be semisolid stool rather than formed stool, and very little evidence of stool elsewhere. Impaction is not suspected. No bowel wall edema or pneumatosis  is seen. Image detail of the liver is limited both due to patient motion and nonenhanced scan. No gross abnormalities are appreciated. Gallbladder is surgically absent. Common duct appears dilated to approximately 2 cm, somewhat greater than would be expected for  previous cholecystectomy. There is no obvious distal common duct stone, mass or other potential cause of obstruction. Pancreas is atrophic, and the low-density nodular appearance of the pancreatic tail, images 44 through 35 suggest diffuse ductal dilatation and side branch dilatation of unknown cause. No pancreatic mass or inflammation is apparent. Spleen, adrenal glands, and kidneys appear unremarkable except for a large benign-appearing water density exophytic cyst in the left renal midpole. No upper abdominal mass, adenopathy or acute inflammatory change is seen. Regarding the lower abdomen and pelvis, the bladder is moderately distended but normal in appearance. Prostate and seminal vesicles are not enlarged, actually small and difficult to delineate. No intrapelvic mass or inflammatory change is seen. Bony structures appear to be intact. There is advanced multilevel degenerative disc disease throughout the lumbar spine. Impression: 1. Previous cholecystectomy. Greater than expected dilatation of the common bile duct for cholecystectomy, up to approximately 2 cm. No obvious cause of obstruction. 2. Atrophic pancreas, and what appears to be diffuse ductal dilatation and cystic change, at least of the pancreatic tail, and of uncertain etiology. No directly visible mass or inflammation. 3. Increased bowel gas in a pattern favoring mild ileus over obstruction. No visible bowel wall  inflammation. Electronically Signed: Marlon Álvarez MD  3/11/2025 4:21 PM EDT  Workstation ID: [id number]    XR Hip With or Without Pelvis 2 - 3 View Right  Result Date: 3/11/2025  XR HIP W OR WO PELVIS 2-3 VIEW RIGHT Date of Exam: 3/11/2025 2:30 PM EDT Indication: Fall, hip pain Comparison: None available. Findings: There is narrowing of both hip joints. An acute osseous abnormality of the right hip is not identified. There are surgical clips within the pelvis. The pelvic bones look intact. There are multilevel degenerative changes involving the lumbar spine.     Impression: Impression: 1.There is some narrowing of both hip joints. 2.Multilevel degenerative change lumbar spine. Electronically Signed: Virgil Leon MD  3/11/2025 3:19 PM EDT  Workstation ID: [id number]    XR Chest 1 View  Result Date: 3/11/2025  XR CHEST 1 VW Date of Exam: 3/11/2025 2:10 PM EDT Indication: Weak/Dizzy/AMS triage protocol Comparison: None available. Findings: Sternotomy wires are noted. The heart is not enlarged. There are some interstitial change within the left lung which are nonspecific. This could be seen with inflammatory infectious process, sequela to recent trauma, atelectasis or some underlying edema.  There are no pleural effusions. The right lung seems relatively clear.     Impression: Impression: There are some interstitial change within the left lung which are nonspecific and should be correlated with patient history Electronically Signed: Virgil Leon MD  3/11/2025 3:16 PM EDT  Workstation ID: [id number]    CT Head Without Contrast  Result Date: 3/11/2025  CT HEAD WO CONTRAST Date of Exam: 3/11/2025 2:43 PM EDT Indication: AMS, falls, weakness. Comparison: None available. Technique: Axial CT images were obtained of the head without contrast administration.  Automated exposure control and iterative construction methods were used. Findings: There is no evidence of hemorrhage. There is no mass effect or midline shift. Age-related  involutional changes are visualized. Vascular calcifications are noted. There is no extra-axial collections. Ventricles are normal in size and configuration for patient's stated age. Posterior fossa is within normal limits. Calvarium and skull base appear intact. Visualized sinuses show no air fluid levels. The mastoid air cells are clear. Visualized orbits are unremarkable.     Impression: Impression: No acute intracranial abnormality identified. Electronically Signed: Javier Osorio MD  3/11/2025 3:07 PM EDT  Workstation ID: [id number]      Results for orders placed during the hospital encounter of 02/15/24    Adult Transthoracic Echo Complete W/ Cont if Necessary Per Protocol    Interpretation Summary    Atrial fibrillation was the predominant rhythm observed during the procedure.    The study is technically difficult    Left ventricular wall thickness is consistent with mild concentric hypertrophy.    Left ventricular ejection fraction appears to be 51 - 55%.    Left ventricular diastolic function is consistent with (grade I) impaired relaxation.    The right ventricular cavity is borderline dilated.    The left atrial cavity is moderately dilated.  5.0 cm    The right atrial cavity is mildly  dilated.    No aortic valve regurgitation or stenosis is present    Mild mitral valve regurgitation with MAC is present.    Moderate tricuspid valve regurgitation is present.  RVSP- 56 mmHg    Borderline dilation of the aortic root is present.  3.8 cm      Current medications:  Scheduled Meds:[Held by provider] busPIRone, 5 mg, Oral, Daily With Lunch  [Held by provider] cetirizine, 10 mg, Oral, Daily  doxycycline, 100 mg, Intravenous, Q12H  ferric gluconate, 250 mg, Intravenous, Daily  heparin (porcine), 5,000 Units, Subcutaneous, Q12H  [Held by provider] isosorbide mononitrate, 30 mg, Oral, QAM  methylPREDNISolone sodium succinate, 40 mg, Intravenous, Q12H  [Held by provider] NIFEdipine XL, 30 mg, Oral,  Daily  piperacillin-tazobactam, 4.5 g, Intravenous, Q8H  [Held by provider] rivaroxaban, 20 mg, Oral, Daily  sodium chloride, 10 mL, Intravenous, Q12H      Continuous Infusions:sodium chloride, 100 mL/hr, Last Rate: 100 mL/hr (03/12/25 1218)      PRN Meds:.  senna-docusate sodium **AND** polyethylene glycol **AND** bisacodyl **AND** bisacodyl    dextrose    dextrose    glucagon (human recombinant)    HYDROmorphone    ipratropium-albuterol    melatonin    nitroglycerin    ondansetron    sodium chloride    sodium chloride    sodium chloride    traMADol    Assessment & Plan   Assessment & Plan     Active Hospital Problems    Diagnosis  POA    **Rhabdomyolysis [M62.82]  Yes      Resolved Hospital Problems   No resolved problems to display.        Brief Hospital Course to date:  Carlos Preston is a 89 y.o. male with a history of heart disease, hyperlipidemia, ischemic heart disease status post CABG, paroxysmal A-fib on Eliquis presented to the hospital with progressive weakness.  Found to have bilateral pneumonia and acute rhabdomyolysis    Acute hypoxia respiratory insufficiency  RSV infection  Superimposed left lower lobe bacterial pneumonia, unspecified  Bilateral basal lung fibrosis concerning for chronic dysphagia  CT chest with bilateral chronic fibrotic changes in both lung bases and developing left lower lobe pneumonia.  Patient is forced for RSV.  Hypoxic requiring 4 L nasal cannula  Continue IV Zosyn and azithromycin  IV Solu-Medrol 40 mg twice daily  DuoNebs, Mucinex and I-S  Follow pneumonia workup    Chronic dysphagia  History of esophageal stricture  Speech team consulted  GI team consulted    Acute rhabdomyolysis  Elevated troponin  Patient fell x 2 and was on the floor for 4 to 5 hours  CPK on admission 2500.  Trending down with IV fluids and currently at 1200  Continue IV fluids  Elevated troponin felt to be demand ischemia and secondary to rhabdomyolysis.  Patient with no chest pain or ischemic  changes in EKG.  Defer ischemic workup given his frailty and age       Severe iron deficiency anemia   Symptomatic anemia   Patient is anemic with hemoglobin of 7.6 on admission  Iron studies consistent with severe iron deficiency with iron saturation 4%  Will give him 1 unit of blood  Start IV iron infusion  No evidence of GI bleed    A-fib  Hx of CABG  HTN  On Xarelto at home. Family and patient aware of risk/benefits of holding Xarelto in the setting of concern for swallow safety. Heparin SC ordered  CT head no acute intracranial abnormality identified  ECHO: 02/2024: EF: 51-55%     Debility  PT/OT  Will likely need rehab          Expected Discharge Location and Transportation: IRF  Expected Discharge   Expected Discharge Date: 3/17/2025; Expected Discharge Time:      VTE Prophylaxis:  Pharmacologic & mechanical VTE prophylaxis orders are present.         AM-PAC 6 Clicks Score (PT): 13 (03/12/25 1605)    CODE STATUS:   Code Status and Medical Interventions: CPR (Attempt to Resuscitate); Full Support   Ordered at: 03/11/25 4814     Code Status (Patient has no pulse and is not breathing):    CPR (Attempt to Resuscitate)     Medical Interventions (Patient has pulse or is breathing):    Full Support     Level Of Support Discussed With:    Patient       Maksim Delgado MD  03/12/25

## 2025-03-12 NOTE — PLAN OF CARE
Goal Outcome Evaluation:  Plan of Care Reviewed With: patient, family        Progress: no change  Outcome Evaluation: PT IE complete. Pt presents below baseline with balance deficits, generalized weakness, cognitive impairments, and decreased functional endurance warranting IPPT. Pt req ModA for SPT w/ B UE support. PT rec IRF at d/c when medically appropriate, will continue to monitor progress.    Anticipated Discharge Disposition (PT): inpatient rehabilitation facility

## 2025-03-12 NOTE — CASE MANAGEMENT/SOCIAL WORK
Discharge Planning Assessment  [facility]     Patient Name: Carlos Preston  MRN: [id number]  Today's Date: 3/12/2025    Admit Date: 3/11/2025    Plan: Home with home health, private sitter   Discharge Needs Assessment       Row Name 03/12/25 1435       Living Environment    People in Home alone    Current Living Arrangements condominium    Primary Care Provided by self    Provides Primary Care For no one    Family Caregiver if Needed child(ruma), adult    Family Caregiver Names Bettye Quiñonesddtyrese, daughter    Quality of Family Relationships helpful;involved;supportive    Able to Return to Prior Arrangements yes       Transition Planning    Patient/Family Anticipates Transition to home with family;home;home with help/services;inpatient rehabilitation facility    Patient/Family Anticipated Services at Transition        Discharge Needs Assessment    Equipment Currently Used at Home walker, rolling;cane, straight;wheelchair;cpap;oxygen;nebulizer    Concerns to be Addressed denies needs/concerns at this time    Discharge Coordination/Progress Lives in a condo in [place] by himself, can call on his family for any assistance.  Has a cane, walker, wheelchair, cpap, oxygen and nebulizer machine at home.  Not current with home health.                   Discharge Plan       Row Name 03/12/25 1438       Plan    Plan Home with home health, private sitter    Patient/Family in Agreement with Plan yes    Plan Comments I spoke with Mr Muse's daughter Bettye at bedside as Mr Muse's was sleeping.  Mr Preston resides in a condo in [place] by himself, but can call on family if needed.  He has a cane, walker, wheelchair, cpap, oxygen and nebulizer machine at home.  He is not current with home health at this time.  The company that provides his cpap is [employer].  His PCP is Newton Hankins, and he gets his prescriptions filled at [facility] in [place].  Mr Muse's insurance is Medicare with [employer] as secondary,  and there have not been any issues or lapses in coverage.  His insurance does help with prescription costs.  At this time family would prefer to take Mr Luz home with home health if possible.  They also mentioned hiring a private sitter/obtaining a private contract with home health so that Mr Luz could get more PT.  Case management will continue to follow.    Final Discharge Disposition Code 01 - home or self-care                       Demographic Summary    No documentation.                  Functional Status       Row Name 03/12/25 1434       Functional Status    Usual Activity Tolerance moderate    Current Activity Tolerance moderate       Functional Status, IADL    Medications assistive equipment    Meal Preparation assistive equipment    Housekeeping assistive equipment    Laundry assistive equipment    Shopping assistive equipment       Mental Status    General Appearance WDL WDL                   Psychosocial    No documentation.                  Abuse/Neglect    No documentation.                  Legal    No documentation.                  Substance Abuse    No documentation.                  Patient Forms    No documentation.                     Agnes Hodge RN

## 2025-03-12 NOTE — PLAN OF CARE
Goal Outcome Evaluation:  Plan of Care Reviewed With: patient        Progress: no change  Outcome Evaluation: Patient arrived from [place] overnight. A&Ox4. Currently on 2L nasal cannula. Afib on tele. PRN dilaudid given x1 for pain. Normal Saline currently infusing. Patient failed bedside dysphagia screen, NPO for speech consult. Skin interventions in place. Continue plan of care.

## 2025-03-12 NOTE — H&P
[facility] Medicine Services  HISTORY AND PHYSICAL    Patient Name: Carlos Preston  : 3/20/1928  MRN: [id number]  Primary Care Physician: Newton Hankins MD  Date of admission: 3/11/2025      Subjective   Subjective     Chief Complaint:  Generalized weakness    HPI:  Carlos Preston is a 89 y.o. male with a history of heart disease, hyperlipidemia, ischemic heart disease status post CABG, paroxysmal A-fib on Eliquis presented to [place] emergency department due to worsening cough and weakness.  He did have a fall but denied hitting his head or losing consciousness.  On labs he was found to be in rhabdomyolysis with elevated troponins without chest pain shortness of breath and palpitations.    Patient seen at [facility] with family at bedside. He reports above history. +Coughing with difficulty swallowing.  He reports that he has had esophageal dilatation x 2 and thinks he may need to have another dilatation.  He also endorses laryngitis for the last few days.      Personal History     Past Medical History:   Diagnosis Date    Cancer     H/O prostatectomy     Hearing loss     Heart disease     History of back surgery     Hypercholesterolemia     Hyperlipidemia     Hypertension     IHD (ischemic heart disease)     S/P CABG    Osteoarthritis of spine with myelopathy, lumbar region     Paroxysmal atrial fibrillation 2018    Pulmonary hypertension     S/P knee replacement     Sinus disorder     thyroid ultrasound 2009    Normal           Past Surgical History:   Procedure Laterality Date    CARDIAC CATHETERIZATION  2007    Dr. Mace: 60% LM and 70% LCX.    CARDIAC CATHETERIZATION  2009    %, OM 85-90%,Patent grafts.    CARDIAC CATHETERIZATION  2018    Patent Grafts    CARDIOVASCULAR STRESS TEST  2009    [facility]: Dr. Mace- EF 29%, Diffuse ischemia.    CARDIOVASCULAR STRESS TEST  2012    L. Myoview- Inferoseptal infarct with jon infarct  ischemia. EF 41%.    CARDIOVASCULAR STRESS TEST  03/23/2015    L. Myoview- EF51%,fixed defect , no ischemia burden.    CARDIOVASCULAR STRESS TEST  10/11/2017    L.Myoview- EF 54%. Small inferior Ischemia.    CONVERTED (HISTORICAL) HOLTER  06/12/2023    3 days. A.Fib. Avg 60. . 3 VT. One 3 Sec Pause    CONVERTED (HISTORICAL) HOLTER  05/28/2024    3 Days. A.Fib. Avg 70. . 4.6% PVC. 2 VT    CORONARY ARTERY BYPASS GRAFT  05/17/2007    CABG:  SVG to LAD and LCX.    ECHO - CONVERTED  08/06/2009    Dr. Mace- EF 50%.    ECHO - CONVERTED  02/14/2011    EF 50%, Septal WMA.    ECHO - CONVERTED  11/07/2012    EF 40-45%, RVSP 33 mmHg.    ECHO - CONVERTED  03/23/2015    EF 45-50%, RVSP 40mmHg,mild MR, mild PHT    ECHO - CONVERTED  05/01/2017    EF 50-55%, mild MR    ECHO - CONVERTED  10/11/2017    EF 55%    ECHO - CONVERTED  03/21/2022    TLS. EF 55%. LA- 5.2 cm. Mild MR. RVSP- 44 mmHg    ECHO - CONVERTED  02/15/2024    TLS. EF 55%.RHE. LA- 5.0. Mild MR.AO- 3.8. RVSP- 56 mmHg    US CAROTID UNILATERAL  04/16/2007    Mild Plaque in Rt. bulb and RECA.       Family History: family history includes No Known Problems in his father and mother.     Social History:  reports that he quit smoking about 28 years ago. His smoking use included cigarettes. He started smoking about 81 years ago. He has a 52.7 pack-year smoking history. He has been exposed to tobacco smoke. He has never used smokeless tobacco. He reports that he does not drink alcohol and does not use drugs.  Social History     Social History Narrative    Not on file       Medications:  Available home medication information reviewed.  HYDROcodone-acetaminophen, NIFEdipine XL, Vitamin D3, albuterol sulfate HFA, azilsartan medoxomil, busPIRone, ferrous sulfate, ibuprofen, ipratropium, ipratropium-albuterol, isosorbide mononitrate, loratadine, multivitamin with minerals, nitroglycerin, omeprazole, rivaroxaban, and vitamin C    No Known Allergies    Objective   Objective      Vital Signs:   Temp:  [97.5 °F (36.4 °C)-98.5 °F (36.9 °C)] 98.4 °F (36.9 °C)  Heart Rate:  [78-99] 84  Resp:  [16-24] 16  BP: ()/(52-89) 129/64  Flow (L/min) (Oxygen Therapy):  [2] 2       Physical Exam   Constitutional: Elderly  male No acute distress, awake, alert  HENT: NCAT, mucous membranes dry, raspy voice  Respiratory: +Rhonchi, respiratory effort normal   Cardiovascular: Irregular rate irregular rhythm gallops  Gastrointestinal: Positive bowel sounds, soft, nontender, nondistended  Musculoskeletal: +1 edema bilaterally  Psychiatric: Appropriate affect, cooperative  Neurologic: Oriented x 3, speech clear  Skin: No rashes    Result Review:  I have personally reviewed the results from the time of this admission to 3/12/2025 00:39 EDT and agree with these findings:  [x]  Laboratory list / accordion  []  Microbiology  [x]  Radiology  []  EKG/Telemetry   []  Cardiology/Vascular   []  Pathology  []  Old records  []  Other:        LAB RESULTS:      Lab 03/11/25  1430   WBC 23.50*   HEMOGLOBIN 9.2*   HEMATOCRIT 29.6*   PLATELETS 103*   NEUTROS ABS 21.84*   IMMATURE GRANS (ABS) 0.07*   LYMPHS ABS 0.45*   MONOS ABS 1.13*   EOS ABS 0.00   MCV 92.8   PROCALCITONIN 1.17*   LACTATE 1.8         Lab 03/11/25  1430   SODIUM 143   POTASSIUM 3.8   CHLORIDE 108*   CO2 20.3*   ANION GAP 14.7   BUN 63*   CREATININE 1.42*   EGFR 45.2*   GLUCOSE 108*   CALCIUM 9.5   MAGNESIUM 2.3         Lab 03/11/25  1430   TOTAL PROTEIN 6.9   ALBUMIN 3.1*   GLOBULIN 3.8   ALT (SGPT) 59*   AST (SGOT) 172*   BILIRUBIN 1.3*   ALK PHOS 135*         Lab 03/11/25  1528 03/11/25  1430   PROBNP  --  29,350.0*   HSTROP T 123* 124*                 UA          3/11/2025    16:04   Urinalysis   Squamous Epithelial Cells, UA 0-2    Specific Gravity, UA >=1.030    Ketones, UA Trace    Blood, UA Moderate (2+)    Leukocytes, UA Negative    Nitrite, UA Negative    RBC, UA 3-5    WBC, UA 0-2    Bacteria, UA Trace        Microbiology Results  (last 10 days)       Procedure Component Value - Date/Time    COVID-19, FLU A/B, RSV PCR 1 HR TAT - Swab, Nasopharynx [[id number]]  (Abnormal) Collected: 03/11/25 1431    Lab Status: Final result Specimen: Swab from Nasopharynx Updated: 03/11/25 1518     COVID19 Not Detected     Influenza A PCR Not Detected     Influenza B PCR Not Detected     RSV, PCR Detected    Narrative:      Fact sheet for providers: https://www.fda.gov/media/182754/download    Fact sheet for patients: https://www.fda.gov/media/899723/download    Test performed by PCR.            CT Chest Without Contrast Diagnostic  Result Date: 3/11/2025  CT CHEST WO CONTRAST DIAGNOSTIC, CT ABDOMEN PELVIS WO CONTRAST Date of Exam: 3/11/2025 3:42 PM EDT Indication: cough, shortness of breath. Elevated liver enzymes Comparison: None available. Technique: Axial CT images were obtained of the chest, abdomen/pelvis without contrast administration.  Reconstructed coronal and sagittal images were also obtained. Automated exposure control and iterative construction methods were used. Findings: CT SCAN OF THE CHEST WITHOUT CONTRAST: Chronic appearing changes of fibrosis are seen in the lung periphery. There is moderate cylindrical bronchiectasis of the lower lobes, on the right with no obvious inflammation, on the left with evidence of active lingular and lower lobe bronchial inflammation and some patchy airspace disease consistent with active infection. The larger order airways appear normally patent. The small diameter peripheral airways in the left lower lobe appear partially opacified. There is no pleural effusion. Images of the mediastinum show shotty rounded preaortic lymph nodes, which may be reactive. Poststernotomy changes are noted. Bony structures appear to be intact.     Impression: 1. Chronic appearing peripheral lung changes of the lower lungs, consistent with fibrosis, and moderate cylindrical bronchiectasis. 2. Superimposed left lower lobe bronchitis  perhaps and suspect early changes of bronchopneumonia. Mild reactive mediastinal lymphadenopathy. CT SCAN OF THE ABDOMEN PELVIS WITHOUT CONTRAST: CT scan  film shows diffusely increased bowel gas in a pattern suggesting ileus. Axial images show moderate gaseous distention of nondependent large and small bowel loops again favoring ileus, without an obvious transition point. There is mild distention of the sigmoid and rectum, with what appears to be semisolid stool rather than formed stool, and very little evidence of stool elsewhere. Impaction is not suspected. No bowel wall edema or pneumatosis  is seen. Image detail of the liver is limited both due to patient motion and nonenhanced scan. No gross abnormalities are appreciated. Gallbladder is surgically absent. Common duct appears dilated to approximately 2 cm, somewhat greater than would be expected for  previous cholecystectomy. There is no obvious distal common duct stone, mass or other potential cause of obstruction. Pancreas is atrophic, and the low-density nodular appearance of the pancreatic tail, images 44 through 35 suggest diffuse ductal dilatation and side branch dilatation of unknown cause. No pancreatic mass or inflammation is apparent. Spleen, adrenal glands, and kidneys appear unremarkable except for a large benign-appearing water density exophytic cyst in the left renal midpole. No upper abdominal mass, adenopathy or acute inflammatory change is seen. Regarding the lower abdomen and pelvis, the bladder is moderately distended but normal in appearance. Prostate and seminal vesicles are not enlarged, actually small and difficult to delineate. No intrapelvic mass or inflammatory change is seen. Bony structures appear to be intact. There is advanced multilevel degenerative disc disease throughout the lumbar spine. Impression: 1. Previous cholecystectomy. Greater than expected dilatation of the common bile duct for cholecystectomy, up to  approximately 2 cm. No obvious cause of obstruction. 2. Atrophic pancreas, and what appears to be diffuse ductal dilatation and cystic change, at least of the pancreatic tail, and of uncertain etiology. No directly visible mass or inflammation. 3. Increased bowel gas in a pattern favoring mild ileus over obstruction. No visible bowel wall inflammation. Electronically Signed: Marlon Álvarez MD  3/11/2025 4:21 PM EDT  Workstation ID: [id number]    CT Abdomen Pelvis Without Contrast  Result Date: 3/11/2025  CT CHEST WO CONTRAST DIAGNOSTIC, CT ABDOMEN PELVIS WO CONTRAST Date of Exam: 3/11/2025 3:42 PM EDT Indication: cough, shortness of breath. Elevated liver enzymes Comparison: None available. Technique: Axial CT images were obtained of the chest, abdomen/pelvis without contrast administration.  Reconstructed coronal and sagittal images were also obtained. Automated exposure control and iterative construction methods were used. Findings: CT SCAN OF THE CHEST WITHOUT CONTRAST: Chronic appearing changes of fibrosis are seen in the lung periphery. There is moderate cylindrical bronchiectasis of the lower lobes, on the right with no obvious inflammation, on the left with evidence of active lingular and lower lobe bronchial inflammation and some patchy airspace disease consistent with active infection. The larger order airways appear normally patent. The small diameter peripheral airways in the left lower lobe appear partially opacified. There is no pleural effusion. Images of the mediastinum show shotty rounded preaortic lymph nodes, which may be reactive. Poststernotomy changes are noted. Bony structures appear to be intact.     Impression: 1. Chronic appearing peripheral lung changes of the lower lungs, consistent with fibrosis, and moderate cylindrical bronchiectasis. 2. Superimposed left lower lobe bronchitis perhaps and suspect early changes of bronchopneumonia. Mild reactive mediastinal lymphadenopathy. CT SCAN OF THE  ABDOMEN PELVIS WITHOUT CONTRAST: CT scan  film shows diffusely increased bowel gas in a pattern suggesting ileus. Axial images show moderate gaseous distention of nondependent large and small bowel loops again favoring ileus, without an obvious transition point. There is mild distention of the sigmoid and rectum, with what appears to be semisolid stool rather than formed stool, and very little evidence of stool elsewhere. Impaction is not suspected. No bowel wall edema or pneumatosis  is seen. Image detail of the liver is limited both due to patient motion and nonenhanced scan. No gross abnormalities are appreciated. Gallbladder is surgically absent. Common duct appears dilated to approximately 2 cm, somewhat greater than would be expected for  previous cholecystectomy. There is no obvious distal common duct stone, mass or other potential cause of obstruction. Pancreas is atrophic, and the low-density nodular appearance of the pancreatic tail, images 44 through 35 suggest diffuse ductal dilatation and side branch dilatation of unknown cause. No pancreatic mass or inflammation is apparent. Spleen, adrenal glands, and kidneys appear unremarkable except for a large benign-appearing water density exophytic cyst in the left renal midpole. No upper abdominal mass, adenopathy or acute inflammatory change is seen. Regarding the lower abdomen and pelvis, the bladder is moderately distended but normal in appearance. Prostate and seminal vesicles are not enlarged, actually small and difficult to delineate. No intrapelvic mass or inflammatory change is seen. Bony structures appear to be intact. There is advanced multilevel degenerative disc disease throughout the lumbar spine. Impression: 1. Previous cholecystectomy. Greater than expected dilatation of the common bile duct for cholecystectomy, up to approximately 2 cm. No obvious cause of obstruction. 2. Atrophic pancreas, and what appears to be diffuse ductal dilatation  and cystic change, at least of the pancreatic tail, and of uncertain etiology. No directly visible mass or inflammation. 3. Increased bowel gas in a pattern favoring mild ileus over obstruction. No visible bowel wall inflammation. Electronically Signed: Marlon Álvarez MD  3/11/2025 4:21 PM EDT  Workstation ID: [id number]    XR Hip With or Without Pelvis 2 - 3 View Right  Result Date: 3/11/2025  XR HIP W OR WO PELVIS 2-3 VIEW RIGHT Date of Exam: 3/11/2025 2:30 PM EDT Indication: Fall, hip pain Comparison: None available. Findings: There is narrowing of both hip joints. An acute osseous abnormality of the right hip is not identified. There are surgical clips within the pelvis. The pelvic bones look intact. There are multilevel degenerative changes involving the lumbar spine.     Impression: Impression: 1.There is some narrowing of both hip joints. 2.Multilevel degenerative change lumbar spine. Electronically Signed: Virgil Leon MD  3/11/2025 3:19 PM EDT  Workstation ID: [id number]    XR Chest 1 View  Result Date: 3/11/2025  XR CHEST 1 VW Date of Exam: 3/11/2025 2:10 PM EDT Indication: Weak/Dizzy/AMS triage protocol Comparison: None available. Findings: Sternotomy wires are noted. The heart is not enlarged. There are some interstitial change within the left lung which are nonspecific. This could be seen with inflammatory infectious process, sequela to recent trauma, atelectasis or some underlying edema.  There are no pleural effusions. The right lung seems relatively clear.     Impression: Impression: There are some interstitial change within the left lung which are nonspecific and should be correlated with patient history Electronically Signed: Virgil Leon MD  3/11/2025 3:16 PM EDT  Workstation ID: [id number]    CT Head Without Contrast  Result Date: 3/11/2025  CT HEAD WO CONTRAST Date of Exam: 3/11/2025 2:43 PM EDT Indication: AMS, falls, weakness. Comparison: None available. Technique: Axial CT images were obtained of the  head without contrast administration.  Automated exposure control and iterative construction methods were used. Findings: There is no evidence of hemorrhage. There is no mass effect or midline shift. Age-related involutional changes are visualized. Vascular calcifications are noted. There is no extra-axial collections. Ventricles are normal in size and configuration for patient's stated age. Posterior fossa is within normal limits. Calvarium and skull base appear intact. Visualized sinuses show no air fluid levels. The mastoid air cells are clear. Visualized orbits are unremarkable.     Impression: Impression: No acute intracranial abnormality identified. Electronically Signed: Javier Osorio MD  3/11/2025 3:07 PM EDT  Workstation ID: [id number]      Results for orders placed during the hospital encounter of 02/15/24    Adult Transthoracic Echo Complete W/ Cont if Necessary Per Protocol    Interpretation Summary    Atrial fibrillation was the predominant rhythm observed during the procedure.    The study is technically difficult    Left ventricular wall thickness is consistent with mild concentric hypertrophy.    Left ventricular ejection fraction appears to be 51 - 55%.    Left ventricular diastolic function is consistent with (grade I) impaired relaxation.    The right ventricular cavity is borderline dilated.    The left atrial cavity is moderately dilated.  5.0 cm    The right atrial cavity is mildly  dilated.    No aortic valve regurgitation or stenosis is present    Mild mitral valve regurgitation with MAC is present.    Moderate tricuspid valve regurgitation is present.  RVSP- 56 mmHg    Borderline dilation of the aortic root is present.  3.8 cm      Assessment & Plan   Assessment & Plan     Carlos Preston is a 89 y.o. male with a history of heart disease, hyperlipidemia, ischemic heart disease status post CABG, paroxysmal A-fib on Eliquis presented to [place] emergency department due to worsening cough and  weakness.  He did have a fall but denied hitting his head or losing consciousness.  On labs he was found to be in rhabdomyolysis with elevated troponins without chest pain shortness of breath and palpitations.    Rhabdomyolysis  Elevated troponins  Elevated Cr  -[facility] called cardiology who felt elevated troponins are due to demand ischemia   -no known hx of CKD  -continue with serial chemistries and IVF. Watch for fluid overload   -follow up repeat troponin in AM  -Patient denies CP, SOA    RSV  Superimposed LLL bronchitis, bronchopneumonia   Laryngitis  Concerning for aspiration PNA  -follow blood cultures  -[facility] ER gave 1 dose of azithromycin x 1 and ceftriaxone 2000 mg  -Concerning for aspiration pneumonia will change antibiotics to Unasyn  -strep pneumo Ag, legionella, and MRSA pending   -continuous pulse ox   -contact and droplet    Dysphagia with weight loss  Hx of esophageal dilation xs 2  -failed nursing screen  -SLP in AM   -NPO for now  -GI consult in AM     Chronic anemia  -unknown baseline. Data insufficient   -denies hematuria/hematochezia, melena    A-fib  Hx of CABG  HTN  -On Xarelto at home. Family and patient aware of risk/benefits of holding Xarelto in the setting of concern for swallow safety. Heparin SC ordered  -CT head no acute intracranial abnormality identified  -ECHO: 02/2024: EF: 51-55%    Debility  -PT/OT    VTE Prophylaxis:  Pharmacologic & mechanical VTE prophylaxis orders are present.          CODE STATUS:    Code Status and Medical Interventions: CPR (Attempt to Resuscitate); Full Support   Ordered at: 03/11/25 0509     Code Status (Patient has no pulse and is not breathing):    CPR (Attempt to Resuscitate)     Medical Interventions (Patient has pulse or is breathing):    Full Support     Level Of Support Discussed With:    Patient       Expected Discharge   Expected discharge date/ time has not been documented.     Nataly Renteria MD  03/12/25

## 2025-03-12 NOTE — PLAN OF CARE
Problem: Adult Inpatient Plan of Care  Goal: Plan of Care Review  Outcome: Progressing  Flowsheets (Taken 3/12/2025 1116)  Progress: no change  Plan of Care Reviewed With:   patient   grandchild(ruma)   Goal Outcome Evaluation:  Plan of Care Reviewed With: patient, grandchild(ruma)        Progress: no change       Anticipated Discharge Disposition (SLP): inpatient rehabilitation facility          SLP Swallowing Diagnosis: R/O pharyngeal dysphagia, suspected pharyngeal dysphagia, suspected esophageal dysphagia (03/12/25 1100)           SLP evaluation completed. Will address dysphagia. Please see note for further details and recommendations.

## 2025-03-12 NOTE — THERAPY EVALUATION
Patient Name: Carlos Preston  : 3/20/1928    MRN: [id number]                              Today's Date: 3/12/2025       Admit Date: 3/11/2025    Visit Dx:     ICD-10-CM ICD-9-CM   1. Pneumonia of both lungs due to infectious organism, unspecified part of lung  J18.9 483.8   2. Traumatic rhabdomyolysis, initial encounter  T79.6XXA 958.6   3. Elevated troponin  R79.89 790.6   4. RSV (acute bronchiolitis due to respiratory syncytial virus)  J21.0 466.11   5. Impaired mobility and ADLs  Z74.09 V49.89    Z78.9      Patient Active Problem List   Diagnosis    Pulmonary HTN    Hyperlipemia    HTN (hypertension)    GERD (gastroesophageal reflux disease)    Hx of CABG    IHD (ischemic heart disease)    SOB (shortness of breath)    Abnormal chest x-ray    Esophageal stricture    Rhabdomyolysis     Past Medical History:   Diagnosis Date    Cancer     H/O prostatectomy     Hearing loss     Heart disease     History of back surgery     Hypercholesterolemia     Hyperlipidemia     Hypertension     IHD (ischemic heart disease)     S/P CABG    Osteoarthritis of spine with myelopathy, lumbar region     Paroxysmal atrial fibrillation 2018    Pulmonary hypertension     S/P knee replacement     Sinus disorder     thyroid ultrasound 2009    Normal     Past Surgical History:   Procedure Laterality Date    CARDIAC CATHETERIZATION  2007    Dr. Mace: 60% LM and 70% LCX.    CARDIAC CATHETERIZATION  2009    %, OM 85-90%,Patent grafts.    CARDIAC CATHETERIZATION  2018    Patent Grafts    CARDIOVASCULAR STRESS TEST  2009    [facility]: Dr. Mace- EF 29%, Diffuse ischemia.    CARDIOVASCULAR STRESS TEST  2012    L. Myoview- Inferoseptal infarct with jon infarct ischemia. EF 41%.    CARDIOVASCULAR STRESS TEST  2015    [facility]- EF51%,fixed defect , no ischemia burden.    CARDIOVASCULAR STRESS TEST  10/11/2017    [facility]- EF 54%. Small inferior Ischemia.    CONVERTED (HISTORICAL)  HOLTER  06/12/2023    3 days. A.Fib. Avg 60. . 3 VT. One 3 Sec Pause    CONVERTED (HISTORICAL) HOLTER  05/28/2024    3 Days. A.Fib. Avg 70. . 4.6% PVC. 2 VT    CORONARY ARTERY BYPASS GRAFT  05/17/2007    CABG:  SVG to LAD and LCX.    ECHO - CONVERTED  08/06/2009    Dr. Mace- EF 50%.    ECHO - CONVERTED  02/14/2011    EF 50%, Septal WMA.    ECHO - CONVERTED  11/07/2012    EF 40-45%, RVSP 33 mmHg.    ECHO - CONVERTED  03/23/2015    EF 45-50%, RVSP 40mmHg,mild MR, mild PHT    ECHO - CONVERTED  05/01/2017    EF 50-55%, mild MR    ECHO - CONVERTED  10/11/2017    EF 55%    ECHO - CONVERTED  03/21/2022    TLS. EF 55%. LA- 5.2 cm. Mild MR. RVSP- 44 mmHg    ECHO - CONVERTED  02/15/2024    TLS. EF 55%.RHE. LA- 5.0. Mild MR.AO- 3.8. RVSP- 56 mmHg    US CAROTID UNILATERAL  04/16/2007    Mild Plaque in Rt. bulb and RECA.      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  -KE     Existing Precautions/Restrictions fall;oxygen therapy device and L/min;other (see comments)  droplet/contact precautions  -KE     Barriers to Rehab medically complex  -KE       Row Name 03/12/25 5792          Living Environment    Current Living Arrangements condominium  -KE     People in Home alone  -KE       Row Name 03/12/25 1550          Home Main Entrance    Number of Stairs, Main Entrance other (see comments)  ramp  -KE     Stair Railings, Main Entrance none  -KE       Row Name 03/12/25 1420          Stairs Within Home, Primary    Number of Stairs, Within Home, Primary none  -KE       Row Name  03/12/25 1554          Cognition    Orientation Status (Cognition) oriented to;person;disoriented to;place;time  -KE       Row Name 03/12/25 1554          Safety Issues/Impairments Affecting Functional Mobility    Safety Issues Affecting Function (Mobility) awareness of need for assistance;safety precaution awareness;safety precautions follow-through/compliance;insight into deficits/self-awareness;sequencing abilities;problem-solving  -KE     Impairments Affecting Function (Mobility) balance;cognition;coordination;endurance/activity tolerance;shortness of breath;strength;postural/trunk control;motor planning  -KE     Cognitive Impairments, Mobility Safety/Performance awareness, need for assistance;safety precaution awareness;safety precaution follow-through;insight into deficits/self-awareness;problem-solving/reasoning  -KE               User Key  (r) = Recorded By, (t) = Taken By, (c) = Cosigned By      Initials Name Provider Type    Flory Begum PT Physical Therapist                   Mobility       Row Name 03/12/25 1557          Bed Mobility    Bed Mobility supine-sit  -KE     Supine-Sit [place] (Bed Mobility) moderate assist (50% patient effort);verbal cues;1 person assist  -KE     Assistive Device (Bed Mobility) bed rails;head of bed elevated;repositioning sheet  -KE     Comment, (Bed Mobility) VCs for sequencing, increased time and effort  -KE       Row Name 03/12/25 1557          Bed-Chair Transfer    Bed-Chair [place] (Transfers) moderate assist (50% patient effort);1 person assist  -KE     Assistive Device (Bed-Chair Transfers) other (see comments)  B UE support  -KE       Row Name 03/12/25 1557          Sit-Stand Transfer    Sit-Stand [place] (Transfers) minimum assist (75% patient effort);1 person assist  -KE     Assistive Device (Sit-Stand Transfers) other (see comments)  B UE support  -KE               User Key  (r) = Recorded By, (t) = Taken By, (c) = Cosigned By      Initials  Name Provider Type    Flory Begum, PT Physical Therapist                   Obj/Interventions       Row Name 03/12/25 1557          Range of Motion Comprehensive    General Range of Motion bilateral lower extremity ROM WFL  -KE       Row Name 03/12/25 1557          Strength Comprehensive (MMT)    General Manual Muscle Testing (MMT) Assessment lower extremity strength deficits identified  -KE     Comment, General Manual Muscle Testing (MMT) Assessment B LEs grossly 4-/5  -KE       Row Name 03/12/25 1557          Balance    Balance Assessment sitting static balance;sitting dynamic balance;standing static balance;standing dynamic balance  -KE     Static Sitting Balance contact guard  -KE     Dynamic Sitting Balance minimal assist  -KE     Position, Sitting Balance sitting edge of bed  -KE     Static Standing Balance minimal assist;2-person assist  -KE     Dynamic Standing Balance moderate assist;1-person assist  -KE     Position/Device Used, Standing Balance supported;other (see comments)  BUE support  -KE     Balance Interventions sitting;sit to stand;standing;supported;static;dynamic  -KE     Comment, Balance mild posterior lean  -KE       Row Name 03/12/25 1557          Sensory Assessment (Somatosensory)    Sensory Assessment (Somatosensory) LE sensation intact  -               User Key  (r) = Recorded By, (t) = Taken By, (c) = Cosigned By      Initials Name Provider Type    Flory Begum, PT Physical Therapist                   Goals/Plan       Row Name 03/12/25 1604          Bed Mobility Goal 1 (PT)    Activity/Assistive Device (Bed Mobility Goal 1, PT) sit to supine/supine to sit  -KE     [place] Level/Cues Needed (Bed Mobility Goal 1, PT) contact guard required  -WHITNEY     Time Frame (Bed Mobility Goal 1, PT) short term goal (STG);5 days  -KE     Progress/Outcomes (Bed Mobility Goal 1, PT) new goal  -       Row Name 03/12/25 1604          Transfer Goal 1 (PT)    Activity/Assistive Device  "(Transfer Goal 1, PT) sit-to-stand/stand-to-sit;bed-to-chair/chair-to-bed  -KE     [place] Level/Cues Needed (Transfer Goal 1, PT) standby assist  -KE     Time Frame (Transfer Goal 1, PT) long term goal (LTG);10 days  -KE     Progress/Outcome (Transfer Goal 1, PT) new [facility]  -       Row Name 03/12/25 1609          Gait Training Goal 1 (PT)    Activity/Assistive Device (Gait Training Goal 1, PT) gait (walking locomotion);assistive device use;improve balance and speed  -KE     [place] Level (Gait Training Goal 1, PT) contact guard required  -KE     Distance (Gait Training Goal 1, PT) 100  -KE     Time Frame (Gait Training Goal 1, PT) long term goal (LTG);10 days  -KE     Progress/Outcome (Gait Training Goal 1, PT) new [facility]  -       Row Name 03/12/25 1116          Therapy Assessment/Plan (PT)    Planned Therapy Interventions (PT) home exercise program;balance training;bed mobility training;gait training;neuromuscular re-education;patient/family education;postural re-education;transfer training;stretching;strengthening;stair training;ROM (range of motion)  -KE               User Key  (r) = Recorded By, (t) = Taken By, (c) = Cosigned By      Initials Name Provider Type    Flory Begum, PT Physical Therapist                   Clinical Impression       Row Name 03/12/25 0023          Pain    Pretreatment Pain Rating 5/10  -KE     Posttreatment Pain Rating 5/10  -KE     Pain Location other (see comments)  \"everywhere\"  -KE     Pain Side/Orientation generalized  -KE     Pain Management Interventions exercise or physical activity utilized;positioning techniques utilized  -KE     Response to Pain Interventions activity participation with tolerable pain  -KE       Row Name 03/12/25 5700          Plan of Care Review    Plan of Care Reviewed With patient;family  -KE     Progress no change  -KE     Outcome Evaluation PT IE complete. Pt presents below baseline with balance deficits, generalized weakness, " cognitive impairments, and decreased functional endurance warranting IPPT. Pt req ModA for SPT w/ B UE support. PT rec IRF at d/c when medically appropriate, will continue to monitor progress.  -       Row Name 03/12/25 9066          Therapy Assessment/Plan (PT)    Patient/Family Therapy Goals Statement (PT) return to PLOF  -KE     Rehab Potential (PT) fair  -KE     Criteria for Skilled Interventions Met (PT) yes;meets criteria;skilled treatment is necessary  -KE     Therapy Frequency (PT) daily  -KE     Predicted Duration of Therapy Intervention (PT) 10 days  -KE       Row Name 03/12/25 9599          Vital Signs    Post Systolic BP Rehab 115  -KE     Post Treatment Diastolic BP 88  -KE     Pretreatment Heart Rate (beats/min) 90  -KE     Posttreatment Heart Rate (beats/min) 91  -KE     Pre SpO2 (%) 95  -KE     O2 Delivery Pre Treatment supplemental O2  -KE     O2 Delivery Intra Treatment supplemental O2  -KE     Post SpO2 (%) 93  -KE     O2 Delivery Post Treatment supplemental O2  -KE     Pre Patient Position Supine  -KE     Intra Patient Position Standing  -KE     Post Patient Position Sitting  -KE       Row Name 03/12/25 3881          Positioning and Restraints    Pre-Treatment Position in bed  -KE     Post Treatment Position chair  -KE     In Chair notified nsg;reclined;waffle cushion;on mechanical lift sling;call light within reach;encouraged to call for assist;exit alarm on;with family/caregiver;legs elevated  -KE               User Key  (r) = Recorded By, (t) = Taken By, (c) = Cosigned By      Initials Name Provider Type    Flory Begum, PT Physical Therapist                   Outcome Measures       Row Name 03/12/25 160          How much help from another person do you currently need...    Turning from your back to your side while in flat bed without using bedrails? 3  -KE     Moving from lying on back to sitting on the side of a flat bed without bedrails? 2  -KE     Moving to and from a bed to a  chair (including a wheelchair)? 2  -KE     Standing up from a chair using your arms (e.g., wheelchair, bedside chair)? 3  -KE     Climbing 3-5 steps with a railing? 1  -KE     To walk in hospital room? 2  -KE     AM-PAC 6 Clicks Score (PT) 13  -KE     Highest Level of Mobility Goal 4 --> Transfer to chair/commode  -KE       Row Name 03/12/25 1605 03/12/25 0853       Functional Assessment    Outcome Measure Options AM-PAC 6 Clicks Basic Mobility (PT)  -KE AM-PAC 6 Clicks Daily Activity (OT)  -SD              User Key  (r) = Recorded By, (t) = Taken By, (c) = Cosigned By      Initials Name Provider Type    Gianna Toro, OT Occupational Therapist    Flory Begum, PT Physical Therapist                                 Physical Therapy Education       Title: PT OT SLP Therapies (In Progress)       Topic: Physical Therapy (In Progress)       Point: Mobility training (In Progress)       Learning Progress Summary            Patient Acceptance, E, NR by WHITNEY at 3/12/2025 1606                      Point: Home exercise program (In Progress)       Learning Progress Summary            Patient Acceptance, E, NR by WHITNEY at 3/12/2025 1606                      Point: Body mechanics (In Progress)       Learning Progress Summary            Patient Acceptance, E, NR by WHITNEY at 3/12/2025 1606                      Point: Precautions (In Progress)       Learning Progress Summary            Patient Acceptance, E, NR by WHITNEY at 3/12/2025 1606                                      User Key       Initials Effective Dates Name Provider Type Discipline    WHITNEY 11/16/23 -  Flory Little, PT Physical Therapist PT                  PT Recommendation and Plan  Planned Therapy Interventions (PT): home exercise program, balance training, bed mobility training, gait training, neuromuscular re-education, patient/family education, postural re-education, transfer training, stretching, strengthening, stair training, ROM (range of  motion)  Progress: no change  Outcome Evaluation: PT IE complete. Pt presents below baseline with balance deficits, generalized weakness, cognitive impairments, and decreased functional endurance warranting IPPT. Pt req ModA for SPT w/ B UE support. PT rec IRF at d/c when medically appropriate, will continue to monitor progress.     Time Calculation:   PT Evaluation Complexity  History, PT Evaluation Complexity: 1-2 personal factors and/or comorbidities  Examination of Body Systems (PT Eval Complexity): 1-2 elements  Clinical Presentation (PT Evaluation Complexity): stable  Clinical Decision Making (PT Evaluation Complexity): low complexity  Overall Complexity (PT Evaluation Complexity): low complexity     PT Charges       Row Name 03/12/25 1606             Time Calculation    Start Time 1123  -KE      PT Received On 03/12/25  -KE      PT Goal Re-Cert Due Date 03/22/25  -KE         Untimed Charges    PT Eval/Re-eval Minutes 49  -KE         Total Minutes    Untimed Charges Total Minutes 49  -KE       Total Minutes 49  -KE                User Key  (r) = Recorded By, (t) = Taken By, (c) = Cosigned By      Initials Name Provider Type    Flory Begum PT Physical Therapist                  Therapy Charges for Today       Code Description Service Date Service Provider Modifiers Qty    [id number] HC PT EVAL LOW COMPLEXITY 4 3/12/2025 Flory Little, PT GP 1            PT G-Codes  Outcome Measure Options: AM-PAC 6 Clicks Basic Mobility (PT)  AM-PAC 6 Clicks Score (PT): 13  AM-PAC 6 Clicks Score (OT): 11  PT Discharge Summary  Anticipated Discharge Disposition (PT): inpatient rehabilitation facility    Flory Little PT  3/12/2025

## 2025-03-13 ENCOUNTER — APPOINTMENT (OUTPATIENT)
Dept: GENERAL RADIOLOGY | Facility: HOSPITAL | Age: OVER 89
End: 2025-03-13
Payer: MEDICARE

## 2025-03-13 LAB
ALBUMIN SERPL-MCNC: 2.2 G/DL (ref 3.5–5.2)
ALBUMIN SERPL-MCNC: 2.5 G/DL (ref 3.5–5.2)
ALBUMIN SERPL-MCNC: 2.5 G/DL (ref 3.5–5.2)
ALBUMIN SERPL-MCNC: 2.6 G/DL (ref 3.5–5.2)
ALBUMIN SERPL-MCNC: 2.6 G/DL (ref 3.5–5.2)
ALBUMIN/GLOB SERPL: 0.6 G/DL
ALBUMIN/GLOB SERPL: 0.7 G/DL
ALBUMIN/GLOB SERPL: 0.7 G/DL
ALBUMIN/GLOB SERPL: 0.8 G/DL
ALBUMIN/GLOB SERPL: 0.9 G/DL
ALP SERPL-CCNC: 109 U/L (ref 39–117)
ALP SERPL-CCNC: 110 U/L (ref 39–117)
ALP SERPL-CCNC: 117 U/L (ref 39–117)
ALP SERPL-CCNC: 118 U/L (ref 39–117)
ALP SERPL-CCNC: 118 U/L (ref 39–117)
ALT SERPL W P-5'-P-CCNC: 42 U/L (ref 1–41)
ALT SERPL W P-5'-P-CCNC: 42 U/L (ref 1–41)
ALT SERPL W P-5'-P-CCNC: 43 U/L (ref 1–41)
ALT SERPL W P-5'-P-CCNC: 45 U/L (ref 1–41)
ALT SERPL W P-5'-P-CCNC: 45 U/L (ref 1–41)
ANION GAP SERPL CALCULATED.3IONS-SCNC: 13 MMOL/L (ref 5–15)
ANION GAP SERPL CALCULATED.3IONS-SCNC: 13 MMOL/L (ref 5–15)
ANION GAP SERPL CALCULATED.3IONS-SCNC: 14 MMOL/L (ref 5–15)
ANION GAP SERPL CALCULATED.3IONS-SCNC: 16 MMOL/L (ref 5–15)
ANION GAP SERPL CALCULATED.3IONS-SCNC: 16 MMOL/L (ref 5–15)
AST SERPL-CCNC: 58 U/L (ref 1–40)
AST SERPL-CCNC: 58 U/L (ref 1–40)
AST SERPL-CCNC: 64 U/L (ref 1–40)
AST SERPL-CCNC: 74 U/L (ref 1–40)
AST SERPL-CCNC: 75 U/L (ref 1–40)
BASOPHILS # BLD AUTO: 0.01 10*3/MM3 (ref 0–0.2)
BASOPHILS NFR BLD AUTO: 0.1 % (ref 0–1.5)
BH BB BLOOD EXPIRATION DATE: NORMAL
BH BB BLOOD TYPE BARCODE: 5100
BH BB DISPENSE STATUS: NORMAL
BH BB PRODUCT CODE: NORMAL
BH BB UNIT NUMBER: NORMAL
BILIRUB SERPL-MCNC: 1 MG/DL (ref 0–1.2)
BILIRUB SERPL-MCNC: 1.1 MG/DL (ref 0–1.2)
BUN SERPL-MCNC: 59 MG/DL (ref 8–23)
BUN SERPL-MCNC: 60 MG/DL (ref 8–23)
BUN SERPL-MCNC: 61 MG/DL (ref 8–23)
BUN SERPL-MCNC: 61 MG/DL (ref 8–23)
BUN SERPL-MCNC: 62 MG/DL (ref 8–23)
BUN/CREAT SERPL: 52.6 (ref 7–25)
BUN/CREAT SERPL: 54.6 (ref 7–25)
BUN/CREAT SERPL: 57 (ref 7–25)
BUN/CREAT SERPL: 61.4 (ref 7–25)
BUN/CREAT SERPL: 66.3 (ref 7–25)
CALCIUM SPEC-SCNC: 8.2 MG/DL (ref 8.2–9.6)
CALCIUM SPEC-SCNC: 8.7 MG/DL (ref 8.2–9.6)
CALCIUM SPEC-SCNC: 8.9 MG/DL (ref 8.2–9.6)
CALCIUM SPEC-SCNC: 9 MG/DL (ref 8.2–9.6)
CALCIUM SPEC-SCNC: 9.1 MG/DL (ref 8.2–9.6)
CHLORIDE SERPL-SCNC: 114 MMOL/L (ref 98–107)
CHLORIDE SERPL-SCNC: 114 MMOL/L (ref 98–107)
CHLORIDE SERPL-SCNC: 115 MMOL/L (ref 98–107)
CO2 SERPL-SCNC: 14 MMOL/L (ref 22–29)
CO2 SERPL-SCNC: 15 MMOL/L (ref 22–29)
CO2 SERPL-SCNC: 16 MMOL/L (ref 22–29)
CO2 SERPL-SCNC: 19 MMOL/L (ref 22–29)
CO2 SERPL-SCNC: 19 MMOL/L (ref 22–29)
CREAT SERPL-MCNC: 0.92 MG/DL (ref 0.76–1.27)
CREAT SERPL-MCNC: 1.01 MG/DL (ref 0.76–1.27)
CREAT SERPL-MCNC: 1.07 MG/DL (ref 0.76–1.27)
CREAT SERPL-MCNC: 1.08 MG/DL (ref 0.76–1.27)
CREAT SERPL-MCNC: 1.14 MG/DL (ref 0.76–1.27)
CROSSMATCH INTERPRETATION: NORMAL
CRP SERPL-MCNC: 12.39 MG/DL (ref 0–0.5)
DEPRECATED RDW RBC AUTO: 55.5 FL (ref 37–54)
EGFRCR SERPLBLD CKD-EPI 2021: 58.9 ML/MIN/1.73
EGFRCR SERPLBLD CKD-EPI 2021: 62.8 ML/MIN/1.73
EGFRCR SERPLBLD CKD-EPI 2021: 63.5 ML/MIN/1.73
EGFRCR SERPLBLD CKD-EPI 2021: 68.1 ML/MIN/1.73
EGFRCR SERPLBLD CKD-EPI 2021: 76.1 ML/MIN/1.73
EOSINOPHIL # BLD AUTO: 0 10*3/MM3 (ref 0–0.4)
EOSINOPHIL NFR BLD AUTO: 0 % (ref 0.3–6.2)
ERYTHROCYTE [DISTWIDTH] IN BLOOD BY AUTOMATED COUNT: 16.1 % (ref 12.3–15.4)
GLOBULIN UR ELPH-MCNC: 3 GM/DL
GLOBULIN UR ELPH-MCNC: 3.2 GM/DL
GLOBULIN UR ELPH-MCNC: 3.5 GM/DL
GLOBULIN UR ELPH-MCNC: 3.6 GM/DL
GLOBULIN UR ELPH-MCNC: 3.6 GM/DL
GLUCOSE BLDC GLUCOMTR-MCNC: 110 MG/DL (ref 70–130)
GLUCOSE BLDC GLUCOMTR-MCNC: 115 MG/DL (ref 70–130)
GLUCOSE BLDC GLUCOMTR-MCNC: 132 MG/DL (ref 70–130)
GLUCOSE BLDC GLUCOMTR-MCNC: 145 MG/DL (ref 70–130)
GLUCOSE SERPL-MCNC: 116 MG/DL (ref 65–99)
GLUCOSE SERPL-MCNC: 118 MG/DL (ref 65–99)
GLUCOSE SERPL-MCNC: 132 MG/DL (ref 65–99)
GLUCOSE SERPL-MCNC: 134 MG/DL (ref 65–99)
GLUCOSE SERPL-MCNC: 139 MG/DL (ref 65–99)
HCT VFR BLD AUTO: 30.1 % (ref 37.5–51)
HCT VFR BLD AUTO: 32 % (ref 37.5–51)
HCT VFR BLD AUTO: 34 % (ref 37.5–51)
HCT VFR BLD AUTO: 36.1 % (ref 37.5–51)
HGB BLD-MCNC: 10.2 G/DL (ref 13–17.7)
HGB BLD-MCNC: 10.9 G/DL (ref 13–17.7)
HGB BLD-MCNC: 9 G/DL (ref 13–17.7)
HGB BLD-MCNC: 9.7 G/DL (ref 13–17.7)
IMM GRANULOCYTES # BLD AUTO: 0.06 10*3/MM3 (ref 0–0.05)
IMM GRANULOCYTES NFR BLD AUTO: 0.7 % (ref 0–0.5)
L PNEUMO1 AG UR QL IA: NEGATIVE
LYMPHOCYTES # BLD AUTO: 0.34 10*3/MM3 (ref 0.7–3.1)
LYMPHOCYTES NFR BLD AUTO: 3.7 % (ref 19.6–45.3)
MAGNESIUM SERPL-MCNC: 2.4 MG/DL (ref 1.7–2.3)
MCH RBC QN AUTO: 28.8 PG (ref 26.6–33)
MCHC RBC AUTO-ENTMCNC: 30.3 G/DL (ref 31.5–35.7)
MCV RBC AUTO: 95 FL (ref 79–97)
MONOCYTES # BLD AUTO: 0.2 10*3/MM3 (ref 0.1–0.9)
MONOCYTES NFR BLD AUTO: 2.2 % (ref 5–12)
NEUTROPHILS NFR BLD AUTO: 8.52 10*3/MM3 (ref 1.7–7)
NEUTROPHILS NFR BLD AUTO: 93.3 % (ref 42.7–76)
NRBC BLD AUTO-RTO: 0.2 /100 WBC (ref 0–0.2)
PHOSPHATE SERPL-MCNC: 3.4 MG/DL (ref 2.5–4.5)
PLATELET # BLD AUTO: 120 10*3/MM3 (ref 140–450)
PMV BLD AUTO: 12.7 FL (ref 6–12)
POTASSIUM SERPL-SCNC: 3.6 MMOL/L (ref 3.5–5.2)
POTASSIUM SERPL-SCNC: 3.7 MMOL/L (ref 3.5–5.2)
POTASSIUM SERPL-SCNC: 3.8 MMOL/L (ref 3.5–5.2)
PROCALCITONIN SERPL-MCNC: 0.73 NG/ML (ref 0–0.25)
PROT SERPL-MCNC: 5.6 G/DL (ref 6–8.5)
PROT SERPL-MCNC: 5.7 G/DL (ref 6–8.5)
PROT SERPL-MCNC: 5.7 G/DL (ref 6–8.5)
PROT SERPL-MCNC: 6.1 G/DL (ref 6–8.5)
PROT SERPL-MCNC: 6.2 G/DL (ref 6–8.5)
RBC # BLD AUTO: 3.37 10*6/MM3 (ref 4.14–5.8)
S PNEUM AG SPEC QL LA: NEGATIVE
SODIUM SERPL-SCNC: 144 MMOL/L (ref 136–145)
SODIUM SERPL-SCNC: 145 MMOL/L (ref 136–145)
SODIUM SERPL-SCNC: 146 MMOL/L (ref 136–145)
SODIUM SERPL-SCNC: 146 MMOL/L (ref 136–145)
SODIUM SERPL-SCNC: 147 MMOL/L (ref 136–145)
UNIT  ABO: NORMAL
UNIT  RH: NORMAL
WBC NRBC COR # BLD AUTO: 9.13 10*3/MM3 (ref 3.4–10.8)

## 2025-03-13 PROCEDURE — 99232 SBSQ HOSP IP/OBS MODERATE 35: CPT | Performed by: INTERNAL MEDICINE

## 2025-03-13 PROCEDURE — 25010000002 METHYLPREDNISOLONE PER 40 MG: Performed by: INTERNAL MEDICINE

## 2025-03-13 PROCEDURE — 82948 REAGENT STRIP/BLOOD GLUCOSE: CPT

## 2025-03-13 PROCEDURE — 25810000003 SODIUM CHLORIDE 0.9 % SOLUTION 250 ML FLEX CONT: Performed by: INTERNAL MEDICINE

## 2025-03-13 PROCEDURE — 74018 RADEX ABDOMEN 1 VIEW: CPT

## 2025-03-13 PROCEDURE — 25010000002 NA FERRIC GLUC CPLX PER 12.5 MG: Performed by: INTERNAL MEDICINE

## 2025-03-13 PROCEDURE — 92610 EVALUATE SWALLOWING FUNCTION: CPT

## 2025-03-13 PROCEDURE — 85025 COMPLETE CBC W/AUTO DIFF WBC: CPT | Performed by: INTERNAL MEDICINE

## 2025-03-13 PROCEDURE — 25010000002 HEPARIN (PORCINE) PER 1000 UNITS: Performed by: FAMILY MEDICINE

## 2025-03-13 PROCEDURE — 85014 HEMATOCRIT: CPT | Performed by: INTERNAL MEDICINE

## 2025-03-13 PROCEDURE — 83735 ASSAY OF MAGNESIUM: CPT | Performed by: INTERNAL MEDICINE

## 2025-03-13 PROCEDURE — 85018 HEMOGLOBIN: CPT | Performed by: INTERNAL MEDICINE

## 2025-03-13 PROCEDURE — 25010000002 PIPERACILLIN SOD-TAZOBACTAM PER 1 G: Performed by: INTERNAL MEDICINE

## 2025-03-13 PROCEDURE — 84145 PROCALCITONIN (PCT): CPT

## 2025-03-13 PROCEDURE — 99232 SBSQ HOSP IP/OBS MODERATE 35: CPT | Performed by: PHYSICIAN ASSISTANT

## 2025-03-13 PROCEDURE — 80053 COMPREHEN METABOLIC PANEL: CPT | Performed by: FAMILY MEDICINE

## 2025-03-13 PROCEDURE — 86140 C-REACTIVE PROTEIN: CPT | Performed by: INTERNAL MEDICINE

## 2025-03-13 PROCEDURE — 25010000002 ENOXAPARIN PER 10 MG: Performed by: INTERNAL MEDICINE

## 2025-03-13 PROCEDURE — 84100 ASSAY OF PHOSPHORUS: CPT | Performed by: INTERNAL MEDICINE

## 2025-03-13 RX ORDER — ENOXAPARIN SODIUM 100 MG/ML
40 INJECTION SUBCUTANEOUS NIGHTLY
Status: DISCONTINUED | OUTPATIENT
Start: 2025-03-13 | End: 2025-03-14

## 2025-03-13 RX ADMIN — METHYLPREDNISOLONE SODIUM SUCCINATE 40 MG: 40 INJECTION INTRAMUSCULAR; INTRAVENOUS at 22:16

## 2025-03-13 RX ADMIN — ENOXAPARIN SODIUM 40 MG: 100 INJECTION SUBCUTANEOUS at 22:16

## 2025-03-13 RX ADMIN — Medication 10 ML: at 08:25

## 2025-03-13 RX ADMIN — PIPERACILLIN AND TAZOBACTAM 4.5 G: 4; .5 INJECTION, POWDER, FOR SOLUTION INTRAVENOUS at 06:04

## 2025-03-13 RX ADMIN — DOXYCYCLINE 100 MG: 100 INJECTION, POWDER, LYOPHILIZED, FOR SOLUTION INTRAVENOUS at 22:15

## 2025-03-13 RX ADMIN — PIPERACILLIN AND TAZOBACTAM 4.5 G: 4; .5 INJECTION, POWDER, FOR SOLUTION INTRAVENOUS at 22:22

## 2025-03-13 RX ADMIN — DOXYCYCLINE 100 MG: 100 INJECTION, POWDER, LYOPHILIZED, FOR SOLUTION INTRAVENOUS at 08:24

## 2025-03-13 RX ADMIN — METHYLPREDNISOLONE SODIUM SUCCINATE 40 MG: 40 INJECTION INTRAMUSCULAR; INTRAVENOUS at 11:50

## 2025-03-13 RX ADMIN — SODIUM CHLORIDE 250 MG: 9 INJECTION, SOLUTION INTRAVENOUS at 10:16

## 2025-03-13 RX ADMIN — HEPARIN SODIUM 5000 UNITS: 5000 INJECTION INTRAVENOUS; SUBCUTANEOUS at 08:24

## 2025-03-13 RX ADMIN — Medication 10 ML: at 22:23

## 2025-03-13 RX ADMIN — PIPERACILLIN AND TAZOBACTAM 4.5 G: 4; .5 INJECTION, POWDER, FOR SOLUTION INTRAVENOUS at 14:07

## 2025-03-13 NOTE — PLAN OF CARE
Goal Outcome Evaluation:  Plan of Care Reviewed With: patient        Progress: improving  Outcome Evaluation: Patient is more alert this morning. Remains A&Ox4. Stands at side of bed with x2 assist. No complaints of pain overnight.

## 2025-03-13 NOTE — PROGRESS NOTES
"GI Daily Progress Note  Subjective:    Chief Complaint:  Follow up dysphagia and abnormal CT abdomen     Mj is more awake today.    Has productive cough and generalized weakness.    Daughter present at the bedside.        Objective:    /85 (BP Location: Right arm, Patient Position: Lying)   Pulse 90   Temp 98.3 °F (36.8 °C) (Axillary)   Resp 18   Ht 182.9 cm (72\")   Wt 83.9 kg (185 lb)   SpO2 100%   BMI 25.09 kg/m²     Physical Exam  Constitutional:       Appearance: He is ill-appearing.   Cardiovascular:      Rate and Rhythm: Regular rhythm. Tachycardia present.   Pulmonary:      Comments: Mild tachypnea   Abdominal:      General: Bowel sounds are normal.      Palpations: Abdomen is soft.      Tenderness: There is no abdominal tenderness.   Neurological:      Mental Status: He is alert.       Lab  Lab Results   Component Value Date    WBC 9.13 03/13/2025    HGB 9.7 (L) 03/13/2025    HGB 9.0 (L) 03/13/2025    HGB 8.3 (L) 03/12/2025    MCV 95.0 03/13/2025     (L) 03/13/2025     Lab Results   Component Value Date    GLUCOSE 134 (H) 03/13/2025    BUN 62 (H) 03/13/2025    CREATININE 1.01 03/13/2025    BCR 61.4 (H) 03/13/2025     03/13/2025    K 3.7 03/13/2025    CO2 14.0 (L) 03/13/2025    CALCIUM 8.9 03/13/2025    ALBUMIN 2.2 (L) 03/13/2025    ALKPHOS 110 03/13/2025    BILITOT 1.0 03/13/2025    ALT 42 (H) 03/13/2025    AST 64 (H) 03/13/2025       Assessment:    Abnormal CT abdomen with common bile duct dilation as well as pancreas duct dilation.   No focal mass or obstructive process seen on noncontrast CT.    Total bilirubin is normal.    Unintentional weight loss, raising concern of underlying malignancy  Elevated LFTs  Rhabdomyolysis  RSV pneumonia  Atrial fibrillation, on anticoagulation with Eliquis   Recent falls   Dysphagia, suspect oropharyngeal at this time     Plan:    >> Patient with pharyngeal dysphagia associated with his acute illness, RSV pneumonia and global weakness after fall " and remaining down at home   >> Bedside keofeed placement, NPO due to risk of aspiration at this time  >> As discussed yesterday can consider MRI abdomen with and without contrast, with MRCP sequence when hemodynamically stable and respiratory status improved.       LEW Soto  03/13/25  15:28 EDT

## 2025-03-13 NOTE — PROGRESS NOTES
"          Clinical Nutrition Assessment     Patient Name: Carlos Preston  YOB: 1928  MRN: [id number]  Date of Encounter: 03/12/25 20:41 EDT  Admission date: 3/11/2025  Reason for Visit: Difficulty chewing/swallowing    Assessment   Nutrition Assessment   Admission Diagnosis:  Rhabdomyolysis [M62.82]    Problem List:    Rhabdomyolysis      PMH:   He  has a past medical history of Cancer, H/O prostatectomy, Hearing loss, Heart disease, History of back surgery, Hypercholesterolemia, Hyperlipidemia, Hypertension, IHD (ischemic heart disease), Osteoarthritis of spine with myelopathy, lumbar region, Paroxysmal atrial fibrillation (07/03/2018), Pulmonary hypertension, S/P knee replacement, Sinus disorder, and thyroid ultrasound (08/28/2009).    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    Applicable Nutrition History:       Anthropometrics     Height: Height: 182.9 cm (72\")  Last Filed Weight: Weight: 83.9 kg (185 lb) (03/11/25 1402)  Method:    BMI: BMI (Calculated): 25.1    UBW:  250lbs per pt's dtr, appears ~215lbs in the past yr  Weight change:  possible 30lb/14% wt loss x 1 yr-need measured wt [place] [facility] [place]   Weight      Weight (kg) Weight (lbs) Visit Report   1/10/2024 97.523 kg  215 lb  --    2/14/2024 95.074 " kg  209 lb 9.6 oz  --    2/15/2024 95.1 kg  209 lb 10.5 oz     8/21/2024 88.542 kg  195 lb 3.2 oz  --    3/11/2025 83.915 kg  185 lb         Nutrition Focused Physical Exam    Date: 3/12    Unable to perform due to Pt unable to participate at time of visit     Subjective   Reported/Observed/Food/Nutrition Related History:     Pt up in chair at bedside, very sleepy. Dtr at bedside provides nutrition hx. Dtr states pt has had poor intake x 2 days, states prior to recent fall pt was eating well. Dtr confirms pt has had esophageal dilation in the past, ~1.5 yrs ago and states worsening dysphagia over the past yr and pt managing this w/cutting food into small pieces and drinking adequate fluids w/meals. Dtr states pt has also had significant wt loss over the past yr, states UBW is 250lbs and was 185lbs at MD office <1 week ago. Dtr unsure etiology as pt tells her he eats well.     Current Nutrition Prescription   PO: NPO Diet NPO Type: Strict NPO  Oral Nutrition Supplement:   Intake: N/A    Assessment & Plan   Nutrition Diagnosis   Date:  3/12            Updated:    Problem Inadequate oral intake    Etiology dysphagia   Signs/Symptoms NPO   Status: New    Date:   3/12  Updated:     Problem Unintended weight loss   Etiology ? Dysphagia, advanced age   Signs/Symptoms Possible 14% wt loss x 1 yr   Status: New    Goal / Objectives:   Nutrition to support treatment and Advance diet as medically feasible/appropriate      Nutrition Intervention      Follow treatment progress, Care plan reviewed, Await begin PO, Supplement provided    RD to provide Boost (vanilla) once advanced to appropriate diet. Dtr hopeful pt to have SLP eval tomorrow.   Measured wt requested to nsg to verify reported changes    Monitoring/Evaluation:   Per protocol, PO intake, Supplement intake, Weight, Swallow function    Lorna Cuevas, MS,RD,LD  Time Spent:30

## 2025-03-13 NOTE — THERAPY RE-EVALUATION
Acute Care - Speech Language Pathology   Swallow Re-Evaluation [facility]  Clinical Swallow Evaluation       Patient Name: Carlos Preston  : 3/20/1928  MRN: [id number]  Today's Date: 3/13/2025               Admit Date: 3/11/2025    Visit Dx:     ICD-10-CM ICD-9-CM   1. Pneumonia of both lungs due to infectious organism, unspecified part of lung  J18.9 483.8   2. Traumatic rhabdomyolysis, initial encounter  T79.6XXA 958.6   3. Elevated troponin  R79.89 790.6   4. RSV (acute bronchiolitis due to respiratory syncytial virus)  J21.0 466.11   5. Impaired mobility and ADLs  Z74.09 V49.89    Z78.9    6. Dysphagia, unspecified type  R13.10 787.20     Patient Active Problem List   Diagnosis    Pulmonary HTN    Hyperlipemia    HTN (hypertension)    GERD (gastroesophageal reflux disease)    Hx of CABG    IHD (ischemic heart disease)    SOB (shortness of breath)    Abnormal chest x-ray    Esophageal stricture    Rhabdomyolysis     Past Medical History:   Diagnosis Date    Cancer     H/O prostatectomy     Hearing loss     Heart disease     History of back surgery     Hypercholesterolemia     Hyperlipidemia     Hypertension     IHD (ischemic heart disease)     S/P CABG    Osteoarthritis of spine with myelopathy, lumbar region     Paroxysmal atrial fibrillation 2018    Pulmonary hypertension     S/P knee replacement     Sinus disorder     thyroid ultrasound 2009    Normal     Past Surgical History:   Procedure Laterality Date    CARDIAC CATHETERIZATION  2007    Dr. Mace: 60% LM and 70% LCX.    CARDIAC CATHETERIZATION  2009    %, OM 85-90%,Patent grafts.    CARDIAC CATHETERIZATION  2018    Patent Grafts    CARDIOVASCULAR STRESS TEST  2009    ROBIN George: Dr. Mace- EF 29%, Diffuse ischemia.    CARDIOVASCULAR STRESS TEST  2012    L. Myoview- Inferoseptal infarct with jon infarct ischemia. EF 41%.    CARDIOVASCULAR STRESS TEST  2015    L. Myoview- EF51%,fixed defect , no  ischemia burden.    CARDIOVASCULAR STRESS TEST  10/11/2017    L.Myoview- EF 54%. Small inferior Ischemia.    CONVERTED (HISTORICAL) HOLTER  06/12/2023    3 days. A.Fib. Avg 60. . 3 VT. One 3 Sec Pause    CONVERTED (HISTORICAL) HOLTER  05/28/2024    3 Days. A.Fib. Avg 70. . 4.6% PVC. 2 VT    CORONARY ARTERY BYPASS GRAFT  05/17/2007    CABG:  SVG to LAD and LCX.    ECHO - CONVERTED  08/06/2009    Dr. Mace- EF 50%.    ECHO - CONVERTED  02/14/2011    EF 50%, Septal WMA.    ECHO - CONVERTED  11/07/2012    EF 40-45%, RVSP 33 mmHg.    ECHO - CONVERTED  03/23/2015    EF 45-50%, RVSP 40mmHg,mild MR, mild PHT    ECHO - CONVERTED  05/01/2017    EF 50-55%, mild MR    ECHO - CONVERTED  10/11/2017    EF 55%    ECHO - CONVERTED  03/21/2022    TLS. EF 55%. LA- 5.2 cm. Mild MR. RVSP- 44 mmHg    ECHO - CONVERTED  02/15/2024    TLS. EF 55%.RHE. LA- 5.0. Mild MR.AO- 3.8. RVSP- 56 mmHg    US CAROTID UNILATERAL  04/16/2007    Mild Plaque in Rt. bulb and RECA.       SLP Recommendation and Plan  SLP Swallowing Diagnosis: suspected pharyngeal dysphagia (03/13/25 1035)  SLP Diet Recommendation: NPO, temporary alternate methods of nutrition/hydration, other (see comments) (vs consideration of comfort diet if aligns w/ GOC/POC) (03/13/25 1035)     SLP Rec. for Method of Medication Administration: meds via alternate route (03/13/25 1035)     Monitor for Signs of Aspiration: notify SLP if any concerns, yes (03/13/25 1035)  Recommended Diagnostics: reassess via clinical swallow evaluation (03/13/25 1035)  Swallow Criteria for Skilled Therapeutic Interventions Met: demonstrates skilled criteria (03/13/25 1035)  Anticipated Discharge Disposition (SLP): inpatient rehabilitation facility (03/13/25 1035)  Rehab Potential/Prognosis, Swallowing: adequate, monitor progress closely (03/13/25 1035)     Predicted Duration Therapy Intervention (Days): until discharge (03/13/25 1035)  Oral Care Recommendations: Oral Care BID/PRN, Suction  toothbrush (03/13/25 1035)                                               SWALLOW EVALUATION (Last 72 Hours)       SLP Adult Swallow Evaluation       Row Name 03/13/25 1035 03/12/25 1100                Rehab Evaluation    Document Type evaluation  - evaluation  -AW       Subjective Information no complaints  - no complaints  -AW       Patient Observations alert;cooperative  - alert;cooperative  -AW       Patient/Family/Caregiver Comments/Observations Dtr present  - grandchildren at bedside  -AW       Patient Effort adequate  - adequate  -       Symptoms Noted During/After Treatment none  - none  -AW       Oral Care suction provided;teeth brushed - suction toothbrush;tongue brushed  - swabbed with sterile water  -AW          General Information    Patient Profile Reviewed yes  - yes  -       Pertinent History Of Current Problem See initial eval  - rhabdomylosis, RSV, pna  -AW       Current Method of Nutrition NPO  - NPO  reg/thins at home  -AW       Precautions/Limitations, Vision WFL;for purposes of eval  [facility] WFL  -AW       Precautions/Limitations, [facility]  -[facility]       Prior Level of Function-Communication WFL  - WFL  -AW       Prior Level of Function-Swallowing esophageal concerns  - no diet consistency restrictions  -AW       Plans/Goals Discussed with patient;family;agreed upon  - patient;patient and family;agreed upon  -       Barriers to Rehab medically complex  - medically complex;previous functional deficit  -       Patient's Goals for Discharge patient did not state  - return to PO diet  -AW          Pain    Pretreatment Pain Rating -- 0/10 - no pain  -AW       Posttreatment Pain Rating -- 0/10 - no pain  -AW       Additional Documentation Pain Scale: FACES Pre/Post-Treatment (Group)  - --          Pain Scale: FACES Pre/Post-Treatment    Pain: FACES Scale, Pretreatment 0-->no hurt  - --       Posttreatment Pain Rating 0-->no hurt  -  --          Oral Motor Structure and Function    Dentition Assessment natural, present and adequate  - natural, present and adequate  -AW       Secretion Management WNL/WFL  - WNL/WFL  -AW       Mucosal Quality dry  - moist, healthy  -AW       Volitional Swallow -- delayed;weak  -AW       Volitional Cough -- weak;WFL  -AW          Oral Musculature and Cranial Nerve Assessment    Oral Motor General Assessment generalized oral motor weakness  - WFL  -AW          General Eating/Swallowing Observations    Respiratory Support Currently in Use nasal cannula  - nasal cannula  -       O2 Liters 2L  - --       Eating/Swallowing Skills fed by SLP  - fed by SLP  -       Positioning During Eating upright in bed;upright 90 degree  - upright in bed;upright 90 degree  -       Utensils Used spoon;cup  - spoon;straw  -       Consistencies Trialed ice chips;thin liquids;nectar/syrup-thick liquids;pureed  - thin liquids;ice chips  water only d/t GI concerns  -AW          Clinical Swallow Eval    Oral Prep Phase -- WFL  -AW       Oral Transit -- WFL  -AW       Oral Residue -- WFL  -AW       Pharyngeal Phase suspected pharyngeal impairment  - suspected pharyngeal impairment  -AW       Esophageal Phase -- suspected esophageal impairment  -       Clinical Swallow Evaluation Summary Suction required t/o eval & prior to PO trials. Immediate, wet cough w/ thin liquid & NTL trials. Multiple swallows & wet vocal w/ all trials. Pt felt to be @ high risk of aspiration w/ all PO intake @ this time. Pt is also not appropriate to participate in instrumental assessment @ this time. Had lengthy d/w pt & dtr re: aspiration risk, NPO w/ alternative means of nutrition/hydration vs consideration of comfort diet. Pt/dtr would like to proceed w/ safest option. Rec cont NPO w/ alternative means of nutrition/hydration if aligns w/ GOC/POC  - Pt with wet coughing, productive, suctioning mouth. He exh multiple swallows, cough  with larger drinks of thin. Pt acknowledged that one of the sips didn''t go down well. Pt at high risk of aspiration at this time, family wants pt to be able to have some sips, which is reasonable to keep pt comfortable. Advised oral care prior. Will f/u after GI POC in place and give further trials of solids. Pt is too weak for an instrumental swallow exam at this time.  -AW          Pharyngeal Phase Concerns    Pharyngeal Phase Concerns wet vocal quality;cough;throat clear  - wet vocal quality;multiple swallows;cough;throat clear;other (see comments)  dysphonia  -AW       Wet Vocal Quality all consistencies  - thin  -AW       Multiple Swallows all consistencies  - thin  -AW       Cough all consistencies  - thin  -AW       Throat Clear -- thin  -AW          Esophageal Phase Concerns    Esophageal Phase Concerns -- belching  -AW       Belching -- thin  -AW          SLP Evaluation Clinical Impression    SLP Swallowing Diagnosis suspected pharyngeal dysphagia  - R/O pharyngeal dysphagia;suspected pharyngeal dysphagia;suspected esophageal dysphagia  -AW       Functional Impact risk of aspiration/pneumonia;risk of malnutrition;risk of dehydration  - risk of aspiration/pneumonia;risk of malnutrition;risk of dehydration  -       Rehab Potential/Prognosis, Swallowing adequate, monitor progress closely  - adequate, monitor progress closely  -       Swallow Criteria for Skilled Therapeutic Interventions Met demonstrates skilled criteria  - demonstrates skilled criteria  -          Recommendations    Therapy Frequency (Swallow) -- PRN  -AW       Predicted Duration Therapy Intervention (Days) until discharge  - until discharge  -       SLP Diet Recommendation NPO;temporary alternate methods of nutrition/hydration;other (see comments)  vs consideration of comfort diet if aligns w/ GOC/POC  - NPO;ice chips between meals after oral care, with supervision;water between meals after oral care, with  supervision  -       Recommended Diagnostics reassess via clinical swallow evaluation  - reassess via clinical swallow evaluation  -       Recommended Precautions and Strategies -- upright posture during/after eating;small bites of food and sips of liquid  sips and chips only  -       Oral Care Recommendations Oral Care BID/PRN;Suction toothbrush  - Oral Care BID/PRN;Swab;Suction toothbrush  -       SLP Rec. for Method of Medication Administration meds via alternate route  - meds crushed;with puree;meds via alternate route  -       Monitor for Signs of Aspiration notify SLP if any concerns;yes  - notify SLP if any concerns;yes  -AW       Anticipated Discharge Disposition (SLP) inpatient rehabilitation facility  - inpatient rehabilitation facility  -       Equipment Issued to Patient adaptive cup  - --                 User Key  (r) = Recorded By, (t) = Taken By, (c) = Cosigned By      Initials Name Effective Dates     Malena Carmona MS CCC-SLP 02/03/23 -      Dejah Hernandes MS MELVIN-SLP 05/12/23 -                     EDUCATION  The patient has been educated in the following areas:   Dysphagia (Swallowing Impairment) Oral Care/Hydration NPO rationale.                Time Calculation:    Time Calculation- SLP       Row Name 03/13/25 1324             Time Calculation- [facility]    SLP Start Time 1035  -      SLP Received On 03/13/25  -         Untimed Charges    37965-MV Eval Oral Pharyng Swallow Minutes 60  -         Total Minutes    Untimed Charges Total Minutes 60  -       Total Minutes 60  -                User Key  (r) = Recorded By, (t) = Taken By, (c) = Cosigned By      Initials Name Provider Type     Dejah Hernandes, MS CCC-SLP Speech and Language Pathologist                    Therapy Charges for Today       Code Description Service Date Service Provider Modifiers Qty    [id number]  ST EVAL ORAL PHARYNG SWALLOW 4 3/13/2025 Dejah Hernandes, MS CCC-SLP GN 1                  Dejah Hernandes, MS CCC-SLP  3/13/2025

## 2025-03-13 NOTE — PROGRESS NOTES
Clinical Nutrition Assessment     Patient Name: Carlos Preston  YOB: 1928  MRN: [id number]  Date of Encounter: 03/13/25 19:47 EDT  Admission date: 3/11/2025  Reason for Visit: Follow-up protocol, Malnutrition Severity Assessment, Assessment, EN    Assessment   Nutrition Assessment   Admission Diagnosis:  Rhabdomyolysis [M62.82]    Problem List:    Rhabdomyolysis      PMH:   He  has a past medical history of Cancer, H/O prostatectomy, Hearing loss, Heart disease, History of back surgery, Hypercholesterolemia, Hyperlipidemia, Hypertension, IHD (ischemic heart disease), Osteoarthritis of spine with myelopathy, lumbar region, Paroxysmal atrial fibrillation (07/03/2018), Pulmonary hypertension, S/P knee replacement, Sinus disorder, and thyroid ultrasound (08/28/2009).    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    Applicable Nutrition History:   3/12 SLP rec NPO w repeat 3/13      Labs    Labs Reviewed: Yes     Results from last 7 days   Lab Units 03/13/25  1856 03/13/25  1454 03/13/25  0913 03/13/25  0456 03/12/25  0002 03/11/25  1430   GLUCOSE mg/dL 139* 132* 134* 118*   < > 108*   BUN mg/dL 60* 61* 62* 61*   < > 63*   CREATININE mg/dL 1.14 1.07 1.01 0.92   < > 1.42*   SODIUM mmol/L 147* 146* 144  "145   < > 143   CHLORIDE mmol/L 115* 114* 114* 115*   < > 108*   POTASSIUM mmol/L 3.8 3.6 3.7 3.8   < > 3.8   PHOSPHORUS mg/dL  --   --   --  3.4  --   --    MAGNESIUM mg/dL  --   --   --  2.4*  --  2.3   ALT (SGPT) U/L 43* 42* 42* 45*   < > 59*   LACTATE mmol/L  --   --   --   --   --  1.8    < > = values in this interval not displayed.       Results from last 7 days   Lab Units 03/13/25  1856 03/13/25  1454 03/13/25  0913 03/13/25  0456   ALBUMIN g/dL 2.6* 2.6* 2.2* 2.5*   CRP mg/dL  --   --   --  12.39*       Results from last 7 days   Lab Units 03/13/25  1643 03/13/25  1117 03/13/25  0537 03/13/25  0257 03/12/25  1937 03/12/25  1648   GLUCOSE mg/dL 132* 145* 115 110 106 93     No results found for: \"HGBA1C\"      Results from last 7 days   Lab Units 03/11/25  1430   PROBNP pg/mL 29,350.0*       Medications    Medications Reviewed: Yes    Scheduled Meds:[Held by provider] busPIRone, 5 mg, Oral, Daily With Lunch  [Held by provider] cetirizine, 10 mg, Oral, Daily  doxycycline, 100 mg, Intravenous, Q12H  enoxaparin sodium, 40 mg, Subcutaneous, Nightly  ferric gluconate, 250 mg, Intravenous, Daily  [Held by provider] isosorbide mononitrate, 30 mg, Oral, QAM  methylPREDNISolone sodium succinate, 40 mg, Intravenous, Q12H  [Held by provider] NIFEdipine XL, 30 mg, Oral, Daily  piperacillin-tazobactam, 4.5 g, Intravenous, Q8H  [Held by provider] rivaroxaban, 20 mg, Oral, Daily  sodium chloride, 10 mL, Intravenous, Q12H      Continuous Infusions:   PRN Meds:.  senna-docusate sodium **AND** polyethylene glycol **AND** bisacodyl **AND** bisacodyl    dextrose    dextrose    glucagon (human recombinant)    HYDROmorphone    ipratropium-albuterol    melatonin    nitroglycerin    ondansetron    sodium chloride    sodium chloride    sodium chloride    traMADol    Intake/Ouptut 24 hrs (0701 - 0700)   I&O's Reviewed: Yes   Intake & Output (last day)         03/13 0701 03/14 0700    Blood     Total Intake(mL/kg)     Urine (mL/kg/hr)  " "   Total Output     Net              Last stool x 1 on 3/11    Anthropometrics     Height: Height: 182.9 cm (72\")  Last Filed Weight: Weight: 83.9 kg (185 lb) (03/11/25 1402)  Method:    BMI: BMI (Calculated): 25.1    UBW:  250lbs per pt's dtr, appears ~215lbs in the past yr  Weight change:  possible 30lb/14% wt loss x 1 yr-need measured wt from [facility]   Weight      Weight (kg) Weight (lbs) Visit Report   1/10/2024 97.523 kg  215 lb  --    2/14/2024 95.074 kg  209 lb 9.6 oz  --    2/15/2024 95.1 kg  209 lb 10.5 oz     8/21/2024 88.542 kg  195 lb 3.2 oz  --    3/11/2025 83.915 kg  185 lb         Nutrition Focused Physical Exam    Date: 3/13    Patient meets criteria for malnutrition diagnosis, see MSA note.     Subjective   Reported/Observed/Food/Nutrition Related History:     3/13  Family rpt pt at ok until 4 da ago then unable to eat .Repeat has had signif wt loss. Family aware of plan for feeding tube. GI note indicated will place tube 2/2 aspiration risk at this time.    3/12  Pt up in chair at bedside, very sleepy. Dtr at bedside provides nutrition hx. Dtr states pt has had poor intake x 2 days, states prior to recent fall pt was eating well. Dtr confirms pt has had esophageal dilation in the past, ~1.5 yrs ago and states worsening dysphagia over the past yr and pt managing this w/cutting food into small pieces and drinking adequate fluids w/meals. Dtr states pt has also had significant wt loss over the past yr, states UBW is 250lbs and was 185lbs at MD office <1 week ago. Dtr unsure etiology as pt tells her he eats well.       Needs Assessment   Date: 3/13    Height used:Height: 182.9 cm (72\")  Weights used: 83.9 Kg      Estimated Calorie needs: ~ 2000 Kcal/day  Method:  Kcals/KG x 25 = 2098  Method:  [id number] x 1.3 = 1968    Estimated Protein needs: ~ 109 g PRO/day  Method: g/Kg x 1.3    Estimated Fluid needs: ~ ml/day   Per clinical status    Current Nutrition Prescription   PO: NPO Diet NPO Type: Strict " NPO  Oral Nutrition Supplement:   Intake: N/A    Assessment & Plan   Nutrition Diagnosis   Date:  3/12            Updated:  3/13  Problem Inadequate oral intake    Etiology dysphagia   Signs/Symptoms NPO   Status: New EN rec in place    Date:   3/12  Updated:     Problem Unintended weight loss   Etiology ? Dysphagia, advanced age   Signs/Symptoms Possible 14% wt loss x 1 yr   Status: New    Date:  3/13 Updated:  Problem Malnutrition moderate acute   Etiology Alt Swallow fx   Signs/Symptoms Intake hx w some Wasting   Status: New    Goal / Objectives:   Nutrition to support treatment, Advance diet as medically feasible/appropriate, and Initiate EN, Tolerate EN at goal      Nutrition Intervention      Follow treatment progress, Care plan reviewed, EN rec prepared    When tube in place and ready to feed suggest:  Isosource 1.5 begin 20 ml/hr advance w tolerance by 10 ml/hr ev 6 hr to goal 60 ml/hr Water at 45 ml/hr Give 2 Prosource TF daily   Over 22 hr at goal will supply 1320 ml for 2060 Kcal 103% est need, 112 g % est need 20 g Fiber 1003 ml Water in EN 1993 total ml Free Water.      Monitoring/Evaluation:   Per protocol, I&O, Pertinent labs, EN delivery/tolerance, Weight, Symptoms, Swallow function    Marielena eBnitez RD  Time Spent:45 min

## 2025-03-13 NOTE — PROGRESS NOTES
[facility]  PROGRESS NOTE    Patient Name: Carlos Preston  : 3/20/1928  MRN: [id number]    Date of Admission: 3/11/2025  Primary Care Physician: Newton Hankins MD    Subjective   Subjective     CC:  Follow-up for shortness of breath    HPI:  Patient seen and examined this morning.  Lethargic this morning.  Able to open his eyes and talk to me few sentences but then will fall asleep.  Cough is significantly improved compared to yesterday.  No fever.  Daughter at bedside and updated about the plan of care    Objective   Objective     Vital Signs:   Temp:  [97.7 °F (36.5 °C)-100.2 °F (37.9 °C)] 98.3 °F (36.8 °C)  Heart Rate:  [82-91] 90  Resp:  [18-20] 18  BP: (120-147)/(61-93) 144/85  Flow (L/min) (Oxygen Therapy):  [2] 2     Physical Exam:  General: Comfortable, not in distress, conversant and cooperative  Head: Atraumatic and normocephalic  Eyes: No Icterus. No pallor  Ears:  Ears appear intact with no abnormalities noted  Throat: No oral lesions, no thrush  Neck: Supple, trachea midline  Lungs: Bilateral basal coarse crackles with bronchial breathing.  Heart:  Normal S1 and S2, no murmur, no gallop, No JVD, no lower extremity swelling  Abdomen:  Soft, no tenderness, no organomegaly, normal bowel sounds, no organomegaly  Extremities: pulses equal bilaterally  Skin: No bleeding, bruising or rash, normal skin turgor and elasticity  Neurologic: Cranial nerves appear intact with no evidence of facial asymmetry, normal motor and sensory functions in all 4 extremities  Psych: Alert and oriented x 3, normal mood    Results Reviewed:  LAB RESULTS:      Lab 25  0456 25  0058 25  1530 25  1150 25  0417 25  1528 25  1430   WBC 9.13  --   --   --  15.52*  --  23.50*   HEMOGLOBIN 9.7* 9.0* 8.3* 8.3* 7.6*  --  9.2*   HEMATOCRIT 32.0* 30.1* 27.9* 27.7* 25.7*  --  29.6*   PLATELETS 120*  --   --   --  93*  --  103*   NEUTROS ABS 8.52*  --    --   --   --   --  21.84*   IMMATURE GRANS (ABS) 0.06*  --   --   --   --   --  0.07*   LYMPHS ABS 0.34*  --   --   --   --   --  0.45*   MONOS ABS 0.20  --   --   --   --   --  1.13*   EOS ABS 0.00  --   --   --   --   --  0.00   MCV 95.0  --   --   --  95.2  --  92.8   CRP 12.39*  --   --   --   --   --   --    PROCALCITONIN 0.73*  --   --   --   --   --  1.17*   LACTATE  --   --   --   --   --   --  1.8   LDH  --   --   --   --  340*  --   --    HSTROP T  --   --   --   --  96* 123* 124*         Lab 03/13/25 0913 03/13/25 0456 03/13/25 0058 03/12/25  1530 03/12/25  1151 03/12/25  0002 03/11/25  1430   SODIUM 144 145 146* 146* 146*   < > 143   POTASSIUM 3.7 3.8 3.8 3.7 3.8   < > 3.8   CHLORIDE 114* 115* 115* 113* 114*   < > 108*   CO2 14.0* 16.0* 15.0* 17.0* 19.0*   < > 20.3*   ANION GAP 16.0* 14.0 16.0* 16.0* 13.0   < > 14.7   BUN 62* 61* 59* 62* 63*   < > 63*   CREATININE 1.01 0.92 1.08 1.12 1.17   < > 1.42*   EGFR 68.1 76.1 62.8 60.1 57.1*   < > 45.2*   GLUCOSE 134* 118* 116* 86 81   < > 108*   CALCIUM 8.9 9.0 8.2 9.1 9.1   < > 9.5   MAGNESIUM  --  2.4*  --   --   --   --  2.3   PHOSPHORUS  --  3.4  --   --   --   --   --     < > = values in this interval not displayed.         Lab 03/13/25 0913 03/13/25 0456 03/13/25 0058 03/12/25  1530 03/12/25  1151   TOTAL PROTEIN 5.7* 6.1 5.7* 6.3 6.2   ALBUMIN 2.2* 2.5* 2.5* 2.7* 2.7*   GLOBULIN 3.5 3.6 3.2 3.6 3.5   ALT (SGPT) 42* 45* 45* 51* 51*   AST (SGOT) 64* 74* 75* 103* 105*   BILIRUBIN 1.0 1.1 1.0 1.0 1.0   ALK PHOS 110 118* 109 127* 135*         Lab 03/12/25  0417 03/11/25  1528 03/11/25  1430   PROBNP  --   --  29,350.0*   HSTROP T 96* 123* 124*             Lab 03/12/25  1150 03/12/25  0833 03/12/25  0417   IRON  --   --  9*   IRON SATURATION (TSAT)  --   --  4*   TIBC  --   --  222*   TRANSFERRIN  --   --  149*   FERRITIN  --   --  238.60   FOLATE  --  8.44  --    VITAMIN B 12  --  1,188*  --    ABO TYPING O  --  O   RH TYPING Positive  --  Positive    ANTIBODY SCREEN Negative  --   --          Brief Urine Lab Results  (Last result in the past 365 days)        Color   Clarity   Blood   Leuk Est   Nitrite   Protein   CREAT   Urine HCG        03/11/25 1604 [place]   Clear   Moderate (2+)   Negative   Negative   30 mg/dL (1+)                   Microbiology Results Abnormal       Procedure Component Value - Date/Time    COVID-19, FLU A/B, RSV PCR 1 HR TAT - Swab, Nasopharynx [[id number]]  (Abnormal) Collected: 03/11/25 1431    Lab Status: Final result Specimen: Swab from Nasopharynx Updated: 03/11/25 1518     COVID19 Not Detected     Influenza A PCR Not Detected     Influenza B PCR Not Detected     RSV, PCR Detected    Narrative:      Fact sheet for providers: https://www.fda.gov/media/393016/download    Fact sheet for patients: https://www.fda.gov/media/493292/download    Test performed by PCR.            CT Chest Without Contrast Diagnostic  Result Date: 3/11/2025  CT CHEST WO CONTRAST DIAGNOSTIC, CT ABDOMEN PELVIS WO CONTRAST Date of Exam: 3/11/2025 3:42 PM EDT Indication: cough, shortness of breath. Elevated liver enzymes Comparison: None available. Technique: Axial CT images were obtained of the chest, abdomen/pelvis without contrast administration.  Reconstructed coronal and sagittal images were also obtained. Automated exposure control and iterative construction methods were used. Findings: CT SCAN OF THE CHEST WITHOUT CONTRAST: Chronic appearing changes of fibrosis are seen in the lung periphery. There is moderate cylindrical bronchiectasis of the lower lobes, on the right with no obvious inflammation, on the left with evidence of active lingular and lower lobe bronchial inflammation and some patchy airspace disease consistent with active infection. The larger order airways appear normally patent. The small diameter peripheral airways in the left lower lobe appear partially opacified. There is no pleural effusion. Images of the mediastinum show shotty rounded  preaortic lymph nodes, which may be reactive. Poststernotomy changes are noted. Bony structures appear to be intact.     Impression: 1. Chronic appearing peripheral lung changes of the lower lungs, consistent with fibrosis, and moderate cylindrical bronchiectasis. 2. Superimposed left lower lobe bronchitis perhaps and suspect early changes of bronchopneumonia. Mild reactive mediastinal lymphadenopathy. CT SCAN OF THE ABDOMEN PELVIS WITHOUT CONTRAST: CT scan  film shows diffusely increased bowel gas in a pattern suggesting ileus. Axial images show moderate gaseous distention of nondependent large and small bowel loops again favoring ileus, without an obvious transition point. There is mild distention of the sigmoid and rectum, with what appears to be semisolid stool rather than formed stool, and very little evidence of stool elsewhere. Impaction is not suspected. No bowel wall edema or pneumatosis  is seen. Image detail of the liver is limited both due to patient motion and nonenhanced scan. No gross abnormalities are appreciated. Gallbladder is surgically absent. Common duct appears dilated to approximately 2 cm, somewhat greater than would be expected for  previous cholecystectomy. There is no obvious distal common duct stone, mass or other potential cause of obstruction. Pancreas is atrophic, and the low-density nodular appearance of the pancreatic tail, images 44 through 35 suggest diffuse ductal dilatation and side branch dilatation of unknown cause. No pancreatic mass or inflammation is apparent. Spleen, adrenal glands, and kidneys appear unremarkable except for a large benign-appearing water density exophytic cyst in the left renal midpole. No upper abdominal mass, adenopathy or acute inflammatory change is seen. Regarding the lower abdomen and pelvis, the bladder is moderately distended but normal in appearance. Prostate and seminal vesicles are not enlarged, actually small and difficult to delineate. No  intrapelvic mass or inflammatory change is seen. Bony structures appear to be intact. There is advanced multilevel degenerative disc disease throughout the lumbar spine. Impression: 1. Previous cholecystectomy. Greater than expected dilatation of the common bile duct for cholecystectomy, up to approximately 2 cm. No obvious cause of obstruction. 2. Atrophic pancreas, and what appears to be diffuse ductal dilatation and cystic change, at least of the pancreatic tail, and of uncertain etiology. No directly visible mass or inflammation. 3. Increased bowel gas in a pattern favoring mild ileus over obstruction. No visible bowel wall inflammation. Electronically Signed: Marlon Álvarez MD  3/11/2025 4:21 PM EDT  Workstation ID: [id number]    CT Abdomen Pelvis Without Contrast  Result Date: 3/11/2025  CT CHEST WO CONTRAST DIAGNOSTIC, CT ABDOMEN PELVIS WO CONTRAST Date of Exam: 3/11/2025 3:42 PM EDT Indication: cough, shortness of breath. Elevated liver enzymes Comparison: None available. Technique: Axial CT images were obtained of the chest, abdomen/pelvis without contrast administration.  Reconstructed coronal and sagittal images were also obtained. Automated exposure control and iterative construction methods were used. Findings: CT SCAN OF THE CHEST WITHOUT CONTRAST: Chronic appearing changes of fibrosis are seen in the lung periphery. There is moderate cylindrical bronchiectasis of the lower lobes, on the right with no obvious inflammation, on the left with evidence of active lingular and lower lobe bronchial inflammation and some patchy airspace disease consistent with active infection. The larger order airways appear normally patent. The small diameter peripheral airways in the left lower lobe appear partially opacified. There is no pleural effusion. Images of the mediastinum show shotty rounded preaortic lymph nodes, which may be reactive. Poststernotomy changes are noted. Bony structures appear to be intact.      Impression: 1. Chronic appearing peripheral lung changes of the lower lungs, consistent with fibrosis, and moderate cylindrical bronchiectasis. 2. Superimposed left lower lobe bronchitis perhaps and suspect early changes of bronchopneumonia. Mild reactive mediastinal lymphadenopathy. CT SCAN OF THE ABDOMEN PELVIS WITHOUT CONTRAST: CT scan  film shows diffusely increased bowel gas in a pattern suggesting ileus. Axial images show moderate gaseous distention of nondependent large and small bowel loops again favoring ileus, without an obvious transition point. There is mild distention of the sigmoid and rectum, with what appears to be semisolid stool rather than formed stool, and very little evidence of stool elsewhere. Impaction is not suspected. No bowel wall edema or pneumatosis  is seen. Image detail of the liver is limited both due to patient motion and nonenhanced scan. No gross abnormalities are appreciated. Gallbladder is surgically absent. Common duct appears dilated to approximately 2 cm, somewhat greater than would be expected for  previous cholecystectomy. There is no obvious distal common duct stone, mass or other potential cause of obstruction. Pancreas is atrophic, and the low-density nodular appearance of the pancreatic tail, images 44 through 35 suggest diffuse ductal dilatation and side branch dilatation of unknown cause. No pancreatic mass or inflammation is apparent. Spleen, adrenal glands, and kidneys appear unremarkable except for a large benign-appearing water density exophytic cyst in the left renal midpole. No upper abdominal mass, adenopathy or acute inflammatory change is seen. Regarding the lower abdomen and pelvis, the bladder is moderately distended but normal in appearance. Prostate and seminal vesicles are not enlarged, actually small and difficult to delineate. No intrapelvic mass or inflammatory change is seen. Bony structures appear to be intact. There is advanced multilevel  degenerative disc disease throughout the lumbar spine. Impression: 1. Previous cholecystectomy. Greater than expected dilatation of the common bile duct for cholecystectomy, up to approximately 2 cm. No obvious cause of obstruction. 2. Atrophic pancreas, and what appears to be diffuse ductal dilatation and cystic change, at least of the pancreatic tail, and of uncertain etiology. No directly visible mass or inflammation. 3. Increased bowel gas in a pattern favoring mild ileus over obstruction. No visible bowel wall inflammation. Electronically Signed: Marlon Álvarez MD  3/11/2025 4:21 PM EDT  Workstation ID: [id number]    XR Hip With or Without Pelvis 2 - 3 View Right  Result Date: 3/11/2025  XR HIP W OR WO PELVIS 2-3 VIEW RIGHT Date of Exam: 3/11/2025 2:30 PM EDT Indication: Fall, hip pain Comparison: None available. Findings: There is narrowing of both hip joints. An acute osseous abnormality of the right hip is not identified. There are surgical clips within the pelvis. The pelvic bones look intact. There are multilevel degenerative changes involving the lumbar spine.     Impression: Impression: 1.There is some narrowing of both hip joints. 2.Multilevel degenerative change lumbar spine. Electronically Signed: Virgil Leon MD  3/11/2025 3:19 PM EDT  Workstation ID: [id number]    XR Chest 1 View  Result Date: 3/11/2025  XR CHEST 1 VW Date of Exam: 3/11/2025 2:10 PM EDT Indication: Weak/Dizzy/AMS triage protocol Comparison: None available. Findings: Sternotomy wires are noted. The heart is not enlarged. There are some interstitial change within the left lung which are nonspecific. This could be seen with inflammatory infectious process, sequela to recent trauma, atelectasis or some underlying edema.  There are no pleural effusions. The right lung seems relatively clear.     Impression: Impression: There are some interstitial change within the left lung which are nonspecific and should be correlated with patient history  Electronically Signed: Virgil Leon MD  3/11/2025 3:16 PM EDT  Workstation ID: [id number]    CT Head Without Contrast  Result Date: 3/11/2025  CT HEAD WO CONTRAST Date of Exam: 3/11/2025 2:43 PM EDT Indication: AMS, falls, weakness. Comparison: None available. Technique: Axial CT images were obtained of the head without contrast administration.  Automated exposure control and iterative construction methods were used. Findings: There is no evidence of hemorrhage. There is no mass effect or midline shift. Age-related involutional changes are visualized. Vascular calcifications are noted. There is no extra-axial collections. Ventricles are normal in size and configuration for patient's stated age. Posterior fossa is within normal limits. Calvarium and skull base appear intact. Visualized sinuses show no air fluid levels. The mastoid air cells are clear. Visualized orbits are unremarkable.     Impression: Impression: No acute intracranial abnormality identified. Electronically Signed: Javier Osorio MD  3/11/2025 3:07 PM EDT  Workstation ID: [id number]      Results for orders placed during the hospital encounter of 02/15/24    Adult Transthoracic Echo Complete W/ Cont if Necessary Per Protocol    Interpretation Summary    Atrial fibrillation was the predominant rhythm observed during the procedure.    The study is technically difficult    Left ventricular wall thickness is consistent with mild concentric hypertrophy.    Left ventricular ejection fraction appears to be 51 - 55%.    Left ventricular diastolic function is consistent with (grade I) impaired relaxation.    The right ventricular cavity is borderline dilated.    The left atrial cavity is moderately dilated.  5.0 cm    The right atrial cavity is mildly  dilated.    No aortic valve regurgitation or stenosis is present    Mild mitral valve regurgitation with MAC is present.    Moderate tricuspid valve regurgitation is present.  RVSP- 56 mmHg    Borderline dilation of  the aortic root is present.  3.8 cm      Current medications:  Scheduled Meds:[Held by provider] busPIRone, 5 mg, Oral, Daily With Lunch  [Held by provider] cetirizine, 10 mg, Oral, Daily  doxycycline, 100 mg, Intravenous, Q12H  ferric gluconate, 250 mg, Intravenous, Daily  heparin (porcine), 5,000 Units, Subcutaneous, Q12H  [Held by provider] isosorbide mononitrate, 30 mg, Oral, QAM  methylPREDNISolone sodium succinate, 40 mg, Intravenous, Q12H  [Held by provider] NIFEdipine XL, 30 mg, Oral, Daily  piperacillin-tazobactam, 4.5 g, Intravenous, Q8H  [Held by provider] rivaroxaban, 20 mg, Oral, Daily  sodium chloride, 10 mL, Intravenous, Q12H      Continuous Infusions:     PRN Meds:.  senna-docusate sodium **AND** polyethylene glycol **AND** bisacodyl **AND** bisacodyl    dextrose    dextrose    glucagon (human recombinant)    HYDROmorphone    ipratropium-albuterol    melatonin    nitroglycerin    ondansetron    sodium chloride    sodium chloride    sodium chloride    traMADol    Assessment & Plan   Assessment & Plan     Active Hospital Problems    Diagnosis  POA    **Rhabdomyolysis [M62.82]  Yes      Resolved Hospital Problems   No resolved problems to display.        Brief Hospital Course to date:  Carlos Preston is a 89 y.o. male with a history of heart disease, hyperlipidemia, ischemic heart disease status post CABG, paroxysmal A-fib on Eliquis presented to the hospital with progressive weakness.  Found to have bilateral pneumonia and acute rhabdomyolysis    Acute hypoxia respiratory insufficiency  RSV infection  Superimposed left lower lobe bacterial pneumonia, unspecified  Bilateral basal lung fibrosis concerning for chronic dysphagia  CT chest with bilateral chronic fibrotic changes in both lung bases and developing left lower lobe pneumonia.  Patient tested positive for RSV.  Hypoxic requiring 4 L nasal cannula  Continue IV Zosyn. S/P azithromycin  IV Solu-Medrol 40 mg twice daily  Negative Legionella  antigen, strep antigen and MRSA swab  Follow sputum culture  DuoNebs, Mucinex and I-S    Chronic dysphagia  History of esophageal stricture  Speech team consulted  GI team consulted  Discussed with the patient daughter.  If the patient continues to have severe dysphagia and with his poor nutrition and frailty, he would benefit from short-term enteral feeding through [employer].  She is agreeable    Acute rhabdomyolysis  Elevated troponin  Patient fell x 2 and was on the floor for 4 to 5 hours  CPK on admission 2500.  Trending down with IV fluids and currently at 1200  Continue IV fluids   Elevated troponin felt to be demand ischemia and secondary to rhabdomyolysis.  Patient with no chest pain or ischemic changes in EKG.  Defer ischemic workup given his frailty and age       Severe iron deficiency anemia   Symptomatic anemia   Patient is anemic with hemoglobin of 7.6 on admission.  Improved to 9.7 after 1 unit of blood transfusion on 3/12/2025  Iron studies consistent with severe iron deficiency with iron saturation 4%.  Continue iron infusion x 3 doses  No evidence of GI bleed    A-fib  Hx of CABG  HTN  On Xarelto at home. Family and patient aware of risk/benefits of holding Xarelto in the setting of concern for swallow safety. Heparin SC ordered  CT head no acute intracranial abnormality identified  ECHO: 02/2024: EF: 51-55%     Debility  PT/OT  Will likely need rehab          Expected Discharge Location and Transportation: [facility]  Expected Discharge   Expected Discharge Date: 3/17/2025; Expected Discharge Time:      VTE Prophylaxis:  Pharmacologic & mechanical VTE prophylaxis orders are present.         AM-PAC 6 Clicks Score (PT): 11 (03/12/25 2045)    CODE STATUS:   Code Status and Medical Interventions: CPR (Attempt to Resuscitate); Full Support   Ordered at: 03/11/25 0686     Code Status (Patient has no pulse and is not breathing):    CPR (Attempt to Resuscitate)     Medical Interventions (Patient has pulse or is  breathing):    Full Support     Level Of Support Discussed With:    Patient       Maksim Delgado MD  03/13/25

## 2025-03-13 NOTE — PLAN OF CARE
Goal Outcome Evaluation:                   Anticipated Discharge Disposition (SLP): inpatient rehabilitation facility          SLP Swallowing Diagnosis: suspected pharyngeal dysphagia (03/13/25 8538)

## 2025-03-14 PROBLEM — E44.0 MODERATE PROTEIN-CALORIE MALNUTRITION: Status: ACTIVE | Noted: 2025-03-14

## 2025-03-14 LAB
ALBUMIN SERPL-MCNC: 2.3 G/DL (ref 3.5–5.2)
ALBUMIN SERPL-MCNC: 2.3 G/DL (ref 3.5–5.2)
ALBUMIN SERPL-MCNC: 2.4 G/DL (ref 3.5–5.2)
ALBUMIN SERPL-MCNC: 2.4 G/DL (ref 3.5–5.2)
ALBUMIN SERPL-MCNC: 2.5 G/DL (ref 3.5–5.2)
ALBUMIN/GLOB SERPL: 0.7 G/DL
ALBUMIN/GLOB SERPL: 0.8 G/DL
ALP SERPL-CCNC: 102 U/L (ref 39–117)
ALP SERPL-CCNC: 104 U/L (ref 39–117)
ALP SERPL-CCNC: 105 U/L (ref 39–117)
ALP SERPL-CCNC: 110 U/L (ref 39–117)
ALP SERPL-CCNC: 99 U/L (ref 39–117)
ALT SERPL W P-5'-P-CCNC: 35 U/L (ref 1–41)
ALT SERPL W P-5'-P-CCNC: 35 U/L (ref 1–41)
ALT SERPL W P-5'-P-CCNC: 36 U/L (ref 1–41)
ALT SERPL W P-5'-P-CCNC: 36 U/L (ref 1–41)
ALT SERPL W P-5'-P-CCNC: 37 U/L (ref 1–41)
ANION GAP SERPL CALCULATED.3IONS-SCNC: 11 MMOL/L (ref 5–15)
ANION GAP SERPL CALCULATED.3IONS-SCNC: 12 MMOL/L (ref 5–15)
ANION GAP SERPL CALCULATED.3IONS-SCNC: 12 MMOL/L (ref 5–15)
ANION GAP SERPL CALCULATED.3IONS-SCNC: 13 MMOL/L (ref 5–15)
ANION GAP SERPL CALCULATED.3IONS-SCNC: 13 MMOL/L (ref 5–15)
AST SERPL-CCNC: 35 U/L (ref 1–40)
AST SERPL-CCNC: 36 U/L (ref 1–40)
AST SERPL-CCNC: 39 U/L (ref 1–40)
AST SERPL-CCNC: 39 U/L (ref 1–40)
AST SERPL-CCNC: 45 U/L (ref 1–40)
BACTERIA SPEC RESP CULT: NO GROWTH
BASOPHILS # BLD AUTO: 0.01 10*3/MM3 (ref 0–0.2)
BASOPHILS NFR BLD AUTO: 0.1 % (ref 0–1.5)
BILIRUB SERPL-MCNC: 0.8 MG/DL (ref 0–1.2)
BILIRUB SERPL-MCNC: 0.9 MG/DL (ref 0–1.2)
BUN SERPL-MCNC: 60 MG/DL (ref 8–23)
BUN SERPL-MCNC: 61 MG/DL (ref 8–23)
BUN SERPL-MCNC: 63 MG/DL (ref 8–23)
BUN/CREAT SERPL: 51.3 (ref 7–25)
BUN/CREAT SERPL: 56.8 (ref 7–25)
BUN/CREAT SERPL: 59.8 (ref 7–25)
BUN/CREAT SERPL: 63.6 (ref 7–25)
BUN/CREAT SERPL: 69.2 (ref 7–25)
CALCIUM SPEC-SCNC: 8.8 MG/DL (ref 8.2–9.6)
CALCIUM SPEC-SCNC: 8.9 MG/DL (ref 8.2–9.6)
CALCIUM SPEC-SCNC: 9 MG/DL (ref 8.2–9.6)
CHLORIDE SERPL-SCNC: 115 MMOL/L (ref 98–107)
CHLORIDE SERPL-SCNC: 116 MMOL/L (ref 98–107)
CHLORIDE SERPL-SCNC: 116 MMOL/L (ref 98–107)
CHLORIDE SERPL-SCNC: 118 MMOL/L (ref 98–107)
CHLORIDE SERPL-SCNC: 118 MMOL/L (ref 98–107)
CO2 SERPL-SCNC: 18 MMOL/L (ref 22–29)
CO2 SERPL-SCNC: 19 MMOL/L (ref 22–29)
CO2 SERPL-SCNC: 19 MMOL/L (ref 22–29)
CREAT SERPL-MCNC: 0.91 MG/DL (ref 0.76–1.27)
CREAT SERPL-MCNC: 0.99 MG/DL (ref 0.76–1.27)
CREAT SERPL-MCNC: 1.02 MG/DL (ref 0.76–1.27)
CREAT SERPL-MCNC: 1.11 MG/DL (ref 0.76–1.27)
CREAT SERPL-MCNC: 1.17 MG/DL (ref 0.76–1.27)
CRP SERPL-MCNC: 5.67 MG/DL (ref 0–0.5)
DEPRECATED RDW RBC AUTO: 57.7 FL (ref 37–54)
EGFRCR SERPLBLD CKD-EPI 2021: 57.1 ML/MIN/1.73
EGFRCR SERPLBLD CKD-EPI 2021: 60.8 ML/MIN/1.73
EGFRCR SERPLBLD CKD-EPI 2021: 67.3 ML/MIN/1.73
EGFRCR SERPLBLD CKD-EPI 2021: 69.7 ML/MIN/1.73
EGFRCR SERPLBLD CKD-EPI 2021: 77.1 ML/MIN/1.73
EOSINOPHIL # BLD AUTO: 0 10*3/MM3 (ref 0–0.4)
EOSINOPHIL NFR BLD AUTO: 0 % (ref 0.3–6.2)
ERYTHROCYTE [DISTWIDTH] IN BLOOD BY AUTOMATED COUNT: 16.4 % (ref 12.3–15.4)
GLOBULIN UR ELPH-MCNC: 2.9 GM/DL
GLOBULIN UR ELPH-MCNC: 3.1 GM/DL
GLUCOSE BLDC GLUCOMTR-MCNC: 129 MG/DL (ref 70–130)
GLUCOSE BLDC GLUCOMTR-MCNC: 136 MG/DL (ref 70–130)
GLUCOSE BLDC GLUCOMTR-MCNC: 141 MG/DL (ref 70–130)
GLUCOSE BLDC GLUCOMTR-MCNC: 190 MG/DL (ref 70–130)
GLUCOSE SERPL-MCNC: 132 MG/DL (ref 65–99)
GLUCOSE SERPL-MCNC: 146 MG/DL (ref 65–99)
GLUCOSE SERPL-MCNC: 147 MG/DL (ref 65–99)
GLUCOSE SERPL-MCNC: 147 MG/DL (ref 65–99)
GLUCOSE SERPL-MCNC: 159 MG/DL (ref 65–99)
GRAM STN SPEC: NORMAL
HCT VFR BLD AUTO: 35.3 % (ref 37.5–51)
HGB BLD-MCNC: 10.4 G/DL (ref 13–17.7)
IMM GRANULOCYTES # BLD AUTO: 0.06 10*3/MM3 (ref 0–0.05)
IMM GRANULOCYTES NFR BLD AUTO: 0.5 % (ref 0–0.5)
LYMPHOCYTES # BLD AUTO: 0.37 10*3/MM3 (ref 0.7–3.1)
LYMPHOCYTES NFR BLD AUTO: 3.4 % (ref 19.6–45.3)
MAGNESIUM SERPL-MCNC: 2.4 MG/DL (ref 1.7–2.3)
MCH RBC QN AUTO: 28.7 PG (ref 26.6–33)
MCHC RBC AUTO-ENTMCNC: 29.5 G/DL (ref 31.5–35.7)
MCV RBC AUTO: 97.2 FL (ref 79–97)
MONOCYTES # BLD AUTO: 0.31 10*3/MM3 (ref 0.1–0.9)
MONOCYTES NFR BLD AUTO: 2.8 % (ref 5–12)
NEUTROPHILS NFR BLD AUTO: 10.29 10*3/MM3 (ref 1.7–7)
NEUTROPHILS NFR BLD AUTO: 93.2 % (ref 42.7–76)
NRBC BLD AUTO-RTO: 0.5 /100 WBC (ref 0–0.2)
PHOSPHATE SERPL-MCNC: 2.8 MG/DL (ref 2.5–4.5)
PLATELET # BLD AUTO: 119 10*3/MM3 (ref 140–450)
PMV BLD AUTO: 12.4 FL (ref 6–12)
POTASSIUM SERPL-SCNC: 3.6 MMOL/L (ref 3.5–5.2)
POTASSIUM SERPL-SCNC: 3.7 MMOL/L (ref 3.5–5.2)
POTASSIUM SERPL-SCNC: 3.8 MMOL/L (ref 3.5–5.2)
PROT SERPL-MCNC: 5.2 G/DL (ref 6–8.5)
PROT SERPL-MCNC: 5.4 G/DL (ref 6–8.5)
PROT SERPL-MCNC: 5.5 G/DL (ref 6–8.5)
PROT SERPL-MCNC: 5.5 G/DL (ref 6–8.5)
PROT SERPL-MCNC: 5.6 G/DL (ref 6–8.5)
QT INTERVAL: 394 MS
QTC INTERVAL: 492 MS
RBC # BLD AUTO: 3.63 10*6/MM3 (ref 4.14–5.8)
SODIUM SERPL-SCNC: 146 MMOL/L (ref 136–145)
SODIUM SERPL-SCNC: 146 MMOL/L (ref 136–145)
SODIUM SERPL-SCNC: 147 MMOL/L (ref 136–145)
SODIUM SERPL-SCNC: 148 MMOL/L (ref 136–145)
SODIUM SERPL-SCNC: 149 MMOL/L (ref 136–145)
WBC NRBC COR # BLD AUTO: 11.04 10*3/MM3 (ref 3.4–10.8)

## 2025-03-14 PROCEDURE — 25810000003 DEXTROSE 5 % WITH KCL 20 MEQ 20 MEQ/L SOLUTION: Performed by: INTERNAL MEDICINE

## 2025-03-14 PROCEDURE — 25010000002 METOCLOPRAMIDE PER 10 MG: Performed by: INTERNAL MEDICINE

## 2025-03-14 PROCEDURE — 86140 C-REACTIVE PROTEIN: CPT | Performed by: INTERNAL MEDICINE

## 2025-03-14 PROCEDURE — 25010000002 PIPERACILLIN SOD-TAZOBACTAM PER 1 G: Performed by: INTERNAL MEDICINE

## 2025-03-14 PROCEDURE — 99232 SBSQ HOSP IP/OBS MODERATE 35: CPT | Performed by: PHYSICIAN ASSISTANT

## 2025-03-14 PROCEDURE — 25810000003 SODIUM CHLORIDE 0.9 % SOLUTION 250 ML FLEX CONT: Performed by: INTERNAL MEDICINE

## 2025-03-14 PROCEDURE — 25010000002 NA FERRIC GLUC CPLX PER 12.5 MG: Performed by: INTERNAL MEDICINE

## 2025-03-14 PROCEDURE — 80053 COMPREHEN METABOLIC PANEL: CPT | Performed by: FAMILY MEDICINE

## 2025-03-14 PROCEDURE — 83735 ASSAY OF MAGNESIUM: CPT | Performed by: INTERNAL MEDICINE

## 2025-03-14 PROCEDURE — 25010000002 HYDROMORPHONE PER 4 MG: Performed by: FAMILY MEDICINE

## 2025-03-14 PROCEDURE — 25010000002 METHYLPREDNISOLONE PER 40 MG: Performed by: INTERNAL MEDICINE

## 2025-03-14 PROCEDURE — 84100 ASSAY OF PHOSPHORUS: CPT | Performed by: INTERNAL MEDICINE

## 2025-03-14 PROCEDURE — 92610 EVALUATE SWALLOWING FUNCTION: CPT

## 2025-03-14 PROCEDURE — 85025 COMPLETE CBC W/AUTO DIFF WBC: CPT | Performed by: INTERNAL MEDICINE

## 2025-03-14 PROCEDURE — 82948 REAGENT STRIP/BLOOD GLUCOSE: CPT

## 2025-03-14 PROCEDURE — 99232 SBSQ HOSP IP/OBS MODERATE 35: CPT | Performed by: INTERNAL MEDICINE

## 2025-03-14 RX ORDER — POTASSIUM CHLORIDE, DEXTROSE MONOHYDRATE 150; 5 MG/100ML; G/100ML
75 INJECTION, SOLUTION INTRAVENOUS CONTINUOUS
Status: DISPENSED | OUTPATIENT
Start: 2025-03-14 | End: 2025-03-16

## 2025-03-14 RX ORDER — AMINO AC/PROTEIN HYDR/WHEY PRO 10G-100/30
45 LIQUID (ML) ORAL 2 TIMES DAILY
Status: DISCONTINUED | OUTPATIENT
Start: 2025-03-14 | End: 2025-03-18

## 2025-03-14 RX ORDER — PANTOPRAZOLE SODIUM 40 MG/10ML
40 INJECTION, POWDER, LYOPHILIZED, FOR SOLUTION INTRAVENOUS
Status: DISCONTINUED | OUTPATIENT
Start: 2025-03-15 | End: 2025-03-21

## 2025-03-14 RX ORDER — METOCLOPRAMIDE HYDROCHLORIDE 5 MG/ML
10 INJECTION INTRAMUSCULAR; INTRAVENOUS EVERY 6 HOURS
Status: DISCONTINUED | OUTPATIENT
Start: 2025-03-14 | End: 2025-03-16 | Stop reason: DRUGHIGH

## 2025-03-14 RX ORDER — POLYETHYLENE GLYCOL 3350 17 G/17G
17 POWDER, FOR SOLUTION ORAL DAILY
Status: DISCONTINUED | OUTPATIENT
Start: 2025-03-14 | End: 2025-03-26 | Stop reason: HOSPADM

## 2025-03-14 RX ORDER — POTASSIUM CHLORIDE, DEXTROSE MONOHYDRATE 150; 5 MG/100ML; G/100ML
75 INJECTION, SOLUTION INTRAVENOUS CONTINUOUS
Status: DISPENSED | OUTPATIENT
Start: 2025-03-14 | End: 2025-03-14

## 2025-03-14 RX ADMIN — Medication 10 ML: at 22:01

## 2025-03-14 RX ADMIN — METOCLOPRAMIDE 10 MG: 5 INJECTION, SOLUTION INTRAMUSCULAR; INTRAVENOUS at 18:18

## 2025-03-14 RX ADMIN — DOXYCYCLINE 100 MG: 100 INJECTION, POWDER, LYOPHILIZED, FOR SOLUTION INTRAVENOUS at 21:58

## 2025-03-14 RX ADMIN — METOCLOPRAMIDE 10 MG: 5 INJECTION, SOLUTION INTRAMUSCULAR; INTRAVENOUS at 10:52

## 2025-03-14 RX ADMIN — METHYLPREDNISOLONE SODIUM SUCCINATE 40 MG: 40 INJECTION INTRAMUSCULAR; INTRAVENOUS at 10:52

## 2025-03-14 RX ADMIN — Medication 10 ML: at 08:14

## 2025-03-14 RX ADMIN — HYDROMORPHONE HYDROCHLORIDE 0.5 MG: 1 INJECTION, SOLUTION INTRAMUSCULAR; INTRAVENOUS; SUBCUTANEOUS at 22:13

## 2025-03-14 RX ADMIN — POTASSIUM CHLORIDE AND DEXTROSE MONOHYDRATE 75 ML/HR: 150; 5 INJECTION, SOLUTION INTRAVENOUS at 18:19

## 2025-03-14 RX ADMIN — Medication 45 ML: at 22:00

## 2025-03-14 RX ADMIN — POLYETHYLENE GLYCOL 3350 17 G: 17 POWDER, FOR SOLUTION ORAL at 14:06

## 2025-03-14 RX ADMIN — SODIUM CHLORIDE 250 MG: 9 INJECTION, SOLUTION INTRAVENOUS at 10:53

## 2025-03-14 RX ADMIN — DOXYCYCLINE 100 MG: 100 INJECTION, POWDER, LYOPHILIZED, FOR SOLUTION INTRAVENOUS at 08:14

## 2025-03-14 RX ADMIN — RIVAROXABAN 15 MG: 15 TABLET, FILM COATED ORAL at 18:19

## 2025-03-14 RX ADMIN — METHYLPREDNISOLONE SODIUM SUCCINATE 40 MG: 40 INJECTION INTRAMUSCULAR; INTRAVENOUS at 21:59

## 2025-03-14 RX ADMIN — PIPERACILLIN AND TAZOBACTAM 4.5 G: 4; .5 INJECTION, POWDER, FOR SOLUTION INTRAVENOUS at 14:06

## 2025-03-14 RX ADMIN — PIPERACILLIN AND TAZOBACTAM 4.5 G: 4; .5 INJECTION, POWDER, FOR SOLUTION INTRAVENOUS at 05:57

## 2025-03-14 RX ADMIN — METOCLOPRAMIDE 10 MG: 5 INJECTION, SOLUTION INTRAMUSCULAR; INTRAVENOUS at 21:59

## 2025-03-14 RX ADMIN — PIPERACILLIN AND TAZOBACTAM 4.5 G: 4; .5 INJECTION, POWDER, FOR SOLUTION INTRAVENOUS at 22:12

## 2025-03-14 NOTE — PROGRESS NOTES
Clinical Nutrition Assessment     Patient Name: Carlos Preston  YOB: 1928  MRN: [id number]  Date of Encounter: 03/14/25 13:09 EDT  Admission date: 3/11/2025  Reason for Visit: Follow-up protocol, EN    Assessment   Nutrition Assessment   Admission Diagnosis:  Rhabdomyolysis [M62.82]    Problem List:    Rhabdomyolysis    Moderate protein-calorie malnutrition      PMH:   He  has a past medical history of Cancer, H/O prostatectomy, Hearing loss, Heart disease, History of back surgery, Hypercholesterolemia, Hyperlipidemia, Hypertension, IHD (ischemic heart disease), Osteoarthritis of spine with myelopathy, lumbar region, Paroxysmal atrial fibrillation (07/03/2018), Pulmonary hypertension, S/P knee replacement, Sinus disorder, and thyroid ultrasound (08/28/2009).    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    Applicable Nutrition History:   3/12 SLP rec NPO w repeat 3/13  3/13-SLP Diet Recommendation: NPO, temporary alternate methods of nutrition/hydration, other (see comments) (vs consideration of comfort diet if aligns w/ GOC/POC)     Labs    Labs Reviewed: Yes     Results from last 7 days   Lab Units 03/14/25  0824 03/14/25  0504 03/14/25  0009 03/13/25  0913 03/13/25  0456 03/12/25  0002  "03/11/25  1430   GLUCOSE mg/dL 147* 132* 147*   < > 118*   < > 108*   BUN mg/dL 63* 63* 60*   < > 61*   < > 63*   CREATININE mg/dL 0.99 1.11 1.17   < > 0.92   < > 1.42*   SODIUM mmol/L 147* 146* 146*   < > 145   < > 143   CHLORIDE mmol/L 116* 116* 115*   < > 115*   < > 108*   POTASSIUM mmol/L 3.7 3.8 3.6   < > 3.8   < > 3.8   PHOSPHORUS mg/dL  --  2.8  --   --  3.4  --   --    MAGNESIUM mg/dL  --  2.4*  --   --  2.4*  --  2.3   ALT (SGPT) U/L 36 35 37   < > 45*   < > 59*   LACTATE mmol/L  --   --   --   --   --   --  1.8    < > = values in this interval not displayed.       Results from last 7 days   Lab Units 03/14/25  0824 03/14/25  0504 03/14/25  0009 03/13/25  0913 03/13/25  0456   ALBUMIN g/dL 2.3* 2.3* 2.4*   < > 2.5*   CRP mg/dL  --  5.67*  --   --  12.39*    < > = values in this interval not displayed.       Results from last 7 days   Lab Units 03/14/25  1137 03/14/25  0743 03/13/25  1643 03/13/25  1117 03/13/25  0537 03/13/25  0257   GLUCOSE mg/dL 136* 141* 132* 145* 115 110     No results found for: \"HGBA1C\"      Results from last 7 days   Lab Units 03/11/25  1430   PROBNP pg/mL 29,350.0*       Medications    Medications Reviewed: Yes    Scheduled Meds:[Held by provider] busPIRone, 5 mg, Oral, Daily With Lunch  [Held by provider] cetirizine, 10 mg, Oral, Daily  doxycycline, 100 mg, Intravenous, Q12H  [Held by provider] isosorbide mononitrate, 30 mg, Oral, QAM  methylPREDNISolone sodium succinate, 40 mg, Intravenous, Q12H  metoclopramide, 10 mg, Intravenous, Q6H  [Held by provider] NIFEdipine XL, 30 mg, Oral, Daily  [START ON 3/15/2025] pantoprazole, 40 mg, Intravenous, Q AM  piperacillin-tazobactam, 4.5 g, Intravenous, Q8H  polyethylene glycol, 17 g, Nasogastric, Daily  rivaroxaban, 15 mg, Oral, Daily With Dinner  sodium chloride, 10 mL, Intravenous, Q12H      Continuous Infusions:   PRN Meds:.  senna-docusate sodium **AND** polyethylene glycol **AND** bisacodyl **AND** bisacodyl    dextrose    dextrose    " "glucagon (human recombinant)    HYDROmorphone    ipratropium-albuterol    melatonin    nitroglycerin    ondansetron    sodium chloride    sodium chloride    sodium chloride    traMADol    Intake/Ouptut 24 hrs (0701 - 0700)   I&O's Reviewed: Yes   Intake & Output (last day)         03/13 0701  03/14 0700 03/14 0701  03/15 0700    Blood      Total Intake(mL/kg)      Urine (mL/kg/hr)      Total Output      Net                Last stool x 1 on 3/11    Anthropometrics     Height: Height: 182.9 cm (72\")  Last Filed Weight: Weight: 83.9 kg (185 lb) (03/11/25 1402)  Method:    BMI: BMI (Calculated): 25.1    UBW:  250lbs per pt's dtr, appears ~215lbs in the past yr  Weight change:  possible 30lb/14% wt loss x 1 yr-need measured wt from [facility] [place]   Weight      Weight (kg) Weight (lbs) Visit Report   1/10/2024 97.523 kg  215 lb  --    2/14/2024 95.074 kg  209 lb 9.6 oz  --    2/15/2024 95.1 kg  209 lb 10.5 oz     8/21/2024 88.542 kg  195 lb 3.2 oz  --    3/11/2025 83.915 kg  185 lb         Nutrition Focused Physical Exam    Date: 3/13    Patient meets criteria for malnutrition diagnosis, see MSA note.     Subjective   Reported/Observed/Food/Nutrition Related History:     3/14  RN states keofeed placed, in stomach and MD hummelay to feed.     3/13  Family rpt pt at ok until 4 da ago then unable to eat .Repeat has had signif wt loss. Family aware of plan for feeding tube. GI note indicated will place tube 2/2 aspiration risk at this time.    3/12  Pt up in chair at bedside, very sleepy. Dtr at bedside provides nutrition hx. Dtr states pt has had poor intake x 2 days, states prior to recent fall pt was eating well. Dtr confirms pt has had esophageal dilation in the past, ~1.5 yrs ago and states worsening dysphagia over the past yr and pt managing this w/cutting food into small pieces and drinking adequate fluids w/meals. Dtr states pt has also had significant wt loss over the past yr, states UBW is 250lbs and was 185lbs at MD office <1 " "week ago. Dtr unsure etiology as pt tells her he eats well.     Needs Assessment   Date: 3/13    Height used:Height: 182.9 cm (72\")  Weights used: 83.9 Kg    Estimated Calorie needs: ~ 2000 Kcal/day  Method:  Kcals/KG x 25 = 2098  Method:  [id number] x 1.3 = 1968    Estimated Protein needs: ~ 109 g PRO/day  Method: g/Kg x 1.3    Estimated Fluid needs: ~ ml/day   Per clinical status    Current Nutrition Prescription   PO: NPO Diet NPO Type: Strict NPO  Oral Nutrition Supplement:   Intake: N/A    Assessment & Plan   Nutrition Diagnosis   Date:  3/12            Updated:  3/13  Problem Inadequate oral intake    Etiology dysphagia   Signs/Symptoms NPO   Status: New EN rec in place    Date:   3/12  Updated:     Problem Unintended weight loss   Etiology ? Dysphagia, advanced age   Signs/Symptoms Possible 14% wt loss x 1 yr   Status: New    Date:  3/13 Updated:  Problem Malnutrition moderate acute   Etiology Alt Swallow fx   Signs/Symptoms Intake hx w some Wasting   Status: New    Goal / Objectives:   Nutrition to support treatment, Advance diet as medically feasible/appropriate, and Initiate EN, Tolerate EN at goal      Nutrition Intervention      Follow treatment progress, Care plan reviewed, EN rec prepared    Recommend EN regimen via keofeed:  Isosource 1.5 @ 20 ml/hr advance w tolerance by 10 ml/hr q 6 hr to goal @ 60 ml/hr. Water flush @ 45 ml/hr. Prosource TF BID.  X22hrs this regimen provides 1320ml, 2060kcals (103% est needs), 112g pro (103% est needs), 20g fiber, 1003ml wtaer from formula, +990ml water from flushes, 1993ml TFW.     Monitoring/Evaluation:   Per protocol, I&O, Pertinent labs, EN delivery/tolerance, Weight, Symptoms, Swallow function    Lorna Cuevas, MS,RD,LD  Time Spent:20 min  "

## 2025-03-14 NOTE — CASE MANAGEMENT/SOCIAL WORK
Continued Stay Note  [facility]     Patient Name: Carlos Preston  MRN: [id number]  Today's Date: 3/14/2025    Admit Date: 3/11/2025    Plan: Home with home health   Discharge Plan       Row Name 03/14/25 1522       Plan    Plan Home with home health    Patient/Family in Agreement with Plan yes    Plan Comments I spoke with Mr Luz family at bedside.  Mr Luz has worked with PT and OT and they are both recommending inpatient rehab.  At this time, family would prefer to take him home with home health.  Family would prefer [facility] home health out of [facility].  I have called and left a voicemail for [facility].  I have updated them that an electric wheelchair transport chair will need to be ordered by Mr Preston PCP.  Family is also interested in suction equipment.  At this time, Mr Preston has a keofeed placed due to dysphagia.  Family is open to a PEG tube.  Case management will follow up on Monday.    Final Discharge Disposition Code 06 - home with home health care                   Discharge Codes    No documentation.                 Expected Discharge Date and Time       Expected Discharge Date Expected Discharge Time    Mar 17, 2025               Agnes Hodge RN

## 2025-03-14 NOTE — THERAPY RE-EVALUATION
Acute Care - Speech Language Pathology   Swallow Re-Evaluation [facility]  Clinical Swallow Re-Evaluation       Patient Name: Carlos Preston  : 3/20/1928  MRN: [id number]  Today's Date: 3/14/2025               Admit Date: 3/11/2025    Visit Dx:     ICD-10-CM ICD-9-CM   1. Pneumonia of both lungs due to infectious organism, unspecified part of lung  J18.9 483.8   2. Traumatic rhabdomyolysis, initial encounter  T79.6XXA 958.6   3. Elevated troponin  R79.89 790.6   4. RSV (acute bronchiolitis due to respiratory syncytial virus)  J21.0 466.11   5. Impaired mobility and ADLs  Z74.09 V49.89    Z78.9    6. Dysphagia, unspecified type  R13.10 787.20     Patient Active Problem List   Diagnosis    Pulmonary HTN    Hyperlipemia    HTN (hypertension)    GERD (gastroesophageal reflux disease)    Hx of CABG    IHD (ischemic heart disease)    SOB (shortness of breath)    Abnormal chest x-ray    Esophageal stricture    Rhabdomyolysis    Moderate protein-calorie malnutrition     Past Medical History:   Diagnosis Date    Cancer     H/O prostatectomy     Hearing loss     Heart disease     History of back surgery     Hypercholesterolemia     Hyperlipidemia     Hypertension     IHD (ischemic heart disease)     S/P CABG    Osteoarthritis of spine with myelopathy, lumbar region     Paroxysmal atrial fibrillation 2018    Pulmonary hypertension     S/P knee replacement     Sinus disorder     thyroid ultrasound 2009    Normal     Past Surgical History:   Procedure Laterality Date    CARDIAC CATHETERIZATION  2007    Dr. Mace: 60% LM and 70% LCX.    CARDIAC CATHETERIZATION  2009    %, OM 85-90%,Patent grafts.    CARDIAC CATHETERIZATION  2018    Patent Grafts    CARDIOVASCULAR STRESS TEST  2009    ROBIN George: Dr. Mace- EF 29%, Diffuse ischemia.    CARDIOVASCULAR STRESS TEST  2012    L. Myoview- Inferoseptal infarct with jon infarct ischemia. EF 41%.    CARDIOVASCULAR STRESS TEST   03/23/2015    L. Myoview- EF51%,fixed defect , no ischemia burden.    CARDIOVASCULAR STRESS TEST  10/11/2017    L.Myoview- EF 54%. Small inferior Ischemia.    CONVERTED (HISTORICAL) HOLTER  06/12/2023    3 days. A.Fib. Avg 60. . 3 VT. One 3 Sec Pause    CONVERTED (HISTORICAL) HOLTER  05/28/2024    3 Days. A.Fib. Avg 70. . 4.6% PVC. 2 VT    CORONARY ARTERY BYPASS GRAFT  05/17/2007    CABG:  SVG to LAD and LCX.    ECHO - CONVERTED  08/06/2009    Dr. Mace- EF 50%.    ECHO - CONVERTED  02/14/2011    EF 50%, Septal WMA.    ECHO - CONVERTED  11/07/2012    EF 40-45%, RVSP 33 mmHg.    ECHO - CONVERTED  03/23/2015    EF 45-50%, RVSP 40mmHg,mild MR, mild PHT    ECHO - CONVERTED  05/01/2017    EF 50-55%, mild MR    ECHO - CONVERTED  10/11/2017    EF 55%    ECHO - CONVERTED  03/21/2022    TLS. EF 55%. LA- 5.2 cm. Mild MR. RVSP- 44 mmHg    ECHO - CONVERTED  02/15/2024    TLS. EF 55%.RHE. LA- 5.0. Mild MR.AO- 3.8. RVSP- 56 mmHg    US CAROTID UNILATERAL  04/16/2007    Mild Plaque in Rt. bulb and RECA.       SLP Recommendation and Plan  SLP Swallowing Diagnosis: oral dysphagia, R/O pharyngeal dysphagia (03/14/25 1145)  SLP Diet Recommendation: NPO, temporary alternate methods of nutrition/hydration, other (see comments) (03/14/25 1145)  Recommended Precautions and Strategies: general aspiration precautions (OK for ice chips after oral care- one at a time) (03/14/25 1145)  SLP Rec. for Method of Medication Administration: meds via alternate route (03/14/25 1145)     Monitor for Signs of Aspiration: notify SLP if any concerns, yes (03/14/25 1145)  Recommended Diagnostics: FEES (03/14/25 1145)  Swallow Criteria for Skilled Therapeutic Interventions Met: demonstrates skilled criteria (03/14/25 1145)     Rehab Potential/Prognosis, Swallowing: good, to achieve stated therapy goals (03/14/25 8325)  Therapy Frequency (Swallow): PRN (Pending FEES) (03/14/25 7715)     Oral Care Recommendations: Oral Care BID/PRN, Suction  toothbrush (03/14/25 1145)                                        Outcome Evaluation: Looks improved today. Appears ready for instrumental. Reporting fatigue/exhaustion today.  Plan is for FEES tomorrow.      SWALLOW EVALUATION (Last 72 Hours)       SLP Adult Swallow Evaluation       Row Name 03/14/25 1145 03/13/25 1035 03/12/25 1100             Rehab Evaluation    Document Type evaluation  -ML evaluation  - evaluation  -AW      Subjective Information complains of;fatigue  -ML no complaints  - no complaints  -AW      Patient Observations alert;cooperative  -ML alert;cooperative  - alert;cooperative  -AW      Patient/Family/Caregiver Comments/Observations Family present  - Dtr present  - grandchildren at bedside  -AW      Patient Effort good  -ML adequate  - adequate  -AW      Comment Family concerned as TF has not been initiated- they are reporting pt has been NPO x 4 days- spoke with RN and messaged RD.  RN reports tube is only at tip of the stomach AND she is awaiting order to start TF.  -ML -- --      Symptoms Noted During/After Treatment fatigue  -ML none  -MH none  -AW      Oral Care suction provided;teeth brushed - suction toothbrush  - suction provided;teeth brushed - suction toothbrush;tongue brushed  - swabbed with sterile water  -AW         General Information    Patient Profile Reviewed yes  -ML yes  - yes  -AW      Pertinent History Of Current Problem See ST hx  -ML See initial eval  - rhabdomylosis, RSV, pna  -AW      Current Method of Nutrition small-bore  Small bore NG in place but TF not initiated.  -ML NPO  - NPO  reg/thins at home  -AW      Precautions/Limitations, Vision -- WFL;for purposes of eval  - WFL  -AW      Precautions/Limitations, Hearing -- WFL;for purposes of eval  - WFL  -AW      Prior Level of Function-Communication -- WFL  - WFL  -AW      Prior Level of Function-Swallowing -- esophageal concerns  - no diet consistency restrictions  -AW      Plans/Goals  Discussed with -- patient;family;agreed upon  - patient;patient and family;agreed upon  -      Barriers to Rehab -- medically complex  - medically complex;previous functional deficit  -      Patient's Goals for Discharge -- patient did not state  - return to PO diet  -         Pain    Pretreatment Pain Rating 0/10 - no pain  -ML -- 0/10 - no pain  -AW      Posttreatment Pain Rating 0/10 - no pain  -ML -- 0/10 - no pain  -AW      Additional Documentation -- Pain Scale: FACES Pre/Post-Treatment (Group)  - --         Pain Scale: FACES Pre/Post-Treatment    Pain: FACES Scale, Pretreatment -- 0-->no hurt  - --      Posttreatment Pain Rating -- 0-->no hurt  - --         Oral Motor Structure and Function    Dentition Assessment -- natural, present and adequate  - natural, present and adequate  -      Secretion Management -- WNL/WFL  - WNL/WFL  -AW      Mucosal Quality -- dry  - moist, healthy  -      Volitional Swallow -- -- delayed;weak  -      Volitional Cough -- -- weak;WFL  -AW         Oral Musculature and Cranial Nerve Assessment    Oral Motor General Assessment generalized oral motor weakness  - generalized oral motor weakness  - WFL  -AW         General Eating/Swallowing Observations    Respiratory Support Currently in Use nasal cannula  - nasal cannula  - nasal cannula  -      O2 Liters -- 2L  - --      Eating/Swallowing Skills fed by SLP  - fed by SLP  - fed by SLP  -AW      Positioning During Eating upright in bed  - upright in bed;upright 90 degree  - upright in bed;upright 90 degree  -      Utensils Used spoon  - spoon;cup  - spoon;straw  -      Consistencies Trialed ice chips;thin liquids  water via tsp  -ML ice chips;thin liquids;nectar/syrup-thick liquids;pureed  - thin liquids;ice chips  water only d/t GI concerns  -      Pre SpO2 (%) 97  -ML -- --      Post SpO2 (%) 97  -ML -- --         Clinical Swallow Eval    Oral Prep Phase impaired  - --  WFL  -AW      Oral Transit WFL  -ML -- WFL  -AW      Oral Residue WFL  -ML -- WFL  -AW      Pharyngeal Phase suspected pharyngeal impairment  -ML suspected pharyngeal impairment  - suspected pharyngeal impairment  -AW      Esophageal Phase --  Hx of esophageal dysphagia- s/p dilation over 1 year ago  -ML -- suspected esophageal impairment  -AW      Clinical Swallow Evaluation Summary Clinical swallowing reevaluation. Looks better today.  Continued coughing intermittently after PO trials. Cough is intermittently productive. Vocal quality improved after ice/water trials.   Still holding yaunker in his hands but infrequent use.  Needs instrumental- pt doesn't care if he has FEES or MBS- esophageal hx but leaning towards FEES to get started.  -ML Suction required t/o eval & prior to PO trials. Immediate, wet cough w/ thin liquid & NTL trials. Multiple swallows & wet vocal w/ all trials. Pt felt to be @ high risk of aspiration w/ all PO intake @ this time. Pt is also not appropriate to participate in instrumental assessment @ this time. Had lengthy d/w pt & dtr re: aspiration risk, NPO w/ alternative means of nutrition/hydration vs consideration of comfort diet. Pt/dtr would like to proceed w/ safest option. Rec cont NPO w/ alternative means of nutrition/hydration if aligns w/ GOC/POC  - Pt with wet coughing, productive, suctioning mouth. He exh multiple swallows, cough with larger drinks of thin. Pt acknowledged that one of the sips didn''t go down well. Pt at high risk of aspiration at this time, family wants pt to be able to have some sips, which is reasonable to keep pt comfortable. Advised oral care prior. Will f/u after GI POC in place and give further trials of solids. Pt is too weak for an instrumental swallow exam at this time.  -AW         Oral Prep Concerns    Oral Prep Concerns prolonged mastication  -ML -- --      Oral Prep Concerns, Comment Slow preparaton/manipulation of ice/water.  -ML -- --          Pharyngeal Phase Concerns    Pharyngeal Phase Concerns -- wet vocal quality;cough;throat clear  - wet vocal quality;multiple swallows;cough;throat clear;other (see comments)  dysphonia  -AW      Wet Vocal Quality -- all consistencies  - thin  -AW      Multiple Swallows -- all consistencies  - thin  -AW      Cough thin;other (see comments)  ice/water  -ML all consistencies  - thin  -AW      Throat Clear -- -- thin  -AW         Esophageal Phase Concerns    Esophageal Phase Concerns other (see comments)  No symptoms today- but hx of esophageal dilation  - -- belching  -AW      Belching -- -- thin  -AW         SLP Evaluation Clinical Impression    SLP Swallowing Diagnosis oral dysphagia;R/O pharyngeal dysphagia  - suspected pharyngeal dysphagia  - R/O pharyngeal dysphagia;suspected pharyngeal dysphagia;suspected esophageal dysphagia  -      Functional Impact risk of aspiration/pneumonia;risk of malnutrition  - risk of aspiration/pneumonia;risk of malnutrition;risk of dehydration  - risk of aspiration/pneumonia;risk of malnutrition;risk of dehydration  -AW      Rehab Potential/Prognosis, Swallowing good, to achieve stated therapy goals  - adequate, monitor progress closely  - adequate, monitor progress closely  -AW      Swallow Criteria for Skilled Therapeutic Interventions Met demonstrates skilled criteria  - demonstrates skilled criteria  - demonstrates skilled criteria  -AW         Recommendations    Therapy Frequency (Swallow) PRN  Pending FEES  - -- PRN  -AW      Predicted Duration Therapy Intervention (Days) -- until discharge  - until discharge  -      SLP Diet Recommendation NPO;temporary alternate methods of nutrition/hydration;other (see comments)  - NPO;temporary alternate methods of nutrition/hydration;other (see comments)  vs consideration of comfort diet if aligns w/ GOC/POC  - NPO;ice chips between meals after oral care, with supervision;water between meals after oral  care, with supervision  -      Recommended Diagnostics FEES  - reassess via clinical swallow evaluation  - reassess via clinical swallow evaluation  -AW      Recommended Precautions and Strategies general aspiration precautions  OK for ice chips after oral care- one at a time  -ML -- upright posture during/after eating;small bites of food and sips of liquid  sips and chips only  -AW      Oral Care Recommendations Oral Care BID/PRN;Suction toothbrush  -ML Oral Care BID/PRN;Suction toothbrush  - Oral Care BID/PRN;Swab;Suction toothbrush  -AW      SLP Rec. for Method of Medication Administration meds via alternate route  - meds via alternate route  - meds crushed;with puree;meds via alternate route  -AW      Monitor for Signs of Aspiration notify SLP if any concerns;yes  - notify SLP if any concerns;yes  - notify SLP if any concerns;yes  -AW      Anticipated Discharge Disposition (SLP) -- inpatient rehabilitation facility  - inpatient rehabilitation facility  -      Equipment Issued to Patient -- adaptive cup  - --                User Key  (r) = Recorded By, (t) = Taken By, (c) = Cosigned By      Initials Name Effective Dates    AW Malena Carmona, MS CCC-SLP 02/03/23 -     ML Sarah Savage, MS CCC-SLP 08/30/24 -      Dejah Hernandes, MS CCC-SLP 05/12/23 -                     EDUCATION  The patient has been educated in the following areas:   Dysphagia (Swallowing Impairment) Oral Care/Hydration NPO rationale FEES vs. MBS .                Time Calculation:    Time Calculation- SLP       Row Name 03/14/25 1355             Time Calculation- SLP    SLP Start Time 1145  -ML      SLP Received On 03/14/25  -ML                User Key  (r) = Recorded By, (t) = Taken By, (c) = Cosigned By      Initials Name Provider Type    ML Sarah Savage, MS CCC-SLP Speech and Language Pathologist                    Therapy Charges for Today       Code Description Service Date Service Provider  Modifiers Qty    [id number] [facility] EVAL ORAL PHARYNG SWALLOW 4 3/14/2025 Sarah Savage, MS CCC-SLP GN 1                 Sarah Savage, MS CCC-SLP  3/14/2025

## 2025-03-14 NOTE — PROGRESS NOTES
"GI Daily Progress Note  Subjective:    Chief Complaint:  Follow up dysphagia and abnormal CT abdomen     Had bedside Keofeed tube placed overnight.   Remains globally weak.   Continues to have cough and difficulty clearing secretions, using suction device.        Last bowel movement was on evening of 3/11.      Objective:    /78 (BP Location: Right arm, Patient Position: Lying)   Pulse 77   Temp 98.1 °F (36.7 °C) (Axillary)   Resp 18   Ht 182.9 cm (72\")   Wt 83.9 kg (185 lb)   SpO2 97%   BMI 25.09 kg/m²     Physical Exam  Constitutional:       Appearance: He is ill-appearing.      Comments: Frail elderly male resting supine.    Appears globally weak and fatigued    Cardiovascular:      Rate and Rhythm: Normal rate and regular rhythm.   Pulmonary:      Effort: Pulmonary effort is normal. No respiratory distress.      Comments: On 2L O2 via nasal cannula   Abdominal:      General: Bowel sounds are normal.      Palpations: Abdomen is soft.      Tenderness: There is no abdominal tenderness. There is no guarding.         Lab  Lab Results   Component Value Date    WBC 11.04 (H) 03/14/2025    HGB 10.4 (L) 03/14/2025    HGB 10.9 (L) 03/13/2025    HGB 10.2 (L) 03/13/2025    MCV 97.2 (H) 03/14/2025     (L) 03/14/2025       Lab Results   Component Value Date    GLUCOSE 147 (H) 03/14/2025    BUN 63 (H) 03/14/2025    CREATININE 0.99 03/14/2025    BCR 63.6 (H) 03/14/2025     (H) 03/14/2025    K 3.7 03/14/2025    CO2 18.0 (L) 03/14/2025    CALCIUM 9.0 03/14/2025    ALBUMIN 2.3 (L) 03/14/2025    ALKPHOS 105 03/14/2025    BILITOT 0.9 03/14/2025    ALT 36 03/14/2025    AST 39 03/14/2025       Assessment:    Abnormal CT abdomen with common bile duct dilation as well as pancreas duct dilation.   No focal mass or obstructive process seen on noncontrast CT.    Total bilirubin is normal.    Unintentional weight loss, raising concern of underlying malignancy  Elevated LFTs, secondary to rhabdomyolysis.   Improved. "      Rhabdomyolysis  RSV pneumonia  Atrial fibrillation, on anticoagulation with Eliquis   Recent falls   Dysphagia, suspect oropharyngeal at this time     Plan:    >> Begin enteral nutrition.   Keep head of bed raised.     >> Once daily PPI  >> Begin once daily Miralax via keofeed tube   >> Consider MRI abdomen with and without contrast, with MRCP sequence when hemodynamically stable and respiratory status improved.     Will sign off.  Please call for any questions or concerns.       ELW Soto  03/14/25  11:37 EDT

## 2025-03-14 NOTE — PLAN OF CARE
Goal Outcome Evaluation:              Outcome Evaluation: Looks improved today. Appears ready for instrumental. Reporting fatigue/exhaustion today.  Plan is for FEES tomorrow.               SLP Swallowing Diagnosis: oral dysphagia, R/O pharyngeal dysphagia (03/14/25 9974)

## 2025-03-14 NOTE — PROGRESS NOTES
[facility] Medicine Services  PROGRESS NOTE    Patient Name: Carlos Preston  : 3/20/1928  MRN: [id number]    Date of Admission: 3/11/2025  Primary Care Physician: Newton Hankins MD    Subjective   Subjective     CC:  Follow-up for shortness of breath    HPI:  Patient seen and examined this morning.  More awake and alert today.  Cough is significantly improved.  Family gave him a cup of Gatorade to drink and I instructed them that no liquid is allowed because of his increased risk of aspiration.  Keofeed was placed overnight and is supposed to start on tube feeds soon    Objective   Objective     Vital Signs:   Temp:  [96.8 °F (36 °C)-99.2 °F (37.3 °C)] 98 °F (36.7 °C)  Heart Rate:  [73-90] 75  Resp:  [18] 18  BP: (126-144)/(65-85) 126/76  Flow (L/min) (Oxygen Therapy):  [1.5-15] 15     Physical Exam:  General: Comfortable, not in distress, conversant and cooperative  Head: Atraumatic and normocephalic  Eyes: No Icterus. No pallor  Ears:  Ears appear intact with no abnormalities noted  Throat: No oral lesions, no thrush  Neck: Supple, trachea midline  Lungs: Bilateral basal coarse crackles with bronchial breathing.  Heart:  Normal S1 and S2, no murmur, no gallop, No JVD, no lower extremity swelling  Abdomen:  Soft, no tenderness, no organomegaly, hypoactive bowel sounds, no organomegaly  Extremities: pulses equal bilaterally  Skin: No bleeding, bruising or rash, normal skin turgor and elasticity  Neurologic: Cranial nerves appear intact with no evidence of facial asymmetry, normal motor and sensory functions in all 4 extremities  Psych: Alert and oriented x 3, normal mood    Results Reviewed:  LAB RESULTS:      Lab 25  0504 25  1856 25  1454 25  0456 25  0058 25  1150 25  0417 25  1528 25  1430   WBC 11.04*  --   --  9.13  --   --  15.52*  --  23.50*   HEMOGLOBIN 10.4* 10.9* 10.2* 9.7* 9.0*   < > 7.6*  --  9.2*   HEMATOCRIT 35.3*  36.1* 34.0* 32.0* 30.1*   < > 25.7*  --  29.6*   PLATELETS 119*  --   --  120*  --   --  93*  --  103*   NEUTROS ABS 10.29*  --   --  8.52*  --   --   --   --  21.84*   IMMATURE GRANS (ABS) 0.06*  --   --  0.06*  --   --   --   --  0.07*   LYMPHS ABS 0.37*  --   --  0.34*  --   --   --   --  0.45*   MONOS ABS 0.31  --   --  0.20  --   --   --   --  1.13*   EOS ABS 0.00  --   --  0.00  --   --   --   --  0.00   MCV 97.2*  --   --  95.0  --   --  95.2  --  92.8   CRP  --   --   --  12.39*  --   --   --   --   --    PROCALCITONIN  --   --   --  0.73*  --   --   --   --  1.17*   LACTATE  --   --   --   --   --   --   --   --  1.8   LDH  --   --   --   --   --   --  340*  --   --    HSTROP T  --   --   --   --   --   --  96* 123* 124*    < > = values in this interval not displayed.         Lab 03/14/25  0009 03/13/25  1856 03/13/25  1454 03/13/25  0913 03/13/25  0456 03/12/25  0002 03/11/25  1430   SODIUM 146* 147* 146* 144 145   < > 143   POTASSIUM 3.6 3.8 3.6 3.7 3.8   < > 3.8   CHLORIDE 115* 115* 114* 114* 115*   < > 108*   CO2 18.0* 19.0* 19.0* 14.0* 16.0*   < > 20.3*   ANION GAP 13.0 13.0 13.0 16.0* 14.0   < > 14.7   BUN 60* 60* 61* 62* 61*   < > 63*   CREATININE 1.17 1.14 1.07 1.01 0.92   < > 1.42*   EGFR 57.1* 58.9* 63.5 68.1 76.1   < > 45.2*   GLUCOSE 147* 139* 132* 134* 118*   < > 108*   CALCIUM 8.9 9.1 8.7 8.9 9.0   < > 9.5   MAGNESIUM  --   --   --   --  2.4*  --  2.3   PHOSPHORUS  --   --   --   --  3.4  --   --     < > = values in this interval not displayed.         Lab 03/14/25  0009 03/13/25  1856 03/13/25  1454 03/13/25  0913 03/13/25  0456   TOTAL PROTEIN 5.5* 6.2 5.6* 5.7* 6.1   ALBUMIN 2.4* 2.6* 2.6* 2.2* 2.5*   GLOBULIN 3.1 3.6 3.0 3.5 3.6   ALT (SGPT) 37 43* 42* 42* 45*   AST (SGOT) 45* 58* 58* 64* 74*   BILIRUBIN 0.9 1.0 1.0 1.0 1.1   ALK PHOS 104 118* 117 110 118*         Lab 03/12/25  0417 03/11/25  1528 03/11/25  1430   PROBNP  --   --  29,350.0*   HSTROP T 96* 123* 124*             Lab  03/12/25  1150 03/12/25  0833 03/12/25  0417   IRON  --   --  9*   IRON SATURATION (TSAT)  --   --  4*   TIBC  --   --  222*   TRANSFERRIN  --   --  149*   FERRITIN  --   --  238.60   FOLATE  --  8.44  --    VITAMIN B 12  --  1,188*  --    ABO TYPING O  --  O   RH TYPING Positive  --  Positive   ANTIBODY SCREEN Negative  --   --          Brief Urine Lab Results  (Last result in the past 365 days)        Color   Clarity   Blood   Leuk Est   Nitrite   Protein   CREAT   Urine HCG        03/11/25 1604 [place]   Clear   Moderate (2+)   Negative   Negative   30 mg/dL (1+)                   Microbiology Results Abnormal       Procedure Component Value - Date/Time    COVID-19, FLU A/B, RSV PCR 1 HR TAT - Swab, Nasopharynx [[id number]]  (Abnormal) Collected: 03/11/25 1431    Lab Status: Final result Specimen: Swab from Nasopharynx Updated: 03/11/25 1518     COVID19 Not Detected     Influenza A PCR Not Detected     Influenza B PCR Not Detected     RSV, PCR Detected    Narrative:      Fact sheet for providers: https://www.fda.gov/media/532724/download    Fact sheet for patients: https://www.fda.gov/media/000754/download    Test performed by PCR.            XR Abdomen KUB  Result Date: 3/13/2025  XR ABDOMEN KUB Date of Exam: 3/13/2025 10:46 PM EDT Indication: verify keofeed placement Comparison: None available. Findings: There is a feeding tube in the stomach. There are scattered pockets of large and small bowel gas in a nonspecific pattern. There are degenerative changes of the spine.     Impression: Impression: Feeding tube is in the stomach. Electronically Signed: Domingo Germain MD  3/13/2025 11:12 PM EDT  Workstation ID: [id number]      Results for orders placed during the hospital encounter of 02/15/24    Adult Transthoracic Echo Complete W/ Cont if Necessary Per Protocol    Interpretation Summary    Atrial fibrillation was the predominant rhythm observed during the procedure.    The study is technically difficult    Left  ventricular wall thickness is consistent with mild concentric hypertrophy.    Left ventricular ejection fraction appears to be 51 - 55%.    Left ventricular diastolic function is consistent with (grade I) impaired relaxation.    The right ventricular cavity is borderline dilated.    The left atrial cavity is moderately dilated.  5.0 cm    The right atrial cavity is mildly  dilated.    No aortic valve regurgitation or stenosis is present    Mild mitral valve regurgitation with MAC is present.    Moderate tricuspid valve regurgitation is present.  RVSP- 56 mmHg    Borderline dilation of the aortic root is present.  3.8 cm      Current medications:  Scheduled Meds:[Held by provider] busPIRone, 5 mg, Oral, Daily With Lunch  [Held by provider] cetirizine, 10 mg, Oral, Daily  doxycycline, 100 mg, Intravenous, Q12H  enoxaparin sodium, 40 mg, Subcutaneous, Nightly  ferric gluconate, 250 mg, Intravenous, Daily  [Held by provider] isosorbide mononitrate, 30 mg, Oral, QAM  methylPREDNISolone sodium succinate, 40 mg, Intravenous, Q12H  [Held by provider] NIFEdipine XL, 30 mg, Oral, Daily  piperacillin-tazobactam, 4.5 g, Intravenous, Q8H  [Held by provider] rivaroxaban, 20 mg, Oral, Daily  sodium chloride, 10 mL, Intravenous, Q12H      Continuous Infusions:     PRN Meds:.  senna-docusate sodium **AND** polyethylene glycol **AND** bisacodyl **AND** bisacodyl    dextrose    dextrose    glucagon (human recombinant)    HYDROmorphone    ipratropium-albuterol    melatonin    nitroglycerin    ondansetron    sodium chloride    sodium chloride    sodium chloride    traMADol    Assessment & Plan   Assessment & Plan     Active Hospital Problems    Diagnosis  POA    **Rhabdomyolysis [M62.82]  Yes      Resolved Hospital Problems   No resolved problems to display.        Brief Hospital Course to date:  Carlos Preston is a 89 y.o. male with a history of heart disease, hyperlipidemia, ischemic heart disease status post CABG, paroxysmal A-fib  on Eliquis presented to the hospital with progressive weakness.  Found to have bilateral pneumonia and acute rhabdomyolysis    Acute hypoxia respiratory insufficiency  RSV infection  Superimposed left lower lobe bacterial pneumonia, unspecified  Bilateral basal lung fibrosis concerning for chronic dysphagia / aspiration  CT chest with bilateral chronic fibrotic changes in both lung bases and developing left lower lobe pneumonia.  Patient tested positive for RSV.  Hypoxic requiring 4 L nasal cannula  Continue IV Zosyn. S/P azithromycin  Continue IV Solu-Medrol 40 mg twice daily  Negative Legionella antigen, strep antigen and MRSA swab  Follow sputum culture  DuoNebs, Mucinex and I-S    Chronic dysphagia  History of esophageal stricture  Speech team consulted.  Recommended temporary feeding tube  Patient underwent feeding tube placement and will start on tube feed  Speech following  GI following    Acute rhabdomyolysis  Elevated troponin  Patient fell x 2 and was on the floor for 4 to 5 hours  CPK on admission 2500.  Trended down to 1200 with IV fluids   Continue IV fluids with D5 till tube feed is optimized  Elevated troponin felt to be demand ischemia and secondary to rhabdomyolysis.  Patient with no chest pain or ischemic changes in EKG.  Defer ischemic workup given his frailty and age       Severe iron deficiency anemia   Symptomatic anemia   Patient is anemic with hemoglobin of 7.6 on admission.  Received 1 unit of blood transfusion and IV iron x 3 doses  Hemoglobin stable around 10  No evidence of GI bleed    Possible ileus  Hypoactive bowel sounds and KUB with nonobstructive gas pattern  Reglan    Elevated liver enzymes, improved  CBD dilation  Abnormal CT abdomen with common bile duct dilation as well as pancreas duct dilation.   No focal mass or obstructive process seen on noncontrast CT  Total bilirubin is normal  GI following and recommended MRCP when more stable    A-fib  Hx of CABG  HTN  On Xarelto at  home. Family and patient aware of risk/benefits of holding Xarelto in the setting of concern for swallow safety.  CT head no acute intracranial abnormality identified  ECHO: 02/2024: EF: 51-55%     Debility  PT/OT  Will likely need rehab          Expected Discharge Location and Transportation: [facility]  Expected Discharge   Expected Discharge Date: 3/17/2025; Expected Discharge Time:      VTE Prophylaxis:  Pharmacologic & mechanical VTE prophylaxis orders are present.         AM-PAC 6 Clicks Score (PT): 11 (03/13/25 1930)    CODE STATUS:   Code Status and Medical Interventions: CPR (Attempt to Resuscitate); Full Support   Ordered at: 03/11/25 5691     Code Status (Patient has no pulse and is not breathing):    CPR (Attempt to Resuscitate)     Medical Interventions (Patient has pulse or is breathing):    Full Support     Level Of Support Discussed With:    Patient       Maksim Delgado MD  03/14/25

## 2025-03-14 NOTE — PLAN OF CARE
Goal Outcome Evaluation:  Plan of Care Reviewed With: patient, family                   A&O x 4. The current medical regimen is effective;  continue present plan and medications. Fall precautions and contact precaution in place. Is max assist of 2 for transfers. Is on 1.5 liters oxygen and afib on tele. Keofeed placed last night.

## 2025-03-15 LAB
ALBUMIN SERPL-MCNC: 2.4 G/DL (ref 3.5–5.2)
ALBUMIN SERPL-MCNC: 2.5 G/DL (ref 3.5–5.2)
ALBUMIN/GLOB SERPL: 0.8 G/DL
ALBUMIN/GLOB SERPL: 0.9 G/DL
ALP SERPL-CCNC: 104 U/L (ref 39–117)
ALP SERPL-CCNC: 110 U/L (ref 39–117)
ALP SERPL-CCNC: 110 U/L (ref 39–117)
ALP SERPL-CCNC: 113 U/L (ref 39–117)
ALT SERPL W P-5'-P-CCNC: 32 U/L (ref 1–41)
ALT SERPL W P-5'-P-CCNC: 34 U/L (ref 1–41)
ALT SERPL W P-5'-P-CCNC: 34 U/L (ref 1–41)
ALT SERPL W P-5'-P-CCNC: 35 U/L (ref 1–41)
ANION GAP SERPL CALCULATED.3IONS-SCNC: 10 MMOL/L (ref 5–15)
ANION GAP SERPL CALCULATED.3IONS-SCNC: 10 MMOL/L (ref 5–15)
ANION GAP SERPL CALCULATED.3IONS-SCNC: 13 MMOL/L (ref 5–15)
ANION GAP SERPL CALCULATED.3IONS-SCNC: 13 MMOL/L (ref 5–15)
AST SERPL-CCNC: 28 U/L (ref 1–40)
AST SERPL-CCNC: 31 U/L (ref 1–40)
AST SERPL-CCNC: 31 U/L (ref 1–40)
AST SERPL-CCNC: 34 U/L (ref 1–40)
BASOPHILS # BLD AUTO: 0.02 10*3/MM3 (ref 0–0.2)
BASOPHILS NFR BLD AUTO: 0.2 % (ref 0–1.5)
BILIRUB SERPL-MCNC: 0.7 MG/DL (ref 0–1.2)
BILIRUB SERPL-MCNC: 0.8 MG/DL (ref 0–1.2)
BUN SERPL-MCNC: 56 MG/DL (ref 8–23)
BUN SERPL-MCNC: 56 MG/DL (ref 8–23)
BUN SERPL-MCNC: 61 MG/DL (ref 8–23)
BUN SERPL-MCNC: 62 MG/DL (ref 8–23)
BUN/CREAT SERPL: 53.3 (ref 7–25)
BUN/CREAT SERPL: 53.3 (ref 7–25)
BUN/CREAT SERPL: 56.4 (ref 7–25)
BUN/CREAT SERPL: 58.7 (ref 7–25)
CALCIUM SPEC-SCNC: 9 MG/DL (ref 8.2–9.6)
CALCIUM SPEC-SCNC: 9.2 MG/DL (ref 8.2–9.6)
CHLORIDE SERPL-SCNC: 115 MMOL/L (ref 98–107)
CHLORIDE SERPL-SCNC: 118 MMOL/L (ref 98–107)
CHOLEST SERPL-MCNC: 65 MG/DL (ref 0–200)
CO2 SERPL-SCNC: 19 MMOL/L (ref 22–29)
CO2 SERPL-SCNC: 19 MMOL/L (ref 22–29)
CO2 SERPL-SCNC: 20 MMOL/L (ref 22–29)
CO2 SERPL-SCNC: 21 MMOL/L (ref 22–29)
CREAT SERPL-MCNC: 1.04 MG/DL (ref 0.76–1.27)
CREAT SERPL-MCNC: 1.05 MG/DL (ref 0.76–1.27)
CREAT SERPL-MCNC: 1.05 MG/DL (ref 0.76–1.27)
CREAT SERPL-MCNC: 1.1 MG/DL (ref 0.76–1.27)
DEPRECATED RDW RBC AUTO: 55.5 FL (ref 37–54)
EGFRCR SERPLBLD CKD-EPI 2021: 61.4 ML/MIN/1.73
EGFRCR SERPLBLD CKD-EPI 2021: 65 ML/MIN/1.73
EGFRCR SERPLBLD CKD-EPI 2021: 65 ML/MIN/1.73
EGFRCR SERPLBLD CKD-EPI 2021: 65.7 ML/MIN/1.73
EOSINOPHIL # BLD AUTO: 0 10*3/MM3 (ref 0–0.4)
EOSINOPHIL NFR BLD AUTO: 0 % (ref 0.3–6.2)
ERYTHROCYTE [DISTWIDTH] IN BLOOD BY AUTOMATED COUNT: 16.2 % (ref 12.3–15.4)
GLOBULIN UR ELPH-MCNC: 2.7 GM/DL
GLOBULIN UR ELPH-MCNC: 2.9 GM/DL
GLUCOSE BLDC GLUCOMTR-MCNC: 204 MG/DL (ref 70–130)
GLUCOSE BLDC GLUCOMTR-MCNC: 208 MG/DL (ref 70–130)
GLUCOSE BLDC GLUCOMTR-MCNC: 227 MG/DL (ref 70–130)
GLUCOSE SERPL-MCNC: 181 MG/DL (ref 65–99)
GLUCOSE SERPL-MCNC: 213 MG/DL (ref 65–99)
GLUCOSE SERPL-MCNC: 213 MG/DL (ref 65–99)
GLUCOSE SERPL-MCNC: 226 MG/DL (ref 65–99)
HCT VFR BLD AUTO: 33.3 % (ref 37.5–51)
HGB BLD-MCNC: 10.1 G/DL (ref 13–17.7)
IMM GRANULOCYTES # BLD AUTO: 0.18 10*3/MM3 (ref 0–0.05)
IMM GRANULOCYTES NFR BLD AUTO: 1.5 % (ref 0–0.5)
LYMPHOCYTES # BLD AUTO: 0.51 10*3/MM3 (ref 0.7–3.1)
LYMPHOCYTES NFR BLD AUTO: 4.2 % (ref 19.6–45.3)
MAGNESIUM SERPL-MCNC: 2.4 MG/DL (ref 1.7–2.3)
MCH RBC QN AUTO: 28.8 PG (ref 26.6–33)
MCHC RBC AUTO-ENTMCNC: 30.3 G/DL (ref 31.5–35.7)
MCV RBC AUTO: 94.9 FL (ref 79–97)
MONOCYTES # BLD AUTO: 0.4 10*3/MM3 (ref 0.1–0.9)
MONOCYTES NFR BLD AUTO: 3.3 % (ref 5–12)
NEUTROPHILS NFR BLD AUTO: 10.93 10*3/MM3 (ref 1.7–7)
NEUTROPHILS NFR BLD AUTO: 90.8 % (ref 42.7–76)
NRBC BLD AUTO-RTO: 0.6 /100 WBC (ref 0–0.2)
PHOSPHATE SERPL-MCNC: 2.1 MG/DL (ref 2.5–4.5)
PLATELET # BLD AUTO: 121 10*3/MM3 (ref 140–450)
PMV BLD AUTO: 11.9 FL (ref 6–12)
POTASSIUM SERPL-SCNC: 3.8 MMOL/L (ref 3.5–5.2)
POTASSIUM SERPL-SCNC: 3.9 MMOL/L (ref 3.5–5.2)
PROT SERPL-MCNC: 5.2 G/DL (ref 6–8.5)
PROT SERPL-MCNC: 5.3 G/DL (ref 6–8.5)
RBC # BLD AUTO: 3.51 10*6/MM3 (ref 4.14–5.8)
SODIUM SERPL-SCNC: 146 MMOL/L (ref 136–145)
SODIUM SERPL-SCNC: 147 MMOL/L (ref 136–145)
SODIUM SERPL-SCNC: 147 MMOL/L (ref 136–145)
SODIUM SERPL-SCNC: 148 MMOL/L (ref 136–145)
TRIGL SERPL-MCNC: 79 MG/DL (ref 0–150)
WBC NRBC COR # BLD AUTO: 12.04 10*3/MM3 (ref 3.4–10.8)

## 2025-03-15 PROCEDURE — 83735 ASSAY OF MAGNESIUM: CPT

## 2025-03-15 PROCEDURE — 84478 ASSAY OF TRIGLYCERIDES: CPT

## 2025-03-15 PROCEDURE — 80053 COMPREHEN METABOLIC PANEL: CPT

## 2025-03-15 PROCEDURE — 84100 ASSAY OF PHOSPHORUS: CPT

## 2025-03-15 PROCEDURE — 80053 COMPREHEN METABOLIC PANEL: CPT | Performed by: FAMILY MEDICINE

## 2025-03-15 PROCEDURE — 82948 REAGENT STRIP/BLOOD GLUCOSE: CPT

## 2025-03-15 PROCEDURE — 25810000003 DEXTROSE 5 % WITH KCL 20 MEQ 20 MEQ/L SOLUTION: Performed by: INTERNAL MEDICINE

## 2025-03-15 PROCEDURE — 25010000002 PIPERACILLIN SOD-TAZOBACTAM PER 1 G: Performed by: INTERNAL MEDICINE

## 2025-03-15 PROCEDURE — 85025 COMPLETE CBC W/AUTO DIFF WBC: CPT | Performed by: INTERNAL MEDICINE

## 2025-03-15 PROCEDURE — 25010000002 METOCLOPRAMIDE PER 10 MG: Performed by: INTERNAL MEDICINE

## 2025-03-15 PROCEDURE — 82465 ASSAY BLD/SERUM CHOLESTEROL: CPT

## 2025-03-15 PROCEDURE — 99232 SBSQ HOSP IP/OBS MODERATE 35: CPT | Performed by: INTERNAL MEDICINE

## 2025-03-15 RX ORDER — HYDROXYZINE HYDROCHLORIDE 25 MG/1
25 TABLET, FILM COATED ORAL NIGHTLY
Status: DISCONTINUED | OUTPATIENT
Start: 2025-03-15 | End: 2025-03-16

## 2025-03-15 RX ORDER — NICOTINE POLACRILEX 4 MG
15 LOZENGE BUCCAL
Status: DISCONTINUED | OUTPATIENT
Start: 2025-03-15 | End: 2025-03-26 | Stop reason: HOSPADM

## 2025-03-15 RX ORDER — TRAMADOL HYDROCHLORIDE 50 MG/1
50 TABLET ORAL EVERY 12 HOURS PRN
Status: DISCONTINUED | OUTPATIENT
Start: 2025-03-15 | End: 2025-03-15

## 2025-03-15 RX ORDER — BISACODYL 5 MG/1
5 TABLET, DELAYED RELEASE ORAL DAILY PRN
Status: DISCONTINUED | OUTPATIENT
Start: 2025-03-15 | End: 2025-03-16

## 2025-03-15 RX ORDER — QUETIAPINE FUMARATE 25 MG/1
25 TABLET, FILM COATED ORAL 2 TIMES DAILY
Status: DISCONTINUED | OUTPATIENT
Start: 2025-03-15 | End: 2025-03-16

## 2025-03-15 RX ORDER — TRAMADOL HYDROCHLORIDE 50 MG/1
25 TABLET ORAL EVERY 8 HOURS PRN
Status: DISCONTINUED | OUTPATIENT
Start: 2025-03-15 | End: 2025-03-26 | Stop reason: HOSPADM

## 2025-03-15 RX ORDER — INSULIN LISPRO 100 [IU]/ML
2-7 INJECTION, SOLUTION INTRAVENOUS; SUBCUTANEOUS EVERY 6 HOURS SCHEDULED
Status: DISCONTINUED | OUTPATIENT
Start: 2025-03-16 | End: 2025-03-19

## 2025-03-15 RX ORDER — TROLAMINE SALICYLATE 10 G/100G
1 CREAM TOPICAL AS NEEDED
Status: DISCONTINUED | OUTPATIENT
Start: 2025-03-15 | End: 2025-03-26 | Stop reason: HOSPADM

## 2025-03-15 RX ORDER — IBUPROFEN 600 MG/1
1 TABLET ORAL
Status: DISCONTINUED | OUTPATIENT
Start: 2025-03-15 | End: 2025-03-26 | Stop reason: HOSPADM

## 2025-03-15 RX ORDER — PREDNISONE 20 MG/1
20 TABLET ORAL
Status: DISCONTINUED | OUTPATIENT
Start: 2025-03-16 | End: 2025-03-18

## 2025-03-15 RX ORDER — POLYETHYLENE GLYCOL 3350 17 G/17G
17 POWDER, FOR SOLUTION ORAL DAILY PRN
Status: DISCONTINUED | OUTPATIENT
Start: 2025-03-15 | End: 2025-03-19

## 2025-03-15 RX ORDER — QUETIAPINE FUMARATE 25 MG/1
25 TABLET, FILM COATED ORAL NIGHTLY
Status: DISCONTINUED | OUTPATIENT
Start: 2025-03-15 | End: 2025-03-15

## 2025-03-15 RX ORDER — AMOXICILLIN 250 MG
2 CAPSULE ORAL 2 TIMES DAILY PRN
Status: DISCONTINUED | OUTPATIENT
Start: 2025-03-15 | End: 2025-03-19

## 2025-03-15 RX ORDER — DEXTROSE MONOHYDRATE 25 G/50ML
25 INJECTION, SOLUTION INTRAVENOUS
Status: DISCONTINUED | OUTPATIENT
Start: 2025-03-15 | End: 2025-03-26 | Stop reason: HOSPADM

## 2025-03-15 RX ORDER — INSULIN LISPRO 100 [IU]/ML
2-7 INJECTION, SOLUTION INTRAVENOUS; SUBCUTANEOUS
Status: DISCONTINUED | OUTPATIENT
Start: 2025-03-15 | End: 2025-03-15

## 2025-03-15 RX ORDER — BISACODYL 10 MG
10 SUPPOSITORY, RECTAL RECTAL DAILY PRN
Status: DISCONTINUED | OUTPATIENT
Start: 2025-03-15 | End: 2025-03-19

## 2025-03-15 RX ADMIN — PIPERACILLIN AND TAZOBACTAM 4.5 G: 4; .5 INJECTION, POWDER, FOR SOLUTION INTRAVENOUS at 21:30

## 2025-03-15 RX ADMIN — Medication 10 MG: at 21:28

## 2025-03-15 RX ADMIN — DOXYCYCLINE 100 MG: 100 INJECTION, POWDER, LYOPHILIZED, FOR SOLUTION INTRAVENOUS at 21:29

## 2025-03-15 RX ADMIN — RIVAROXABAN 15 MG: 15 TABLET, FILM COATED ORAL at 18:26

## 2025-03-15 RX ADMIN — METOCLOPRAMIDE 10 MG: 5 INJECTION, SOLUTION INTRAMUSCULAR; INTRAVENOUS at 18:26

## 2025-03-15 RX ADMIN — Medication 10 ML: at 21:30

## 2025-03-15 RX ADMIN — PIPERACILLIN AND TAZOBACTAM 4.5 G: 4; .5 INJECTION, POWDER, FOR SOLUTION INTRAVENOUS at 14:03

## 2025-03-15 RX ADMIN — METOCLOPRAMIDE 10 MG: 5 INJECTION, SOLUTION INTRAMUSCULAR; INTRAVENOUS at 09:13

## 2025-03-15 RX ADMIN — METOCLOPRAMIDE 10 MG: 5 INJECTION, SOLUTION INTRAMUSCULAR; INTRAVENOUS at 05:04

## 2025-03-15 RX ADMIN — POTASSIUM CHLORIDE AND DEXTROSE MONOHYDRATE 75 ML/HR: 150; 5 INJECTION, SOLUTION INTRAVENOUS at 10:04

## 2025-03-15 RX ADMIN — METOCLOPRAMIDE 10 MG: 5 INJECTION, SOLUTION INTRAMUSCULAR; INTRAVENOUS at 21:28

## 2025-03-15 RX ADMIN — POLYETHYLENE GLYCOL 3350 17 G: 17 POWDER, FOR SOLUTION ORAL at 09:13

## 2025-03-15 RX ADMIN — PIPERACILLIN AND TAZOBACTAM 4.5 G: 4; .5 INJECTION, POWDER, FOR SOLUTION INTRAVENOUS at 05:04

## 2025-03-15 RX ADMIN — PANTOPRAZOLE SODIUM 40 MG: 40 INJECTION, POWDER, FOR SOLUTION INTRAVENOUS at 05:04

## 2025-03-15 RX ADMIN — Medication 10 ML: at 09:25

## 2025-03-15 RX ADMIN — QUETIAPINE FUMARATE 25 MG: 25 TABLET ORAL at 21:28

## 2025-03-15 RX ADMIN — HYDROXYZINE HYDROCHLORIDE 25 MG: 25 TABLET, FILM COATED ORAL at 21:29

## 2025-03-15 RX ADMIN — DOXYCYCLINE 100 MG: 100 INJECTION, POWDER, LYOPHILIZED, FOR SOLUTION INTRAVENOUS at 09:13

## 2025-03-15 NOTE — PLAN OF CARE
Goal Outcome Evaluation:  Plan of Care Reviewed With: patient, family           Outcome Evaluation: A&O x 4 with confusion noted after dilaudid administered iv last night for pain. Has been awake all night. Fall precautions and contact precaution in place. Is max assist of 2 and lift for transfers. Is on 1.5 liters oxygen and afib on tele. Isosource 1.5 continues at 60ml/hr with 45ml/hr flush via keofeed. Bed changed out last night and patient is turned Q 2 hours and prn. Is npo except for ice chips and sips of water.     Edit* Isosource running at 40ml/hr with 50ml/hr flush.

## 2025-03-15 NOTE — PROGRESS NOTES
[facility] Medicine Services  PROGRESS NOTE    Patient Name: Carlos Preston  : 3/20/1928  MRN: [id number]    Date of Admission: 3/11/2025  Primary Care Physician: Newton Hankins MD    Subjective   Subjective     CC:  Follow-up for shortness of breath    HPI:  Patient seen and examined this morning.  Lethargic today.  Per his granddaughter at bedside, did not sleep well overnight and has been pulling on the wires and tubes.  No other acute complaints overnight    Objective   Objective     Vital Signs:   Temp:  [97.5 °F (36.4 °C)-98.9 °F (37.2 °C)] 97.8 °F (36.6 °C)  Heart Rate:  [72-89] 72  Resp:  [18-19] 19  BP: (131-148)/(78-95) 145/95  Flow (L/min) (Oxygen Therapy):  [1-1.5] 1     Physical Exam:  General: Comfortable, not in distress, conversant and cooperative  Head: Atraumatic and normocephalic  Eyes: No Icterus. No pallor  Ears:  Ears appear intact with no abnormalities noted  Throat: No oral lesions, no thrush  Neck: Supple, trachea midline  Lungs: Bilateral basal coarse crackles with bronchial breathing.  Heart:  Normal S1 and S2, no murmur, no gallop, No JVD, no lower extremity swelling  Abdomen:  Soft, no tenderness, no organomegaly, hypoactive bowel sounds, no organomegaly  Extremities: pulses equal bilaterally  Skin: No bleeding, bruising or rash, normal skin turgor and elasticity  Neurologic: Cranial nerves appear intact with no evidence of facial asymmetry, normal motor and sensory functions in all 4 extremities  Psych: Alert and oriented x 3, normal mood    Results Reviewed:  LAB RESULTS:      Lab 03/15/25  0435 25  0504 25  1856 25  1454 25  0456 25  1150 25  0417 25  1528 25  1430   WBC 12.04* 11.04*  --   --  9.13  --  15.52*  --  23.50*   HEMOGLOBIN 10.1* 10.4* 10.9* 10.2* 9.7*   < > 7.6*  --  9.2*   HEMATOCRIT 33.3* 35.3* 36.1* 34.0* 32.0*   < > 25.7*  --  29.6*   PLATELETS 121* 119*  --   --  120*  --  93*  --  103*    NEUTROS ABS 10.93* 10.29*  --   --  8.52*  --   --   --  21.84*   IMMATURE GRANS (ABS) 0.18* 0.06*  --   --  0.06*  --   --   --  0.07*   LYMPHS ABS 0.51* 0.37*  --   --  0.34*  --   --   --  0.45*   MONOS ABS 0.40 0.31  --   --  0.20  --   --   --  1.13*   EOS ABS 0.00 0.00  --   --  0.00  --   --   --  0.00   MCV 94.9 97.2*  --   --  95.0  --  95.2  --  92.8   CRP  --  5.67*  --   --  12.39*  --   --   --   --    PROCALCITONIN  --   --   --   --  0.73*  --   --   --  1.17*   LACTATE  --   --   --   --   --   --   --   --  1.8   LDH  --   --   --   --   --   --  340*  --   --    HSTROP T  --   --   --   --   --   --  96* 123* 124*    < > = values in this interval not displayed.         Lab 03/15/25  0435 03/15/25  0004 03/14/25  1902 03/14/25  1339 03/14/25  0824 03/14/25  0504 03/13/25  0913 03/13/25  0456 03/12/25  0002 03/11/25  1430   SODIUM 147*  147* 148* 149* 148* 147* 146*   < > 145   < > 143   POTASSIUM 3.9  3.9 3.8 3.8 3.8 3.7 3.8   < > 3.8   < > 3.8   CHLORIDE 115*  115* 118* 118* 118* 116* 116*   < > 115*   < > 108*   CO2 19.0*  19.0* 20.0* 19.0* 19.0* 18.0* 18.0*   < > 16.0*   < > 20.3*   ANION GAP 13.0  13.0 10.0 12.0 11.0 13.0 12.0   < > 14.0   < > 14.7   BUN 56*  56* 61* 61* 63* 63* 63*   < > 61*   < > 63*   CREATININE 1.05  1.05 1.04 1.02 0.91 0.99 1.11   < > 0.92   < > 1.42*   EGFR 65.0  65.0 65.7 67.3 77.1 69.7 60.8   < > 76.1   < > 45.2*   GLUCOSE 213*  213* 181* 159* 146* 147* 132*   < > 118*   < > 108*   CALCIUM 9.0  9.0 9.0 8.9 8.9 9.0 8.8   < > 9.0   < > 9.5   MAGNESIUM 2.4*  --   --   --   --  2.4*  --  2.4*  --  2.3   PHOSPHORUS 2.1*  --   --   --   --  2.8  --  3.4  --   --     < > = values in this interval not displayed.         Lab 03/15/25  0435 03/15/25  0004 03/14/25  1902 03/14/25  1339 03/14/25  0824   TOTAL PROTEIN 5.2*  5.2* 5.3* 5.6* 5.5* 5.4*   ALBUMIN 2.5*  2.5* 2.4* 2.5* 2.4* 2.3*   GLOBULIN 2.7  2.7 2.9 3.1 3.1 3.1   ALT (SGPT) 34  34 32 36 35 36   AST (SGOT)  31  31 34 35 36 39   BILIRUBIN 0.8  0.8 0.8 0.9 0.9 0.9   ALK PHOS 110  110 104 110 99 105         Lab 03/12/25  0417 03/11/25  1528 03/11/25  1430   PROBNP  --   --  29,350.0*   HSTROP T 96* 123* 124*         Lab 03/15/25  0435   CHOLESTEROL 65   TRIGLYCERIDES 79         Lab 03/12/25  1150 03/12/25  0833 03/12/25  0417   IRON  --   --  9*   IRON SATURATION (TSAT)  --   --  4*   TIBC  --   --  222*   TRANSFERRIN  --   --  149*   FERRITIN  --   --  238.60   FOLATE  --  8.44  --    VITAMIN B 12  --  1,188*  --    ABO TYPING O  --  O   RH TYPING Positive  --  Positive   ANTIBODY SCREEN Negative  --   --          Brief Urine Lab Results  (Last result in the past 365 days)        Color   Clarity   Blood   Leuk Est   Nitrite   Protein   CREAT   Urine HCG        03/11/25 1604 [place]   Clear   Moderate (2+)   Negative   Negative   30 mg/dL (1+)                   Microbiology Results Abnormal       Procedure Component Value - Date/Time    COVID-19, FLU A/B, RSV PCR 1 HR TAT - Swab, Nasopharynx [[id number]]  (Abnormal) Collected: 03/11/25 1431    Lab Status: Final result Specimen: Swab from Nasopharynx Updated: 03/11/25 1518     COVID19 Not Detected     Influenza A PCR Not Detected     Influenza B PCR Not Detected     RSV, PCR Detected    Narrative:      Fact sheet for providers: https://www.fda.gov/media/441537/download    Fact sheet for patients: https://www.fda.gov/media/936686/download    Test performed by PCR.            XR Abdomen KUB  Result Date: 3/13/2025  XR ABDOMEN KUB Date of Exam: 3/13/2025 10:46 PM EDT Indication: verify keofeed placement Comparison: None available. Findings: There is a feeding tube in the stomach. There are scattered pockets of large and small bowel gas in a nonspecific pattern. There are degenerative changes of the spine.     Impression: Impression: Feeding tube is in the stomach. Electronically Signed: Domingo Germain MD  3/13/2025 11:12 PM EDT  Workstation ID: [id number]      Results for orders  placed during the hospital encounter of 02/15/24    Adult Transthoracic Echo Complete W/ Cont if Necessary Per Protocol    Interpretation Summary    Atrial fibrillation was the predominant rhythm observed during the procedure.    The study is technically difficult    Left ventricular wall thickness is consistent with mild concentric hypertrophy.    Left ventricular ejection fraction appears to be 51 - 55%.    Left ventricular diastolic function is consistent with (grade I) impaired relaxation.    The right ventricular cavity is borderline dilated.    The left atrial cavity is moderately dilated.  5.0 cm    The right atrial cavity is mildly  dilated.    No aortic valve regurgitation or stenosis is present    Mild mitral valve regurgitation with MAC is present.    Moderate tricuspid valve regurgitation is present.  RVSP- 56 mmHg    Borderline dilation of the aortic root is present.  3.8 cm      Current medications:  Scheduled Meds:[Held by provider] busPIRone, 5 mg, Oral, Daily With Lunch  [Held by provider] cetirizine, 10 mg, Oral, Daily  doxycycline, 100 mg, Intravenous, Q12H  [Held by provider] isosorbide mononitrate, 30 mg, Oral, QAM  methylPREDNISolone sodium succinate, 40 mg, Intravenous, Q12H  metoclopramide, 10 mg, Intravenous, Q6H  [Held by provider] NIFEdipine XL, 30 mg, Oral, Daily  pantoprazole, 40 mg, Intravenous, Q AM  piperacillin-tazobactam, 4.5 g, Intravenous, Q8H  polyethylene glycol, 17 g, Nasogastric, Daily  ProSource TF, 45 mL, Nasogastric, BID  rivaroxaban, 15 mg, Nasogastric, Daily With Dinner  sodium chloride, 10 mL, Intravenous, Q12H      Continuous Infusions:dextrose 5 % with KCl 20 mEq, 75 mL/hr, Last Rate: 75 mL/hr (03/14/25 1819)        PRN Meds:.  senna-docusate sodium **AND** polyethylene glycol **AND** bisacodyl **AND** bisacodyl    dextrose    dextrose    glucagon (human recombinant)    HYDROmorphone    ipratropium-albuterol    melatonin    nitroglycerin    ondansetron    sodium  chloride    sodium chloride    sodium chloride    traMADol    Assessment & Plan   Assessment & Plan     Active Hospital Problems    Diagnosis  POA    **Rhabdomyolysis [M62.82]  Yes    Moderate protein-calorie malnutrition [E44.0]  Yes      Resolved Hospital Problems   No resolved problems to display.        Brief Hospital Course to date:  Carlos Preston is a 89 y.o. male with a history of heart disease, hyperlipidemia, ischemic heart disease status post CABG, paroxysmal A-fib on Eliquis presented to the hospital with progressive weakness.  Found to have bilateral pneumonia and acute rhabdomyolysis    Acute hypoxia respiratory insufficiency  RSV infection  Superimposed left lower lobe bacterial pneumonia, unspecified  Bilateral basal lung fibrosis concerning for chronic dysphagia / aspiration  CT chest with bilateral chronic fibrotic changes in both lung bases and developing left lower lobe pneumonia.  Patient tested positive for RSV.  Hypoxic requiring 4 L nasal cannula  Continue IV Zosyn. S/P azithromycin  Switch IV Solu-Medrol to p.o. prednisone 40 mg daily  Negative Legionella antigen, strep antigen and MRSA swab  Sputum culture negative to date  DuoNebs, Mucinex and I-S    Chronic dysphagia  History of esophageal stricture  Speech team consulted.  Recommended temporary feeding tube  Patient underwent feeding tube placement and will start on tube feed  Speech following  GI following    Acute rhabdomyolysis  Elevated troponin  Patient fell x 2 and was on the floor for 4 to 5 hours  CPK on admission 2500.  Trended down to 1200 with IV fluids   Continue IV fluids with D5 till tube feed is optimized  Elevated troponin felt to be demand ischemia and secondary to rhabdomyolysis.  Patient with no chest pain or ischemic changes in EKG.  Defer ischemic workup given his frailty and age       Severe iron deficiency anemia   Symptomatic anemia   Patient is anemic with hemoglobin of 7.6 on admission.  Received 1 unit of  blood transfusion   Status post IV iron x 3 doses  Hemoglobin stable around 10  No evidence of GI bleed    Possible ileus  Hypoactive bowel sounds and KUB with nonobstructive gas pattern  Continue Reglan    Elevated liver enzymes, improved  CBD dilation  Abnormal CT abdomen with common bile duct dilation as well as pancreas duct dilation.   No focal mass or obstructive process seen on noncontrast CT  Total bilirubin is normal  GI following and recommended MRCP when more stable    A-fib  Hx of CABG  HTN  On Xarelto at home. Family and patient aware of risk/benefits of holding Xarelto in the setting of concern for swallow safety.   CT head no acute intracranial abnormality identified  ECHO: 02/2024: EF: 51-55%     Debility  PT/OT  Will likely need rehab    Hypoactive delirium  Start Seroquel 25 mg twice daily  Sleep hygiene  At bedtime melatonin and Atarax      Expected Discharge Location and Transportation: IRF  Expected Discharge   Expected Discharge Date: 3/17/2025; Expected Discharge Time:      VTE Prophylaxis:  Pharmacologic & mechanical VTE prophylaxis orders are present.         AM-PAC 6 Clicks Score (PT): 6 (03/14/25 1955)    CODE STATUS:   Code Status and Medical Interventions: CPR (Attempt to Resuscitate); Full Support   Ordered at: 03/11/25 3850     Code Status (Patient has no pulse and is not breathing):    CPR (Attempt to Resuscitate)     Medical Interventions (Patient has pulse or is breathing):    Full Support     Level Of Support Discussed With:    Patient       Maksim Delgado MD  03/15/25

## 2025-03-15 NOTE — SIGNIFICANT NOTE
03/15/25 1154   SLP Deferred Reason   SLP Deferred Reason Routine  (Spoke to MD, pt not appropriate to participate in FEES this date. SLP will defer & f/u as appropriate/pending pt status)

## 2025-03-16 LAB
BACTERIA SPEC AEROBE CULT: NORMAL
BACTERIA SPEC AEROBE CULT: NORMAL
GLUCOSE BLDC GLUCOMTR-MCNC: 154 MG/DL (ref 70–130)
GLUCOSE BLDC GLUCOMTR-MCNC: 158 MG/DL (ref 70–130)
GLUCOSE BLDC GLUCOMTR-MCNC: 175 MG/DL (ref 70–130)
GLUCOSE BLDC GLUCOMTR-MCNC: 180 MG/DL (ref 70–130)

## 2025-03-16 PROCEDURE — 63710000001 INSULIN LISPRO (HUMAN) PER 5 UNITS: Performed by: INTERNAL MEDICINE

## 2025-03-16 PROCEDURE — 99232 SBSQ HOSP IP/OBS MODERATE 35: CPT | Performed by: INTERNAL MEDICINE

## 2025-03-16 PROCEDURE — 25810000003 DEXTROSE 5 % WITH KCL 20 MEQ 20 MEQ/L SOLUTION: Performed by: INTERNAL MEDICINE

## 2025-03-16 PROCEDURE — 25010000002 METOCLOPRAMIDE PER 10 MG: Performed by: INTERNAL MEDICINE

## 2025-03-16 PROCEDURE — 25010000002 PIPERACILLIN SOD-TAZOBACTAM PER 1 G: Performed by: INTERNAL MEDICINE

## 2025-03-16 PROCEDURE — 82948 REAGENT STRIP/BLOOD GLUCOSE: CPT

## 2025-03-16 PROCEDURE — 63710000001 PREDNISONE PER 1 MG: Performed by: INTERNAL MEDICINE

## 2025-03-16 RX ORDER — BUSPIRONE HYDROCHLORIDE 10 MG/1
5 TABLET ORAL
Status: DISCONTINUED | OUTPATIENT
Start: 2025-03-17 | End: 2025-03-26 | Stop reason: HOSPADM

## 2025-03-16 RX ORDER — QUETIAPINE FUMARATE 25 MG/1
25 TABLET, FILM COATED ORAL DAILY
Status: DISCONTINUED | OUTPATIENT
Start: 2025-03-17 | End: 2025-03-18

## 2025-03-16 RX ORDER — METOCLOPRAMIDE HYDROCHLORIDE 5 MG/5ML
5 SOLUTION ORAL EVERY 6 HOURS
Status: DISCONTINUED | OUTPATIENT
Start: 2025-03-16 | End: 2025-03-19

## 2025-03-16 RX ORDER — HYDROXYZINE HYDROCHLORIDE 25 MG/1
25 TABLET, FILM COATED ORAL NIGHTLY
Status: DISCONTINUED | OUTPATIENT
Start: 2025-03-16 | End: 2025-03-23

## 2025-03-16 RX ORDER — QUETIAPINE FUMARATE 25 MG/1
50 TABLET, FILM COATED ORAL NIGHTLY
Status: DISCONTINUED | OUTPATIENT
Start: 2025-03-16 | End: 2025-03-20

## 2025-03-16 RX ADMIN — RIVAROXABAN 15 MG: 15 TABLET, FILM COATED ORAL at 17:25

## 2025-03-16 RX ADMIN — QUETIAPINE FUMARATE 50 MG: 25 TABLET ORAL at 22:18

## 2025-03-16 RX ADMIN — BUSPIRONE HYDROCHLORIDE 5 MG: 10 TABLET ORAL at 12:04

## 2025-03-16 RX ADMIN — Medication 10 ML: at 08:29

## 2025-03-16 RX ADMIN — DOXYCYCLINE 100 MG: 100 INJECTION, POWDER, LYOPHILIZED, FOR SOLUTION INTRAVENOUS at 08:28

## 2025-03-16 RX ADMIN — Medication 10 ML: at 22:19

## 2025-03-16 RX ADMIN — INSULIN LISPRO 2 UNITS: 100 INJECTION, SOLUTION INTRAVENOUS; SUBCUTANEOUS at 12:04

## 2025-03-16 RX ADMIN — PIPERACILLIN AND TAZOBACTAM 4.5 G: 4; .5 INJECTION, POWDER, FOR SOLUTION INTRAVENOUS at 22:24

## 2025-03-16 RX ADMIN — QUETIAPINE FUMARATE 25 MG: 25 TABLET ORAL at 08:29

## 2025-03-16 RX ADMIN — Medication 45 ML: at 08:30

## 2025-03-16 RX ADMIN — METOCLOPRAMIDE HYDROCHLORIDE 5 MG: 5 SOLUTION ORAL at 09:53

## 2025-03-16 RX ADMIN — METOCLOPRAMIDE HYDROCHLORIDE 5 MG: 5 SOLUTION ORAL at 22:19

## 2025-03-16 RX ADMIN — INSULIN LISPRO 2 UNITS: 100 INJECTION, SOLUTION INTRAVENOUS; SUBCUTANEOUS at 01:39

## 2025-03-16 RX ADMIN — POLYETHYLENE GLYCOL 3350 17 G: 17 POWDER, FOR SOLUTION ORAL at 08:28

## 2025-03-16 RX ADMIN — POTASSIUM CHLORIDE AND DEXTROSE MONOHYDRATE 75 ML/HR: 150; 5 INJECTION, SOLUTION INTRAVENOUS at 04:01

## 2025-03-16 RX ADMIN — INSULIN LISPRO 2 UNITS: 100 INJECTION, SOLUTION INTRAVENOUS; SUBCUTANEOUS at 17:25

## 2025-03-16 RX ADMIN — HYDROXYZINE HYDROCHLORIDE 25 MG: 25 TABLET ORAL at 22:18

## 2025-03-16 RX ADMIN — PREDNISONE 20 MG: 20 TABLET ORAL at 08:29

## 2025-03-16 RX ADMIN — PIPERACILLIN AND TAZOBACTAM 4.5 G: 4; .5 INJECTION, POWDER, FOR SOLUTION INTRAVENOUS at 04:01

## 2025-03-16 RX ADMIN — PANTOPRAZOLE SODIUM 40 MG: 40 INJECTION, POWDER, FOR SOLUTION INTRAVENOUS at 04:35

## 2025-03-16 RX ADMIN — METOCLOPRAMIDE 10 MG: 5 INJECTION, SOLUTION INTRAMUSCULAR; INTRAVENOUS at 04:02

## 2025-03-16 RX ADMIN — METOCLOPRAMIDE HYDROCHLORIDE 5 MG: 5 SOLUTION ORAL at 15:52

## 2025-03-16 RX ADMIN — DOXYCYCLINE 100 MG: 100 INJECTION, POWDER, LYOPHILIZED, FOR SOLUTION INTRAVENOUS at 20:56

## 2025-03-16 RX ADMIN — PIPERACILLIN AND TAZOBACTAM 4.5 G: 4; .5 INJECTION, POWDER, FOR SOLUTION INTRAVENOUS at 12:05

## 2025-03-16 RX ADMIN — INSULIN LISPRO 2 UNITS: 100 INJECTION, SOLUTION INTRAVENOUS; SUBCUTANEOUS at 06:23

## 2025-03-16 RX ADMIN — Medication 10 MG: at 22:19

## 2025-03-16 RX ADMIN — Medication 45 ML: at 22:19

## 2025-03-16 NOTE — PLAN OF CARE
Goal Outcome Evaluation:           Progress: no change  Outcome Evaluation: Pt on room air. Afib on monitor. Sleeping most of the day. Up in chair per family request. Large liquid BM x2. A&Ox4. Family at bedside. Tolerating tube feeds. VSS

## 2025-03-16 NOTE — SIGNIFICANT NOTE
03/16/25 1202   SLP Deferred Reason   SLP Deferred Reason Patient/family declined evaluation;Patient/family declined treatment  (Pt is finally resting. Family declined reevaluation today. They report continued choking on ice/water.  SLP to f/u tomorrow- pt will likely need FEES or MBS.)

## 2025-03-16 NOTE — PROGRESS NOTES
[facility] Medicine Services  PROGRESS NOTE    Patient Name: Carlos Preston  : 3/20/1928  MRN: [id number]    Date of Admission: 3/11/2025  Primary Care Physician: Newton Hankins MD    Subjective   Subjective     CC:  Follow-up for shortness of breath    HPI:  Patient seen and examined this morning.  Sleeping this morning.  Per daughter at bedside, he did not sleep well overnight and kept trying to pull on the wires and IV lines.  Tolerating tube feed.  Per family, they are not interested in rehab despite extensive counseling      Objective   Objective     Vital Signs:   Temp:  [97.7 °F (36.5 °C)-98.7 °F (37.1 °C)] 98.3 °F (36.8 °C)  Heart Rate:  [63-73] 71  Resp:  [16-18] 16  BP: (121-147)/(65-93) 135/67     Physical Exam:  General: Comfortable, not in distress, conversant and cooperative  Head: Atraumatic and normocephalic  Eyes: No Icterus. No pallor  Ears:  Ears appear intact with no abnormalities noted  Throat: No oral lesions, no thrush  Neck: Supple, trachea midline  Lungs: Bilateral basal coarse crackles with bronchial breathing.  Heart:  Normal S1 and S2, no murmur, no gallop, No JVD, no lower extremity swelling  Abdomen:  Soft, no tenderness, no organomegaly, hypoactive bowel sounds, no organomegaly  Extremities: pulses equal bilaterally  Skin: No bleeding, bruising or rash, normal skin turgor and elasticity  Neurologic: Cranial nerves appear intact with no evidence of facial asymmetry, normal motor and sensory functions in all 4 extremities  Psych: Alert and oriented x 3, normal mood    Results Reviewed:  LAB RESULTS:      Lab 03/15/25  0435 25  0504 25  1856 25  1454 25  0456 25  1150 25  0417 25  1528 25  1430   WBC 12.04* 11.04*  --   --  9.13  --  15.52*  --  23.50*   HEMOGLOBIN 10.1* 10.4* 10.9* 10.2* 9.7*   < > 7.6*  --  9.2*   HEMATOCRIT 33.3* 35.3* 36.1* 34.0* 32.0*   < > 25.7*  --  29.6*   PLATELETS 121* 119*  --    --  120*  --  93*  --  103*   NEUTROS ABS 10.93* 10.29*  --   --  8.52*  --   --   --  21.84*   IMMATURE GRANS (ABS) 0.18* 0.06*  --   --  0.06*  --   --   --  0.07*   LYMPHS ABS 0.51* 0.37*  --   --  0.34*  --   --   --  0.45*   MONOS ABS 0.40 0.31  --   --  0.20  --   --   --  1.13*   EOS ABS 0.00 0.00  --   --  0.00  --   --   --  0.00   MCV 94.9 97.2*  --   --  95.0  --  95.2  --  92.8   CRP  --  5.67*  --   --  12.39*  --   --   --   --    PROCALCITONIN  --   --   --   --  0.73*  --   --   --  1.17*   LACTATE  --   --   --   --   --   --   --   --  1.8   LDH  --   --   --   --   --   --  340*  --   --    HSTROP T  --   --   --   --   --   --  96* 123* 124*    < > = values in this interval not displayed.         Lab 03/15/25  0929 03/15/25  0435 03/15/25  0004 03/14/25  1902 03/14/25  1339 03/14/25  0824 03/14/25  0504 03/13/25  0913 03/13/25  0456 03/12/25  0002 03/11/25  1430   SODIUM 146* 147*  147* 148* 149* 148*   < > 146*   < > 145   < > 143   POTASSIUM 3.9 3.9  3.9 3.8 3.8 3.8   < > 3.8   < > 3.8   < > 3.8   CHLORIDE 115* 115*  115* 118* 118* 118*   < > 116*   < > 115*   < > 108*   CO2 21.0* 19.0*  19.0* 20.0* 19.0* 19.0*   < > 18.0*   < > 16.0*   < > 20.3*   ANION GAP 10.0 13.0  13.0 10.0 12.0 11.0   < > 12.0   < > 14.0   < > 14.7   BUN 62* 56*  56* 61* 61* 63*   < > 63*   < > 61*   < > 63*   CREATININE 1.10 1.05  1.05 1.04 1.02 0.91   < > 1.11   < > 0.92   < > 1.42*   EGFR 61.4 65.0  65.0 65.7 67.3 77.1   < > 60.8   < > 76.1   < > 45.2*   GLUCOSE 226* 213*  213* 181* 159* 146*   < > 132*   < > 118*   < > 108*   CALCIUM 9.2 9.0  9.0 9.0 8.9 8.9   < > 8.8   < > 9.0   < > 9.5   MAGNESIUM  --  2.4*  --   --   --   --  2.4*  --  2.4*  --  2.3   PHOSPHORUS  --  2.1*  --   --   --   --  2.8  --  3.4  --   --     < > = values in this interval not displayed.         Lab 03/15/25  0929 03/15/25  0435 03/15/25  0004 03/14/25  1902 03/14/25  1339   TOTAL PROTEIN 5.2* 5.2*  5.2* 5.3* 5.6* 5.5*   ALBUMIN  2.5* 2.5*  2.5* 2.4* 2.5* 2.4*   GLOBULIN 2.7 2.7  2.7 2.9 3.1 3.1   ALT (SGPT) 35 34  34 32 36 35   AST (SGOT) 28 31  31 34 35 36   BILIRUBIN 0.7 0.8  0.8 0.8 0.9 0.9   ALK PHOS 113 110  110 104 110 99         Lab 03/12/25  0417 03/11/25  1528 03/11/25  1430   PROBNP  --   --  29,350.0*   HSTROP T 96* 123* 124*         Lab 03/15/25  0435   CHOLESTEROL 65   TRIGLYCERIDES 79         Lab 03/12/25  1150 03/12/25  0833 03/12/25  0417   IRON  --   --  9*   IRON SATURATION (TSAT)  --   --  4*   TIBC  --   --  222*   TRANSFERRIN  --   --  149*   FERRITIN  --   --  238.60   FOLATE  --  8.44  --    VITAMIN B 12  --  1,188*  --    ABO TYPING O  --  O   RH TYPING Positive  --  Positive   ANTIBODY SCREEN Negative  --   --          Brief Urine Lab Results  (Last result in the past 365 days)        Color   Clarity   Blood   Leuk Est   Nitrite   Protein   CREAT   Urine HCG        03/11/25 1604 [place]   Clear   Moderate (2+)   Negative   Negative   30 mg/dL (1+)                   Microbiology Results Abnormal       Procedure Component Value - Date/Time    COVID-19, FLU A/B, RSV PCR 1 HR TAT - Swab, Nasopharynx [[id number]]  (Abnormal) Collected: 03/11/25 1431    Lab Status: Final result Specimen: Swab from Nasopharynx Updated: 03/11/25 1518     COVID19 Not Detected     Influenza A PCR Not Detected     Influenza B PCR Not Detected     RSV, PCR Detected    Narrative:      Fact sheet for providers: https://www.fda.gov/media/050800/download    Fact sheet for patients: https://www.fda.gov/media/905548/download    Test performed by PCR.            No radiology results from the last 24 hrs      Results for orders placed during the hospital encounter of 02/15/24    Adult Transthoracic Echo Complete W/ Cont if Necessary Per Protocol    Interpretation Summary    Atrial fibrillation was the predominant rhythm observed during the procedure.    The study is technically difficult    Left ventricular wall thickness is consistent with mild  concentric hypertrophy.    Left ventricular ejection fraction appears to be 51 - 55%.    Left ventricular diastolic function is consistent with (grade I) impaired relaxation.    The right ventricular cavity is borderline dilated.    The left atrial cavity is moderately dilated.  5.0 cm    The right atrial cavity is mildly  dilated.    No aortic valve regurgitation or stenosis is present    Mild mitral valve regurgitation with MAC is present.    Moderate tricuspid valve regurgitation is present.  RVSP- 56 mmHg    Borderline dilation of the aortic root is present.  3.8 cm      Current medications:  Scheduled Meds:busPIRone, 5 mg, Oral, Daily With Lunch  [Held by provider] cetirizine, 10 mg, Oral, Daily  doxycycline, 100 mg, Intravenous, Q12H  hydrOXYzine, 25 mg, Oral, Nightly  Insulin Lispro, 2-7 Units, Subcutaneous, Q6H  [Held by provider] isosorbide mononitrate, 30 mg, Oral, QAM  melatonin, 10 mg, Oral, Nightly  metoclopramide, 5 mg, Nasogastric, Q6H  [Held by provider] NIFEdipine XL, 30 mg, Oral, Daily  pantoprazole, 40 mg, Intravenous, Q AM  piperacillin-tazobactam, 4.5 g, Intravenous, Q8H  polyethylene glycol, 17 g, Nasogastric, Daily  predniSONE, 20 mg, Nasogastric, Daily With Breakfast  ProSource TF, 45 mL, Nasogastric, BID  [Held by provider] QUEtiapine, 25 mg, Nasogastric, Daily  QUEtiapine, 50 mg, Nasogastric, Nightly  rivaroxaban, 15 mg, Nasogastric, Daily With Dinner  sodium chloride, 10 mL, Intravenous, Q12H      Continuous Infusions:dextrose 5 % with KCl 20 mEq, 75 mL/hr, Last Rate: 75 mL/hr (03/16/25 0401)        PRN Meds:.  senna-docusate sodium **AND** polyethylene glycol **AND** bisacodyl **AND** bisacodyl    dextrose    dextrose    glucagon (human recombinant)    ipratropium-albuterol    nitroglycerin    ondansetron    sodium chloride    sodium chloride    sodium chloride    traMADol    trolamine salicylate    Assessment & Plan   Assessment & Plan     Active Hospital Problems    Diagnosis  POA     **Rhabdomyolysis [M62.82]  Yes    Moderate protein-calorie malnutrition [E44.0]  Yes      Resolved Hospital Problems   No resolved problems to display.        Brief Hospital Course to date:  Carlos Preston is a 89 y.o. male with a history of heart disease, hyperlipidemia, ischemic heart disease status post CABG, paroxysmal A-fib on Eliquis presented to the hospital with progressive weakness.  Found to have bilateral pneumonia and acute rhabdomyolysis    Acute hypoxia respiratory insufficiency  RSV infection  Superimposed left lower lobe bacterial pneumonia, unspecified  Bilateral basal lung fibrosis concerning for chronic dysphagia / aspiration  CT chest with bilateral chronic fibrotic changes in both lung bases and developing left lower lobe pneumonia.  Patient tested positive for RSV.  Hypoxic requiring 4 L nasal cannula  Continue IV Zosyn. S/P azithromycin  Switch IV Solu-Medrol to p.o. prednisone 20 mg daily  Negative Legionella antigen, strep antigen and MRSA swab  Sputum culture negative to date  DuoNebs, Mucinex and I-S    Chronic dysphagia  History of esophageal stricture  Speech team consulted.  Recommended temporary feeding tube  Patient underwent feeding tube placement and will start on tube feed  Speech following.  Plan for Fees tomorrow    Acute rhabdomyolysis  Elevated troponin  Patient fell x 2 and was on the floor for 4 to 5 hours  CPK on admission 2500.  Trended down to 1200 with IV fluids   Elevated troponin felt to be demand ischemia and secondary to rhabdomyolysis.  Patient with no chest pain or ischemic changes in EKG.  Defer ischemic workup given his frailty and age       Severe iron deficiency anemia   Symptomatic anemia   Patient is anemic with hemoglobin of 7.6 on admission.  Received 1 unit of blood transfusion   Status post IV iron x 3 doses  Hemoglobin stable around 10  No evidence of GI bleed    Possible ileus  Hypoactive bowel sounds and KUB with nonobstructive gas pattern  Continue  Reglan p.o. 5 mg Q6    Elevated liver enzymes, improved  CBD dilation  Abnormal CT abdomen with common bile duct dilation as well as pancreas duct dilation.   No focal mass or obstructive process seen on noncontrast CT  Total bilirubin is normal  GI following and recommended MRCP when more stable    A-fib  Hx of CABG  HTN  On Xarelto at home. Family and patient aware of risk/benefits of holding Xarelto in the setting of concern for swallow safety.   CT head no acute intracranial abnormality identified  ECHO: 02/2024: EF: 51-55%     Debility  PT/OT and OT recommended acute rehab but family declined despite extensive counseling.  Per family, they hired 24/7 caregiver and they are interested in taking him home with home PT    Hypoactive delirium  Sleep hygiene  Continue Seroquel.  Close to 50 mg at   Continue at bedtime melatonin and Atarax      Expected Discharge Location and Transportation: Home with home health  Expected Discharge   Expected Discharge Date: 3/20/2025; Expected Discharge Time:      VTE Prophylaxis:  Pharmacologic & mechanical VTE prophylaxis orders are present.         AM-PAC 6 Clicks Score (PT): 10 (03/16/25 9303)    CODE STATUS:   Code Status and Medical Interventions: CPR (Attempt to Resuscitate); Full Support   Ordered at: 03/11/25 4005     Code Status (Patient has no pulse and is not breathing):    CPR (Attempt to Resuscitate)     Medical Interventions (Patient has pulse or is breathing):    Full Support     Level Of Support Discussed With:    Patient       Maksim Delgado MD  03/16/25

## 2025-03-17 ENCOUNTER — APPOINTMENT (OUTPATIENT)
Dept: GENERAL RADIOLOGY | Facility: HOSPITAL | Age: OVER 89
End: 2025-03-17
Payer: MEDICARE

## 2025-03-17 LAB
GLUCOSE BLDC GLUCOMTR-MCNC: 115 MG/DL (ref 70–130)
GLUCOSE BLDC GLUCOMTR-MCNC: 134 MG/DL (ref 70–130)
GLUCOSE BLDC GLUCOMTR-MCNC: 134 MG/DL (ref 70–130)
GLUCOSE BLDC GLUCOMTR-MCNC: 135 MG/DL (ref 70–130)
GLUCOSE BLDC GLUCOMTR-MCNC: 169 MG/DL (ref 70–130)

## 2025-03-17 PROCEDURE — 97530 THERAPEUTIC ACTIVITIES: CPT

## 2025-03-17 PROCEDURE — 99232 SBSQ HOSP IP/OBS MODERATE 35: CPT | Performed by: PHYSICIAN ASSISTANT

## 2025-03-17 PROCEDURE — 71045 X-RAY EXAM CHEST 1 VIEW: CPT

## 2025-03-17 PROCEDURE — 82948 REAGENT STRIP/BLOOD GLUCOSE: CPT

## 2025-03-17 PROCEDURE — 74230 X-RAY XM SWLNG FUNCJ C+: CPT

## 2025-03-17 PROCEDURE — 92526 ORAL FUNCTION THERAPY: CPT

## 2025-03-17 PROCEDURE — 25010000002 PIPERACILLIN SOD-TAZOBACTAM PER 1 G: Performed by: INTERNAL MEDICINE

## 2025-03-17 PROCEDURE — 92610 EVALUATE SWALLOWING FUNCTION: CPT

## 2025-03-17 PROCEDURE — 92611 MOTION FLUOROSCOPY/SWALLOW: CPT

## 2025-03-17 PROCEDURE — 63710000001 INSULIN LISPRO (HUMAN) PER 5 UNITS: Performed by: INTERNAL MEDICINE

## 2025-03-17 RX ADMIN — DOXYCYCLINE 100 MG: 100 INJECTION, POWDER, LYOPHILIZED, FOR SOLUTION INTRAVENOUS at 20:38

## 2025-03-17 RX ADMIN — PIPERACILLIN AND TAZOBACTAM 4.5 G: 4; .5 INJECTION, POWDER, FOR SOLUTION INTRAVENOUS at 14:58

## 2025-03-17 RX ADMIN — METOCLOPRAMIDE HYDROCHLORIDE 5 MG: 5 SOLUTION ORAL at 21:45

## 2025-03-17 RX ADMIN — METOCLOPRAMIDE HYDROCHLORIDE 5 MG: 5 SOLUTION ORAL at 09:20

## 2025-03-17 RX ADMIN — BARIUM SULFATE 50 ML: 400 SUSPENSION ORAL at 11:06

## 2025-03-17 RX ADMIN — PIPERACILLIN AND TAZOBACTAM 4.5 G: 4; .5 INJECTION, POWDER, FOR SOLUTION INTRAVENOUS at 05:09

## 2025-03-17 RX ADMIN — Medication 10 ML: at 21:47

## 2025-03-17 RX ADMIN — QUETIAPINE FUMARATE 50 MG: 25 TABLET ORAL at 21:45

## 2025-03-17 RX ADMIN — HYDROXYZINE HYDROCHLORIDE 25 MG: 25 TABLET ORAL at 21:46

## 2025-03-17 RX ADMIN — BARIUM SULFATE 100 ML: 0.81 POWDER, FOR SUSPENSION ORAL at 11:06

## 2025-03-17 RX ADMIN — PANTOPRAZOLE SODIUM 40 MG: 40 INJECTION, POWDER, FOR SOLUTION INTRAVENOUS at 05:13

## 2025-03-17 RX ADMIN — Medication 10 MG: at 21:46

## 2025-03-17 RX ADMIN — BARIUM SULFATE 20 ML: 400 PASTE ORAL at 11:06

## 2025-03-17 RX ADMIN — Medication 10 ML: at 09:26

## 2025-03-17 RX ADMIN — PIPERACILLIN AND TAZOBACTAM 4.5 G: 4; .5 INJECTION, POWDER, FOR SOLUTION INTRAVENOUS at 23:45

## 2025-03-17 RX ADMIN — BUSPIRONE HYDROCHLORIDE 5 MG: 10 TABLET ORAL at 14:59

## 2025-03-17 RX ADMIN — INSULIN LISPRO 2 UNITS: 100 INJECTION, SOLUTION INTRAVENOUS; SUBCUTANEOUS at 01:12

## 2025-03-17 RX ADMIN — Medication 45 ML: at 21:58

## 2025-03-17 RX ADMIN — DOXYCYCLINE 100 MG: 100 INJECTION, POWDER, LYOPHILIZED, FOR SOLUTION INTRAVENOUS at 09:26

## 2025-03-17 RX ADMIN — METOCLOPRAMIDE HYDROCHLORIDE 5 MG: 5 SOLUTION ORAL at 05:09

## 2025-03-17 RX ADMIN — Medication 45 ML: at 15:00

## 2025-03-17 NOTE — PROGRESS NOTES
[facility] Medicine Services  PROGRESS NOTE    Patient Name: Carlos Preston  : 3/20/1928  MRN: [id number]    Date of Admission: 3/11/2025  Primary Care Physician: Newton Hankins MD    Subjective     CC: f/u dysphagia     HPI:  In bed. Drowsy but interacted. Denied pain or dyspnea. Failed FEES this AM. Discussed options with daughter at bedside. Patient has communicated that he does not want any type of feeding tube. After multiple discussions throughout the day, was notified by SLP provider that daughter has opted for comfort diet.     Spoke with daughter over telephone at 1540 - she would like to leave Keofeed in at least overnight but does not anticipate PEG placement prior to DC and would transition to comfort diet.     Objective     Vital Signs:   Temp:  [97.9 °F (36.6 °C)] 97.9 °F (36.6 °C)  Heart Rate:  [63-76] 63  Resp:  [16-18] 18  BP: (137-145)/(69-88) 145/88     Physical Exam:  Constitutional: No acute distress, drowsy but conversational. Ill appearing   HENT: NCAT, mucous membranes moist. Keofeed in place   Respiratory: Clear to auscultation bilaterally, respiratory effort normal on room air   Cardiovascular: RRR  Gastrointestinal: Positive bowel sounds, soft, nontender, nondistended  Musculoskeletal: No bilateral ankle edema  Psychiatric: Appropriate affect, cooperative with exam  Neurologic: Orientation questions deferred but conversationally appropriate, moves all extremities spontaneously without focal deficits, speech clear  Skin: No rashes to exposed surfaces     Results Reviewed:  LAB RESULTS:      Lab 03/15/25  0435 25  0504 25  1856 25  1454 25  0456 25  1150 25  0417 25  1528 25  1430   WBC 12.04* 11.04*  --   --  9.13  --  15.52*  --  23.50*   HEMOGLOBIN 10.1* 10.4* 10.9* 10.2* 9.7*   < > 7.6*  --  9.2*   HEMATOCRIT 33.3* 35.3* 36.1* 34.0* 32.0*   < > 25.7*  --  29.6*   PLATELETS 121* 119*  --   --  120*  --  93*   --  103*   NEUTROS ABS 10.93* 10.29*  --   --  8.52*  --   --   --  21.84*   IMMATURE GRANS (ABS) 0.18* 0.06*  --   --  0.06*  --   --   --  0.07*   LYMPHS ABS 0.51* 0.37*  --   --  0.34*  --   --   --  0.45*   MONOS ABS 0.40 0.31  --   --  0.20  --   --   --  1.13*   EOS ABS 0.00 0.00  --   --  0.00  --   --   --  0.00   MCV 94.9 97.2*  --   --  95.0  --  95.2  --  92.8   CRP  --  5.67*  --   --  12.39*  --   --   --   --    PROCALCITONIN  --   --   --   --  0.73*  --   --   --  1.17*   LACTATE  --   --   --   --   --   --   --   --  1.8   LDH  --   --   --   --   --   --  340*  --   --    HSTROP T  --   --   --   --   --   --  96* 123* 124*    < > = values in this interval not displayed.         Lab 03/15/25  0929 03/15/25  0435 03/15/25  0004 03/14/25  1902 03/14/25  1339 03/14/25  0824 03/14/25  0504 03/13/25  0913 03/13/25  0456 03/12/25  0002 03/11/25  1430   SODIUM 146* 147*  147* 148* 149* 148*   < > 146*   < > 145   < > 143   POTASSIUM 3.9 3.9  3.9 3.8 3.8 3.8   < > 3.8   < > 3.8   < > 3.8   CHLORIDE 115* 115*  115* 118* 118* 118*   < > 116*   < > 115*   < > 108*   CO2 21.0* 19.0*  19.0* 20.0* 19.0* 19.0*   < > 18.0*   < > 16.0*   < > 20.3*   ANION GAP 10.0 13.0  13.0 10.0 12.0 11.0   < > 12.0   < > 14.0   < > 14.7   BUN 62* 56*  56* 61* 61* 63*   < > 63*   < > 61*   < > 63*   CREATININE 1.10 1.05  1.05 1.04 1.02 0.91   < > 1.11   < > 0.92   < > 1.42*   EGFR 61.4 65.0  65.0 65.7 67.3 77.1   < > 60.8   < > 76.1   < > 45.2*   GLUCOSE 226* 213*  213* 181* 159* 146*   < > 132*   < > 118*   < > 108*   CALCIUM 9.2 9.0  9.0 9.0 8.9 8.9   < > 8.8   < > 9.0   < > 9.5   MAGNESIUM  --  2.4*  --   --   --   --  2.4*  --  2.4*  --  2.3   PHOSPHORUS  --  2.1*  --   --   --   --  2.8  --  3.4  --   --     < > = values in this interval not displayed.         Lab 03/15/25  0929 03/15/25  0435 03/15/25  0004 03/14/25  1902 03/14/25  1339   TOTAL PROTEIN 5.2* 5.2*  5.2* 5.3* 5.6* 5.5*   ALBUMIN 2.5* 2.5*  2.5*  2.4* 2.5* 2.4*   GLOBULIN 2.7 2.7  2.7 2.9 3.1 3.1   ALT (SGPT) 35 34  34 32 36 35   AST (SGOT) 28 31  31 34 35 36   BILIRUBIN 0.7 0.8  0.8 0.8 0.9 0.9   ALK PHOS 113 110  110 104 110 99         Lab 03/12/25  0417 03/11/25  1528 03/11/25  1430   PROBNP  --   --  29,350.0*   HSTROP T 96* 123* 124*         Lab 03/15/25  0435   CHOLESTEROL 65   TRIGLYCERIDES 79         Lab 03/12/25  1150 03/12/25  0833 03/12/25  0417   IRON  --   --  9*   IRON SATURATION (TSAT)  --   --  4*   TIBC  --   --  222*   TRANSFERRIN  --   --  149*   FERRITIN  --   --  238.60   FOLATE  --  8.44  --    VITAMIN B 12  --  1,188*  --    ABO TYPING O  --  O   RH TYPING Positive  --  Positive   ANTIBODY SCREEN Negative  --   --      Brief Urine Lab Results  (Last result in the past 365 days)        Color   Clarity   Blood   Leuk Est   Nitrite   Protein   CREAT   Urine HCG        03/11/25 1604 Isabela   Clear   Moderate (2+)   Negative   Negative   30 mg/dL (1+)                 Microbiology Results Abnormal       Procedure Component Value - Date/Time    COVID-19, FLU A/B, RSV PCR 1 HR TAT - Swab, Nasopharynx [[id number]]  (Abnormal) Collected: 03/11/25 1431    Lab Status: Final result Specimen: Swab from Nasopharynx Updated: 03/11/25 1518     COVID19 Not Detected     Influenza A PCR Not Detected     Influenza B PCR Not Detected     RSV, PCR Detected    Narrative:      Fact sheet for providers: https://www.fda.gov/media/508115/download    Fact sheet for patients: https://www.fda.gov/media/933958/download    Test performed by PCR.          FL Video Swallow With Speech Single Contrast  Result Date: 3/17/2025  FL VIDEO SWALLOW W SPEECH SINGLE-CONTRAST Date of Exam: 3/17/2025 10:34 AM EDT Indication: aspiration.   Comparison: None available. Technique:   The speech pathologist administered food and/or liquid mixed with barium to the patient with cine/video imaging.  Imaging assistance was provided to the speech pathologist and an image was saved.  Fluoroscopic Time: 54 seconds Number of Images: 7 associated fluoroscopic loops were saved Findings: Aspiration was seen during fluoroscopic guided modified barium swallowing series. Please see speech therapy report for full details and recommendations.     Impression: Impression: Fluoroscopy provided for a modified barium swallow. Aspiration of barium was seen during swallowing evaluation. Please see speech therapy report for full details and recommendations. Report dictated by: Shira Renner PA-c  I have personally reviewed this case and agree with the findings above: Electronically Signed: Mendez Navarro MD  3/17/2025 3:24 PM EDT  Workstation ID: [id number]    XR Chest 1 View  Result Date: 3/17/2025  XR CHEST 1 VW Date of Exam: 3/17/2025 3:26 AM EDT Indication: SOB Comparison: 3/11/2025 Findings: Patient is status post sternotomy. Heart is mildly enlarged. Flexible feeding tube is noted in the fundus of the stomach. Interstitial changes are noted in the lungs, particularly in the left lower lung compatible with fibrosis seen by recent chest CT. No new infiltrates. No pneumothorax. Gas-filled loops of colon are noted below the diaphragm.     Impression: Interstitial changes in the lungs compatible with fibrosis seen by recent chest CT. No new infiltrates. Electronically Signed: Newton Keene MD  3/17/2025 3:47 AM EDT  Workstation ID: [id number]    Results for orders placed during the hospital encounter of 02/15/24    Adult Transthoracic Echo Complete W/ Cont if Necessary Per Protocol    Interpretation Summary    Atrial fibrillation was the predominant rhythm observed during the procedure.    The study is technically difficult    Left ventricular wall thickness is consistent with mild concentric hypertrophy.    Left ventricular ejection fraction appears to be 51 - 55%.    Left ventricular diastolic function is consistent with (grade I) impaired relaxation.    The right ventricular cavity is borderline dilated.    The  left atrial cavity is moderately dilated.  5.0 cm    The right atrial cavity is mildly  dilated.    No aortic valve regurgitation or stenosis is present    Mild mitral valve regurgitation with MAC is present.    Moderate tricuspid valve regurgitation is present.  RVSP- 56 mmHg    Borderline dilation of the aortic root is present.  3.8 cm    Current medications:  Scheduled Meds:busPIRone, 5 mg, Nasogastric, Daily With Lunch  [Held by provider] cetirizine, 10 mg, Oral, Daily  doxycycline, 100 mg, Intravenous, Q12H  hydrOXYzine, 25 mg, Nasogastric, Nightly  Insulin Lispro, 2-7 Units, Subcutaneous, Q6H  [Held by provider] isosorbide mononitrate, 30 mg, Oral, QAM  melatonin, 10 mg, Nasogastric, Nightly  metoclopramide, 5 mg, Nasogastric, Q6H  [Held by provider] NIFEdipine XL, 30 mg, Oral, Daily  pantoprazole, 40 mg, Intravenous, Q AM  piperacillin-tazobactam, 4.5 g, Intravenous, Q8H  polyethylene glycol, 17 g, Nasogastric, Daily  predniSONE, 20 mg, Nasogastric, Daily With Breakfast  ProSource TF, 45 mL, Nasogastric, BID  [Held by provider] QUEtiapine, 25 mg, Nasogastric, Daily  QUEtiapine, 50 mg, Nasogastric, Nightly  [Held by provider] rivaroxaban, 15 mg, Nasogastric, Daily With Dinner  sodium chloride, 10 mL, Intravenous, Q12H      Continuous Infusions:     PRN Meds:.  senna-docusate sodium **AND** polyethylene glycol **AND** [DISCONTINUED] bisacodyl **AND** bisacodyl    Calcium Replacement - Follow Nurse / BPA Driven Protocol    dextrose    dextrose    glucagon (human recombinant)    ipratropium-albuterol    Magnesium Standard Dose Replacement - Follow Nurse / BPA Driven Protocol    nitroglycerin    ondansetron    Phosphorus Replacement - Follow Nurse / BPA Driven Protocol    Potassium Replacement - Follow Nurse / BPA Driven Protocol    sodium chloride    sodium chloride    sodium chloride    traMADol    trolamine salicylate    Assessment & Plan   Assessment & Plan     Active Hospital Problems    Diagnosis  POA     **Rhabdomyolysis [M62.82]  Yes    Moderate protein-calorie malnutrition [E44.0]  Yes      Resolved Hospital Problems   No resolved problems to display.     Brief Hospital Course to date:  Carlos Preston is a 89 y.o. male with a history of heart disease, hyperlipidemia, ischemic heart disease status post CABG, paroxysmal A-fib on Eliquis presented to the hospital with progressive weakness.  Found to have bilateral pneumonia and acute rhabdomyolysis    Acute hypoxia respiratory insufficiency  RSV infection  Superimposed left lower lobe bacterial pneumonia, unspecified  Bilateral basal lung fibrosis concerning for chronic dysphagia / aspiration  CT chest with bilateral chronic fibrotic changes in both lung bases and developing left lower lobe pneumonia. Respiratory PCR (+) RSV.  Hypoxic requiring 4 L nasal cannula  Continue IV Zosyn   Completed azithromycin  s/p IV Solu-Medrol - now on PO Prednisone  Negative Legionella antigen, strep antigen and MRSA swab  Sputum culture negative to date  DuoNebs, Mucinex and IS    Chronic dysphagia  History of esophageal stricture  SLP following - aspirating all consistencies  Keofeed was placed and patient started on tube feeds  Failed FEES today  Family strongly considering comfort diet     Acute rhabdomyolysis, resolved   Elevated troponin  Patient fell x 2 and was on the floor for 4 to 5 hours  CPK on admission 2500.  Trended down to 1200 with IV fluids by 3/12  Elevated troponin felt to be demand ischemia and secondary to rhabdomyolysis.  Patient with no chest pain or ischemic changes in EKG.  Defer ischemic workup given his frailty and age       Severe iron deficiency anemia   Symptomatic anemia   Patient was anemic with Hgb 7.6 on admission - s/p 1 unit PRBCs    s/p IV iron x 3 doses  Hemoglobin stable around 10  No evidence of GI bleed    Possible ileus  Hypoactive bowel sounds and KUB with nonobstructive gas pattern  Continue Reglan 5mg PO Q6H    Elevated liver enzymes,  improved  CBD dilation  Abnormal CT abdomen with common bile duct dilation as well as pancreas duct dilation.   No focal mass or obstructive process seen on noncontrast CT  Total bilirubin is normal  GI following and recommended MRCP when more stable    A-fib  Hx of CABG  HTN  On Xarelto at home. Family and patient aware of risk/benefits of holding Xarelto in the setting of concern for swallow safety.   Will continue to hold Xarelto until family definitely confirms comfort diet   CT head no acute intracranial abnormality identified  ECHO: 02/2024: EF: 51-55%     Debility  PT/OT and OT recommended acute rehab but family declined despite extensive counseling.  Per family, they hired 24/7 caregiver and they are interested in taking him home with home PT    Hypoactive delirium  Sleep hygiene - limit interruptions at night  Continue Seroquel 50mg QHSm Melatonin 10mg QHS and Hydroxyzine 25mg QHS    Expected Discharge Location and Transportation: Home with home health / 24/7 caregivers   Expected Discharge Expected Discharge Date: 3/20/2025; Expected Discharge Time:      VTE Prophylaxis:Pharmacologic & mechanical VTE prophylaxis orders are present.    AM-PAC 6 Clicks Score (PT): 10 (03/16/25 2015)    CODE STATUS:   Code Status and Medical Interventions: CPR (Attempt to Resuscitate); Full Support   Ordered at: 03/11/25 4161     Code Status (Patient has no pulse and is not breathing):    CPR (Attempt to Resuscitate)     Medical Interventions (Patient has pulse or is breathing):    Full Support     Level Of Support Discussed With:    Patient     Zeinab Shell PA-C  03/17/25

## 2025-03-17 NOTE — PROGRESS NOTES
Clinical Nutrition Assessment     Patient Name: Carlos Preston  YOB: 1928  MRN: [id number]  Date of Encounter: 03/17/25 15:26 EDT  Admission date: 3/11/2025  Reason for Visit: Follow-up protocol, EN    Assessment   Nutrition Assessment   Admission Diagnosis:  Rhabdomyolysis [M62.82]    Problem List:    Rhabdomyolysis    Moderate protein-calorie malnutrition      PMH:   He  has a past medical history of Cancer, H/O prostatectomy, Hearing loss, Heart disease, History of back surgery, Hypercholesterolemia, Hyperlipidemia, Hypertension, IHD (ischemic heart disease), Osteoarthritis of spine with myelopathy, lumbar region, Paroxysmal atrial fibrillation (07/03/2018), Pulmonary hypertension, S/P knee replacement, Sinus disorder, and thyroid ultrasound (08/28/2009).    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    Applicable Nutrition History:   3/12 SLP rec NPO w repeat 3/13  3/13-SLP Diet Recommendation: NPO, temporary alternate methods of nutrition/hydration, other (see comments) (vs consideration of comfort diet if aligns w/ GOC/POC)     Labs    Labs Reviewed: Yes     Results from last 7 days   Lab Units 03/15/25  0929 03/15/25  0435 03/15/25  0004 03/14/25  0824 03/14/25  0504 03/13/25  0913  "03/13/25  0456 03/12/25  0002 03/11/25  1430   GLUCOSE mg/dL 226* 213*  213* 181*   < > 132*   < > 118*   < > 108*   BUN mg/dL 62* 56*  56* 61*   < > 63*   < > 61*   < > 63*   CREATININE mg/dL 1.10 1.05  1.05 1.04   < > 1.11   < > 0.92   < > 1.42*   SODIUM mmol/L 146* 147*  147* 148*   < > 146*   < > 145   < > 143   CHLORIDE mmol/L 115* 115*  115* 118*   < > 116*   < > 115*   < > 108*   POTASSIUM mmol/L 3.9 3.9  3.9 3.8   < > 3.8   < > 3.8   < > 3.8   PHOSPHORUS mg/dL  --  2.1*  --   --  2.8  --  3.4  --   --    MAGNESIUM mg/dL  --  2.4*  --   --  2.4*  --  2.4*  --  2.3   ALT (SGPT) U/L 35 34  34 32   < > 35   < > 45*   < > 59*   LACTATE mmol/L  --   --   --   --   --   --   --   --  1.8    < > = values in this interval not displayed.       Results from last 7 days   Lab Units 03/15/25  0929 03/15/25  0435 03/15/25  0004 03/14/25  0824 03/14/25  0504 03/13/25  0913 03/13/25  0456   ALBUMIN g/dL 2.5* 2.5*  2.5* 2.4*   < > 2.3*   < > 2.5*   CRP mg/dL  --   --   --   --  5.67*  --  12.39*   CHOLESTEROL mg/dL  --  65  --   --   --   --   --    TRIGLYCERIDES mg/dL  --  79  --   --   --   --   --     < > = values in this interval not displayed.       Results from last 7 days   Lab Units 03/17/25  1152 03/17/25  0612 03/17/25  0106 03/16/25  1719 03/16/25  1150 03/16/25  0547   GLUCOSE mg/dL 115 135* 169* 180* 158* 154*     No results found for: \"HGBA1C\"      Results from last 7 days   Lab Units 03/11/25  1430   PROBNP pg/mL 29,350.0*       Medications    Medications Reviewed: Yes    Scheduled Meds:busPIRone, 5 mg, Nasogastric, Daily With Lunch  [Held by provider] cetirizine, 10 mg, Oral, Daily  doxycycline, 100 mg, Intravenous, Q12H  hydrOXYzine, 25 mg, Nasogastric, Nightly  Insulin Lispro, 2-7 Units, Subcutaneous, Q6H  [Held by provider] isosorbide mononitrate, 30 mg, Oral, QAM  melatonin, 10 mg, Nasogastric, Nightly  metoclopramide, 5 mg, Nasogastric, Q6H  [Held by provider] NIFEdipine XL, 30 mg, Oral, " "Daily  pantoprazole, 40 mg, Intravenous, Q AM  piperacillin-tazobactam, 4.5 g, Intravenous, Q8H  polyethylene glycol, 17 g, Nasogastric, Daily  predniSONE, 20 mg, Nasogastric, Daily With Breakfast  ProSource TF, 45 mL, Nasogastric, BID  [Held by provider] QUEtiapine, 25 mg, Nasogastric, Daily  QUEtiapine, 50 mg, Nasogastric, Nightly  [Held by provider] rivaroxaban, 15 mg, Nasogastric, Daily With Dinner  sodium chloride, 10 mL, Intravenous, Q12H      Continuous Infusions:   PRN Meds:.  senna-docusate sodium **AND** polyethylene glycol **AND** [DISCONTINUED] bisacodyl **AND** bisacodyl    Calcium Replacement - Follow Nurse / BPA Driven Protocol    dextrose    dextrose    glucagon (human recombinant)    ipratropium-albuterol    Magnesium Standard Dose Replacement - Follow Nurse / BPA Driven Protocol    nitroglycerin    ondansetron    Phosphorus Replacement - Follow Nurse / BPA Driven Protocol    Potassium Replacement - Follow Nurse / BPA Driven Protocol    sodium chloride    sodium chloride    sodium chloride    traMADol    trolamine salicylate    Intake/Ouptut 24 hrs (0701 - 0700)   I&O's Reviewed: Yes   Intake & Output (last day)         03/16 0701  03/17 0700 03/17 0701  03/18 0700    Other 1054     NG/GT 1496     Total Intake(mL/kg) 2550 (30.4)     Urine (mL/kg/hr) 1550 (0.8)     Stool 0     Total Output 1550     Net +1000           Stool Unmeasured Occurrence 2 x         Last stool x 1 on 3/11    Anthropometrics     Height: Height: 182.9 cm (72\")  Last Filed Weight: Weight: 83.9 kg (185 lb) (03/11/25 1402)  Method:    BMI: BMI (Calculated): 25.1    UBW:  250lbs per pt's dtr, appears ~215lbs in the past yr  Weight change:  possible 30lb/14% wt loss x 1 yr-need measured wt from [facility]   Weight      Weight (kg) Weight (lbs) Visit Report   1/10/2024 97.523 kg  215 lb  --    2/14/2024 95.074 kg  209 lb 9.6 oz  --    2/15/2024 95.1 kg  209 lb 10.5 oz     8/21/2024 88.542 kg  195 lb 3.2 oz  --    3/11/2025 83.915 kg  185 lb " "        Nutrition Focused Physical Exam    Date: 3/13    Patient meets criteria for malnutrition diagnosis, see MSA note.     Subjective   Reported/Observed/Food/Nutrition Related History:     3/17  Per RN pt doing well on current TF regimen, notes watery Bms. Pt states he is tolerating TF, denies abdominal pain or bloating.     3/14  RN states keofeed placed, in stomach and MD okay to feed.     3/13  Family rpt pt at ok until 4 da ago then unable to eat .Repeat has had signif wt loss. Family aware of plan for feeding tube. GI note indicated will place tube 2/2 aspiration risk at this time.    3/12  Pt up in chair at bedside, very sleepy. Dtr at bedside provides nutrition hx. Dtr states pt has had poor intake x 2 days, states prior to recent fall pt was eating well. Dtr confirms pt has had esophageal dilation in the past, ~1.5 yrs ago and states worsening dysphagia over the past yr and pt managing this w/cutting food into small pieces and drinking adequate fluids w/meals. Dtr states pt has also had significant wt loss over the past yr, states UBW is 250lbs and was 185lbs at MD office <1 week ago. Dtr unsure etiology as pt tells her he eats well.     Needs Assessment   Date: 3/13    Height used:Height: 182.9 cm (72\")  Weights used: 83.9 Kg    Estimated Calorie needs: ~ 2000 Kcal/day  Method:  Kcals/KG x 25 = 2098  Method:  RCN5022 x 1.3 = 1968    Estimated Protein needs: ~ 109 g PRO/day  Method: g/Kg x 1.3    Estimated Fluid needs: ~ ml/day   Per clinical status    Current Nutrition Prescription     EN: IsoSource 1.5  Goal Rate: 60  Water Flushes: 45  Modular: Prosource TF 2/day  Route: NG  Tube: Small bore    At goal over: 22Hrs/day     Rx will supply:   Goal Volume 1320  mL/day     Flush Volume 990 mL/day     Energy 2060 Kcal/day 103 % Est Need   Protein 112 g/day 103 % Est Need   Fiber 20 g/day     Water in  EN 1003 mL     Total Water 1993 mL     Meet DRI Yes    "     --------------------------------------------------------------------------  Product/Rate verified at bedside: Yes  Infusing Rate at time of visit: 60    Average Delivery from [facility] Day:   Volume 1496 mL/day   % Goal Vol.   Flush Volume 1054 mL/day     Energy  Kcal/day  % Est Need   Protein  g/day  % Est Need   Fiber  g/day     Water in  EN  mL     Total Water  mL     Meet DRI Yes        PO: NPO Diet NPO Type: Tube Feeding  Oral Nutrition Supplement:   Intake: N/A    Assessment & Plan   Nutrition Diagnosis   Date:  3/12            Updated:  3/13  Problem Inadequate oral intake    Etiology dysphagia   Signs/Symptoms NPO   Status: New EN rec in place    Date:   3/12  Updated:     Problem Unintended weight loss   Etiology ? Dysphagia, advanced age   Signs/Symptoms Possible 14% wt loss x 1 yr   Status: New    Date:  3/13 Updated:  Problem Malnutrition moderate acute   Etiology Alt Swallow fx   Signs/Symptoms Intake hx w some Wasting   Status: New    Goal / Objectives:   Nutrition to support treatment, Advance diet as medically feasible/appropriate, and Tolerate EN at goal      Nutrition Intervention      Follow treatment progress, Care plan reviewed, Nutrition support order placed    Recommend EN regimen via keofeed:  Isosource 1.5 @ 20 ml/hr advance w tolerance by 10 ml/hr q 6 hr to goal @ 60 ml/hr. Water flush @ 45 ml/hr. Prosource TF BID.  X22hrs this regimen provides 1320ml, 2060kcals (103% est needs), 112g pro (103% est needs), 20g fiber, 1003ml wtaer from formula, +990ml water from flushes, 1993ml TFW.     Replace elytes per protocol  Added Nutrisource Fiber BID    Monitoring/Evaluation:   Per protocol, I&O, Pertinent labs, EN delivery/tolerance, Weight, Symptoms, Swallow function    Lorna Cuevas, MS,RD,LD  Time Spent:30 min

## 2025-03-17 NOTE — THERAPY TREATMENT NOTE
Acute Care - Speech Language Pathology   Swallow Treatment Note and Education  [facility]     Patient Name: Carlos Preston  : 3/20/1928  MRN: [id number]  Today's Date: 3/17/2025               Admit Date: 3/11/2025    Visit Dx:     ICD-10-CM ICD-9-CM   1. Pneumonia of both lungs due to infectious organism, unspecified part of lung  J18.9 483.8   2. Traumatic rhabdomyolysis, initial encounter  T79.6XXA 958.6   3. Elevated troponin  R79.89 790.6   4. RSV (acute bronchiolitis due to respiratory syncytial virus)  J21.0 466.11   5. Impaired mobility and ADLs  Z74.09 V49.89    Z78.9    6. Oropharyngeal dysphagia  R13.12 787.22     Patient Active Problem List   Diagnosis    Pulmonary HTN    Hyperlipemia    HTN (hypertension)    GERD (gastroesophageal reflux disease)    Hx of CABG    IHD (ischemic heart disease)    SOB (shortness of breath)    Abnormal chest x-ray    Esophageal stricture    Rhabdomyolysis    Moderate protein-calorie malnutrition     Past Medical History:   Diagnosis Date    Cancer     H/O prostatectomy     Hearing loss     Heart disease     History of back surgery     Hypercholesterolemia     Hyperlipidemia     Hypertension     IHD (ischemic heart disease)     S/P CABG    Osteoarthritis of spine with myelopathy, lumbar region     Paroxysmal atrial fibrillation 2018    Pulmonary hypertension     S/P knee replacement     Sinus disorder     thyroid ultrasound 2009    Normal     Past Surgical History:   Procedure Laterality Date    CARDIAC CATHETERIZATION  2007    Dr. Mace: 60% LM and 70% LCX.    CARDIAC CATHETERIZATION  2009    %, OM 85-90%,Patent grafts.    CARDIAC CATHETERIZATION  2018    Patent Grafts    CARDIOVASCULAR STRESS TEST  2009    ROBIN George: Dr. Mace- EF 29%, Diffuse ischemia.    CARDIOVASCULAR STRESS TEST  2012    L. Myoview- Inferoseptal infarct with jon infarct ischemia. EF 41%.    CARDIOVASCULAR STRESS TEST  2015    L. Myoview-  EF51%,fixed defect , no ischemia burden.    CARDIOVASCULAR STRESS TEST  10/11/2017    L.Myoview- EF 54%. Small inferior Ischemia.    CONVERTED (HISTORICAL) HOLTER  06/12/2023    3 days. A.Fib. Avg 60. . 3 VT. One 3 Sec Pause    CONVERTED (HISTORICAL) HOLTER  05/28/2024    3 Days. A.Fib. Avg 70. . 4.6% PVC. 2 VT    CORONARY ARTERY BYPASS GRAFT  05/17/2007    CABG:  SVG to LAD and LCX.    ECHO - CONVERTED  08/06/2009    Dr. Mace- EF 50%.    ECHO - CONVERTED  02/14/2011    EF 50%, Septal WMA.    ECHO - CONVERTED  11/07/2012    EF 40-45%, RVSP 33 mmHg.    ECHO - CONVERTED  03/23/2015    EF 45-50%, RVSP 40mmHg,mild MR, mild PHT    ECHO - CONVERTED  05/01/2017    EF 50-55%, mild MR    ECHO - CONVERTED  10/11/2017    EF 55%    ECHO - CONVERTED  03/21/2022    TLS. EF 55%. LA- 5.2 cm. Mild MR. RVSP- 44 mmHg    ECHO - CONVERTED  02/15/2024    TLS. EF 55%.RHE. LA- 5.0. Mild MR.AO- 3.8. RVSP- 56 mmHg    US CAROTID UNILATERAL  04/16/2007    Mild Plaque in Rt. bulb and RECA.       SLP Recommendation and Plan  SLP Swallowing Diagnosis: mild-moderate, oral dysphagia, severe, pharyngeal dysphagia, other (see comments) (? mechanical component to dysphagia 2/2 osteophyte) (03/17/25 1100)  SLP Diet Recommendation: NPO, temporary alternate methods of nutrition/hydration, long term alternate methods of nutrition/hydration, comfort diet, per physician discretion (03/17/25 1350)  Recommended Precautions and Strategies: general aspiration precautions (03/17/25 1350)  SLP Rec. for Method of Medication Administration: meds via alternate route (03/17/25 1350)     Monitor for Signs of Aspiration: notify SLP if any concerns, yes (03/17/25 1350)     Swallow Criteria for Skilled Therapeutic Interventions Met: demonstrates skilled criteria (03/17/25 1100)  Anticipated Discharge Disposition (SLP): inpatient rehabilitation facility (03/17/25 1350)  Rehab Potential/Prognosis, Swallowing: adequate, monitor progress closely (03/17/25  1100)  Therapy Frequency (Swallow): 5 days per week (03/17/25 1350)  Predicted Duration Therapy Intervention (Days): 1 week (03/17/25 1350)  Oral Care Recommendations: Oral Care BID/PRN, Suction toothbrush (03/17/25 1350)                           Treatment Assessment Comments (SLP): Lengthy education provided to pt and family members. Education included complete review of MBSs including images on monitor, dysphagia, aspiration, aspiration pna, NPO, comfort diet / pleasure feeding, PEG v Keofeed v TPN (pt's dtr requesting pt be placed on TPN, Hospitalist PA aware). Pt states several times throughout session that he wants to eat and drink, and he does accept the risk of aspiration. Daughter requests again to discuss PEG placement with PO diet. We review burdens of NPO status + PEG placement, the fact that a PEG does not eliminate aspiration, and potential impact on QOL. Numerous questions were asked by all family members which I answered to the best of my ability. Daughter and great granddaughter verbalized understanding and appreciation. SLP will f/u for continued GOC conversations/education/tx as appropriate. D/w Hospitalist PA and MD via secure chat. (03/17/25 1350)                   SWALLOW EVALUATION (Last 72 Hours)       SLP Adult Swallow Evaluation       Row Name 03/17/25 1350 03/17/25 1100 03/17/25 0830             Rehab Evaluation    Document Type other (see comments)  -RS re-evaluation  -RS re-evaluation  -RS (r) MC (t) RS (c)      Subjective Information no complaints  -RS no complaints  -RS no complaints  -RS (r) MC (t) RS (c)      Patient Observations cooperative  -RS cooperative  -RS alert;cooperative;agree to therapy  -RS (r) MC (t) RS (c)      Patient/Family/Caregiver Comments/Observations Daughter, son in law, great-granddaughter present in person, granddaughter present via phone  -RS no family present in radiology  -RS Daughter present  -RS (r) MC (t) RS (c)      Patient Effort -- adequate  -RS good   -RS (r) MC (t) RS (c)      Symptoms Noted During/After Treatment -- none  -RS none  -RS (r) MC (t) RS (c)         General Information    Patient Profile Reviewed -- -- yes  -RS (r) MC (t) RS (c)      Pertinent History Of Current Problem -- -- See intial eval  -RS (r) MC (t) RS (c)         Pain    Pretreatment Pain Rating 0/10 - no pain  -RS -- 0/10 - no pain  -RS (r) MC (t) RS (c)      Posttreatment Pain Rating 0/10 - no pain  -RS -- 0/10 - no pain  -RS (r) MC (t) RS (c)         General Eating/Swallowing Observations    Respiratory Support Currently in Use -- -- nasal cannula  -RS (r) MC (t) RS (c)      Eating/Swallowing Skills -- -- fed by SLP  -RS (r) MC (t) RS (c)      Positioning During Eating -- -- upright in bed  -RS (r) MC (t) RS (c)      Utensils Used -- -- spoon  -RS (r) MC (t) RS (c)      Consistencies Trialed -- -- ice chips;thin liquids;pureed  -RS (r) MC (t) RS (c)         Clinical Swallow Eval    Oral Prep Phase -- -- impaired  -RS (r) MC (t) RS (c)      Oral Transit -- -- impaired  -RS (r) MC (t) RS (c)      Oral Residue -- -- WFL  -RS (r) MC (t) RS (c)      Pharyngeal Phase -- -- suspected pharyngeal impairment  -RS (r) MC (t) RS (c)      Esophageal Phase -- -- unremarkable  -RS (r) MC (t) RS (c)      Clinical Swallow Evaluation Summary -- -- Pt w/ wet vocal quality prior to PO trials, cued to throat clear. Up to 3 swallows observed with ice chips, followed by WVQ and throat clear. Pt coughed following presentation of thins. Cough is intermittenly productive. Multiple swallows w/ puree, continued throat clears. Dicussion w/ daughter regarding GOC, recommended MBS for further pharyngeal evaluation.  -RS (r) MC (t) RS (c)         Oral Prep Concerns    Oral Prep Concerns -- -- prolonged mastication  -RS (r) MC (t) RS (c)      Prolonged Mastication -- -- other (see comments)  Ice chips  -RS (r) MC (t) RS (c)         Oral Transit Concerns    Oral Transit Concerns -- -- increased oral transit time  -RS (r)  MC (t) RS (c)      Increased Oral Transit Time -- -- other (see comments);pudding  Ice chips  -RS (r) MC (t) RS (c)         Pharyngeal Phase Concerns    Pharyngeal Phase Concerns -- -- wet vocal quality;cough;throat clear  -RS (r) MC (t) RS (c)      Wet Vocal Quality -- -- all consistencies  -RS (r) MC (t) RS (c)      Multiple Swallows -- -- other (see comments);pudding  Ice chips  -RS (r) MC (t) RS (c)      Cough -- -- thin  -RS (r) MC (t) RS (c)      Throat Clear -- -- other (see comments)  Ice chips  -RS (r) MC (t) RS (c)         Esophageal Phase Concerns    Esophageal Phase Concerns, Comment -- -- Hx of esophageal diliation  -RS (r) MC (t) RS (c)         MBS/VFSS    Utensils Used -- spoon;straw  -RS --      Consistencies Trialed -- thin liquids;nectar/syrup-thick liquids;pureed  -RS --         MBS/VFSS Interpretation    Oral Prep Phase -- WFL  -RS --      Oral Transit Phase -- impaired  -RS --      Oral Residue -- WFL  -RS --         Oral Transit Phase    Impaired Oral Transit Phase -- delayed initiation of bolus transit;premature spillage of liquids into pharynx  -RS --         Initiation of Pharyngeal Swallow    Initiation of Pharyngeal Swallow -- bolus in valleculae  -RS --      Pharyngeal Phase -- impaired pharyngeal phase of swallowing  -RS --      Anatomical abnormalities noted -- osteophyte/bone spur per radiology report  -RS --      Anatomical abnormalities functional impact -- functional impact on swallowing  large osteophyte at c5, appears to negatively impact pharyngeal phase  -RS --      Penetration During the Swallow -- thin liquids;nectar-thick liquids;pudding/puree;secondary to delayed swallow initiation or mistiming;secondary to reduced laryngeal elevation;secondary to reduced vestibular closure  -RS --      Penetration After the Swallow -- thin liquids;nectar-thick liquids;pudding/puree;secondary to residue;in valleculae;in pyriform sinuses  -RS --      Depth of Penetration -- deep  -RS --       Response to Penetration -- No  -RS --      No spontaneous response to penetration and -- non-effective laryngeal clearance with cue (see comments)  -RS --      Rosenbek's Scale -- thin:;nectar:;pudding/puree:;3--->level 3  -RS --      Pharyngeal Residue -- diffuse within pharynx;secondary to reduced base of tongue retraction;secondary to reduced posterior pharyngeal wall stripping;secondary to reduced laryngeal elevation;secondary to reduced hyolaryngeal excursion;other (see comments)  also ? mechanical component 2/2 osteophyte  -RS --      Response to Residue -- unable to clear residue  -RS --         Swallowing Quality of Life Assessment    Patient/family preferences -- Avoid nasogastric tube;Avoid gastrostomy tube  Pt verbalizes several times that he does NOT want Keofeed or PEG, that he wants to eat and drink  -RS --      Education and counseling provided Signs of aspiration;Silent aspiration;Risks of aspiration;Safest diet options;Comfort diet options;Pleasure feedings;Alternate nutrition/hydration options, risks, and benefits;Oral care recommendations and rationale;Aspiration precautions  -RS Signs of aspiration;Silent aspiration;Risks of aspiration;Comfort diet options;Pleasure feedings;Alternate nutrition/hydration options, risks, and benefits  -RS Signs of aspiration;Silent aspiration;Risks of aspiration;Safest diet options;Comfort diet options  -RS (r) MC (t) RS (c)         SLP Evaluation Clinical Impression    SLP Swallowing Diagnosis -- mild-moderate;oral dysphagia;severe;pharyngeal dysphagia;other (see comments)  ? mechanical component to dysphagia 2/2 osteophyte  -RS oral dysphagia;R/O pharyngeal dysphagia  -RS (r) MC (t) RS (c)      Functional Impact -- risk of aspiration/pneumonia;risk of malnutrition  -RS risk of aspiration/pneumonia;risk of malnutrition  -RS (r) MC (t) RS (c)      Rehab Potential/Prognosis, Swallowing -- adequate, monitor progress closely  -RS good, to achieve stated therapy goals   -RS (r) MC (t) RS (c)      Swallow Criteria for Skilled Therapeutic Interventions Met -- demonstrates skilled criteria  -RS demonstrates skilled criteria  -RS (r) MC (t) RS (c)         SLP Treatment Clinical Impressions    Treatment Assessment Comments (SLP) Lengthy education provided to pt and family members. Education included complete review of MBSs including images on monitor, dysphagia, aspiration, aspiration pna, NPO, comfort diet / pleasure feeding, PEG v Keofeed v TPN (pt's dtr requesting pt be placed on TPN, Hospitalist PA aware). Pt states several times throughout session that he wants to eat and drink, and he does accept the risk of aspiration. Daughter requests again to discuss PEG placement with PO diet. We review burdens of NPO status + PEG placement, the fact that a PEG does not eliminate aspiration, and potential impact on QOL. Numerous questions were asked by all family members which I answered to the best of my ability. Daughter and great granddaughter verbalized understanding and appreciation. SLP will f/u for continued GOC conversations/education/tx as appropriate. D/w Hospitalist PA and MD via secure chat.  -RS -- --         Recommendations    Therapy Frequency (Swallow) 5 days per week  -RS 5 days per week  -RS --  \  -RS (r) MC (t) RS (c)      Predicted Duration Therapy Intervention (Days) 1 week  -RS 1 week  -RS --      SLP Diet Recommendation NPO;temporary alternate methods of nutrition/hydration;long term alternate methods of nutrition/hydration;comfort diet, per physician discretion  -RS NPO;temporary alternate methods of nutrition/hydration;long term alternate methods of nutrition/hydration;comfort diet, per physician discretion  -RS NPO;temporary alternate methods of nutrition/hydration;other (see comments)  -RS (r) MC (t) RS (c)      Recommended Diagnostics -- -- reassess via VFSS (MBS)  -RS (r) MC (t) RS (c)      Recommended Precautions and Strategies general aspiration precautions   -RS general aspiration precautions  -RS general aspiration precautions  -RS (r) MC (t) RS (c)      Oral Care Recommendations Oral Care BID/PRN;Suction toothbrush  -RS Oral Care BID/PRN;Suction toothbrush  -RS Oral Care BID/PRN;Suction toothbrush  -RS (r) MC (t) RS (c)      SLP Rec. for Method of Medication Administration meds via alternate route  -RS meds via alternate route  -RS meds via alternate route  -RS (r) MC (t) RS (c)      Monitor for Signs of Aspiration notify SLP if any concerns;yes  -RS notify SLP if any concerns;yes  -RS notify SLP if any concerns;yes  -RS (r) MC (t) RS (c)      Anticipated Discharge Disposition (SLP) inpatient rehabilitation facility  -RS inpatient rehabilitation facility  -RS inpatient rehabilitation facility  -RS (r) MC (t) RS (c)                User Key  (r) = Recorded By, (t) = Taken By, (c) = Cosigned By      Initials Name Effective Dates    RS Erick Freemna, MS CCC-SLP 09/14/23 -     Dejah Rubi, Speech Therapy Student 01/16/25 -                     EDUCATION  The patient has been educated in the following areas:   Dysphagia (Swallowing Impairment) NPO rationale   .        SLP GOALS       Row Name 03/17/25 1100             (LTG) Patient will demonstrate functional swallow for    Diet Texture (Demonstrate functional swallow) soft to chew (whole) textures  -RS      Liquid viscosity (Demonstrate functional swallow) thin liquids  -RS      [place] (Demonstrate functional swallow) with minimal cues (75-90% accuracy)  -RS      Time Frame (Demonstrate functional swallow) 1 week  -RS      Progress/Outcomes (Demonstrate functional swallow) new goal  -RS         (STG) Lingual Strengthening Goal 1 (SLP)    Activity (Lingual Strengthening Goal 1, SLP) increase tongue back strength  -RS      Increase Tongue Back Strength lingual resistance exercises  -RS      [place]/Accuracy (Lingual Strengthening Goal 1, SLP) with minimal cues (75-90% accuracy)  -RS      Time Frame (Lingual  Strengthening Goal 1, SLP) 1 week  -RS      Progress/Outcomes (Lingual Strengthening Goal 1, SLP) new goal  -RS         (STG) Pharyngeal Strengthening Exercise Goal 1 (SLP)    Activity (Pharyngeal Strengthening Goal 1, SLP) increase timing;increase superior movement of the hyolaryngeal complex;increase anterior movement of the hyolaryngeal complex;increase closure at entrance to airway/closure of airway at glottis;increase squeeze/positive pressure generation;increase tongue base retraction  -RS      Increase Timing prepping - 3 second prep or suck swallow or 3-step swallow  -RS      Increase Superior Movement of the Hyolaryngeal Complex Mendelsohn  -RS      Increase Anterior Movement of the Hyolaryngeal Complex chin tuck against resistance (CTAR)  -RS      Increase Closure at Entrance to Airway/Closure of Airway at Glottis supraglottic swallow  -RS      Increase Squeeze/Positive Pressure Generation hard effortful swallow  -RS      Increase Tongue Base Retraction edith  -RS      [place]/Accuracy (Pharyngeal Strengthening Goal 1, SLP) with minimal cues (75-90% accuracy)  -RS      Time Frame (Pharyngeal Strengthening Goal 1, SLP) 1 week  -RS      Progress/Outcomes (Pharyngeal Strengthening Goal 1, SLP) new goal  -RS                User Key  (r) = Recorded By, (t) = Taken By, (c) = Cosigned By      Initials Name Provider Type    RS Erick Freeman MS CCC-SLP Speech and Language Pathologist                         Time Calculation:    Time Calculation- SLP       Row Name 03/17/25 1524 03/17/25 1142 03/17/25 1014       Time Calculation- SLP    SLP Start Time 1350  -RS 1000  -RS 0830  -RS (r) MC (t) RS (c)    SLP Received On 03/17/25  -RS 03/17/25  -RS 03/17/25  -RS (r) MC (t) RS (c)       Untimed Charges    65671-CY Eval Oral Pharyng Swallow Minutes -- -- 53  -RS (r) MC (t) RS (c)    83509-PB Motion Fluoro Eval Swallow Minutes -- 60  -RS --    90898-JF Treatment Swallow Minutes 97  -RS -- --       Total Minutes     Untimed Charges Total Minutes 97  -RS 60  -RS 53  -RS (r) MC (t)     Total Minutes 97  -RS 60  -RS 53  -RS (r) MC (t)              User Key  (r) = Recorded By, (t) = Taken By, (c) = Cosigned By      Initials Name Provider Type    Erick Berry MS CCC-SLP Speech and Language Pathologist    Dejah Rubi, Speech Therapy Student SLP Student                    Therapy Charges for Today       Code Description Service Date Service Provider Modifiers Qty    57115266001 HC ST MOTION FLUORO EVAL SWALLOW 4 3/17/2025 Erick Freeman MS CCC-SLP GN 1    [id number] HC ST TREATMENT SWALLOW 6 3/17/2025 Erick Freeman MS CCC-SLP GN 1                 Erick Freeman MS CCC-SLP  3/17/2025

## 2025-03-17 NOTE — THERAPY TREATMENT NOTE
Patient Name: Carlos Preston  : 3/20/1928    MRN: [id number]                              Today's Date: 3/17/2025       Admit Date: 3/11/2025    Visit Dx:     ICD-10-CM ICD-9-CM   1. Pneumonia of both lungs due to infectious organism, unspecified part of lung  J18.9 483.8   2. Traumatic rhabdomyolysis, initial encounter  T79.6XXA 958.6   3. Elevated troponin  R79.89 790.6   4. RSV (acute bronchiolitis due to respiratory syncytial virus)  J21.0 466.11   5. Impaired mobility and ADLs  Z74.09 V49.89    Z78.9    6. Oropharyngeal dysphagia  R13.12 787.22     Patient Active Problem List   Diagnosis    Pulmonary HTN    Hyperlipemia    HTN (hypertension)    GERD (gastroesophageal reflux disease)    Hx of CABG    IHD (ischemic heart disease)    SOB (shortness of breath)    Abnormal chest x-ray    Esophageal stricture    Rhabdomyolysis    Moderate protein-calorie malnutrition     Past Medical History:   Diagnosis Date    Cancer     H/O prostatectomy     Hearing loss     Heart disease     History of back surgery     Hypercholesterolemia     Hyperlipidemia     Hypertension     IHD (ischemic heart disease)     S/P CABG    Osteoarthritis of spine with myelopathy, lumbar region     Paroxysmal atrial fibrillation 2018    Pulmonary hypertension     S/P knee replacement     Sinus disorder     thyroid ultrasound 2009    Normal     Past Surgical History:   Procedure Laterality Date    CARDIAC CATHETERIZATION  2007    Dr. Mace: 60% LM and 70% LCX.    CARDIAC CATHETERIZATION  2009    %, OM 85-90%,Patent grafts.    CARDIAC CATHETERIZATION  2018    Patent Grafts    CARDIOVASCULAR STRESS TEST  2009    [facility] George: Dr. Mace- EF 29%, Diffuse ischemia.    CARDIOVASCULAR STRESS TEST  2012    L. Myoview- Inferoseptal infarct with jon infarct ischemia. EF 41%.    CARDIOVASCULAR STRESS TEST  2015    L. Myoview- EF51%,fixed defect , no ischemia burden.    CARDIOVASCULAR STRESS TEST   10/11/2017    L.Myoview- EF 54%. Small inferior Ischemia.    CONVERTED (HISTORICAL) HOLTER  06/12/2023    3 days. A.Fib. Avg 60. . 3 VT. One 3 Sec Pause    CONVERTED (HISTORICAL) HOLTER  05/28/2024    3 Days. A.Fib. Avg 70. . 4.6% PVC. 2 VT    CORONARY ARTERY BYPASS GRAFT  05/17/2007    CABG:  SVG to LAD and LCX.    ECHO - CONVERTED  08/06/2009    Dr. Mace- EF 50%.    ECHO - CONVERTED  02/14/2011    EF 50%, Septal WMA.    ECHO - CONVERTED  11/07/2012    EF 40-45%, RVSP 33 mmHg.    ECHO - CONVERTED  03/23/2015    EF 45-50%, RVSP 40mmHg,mild MR, mild PHT    ECHO - CONVERTED  05/01/2017    EF 50-55%, mild MR    ECHO - CONVERTED  10/11/2017    EF 55%    ECHO - CONVERTED  03/21/2022    TLS. EF 55%. LA- 5.2 cm. Mild MR. RVSP- 44 mmHg    ECHO - CONVERTED  02/15/2024    TLS. EF 55%.RHE. LA- 5.0. Mild MR.AO- 3.8. RVSP- 56 mmHg    US CAROTID UNILATERAL  04/16/2007    Mild Plaque in Rt. bulb and RECA.      General Information       Row Name 03/17/25 7014          Physical Therapy Time and Intention    Document Type therapy note (daily note)  -KE     Mode of Treatment physical therapy  -       Row Name 03/17/25 9635          General Information    Patient Profile Reviewed yes  -KE     Existing Precautions/Restrictions fall;oxygen therapy device and L/min;other (see comments)  NG  -KE     Barriers to Rehab medically complex  -KE       Row Name 03/17/25 4041          Cognition    Orientation Status (Cognition) oriented to;person;place  -KE       Row Name 03/17/25 1267          Safety Issues/Impairments Affecting Functional Mobility    Safety Issues Affecting Function (Mobility) awareness of need for assistance;safety precaution awareness;safety precautions follow-through/compliance;insight into deficits/self-awareness;sequencing abilities;problem-solving  -KE     Impairments Affecting Function (Mobility) balance;cognition;coordination;endurance/activity tolerance;shortness of breath;strength;postural/trunk  control;motor planning  -KE     Cognitive Impairments, Mobility Safety/Performance awareness, need for assistance;insight into deficits/self-awareness;problem-solving/reasoning;safety precaution awareness;safety precaution follow-through  -WHITNEY               User Key  (r) = Recorded By, (t) = Taken By, (c) = Cosigned By      Initials Name Provider Type    Flory Begum PT Physical Therapist                   Mobility       Row Name 03/17/25 1558          Bed Mobility    Bed Mobility supine-sit  -KE     Supine-Sit [place] (Bed Mobility) maximum assist (25% patient effort);1 person assist  -KE     Assistive Device (Bed Mobility) bed rails;head of bed elevated;repositioning sheet  -       Row Name 03/17/25 1558          Transfers    Comment, (Transfers) Posterior lean EOB req ModAx1; tolerated standing for 30 sec - 1 min w/ MaxAx1 for dep pericare  -KE       Row Name 03/17/25 1558          Bed-Chair Transfer    Bed-Chair [place] (Transfers) maximum assist (25% patient effort);1 person assist  -KE     Assistive Device (Bed-Chair Transfers) other (see comments)  B UE support  -KE       Row Name 03/17/25 1558          Sit-Stand Transfer    Sit-Stand [place] (Transfers) maximum assist (25% patient effort);1 person assist  -KE     Assistive Device (Sit-Stand Transfers) other (see comments)  B UE support  -KE     Comment, (Sit-Stand Transfer) VCs for anterior weight shift and upright posture  -WHITNEY               User Key  (r) = Recorded By, (t) = Taken By, (c) = Cosigned By      Initials Name Provider Type    Flory Begum PT Physical Therapist                   Obj/Interventions       Row Name 03/17/25 1600 03/17/25 1559       Balance    Balance Assessment sitting static balance;sitting dynamic balance;standing static balance;standing dynamic balance  -WHITNEY sitting static balance;sitting dynamic balance;standing static balance;standing dynamic balance  -WHITNEY    Static Sitting Balance moderate  assist;1-person assist  -KE --    Dynamic Sitting Balance moderate assist;1-person assist  -KE --    Position, Sitting Balance sitting edge of bed  -KE --    Static Standing Balance maximum assist;1-person assist  -KE --    Dynamic Standing Balance maximum assist;1-person assist  -KE --    Position/Device Used, Standing Balance supported  -KE --    Balance Interventions sitting;standing;sit to stand;supported;dynamic;static  -KE --    Comment, Balance sig posterior lean  -KE --              User Key  (r) = Recorded By, (t) = Taken By, (c) = Cosigned By      Initials Name Provider Type    Flory Begum, PT Physical Therapist                   Goals/Plan    No documentation.                  Clinical Impression       Row Name 03/17/25 1601          Pain    Pretreatment Pain Rating 0/10 - no pain  -KE     Posttreatment Pain Rating 0/10 - no pain  -KE       Row Name 03/17/25 1601          Plan of Care Review    Plan of Care Reviewed With patient;family  -KE     Progress declining  -KE     Outcome Evaluation Pt req increased assistance for all functional mobility, rq MaxAx1 w/ B UE support for SPT. Pt will continue to benefit from IPPT to address deficits in strength, balance, and functional endurance and facilitate improved independence. Discussed w/ pt and family PT rec of SNF; family expresses wish to return home. Educated pt on equipment needs and transfer techniques, family verbalized understanding. Continue progressing PT POC as tolerated.  -KE       Row Name 03/17/25 1601          Vital Signs    Pre Systolic BP Rehab 145  -KE     Pre Treatment Diastolic BP 88  -KE     Post Systolic BP Rehab 150  -KE     Post Treatment Diastolic BP 83  -KE     Pretreatment Heart Rate (beats/min) 62  -KE     Posttreatment Heart Rate (beats/min) 79  -KE     O2 Delivery Pre Treatment room air  -KE     O2 Delivery Intra Treatment room air  -KE     Post SpO2 (%) 97  -KE     O2 Delivery Post Treatment room air  -KE     Pre Patient  Position Supine  -KE     Intra Patient Position Standing  -KE     Post Patient Position Sitting  -KE       Row Name 03/17/25 1601          Positioning and Restraints    Pre-Treatment Position in bed  -KE     Post Treatment Position chair  -KE     In Chair notified nsg;reclined;waffle cushion;on mechanical lift sling;call light within reach;encouraged to call for assist;exit alarm on;with family/caregiver;legs elevated;heels elevated  -KE               User Key  (r) = Recorded By, (t) = Taken By, (c) = Cosigned By      Initials Name Provider Type    Flory Begum, DAMIEN Physical Therapist                   Outcome Measures       Row Name 03/17/25 1607          How much help from another person do you currently need...    Turning from your back to your side while in flat bed without using bedrails? 2  -KE     Moving from lying on back to sitting on the side of a flat bed without bedrails? 2  -KE     Moving to and from a bed to a chair (including a wheelchair)? 2  -KE     Standing up from a chair using your arms (e.g., wheelchair, bedside chair)? 2  -KE     Climbing 3-5 steps with a railing? 1  -KE     To walk in hospital room? 1  -KE     AM-PAC 6 Clicks Score (PT) 10  -KE     Highest Level of Mobility Goal 4 --> Transfer to chair/commode  -KE       Row Name 03/17/25 1607          Functional Assessment    Outcome Measure Options AM-PAC 6 Clicks Basic Mobility (PT)  -KE               User Key  (r) = Recorded By, (t) = Taken By, (c) = Cosigned By      Initials Name Provider Type    Flory Begum PT Physical Therapist                                 Physical Therapy Education       Title: PT OT SLP Therapies (In Progress)       Topic: Physical Therapy (In Progress)       Point: Mobility training (In Progress)       Learning Progress Summary            Patient Acceptance, E, NR by WHITNEY at 3/17/2025 1608    Acceptance, E, NR by WHITNEY at 3/12/2025 1606                      Point: Home exercise program (In Progress)        Learning Progress Summary            Patient Acceptance, E, NR by  at 3/17/2025 1608    Acceptance, E, NR by KE at 3/12/2025 1606                      Point: Body mechanics (In Progress)       Learning Progress Summary            Patient Acceptance, E, NR by KE at 3/17/2025 1608    Acceptance, E, NR by KE at 3/12/2025 1606                      Point: Precautions (In Progress)       Learning Progress Summary            Patient Acceptance, E, NR by  at 3/17/2025 1608    Acceptance, E, NR by KE at 3/12/2025 1606                                      User Key       Initials Effective Dates Name Provider Type Discipline     11/16/23 -  Flory Little, PT Physical Therapist PT                  PT Recommendation and Plan  Planned Therapy Interventions (PT): home exercise program, balance training, bed mobility training, gait training, neuromuscular re-education, patient/family education, postural re-education, transfer training, stretching, strengthening, stair training, ROM (range of motion)  Progress: declining  Outcome Evaluation: Pt req increased assistance for all functional mobility, rq MaxAx1 w/ B UE support for SPT. Pt will continue to benefit from IPPT to address deficits in strength, balance, and functional endurance and facilitate improved independence. Discussed w/ pt and family PT rec of SNF; family expresses wish to return home. Educated pt on equipment needs and transfer techniques, family verbalized understanding. Continue progressing PT POC as tolerated.     Time Calculation:   PT Evaluation Complexity  History, PT Evaluation Complexity: 1-2 personal factors and/or comorbidities  Examination of Body Systems (PT Eval Complexity): 1-2 elements  Clinical Presentation (PT Evaluation Complexity): stable  Clinical Decision Making (PT Evaluation Complexity): low complexity  Overall Complexity (PT Evaluation Complexity): low complexity     PT Charges       Row Name 03/17/25 1608             Time  Calculation    Start Time 1414  -KE      PT Received On 03/17/25  -KE         Timed Charges    85638 - PT Therapeutic Activity Minutes 44  -KE         Total Minutes    Timed Charges Total Minutes 44  -KE       Total Minutes 44  -KE                User Key  (r) = Recorded By, (t) = Taken By, (c) = Cosigned By      Initials Name Provider Type    Flory Begum PT Physical Therapist                  Therapy Charges for Today       Code Description Service Date Service Provider Modifiers Qty    89561720534 HC PT THERAPEUTIC ACT EA 15 MIN 3/17/2025 Flory Little PT GP 3            PT G-Codes  Outcome Measure Options: AM-PAC 6 Clicks Basic Mobility (PT)  AM-PAC 6 Clicks Score (PT): 10  AM-PAC 6 Clicks Score (OT): 11  PT Discharge Summary  Anticipated Discharge Disposition (PT): skilled nursing facility    Flory Little PT  3/17/2025

## 2025-03-17 NOTE — CASE MANAGEMENT/SOCIAL WORK
Continued Stay Note  [facility]     Patient Name: Carlos Preston  MRN: [id number]  Today's Date: 3/17/2025    Admit Date: 3/11/2025    Plan: Home with home health   Discharge Plan       Row Name 03/17/25 1516       Plan    Plan Home with home health    Patient/Family in Agreement with Plan yes    Plan Comments Discussed Mr Preston in MDR.  Mr Preston went down for a MBS today and remains on his keofeed.  I spoke with Ankit/Carol about suction equipment, and Mr Preston lives outside of [facility]'s coverage.  I spoke with Marisela/Micaela, and Micaela does have an office in [place] that is able to provide suction equipment.  Marisela did opt to wait until Mr Preston was closer to discharge before filling the order.  I spoke with Mr Preston and his family at bedside.  I confirmed that the plan is still home with home health and suction equipment.  I confirmed that [facility] out of [facility] is following Mr Preston.  I also confirmed that a portable electric wheelchair will need to come from his PCP.  I updated family that I was able to find a DME provider for suction equipment.  At this time case management is waiting on Mr Preston to be medically ready for discharge and tube feeding recommendations.  Mr Preston still has Keofeed in place. Case management will continue to follow.    Final Discharge Disposition Code 06 - home with home health care                   Discharge Codes    No documentation.                 Expected Discharge Date and Time       Expected Discharge Date Expected Discharge Time    Mar 20, 2025               Agnes Hodge RN

## 2025-03-17 NOTE — PLAN OF CARE
Goal Outcome Evaluation:              Outcome Evaluation: Patient resting on room air. Afib on monitor with controlled rate. VSS. Tolerating TF well @ goal rate. A&Ox4. Family at bedside requesting for patient to not be woken overnight for routine tests and labs.

## 2025-03-17 NOTE — THERAPY RE-EVALUATION
Acute Care - Speech Language Pathology   Swallow Re-Evaluation [facility]  Re-Clinical Swallow Evaluation      Patient Name: Carlos Preston  : 3/20/1928  MRN: [id number]  Today's Date: 3/17/2025               Admit Date: 3/11/2025    Visit Dx:     ICD-10-CM ICD-9-CM   1. Pneumonia of both lungs due to infectious organism, unspecified part of lung  J18.9 483.8   2. Traumatic rhabdomyolysis, initial encounter  T79.6XXA 958.6   3. Elevated troponin  R79.89 790.6   4. RSV (acute bronchiolitis due to respiratory syncytial virus)  J21.0 466.11   5. Impaired mobility and ADLs  Z74.09 V49.89    Z78.9    6. Dysphagia, unspecified type  R13.10 787.20     Patient Active Problem List   Diagnosis    Pulmonary HTN    Hyperlipemia    HTN (hypertension)    GERD (gastroesophageal reflux disease)    Hx of CABG    IHD (ischemic heart disease)    SOB (shortness of breath)    Abnormal chest x-ray    Esophageal stricture    Rhabdomyolysis    Moderate protein-calorie malnutrition     Past Medical History:   Diagnosis Date    Cancer     H/O prostatectomy     Hearing loss     Heart disease     History of back surgery     Hypercholesterolemia     Hyperlipidemia     Hypertension     IHD (ischemic heart disease)     S/P CABG    Osteoarthritis of spine with myelopathy, lumbar region     Paroxysmal atrial fibrillation 2018    Pulmonary hypertension     S/P knee replacement     Sinus disorder     thyroid ultrasound 2009    Normal     Past Surgical History:   Procedure Laterality Date    CARDIAC CATHETERIZATION  2007    Dr. Mace: 60% LM and 70% LCX.    CARDIAC CATHETERIZATION  2009    %, OM 85-90%,Patent grafts.    CARDIAC CATHETERIZATION  2018    Patent Grafts    CARDIOVASCULAR STRESS TEST  2009    ROBIN George: Dr. Mace- EF 29%, Diffuse ischemia.    CARDIOVASCULAR STRESS TEST  2012    L. Myoview- Inferoseptal infarct with jon infarct ischemia. EF 41%.    CARDIOVASCULAR STRESS TEST   03/23/2015    L. Myoview- EF51%,fixed defect , no ischemia burden.    CARDIOVASCULAR STRESS TEST  10/11/2017    L.Myoview- EF 54%. Small inferior Ischemia.    CONVERTED (HISTORICAL) HOLTER  06/12/2023    3 days. A.Fib. Avg 60. . 3 VT. One 3 Sec Pause    CONVERTED (HISTORICAL) HOLTER  05/28/2024    3 Days. A.Fib. Avg 70. . 4.6% PVC. 2 VT    CORONARY ARTERY BYPASS GRAFT  05/17/2007    CABG:  SVG to LAD and LCX.    ECHO - CONVERTED  08/06/2009    Dr. Mace- EF 50%.    ECHO - CONVERTED  02/14/2011    EF 50%, Septal WMA.    ECHO - CONVERTED  11/07/2012    EF 40-45%, RVSP 33 mmHg.    ECHO - CONVERTED  03/23/2015    EF 45-50%, RVSP 40mmHg,mild MR, mild PHT    ECHO - CONVERTED  05/01/2017    EF 50-55%, mild MR    ECHO - CONVERTED  10/11/2017    EF 55%    ECHO - CONVERTED  03/21/2022    TLS. EF 55%. LA- 5.2 cm. Mild MR. RVSP- 44 mmHg    ECHO - CONVERTED  02/15/2024    TLS. EF 55%.RHE. LA- 5.0. Mild MR.AO- 3.8. RVSP- 56 mmHg    US CAROTID UNILATERAL  04/16/2007    Mild Plaque in Rt. bulb and RECA.       SLP Recommendation and Plan  SLP Swallowing Diagnosis: (P) oral dysphagia, R/O pharyngeal dysphagia (03/17/25 0830)  SLP Diet Recommendation: (P) NPO, temporary alternate methods of nutrition/hydration, other (see comments) (03/17/25 0830)  Recommended Precautions and Strategies: (P) general aspiration precautions (03/17/25 0830)  SLP Rec. for Method of Medication Administration: (P) meds via alternate route (03/17/25 0830)     Monitor for Signs of Aspiration: (P) notify SLP if any concerns, yes (03/17/25 0830)  Recommended Diagnostics: (P) reassess via VFSS (MBS) (03/17/25 0830)  Swallow Criteria for Skilled Therapeutic Interventions Met: (P) demonstrates skilled criteria (03/17/25 0830)  Anticipated Discharge Disposition (SLP): (P) inpatient rehabilitation facility (03/17/25 0830)  Rehab Potential/Prognosis, Swallowing: (P) good, to achieve stated therapy goals (03/17/25 0830)  Therapy Frequency (Swallow): (P)   (\) (03/17/25 0830)     Oral Care Recommendations: (P) Oral Care BID/PRN, Suction toothbrush (03/17/25 0830)                                        Progress: (P) improving      SWALLOW EVALUATION (Last 72 Hours)       SLP Adult Swallow Evaluation       Row Name 03/17/25 0830 03/14/25 9898                Rehab Evaluation    Document Type re-evaluation (P)   - evaluation  -ML       Subjective Information no complaints (P)   -MC complains of;fatigue  -ML       Patient Observations alert;cooperative;agree to therapy (P)   -MC alert;cooperative  -ML       Patient/Family/Caregiver Comments/Observations Daughter present (P)   -MC Family present  -ML       Patient Effort good (P)   -MC good  -ML       Comment -- Family concerned as TF has not been initiated- they are reporting pt has been NPO x 4 days- spoke with RN and messaged RD.  RN reports tube is only at tip of the stomach AND she is awaiting order to start TF.  -ML       Symptoms Noted During/After Treatment none (P)   -MC fatigue  -ML       Oral Care -- suction provided;teeth brushed - suction toothbrush  -ML          General Information    Patient Profile Reviewed yes (P)   -MC yes  -ML       Pertinent History Of Current Problem See intial eval (P)   -MC See ST hx  -ML       Current Method of Nutrition -- small-bore  Small bore NG in place but TF not initiated.  -ML          Pain    Pretreatment Pain Rating 0/10 - no pain (P)   -MC 0/10 - no pain  -ML       Posttreatment Pain Rating 0/10 - no pain (P)   -MC 0/10 - no pain  -ML          Oral Musculature and Cranial Nerve Assessment    Oral Motor General Assessment -- generalized oral motor weakness  -ML          General Eating/Swallowing Observations    Respiratory Support Currently in Use nasal cannula (P)   -MC nasal cannula  -ML       Eating/Swallowing Skills fed by SLP (P)   -MC fed by SLP  -ML       Positioning During Eating upright in bed (P)   -MC upright in bed  -ML       Utensils Used spoon (P)   -MC  spoon  -ML       Consistencies Trialed ice chips;thin liquids;pureed (P)   -MC ice chips;thin liquids  water via tsp  -ML       Pre SpO2 (%) -- 97  -ML       Post SpO2 (%) -- 97  -ML          Clinical Swallow Eval    Oral Prep Phase impaired (P)   -MC impaired  -ML       Oral Transit impaired (P)   -MC WFL  -ML       Oral Residue WFL (P)   -MC WFL  -ML       Pharyngeal Phase suspected pharyngeal impairment (P)   - suspected pharyngeal impairment  -ML       Esophageal Phase unremarkable (P)   -MC --  Hx of esophageal dysphagia- s/p dilation over 1 year ago  -ML       Clinical Swallow Evaluation Summary Pt w/ wet vocal quality prior to PO trials, cued to throat clear. Up to 3 swallows observed with ice chips, followed by WVQ and throat clear. Pt coughed following presentation of thins. Cough is intermittenly productive. Multiple swallows w/ puree, continued throat clears. Dicussion w/ daughter regarding GOC, recommended MBS for further pharyngeal evaluation. (P)   -MC Clinical swallowing reevaluation. Looks better today.  Continued coughing intermittently after PO trials. Cough is intermittently productive. Vocal quality improved after ice/water trials.   Still holding yaunker in his hands but infrequent use.  Needs instrumental- pt doesn't care if he has FEES or MBS- esophageal hx but leaning towards FEES to get started.  -ML          Oral Prep Concerns    Oral Prep Concerns prolonged mastication (P)   -MC prolonged mastication  -ML       Prolonged Mastication other (see comments) (P)   Ice chips  - --       Oral Prep Concerns, Comment -- Slow preparaton/manipulation of ice/water.  -ML          Oral Transit Concerns    Oral Transit Concerns increased oral transit time (P)   - --       Increased Oral Transit Time other (see comments);pudding (P)   Ice chips  - --          Pharyngeal Phase Concerns    Pharyngeal Phase Concerns wet vocal quality;cough;throat clear (P)   - --       Wet Vocal Quality all  consistencies (P)   - --       Multiple Swallows other (see comments);pudding (P)   Ice chips  - --       Cough thin (P)   - thin;other (see comments)  ice/water  -ML       Throat Clear other (see comments) (P)   Ice chips  - --          Esophageal Phase Concerns    Esophageal Phase Concerns -- other (see comments)  No symptoms today- but hx of esophageal dilation  -       Esophageal Phase Concerns, Comment Hx of esophageal diliation (P)   - --          Swallowing Quality of Life Assessment    Education and counseling provided Signs of aspiration;Silent aspiration;Risks of aspiration;Safest diet options;Comfort diet options (P)   - --          SLP Evaluation Clinical Impression    SLP Swallowing Diagnosis oral dysphagia;R/O pharyngeal dysphagia (P)   - oral dysphagia;R/O pharyngeal dysphagia  -       Functional Impact risk of aspiration/pneumonia;risk of malnutrition (P)   - risk of aspiration/pneumonia;risk of malnutrition  -ML       Rehab Potential/Prognosis, Swallowing good, to achieve stated therapy goals (P)   - good, to achieve stated therapy goals  -       Swallow Criteria for Skilled Therapeutic Interventions Met demonstrates skilled criteria (P)   - demonstrates skilled criteria  -          Recommendations    Therapy Frequency (Swallow) -- (P)   \  - PRN  Pending FEES  -ML       SLP Diet Recommendation NPO;temporary alternate methods of nutrition/hydration;other (see comments) (P)   - NPO;temporary alternate methods of nutrition/hydration;other (see comments)  -       Recommended Diagnostics reassess via VFSS (MBS) (P)   - FEES  -ML       Recommended Precautions and Strategies general aspiration precautions (P)   - general aspiration precautions  OK for ice chips after oral care- one at a time  -ML       Oral Care Recommendations Oral Care BID/PRN;Suction toothbrush (P)   - Oral Care BID/PRN;Suction toothbrush  -ML       SLP Rec. for Method of Medication  Administration meds via alternate route (P)   -MC meds via alternate route  -ML       Monitor for Signs of Aspiration notify SLP if any concerns;yes (P)   -MC notify SLP if any concerns;yes  -ML       Anticipated Discharge Disposition (SLP) inpatient rehabilitation facility (P)   - --                 User Key  (r) = Recorded By, (t) = Taken By, (c) = Cosigned By      Initials Name Effective Dates     Sarah Savage P, MS CCC-SLP 08/30/24 -      Dejah Escalante Speech Therapy Student 01/16/25 -                     EDUCATION  The patient has been educated in the following areas:   Dysphagia (Swallowing Impairment) NPO rationale.                Time Calculation:    Time Calculation- SLP       Row Name 03/17/25 1014             Time Calculation- SLP    SLP Start Time 0830 (P)   -      SLP Received On 03/17/25 (P)   -         Untimed Charges    [id number] Eval Oral Pharyng Swallow Minutes 53 (P)   -         Total Minutes    Untimed Charges Total Minutes 53 (P)   -       Total Minutes 53 (P)   -                User Key  (r) = Recorded By, (t) = Taken By, (c) = Cosigned By      Initials Name Provider Type     Dejah Escalante Speech Therapy Student SLP Student                    Therapy Charges for Today       Code Description Service Date Service Provider Modifiers Qty    [id number] HC ST EVAL ORAL PHARYNG SWALLOW 4 3/17/2025 Dejah Escalante Speech Therapy Student GN 1                 Dejah Escalante Speech Therapy Student  3/17/2025

## 2025-03-17 NOTE — PLAN OF CARE
Goal Outcome Evaluation:  Plan of Care Reviewed With: (P) patient, child        Progress: (P) improving       Anticipated Discharge Disposition (SLP): (P) inpatient rehabilitation facility          SLP Swallowing Diagnosis: (P) oral dysphagia, R/O pharyngeal dysphagia (03/17/25 9548)

## 2025-03-17 NOTE — PLAN OF CARE
Goal Outcome Evaluation:  Plan of Care Reviewed With: patient, family        Progress: declining  Outcome Evaluation: Pt req increased assistance for all functional mobility, rq MaxAx1 w/ B UE support for SPT. Pt will continue to benefit from IPPT to address deficits in strength, balance, and functional endurance and facilitate improved independence. Discussed w/ pt and family PT rec of SNF; family expresses wish to return home. Educated pt on equipment needs and transfer techniques, family verbalized understanding. Continue progressing PT POC as tolerated.    Anticipated Discharge Disposition (PT): skilled nursing facility

## 2025-03-17 NOTE — MBS/VFSS/FEES
Acute Care - Speech Language Pathology   Swallow Re-Evaluation [facility]  Modified Barium Swallow Study (MBS)       Patient Name: Carlos Preston  : 3/20/1928  MRN: [id number]  Today's Date: 3/17/2025               Admit Date: 3/11/2025    Visit Dx:     ICD-10-CM ICD-9-CM   1. Pneumonia of both lungs due to infectious organism, unspecified part of lung  J18.9 483.8   2. Traumatic rhabdomyolysis, initial encounter  T79.6XXA 958.6   3. Elevated troponin  R79.89 790.6   4. RSV (acute bronchiolitis due to respiratory syncytial virus)  J21.0 466.11   5. Impaired mobility and ADLs  Z74.09 V49.89    Z78.9    6. Oropharyngeal dysphagia  R13.12 787.22     Patient Active Problem List   Diagnosis    Pulmonary HTN    Hyperlipemia    HTN (hypertension)    GERD (gastroesophageal reflux disease)    Hx of CABG    IHD (ischemic heart disease)    SOB (shortness of breath)    Abnormal chest x-ray    Esophageal stricture    Rhabdomyolysis    Moderate protein-calorie malnutrition     Past Medical History:   Diagnosis Date    Cancer     H/O prostatectomy     Hearing loss     Heart disease     History of back surgery     Hypercholesterolemia     Hyperlipidemia     Hypertension     IHD (ischemic heart disease)     S/P CABG    Osteoarthritis of spine with myelopathy, lumbar region     Paroxysmal atrial fibrillation 2018    Pulmonary hypertension     S/P knee replacement     Sinus disorder     thyroid ultrasound 2009    Normal     Past Surgical History:   Procedure Laterality Date    CARDIAC CATHETERIZATION  2007    Dr. Mace: 60% LM and 70% LCX.    CARDIAC CATHETERIZATION  2009    %, OM 85-90%,Patent grafts.    CARDIAC CATHETERIZATION  2018    Patent Grafts    CARDIOVASCULAR STRESS TEST  2009    ROBIN George: Dr. Mace- EF 29%, Diffuse ischemia.    CARDIOVASCULAR STRESS TEST  2012    L. Myoview- Inferoseptal infarct with jon infarct ischemia. EF 41%.    CARDIOVASCULAR STRESS TEST   03/23/2015    L. Myoview- EF51%,fixed defect , no ischemia burden.    CARDIOVASCULAR STRESS TEST  10/11/2017    L.Myoview- EF 54%. Small inferior Ischemia.    CONVERTED (HISTORICAL) HOLTER  06/12/2023    3 days. A.Fib. Avg 60. . 3 VT. One 3 Sec Pause    CONVERTED (HISTORICAL) HOLTER  05/28/2024    3 Days. A.Fib. Avg 70. . 4.6% PVC. 2 VT    CORONARY ARTERY BYPASS GRAFT  05/17/2007    CABG:  SVG to LAD and LCX.    ECHO - CONVERTED  08/06/2009    Dr. Mace- EF 50%.    ECHO - CONVERTED  02/14/2011    EF 50%, Septal WMA.    ECHO - CONVERTED  11/07/2012    EF 40-45%, RVSP 33 mmHg.    ECHO - CONVERTED  03/23/2015    EF 45-50%, RVSP 40mmHg,mild MR, mild PHT    ECHO - CONVERTED  05/01/2017    EF 50-55%, mild MR    ECHO - CONVERTED  10/11/2017    EF 55%    ECHO - CONVERTED  03/21/2022    TLS. EF 55%. LA- 5.2 cm. Mild MR. RVSP- 44 mmHg    ECHO - CONVERTED  02/15/2024    TLS. EF 55%.RHE. LA- 5.0. Mild MR.AO- 3.8. RVSP- 56 mmHg    US CAROTID UNILATERAL  04/16/2007    Mild Plaque in Rt. bulb and RECA.       SLP Recommendation and Plan  SLP Swallowing Diagnosis: mild-moderate, oral dysphagia, severe, pharyngeal dysphagia, other (see comments) (? mechanical component to dysphagia 2/2 osteophyte) (03/17/25 1100)  SLP Diet Recommendation: NPO, temporary alternate methods of nutrition/hydration, long term alternate methods of nutrition/hydration, comfort diet, per physician discretion (03/17/25 1100)  Recommended Precautions and Strategies: general aspiration precautions (03/17/25 1100)  SLP Rec. for Method of Medication Administration: meds via alternate route (03/17/25 1100)     Monitor for Signs of Aspiration: notify SLP if any concerns, yes (03/17/25 1100)     Swallow Criteria for Skilled Therapeutic Interventions Met: demonstrates skilled criteria (03/17/25 1100)  Anticipated Discharge Disposition (SLP): inpatient rehabilitation facility (03/17/25 1100)  Rehab Potential/Prognosis, Swallowing: adequate, monitor  progress closely (03/17/25 1100)  Therapy Frequency (Swallow): 5 days per week (03/17/25 1100)  Predicted Duration Therapy Intervention (Days): 1 week (03/17/25 1100)  Oral Care Recommendations: Oral Care BID/PRN, Suction toothbrush (03/17/25 1100)                   Large osteophyte at c5, diffuse pharyngeal residue             SWALLOW EVALUATION (Last 72 Hours)       SLP Adult Swallow Evaluation       Row Name 03/17/25 1100 03/17/25 0830                Rehab Evaluation    Document Type re-evaluation  -RS re-evaluation  -RS (r) MC (t) RS (c)       Subjective Information no complaints  -RS no complaints  -RS (r) MC (t) RS (c)       Patient Observations cooperative  -RS alert;cooperative;agree to therapy  -RS (r) MC (t) RS (c)       Patient/Family/Caregiver Comments/Observations no family present in radiology  -RS Daughter present  -RS (r) MC (t) RS (c)       Patient Effort adequate  -RS good  -RS (r) MC (t) RS (c)       Symptoms Noted During/After Treatment none  -RS none  -RS (r) MC (t) RS (c)          General Information    Patient Profile Reviewed -- yes  -RS (r) MC (t) RS (c)       Pertinent History Of Current Problem -- See intial eval  -RS (r) MC (t) RS (c)          Pain    Pretreatment Pain Rating -- 0/10 - no pain  -RS (r) MC (t) RS (c)       Posttreatment Pain Rating -- 0/10 - no pain  -RS (r) MC (t) RS (c)          General Eating/Swallowing Observations    Respiratory Support Currently in Use -- nasal cannula  -RS (r) MC (t) RS (c)       Eating/Swallowing Skills -- fed by SLP  -RS (r) MC (t) RS (c)       Positioning During Eating -- upright in bed  -RS (r) MC (t) RS (c)       Utensils Used -- spoon  -RS (r) MC (t) RS (c)       Consistencies Trialed -- ice chips;thin liquids;pureed  -RS (r) MC (t) RS (c)          Clinical Swallow Eval    Oral Prep Phase -- impaired  -RS (r) MC (t) RS (c)       Oral Transit -- impaired  -RS (r) MC (t) RS (c)       Oral Residue -- WFL  -RS (r) MC (t) RS (c)       Pharyngeal  Phase -- suspected pharyngeal impairment  -RS (r) MC (t) RS (c)       Esophageal Phase -- unremarkable  -RS (r) MC (t) RS (c)       Clinical Swallow Evaluation Summary -- Pt w/ wet vocal quality prior to PO trials, cued to throat clear. Up to 3 swallows observed with ice chips, followed by WVQ and throat clear. Pt coughed following presentation of thins. Cough is intermittenly productive. Multiple swallows w/ puree, continued throat clears. Dicussion w/ daughter regarding GOC, recommended MBS for further pharyngeal evaluation.  -RS (r) MC (t) RS (c)          Oral Prep Concerns    Oral Prep Concerns -- prolonged mastication  -RS (r) MC (t) RS (c)       Prolonged Mastication -- other (see comments)  Ice chips  -RS (r) MC (t) RS (c)          Oral Transit Concerns    Oral Transit Concerns -- increased oral transit time  -RS (r) MC (t) RS (c)       Increased Oral Transit Time -- other (see comments);pudding  Ice chips  -RS (r) MC (t) RS (c)          Pharyngeal Phase Concerns    Pharyngeal Phase Concerns -- wet vocal quality;cough;throat clear  -RS (r) MC (t) RS (c)       Wet Vocal Quality -- all consistencies  -RS (r) MC (t) RS (c)       Multiple Swallows -- other (see comments);pudding  Ice chips  -RS (r) MC (t) RS (c)       Cough -- thin  -RS (r) MC (t) RS (c)       Throat Clear -- other (see comments)  Ice chips  -RS (r) MC (t) RS (c)          Esophageal Phase Concerns    Esophageal Phase Concerns, Comment -- Hx of esophageal diliation  -RS (r) MC (t) RS (c)          MBS/VFSS    Utensils Used spoon;straw  -RS --       Consistencies Trialed thin liquids;nectar/syrup-thick liquids;pureed  -RS --          MBS/VFSS Interpretation    Oral Prep Phase WFL  -RS --       Oral Transit Phase impaired  -RS --       Oral Residue WFL  -RS --          Oral Transit Phase    Impaired Oral Transit Phase delayed initiation of bolus transit;premature spillage of liquids into pharynx  -RS --          Initiation of Pharyngeal Swallow     Initiation of Pharyngeal Swallow bolus in valleculae  -RS --       Pharyngeal Phase impaired pharyngeal phase of swallowing  -RS --       Anatomical abnormalities noted osteophyte/bone spur per radiology report  -RS --       Anatomical abnormalities functional impact functional impact on swallowing  large osteophyte at c5, appears to negatively impact pharyngeal phase  -RS --       Penetration During the Swallow thin liquids;nectar-thick liquids;pudding/puree;secondary to delayed swallow initiation or mistiming;secondary to reduced laryngeal elevation;secondary to reduced vestibular closure  -RS --       Penetration After the Swallow thin liquids;nectar-thick liquids;pudding/puree;secondary to residue;in valleculae;in pyriform sinuses  -RS --       Depth of Penetration deep  -RS --       Response to Penetration No  -RS --       No spontaneous response to penetration and non-effective laryngeal clearance with cue (see comments)  -RS --       Rosenbek's Scale thin:;nectar:;pudding/puree:;3--->level 3  -RS --       Pharyngeal Residue diffuse within pharynx;secondary to reduced base of tongue retraction;secondary to reduced posterior pharyngeal wall stripping;secondary to reduced laryngeal elevation;secondary to reduced hyolaryngeal excursion;other (see comments)  also ? mechanical component 2/2 osteophyte  -RS --       Response to Residue unable to clear residue  -RS --          Swallowing Quality of Life Assessment    Patient/family preferences Avoid nasogastric tube;Avoid gastrostomy tube  Pt verbalizes several times that he does NOT want Keofeed or PEG, that he wants to eat and drink  -RS --       Education and counseling provided Signs of aspiration;Silent aspiration;Risks of aspiration;Comfort diet options;Pleasure feedings;Alternate nutrition/hydration options, risks, and benefits  -RS Signs of aspiration;Silent aspiration;Risks of aspiration;Safest diet options;Comfort diet options  -RS (r) MC (t) RS (c)           SLP Evaluation Clinical Impression    SLP Swallowing Diagnosis mild-moderate;oral dysphagia;severe;pharyngeal dysphagia;other (see comments)  ? mechanical component to dysphagia 2/2 osteophyte  -RS oral dysphagia;R/O pharyngeal dysphagia  -RS (r) MC (t) RS (c)       Functional Impact risk of aspiration/pneumonia;risk of malnutrition  -RS risk of aspiration/pneumonia;risk of malnutrition  -RS (r) MC (t) RS (c)       Rehab Potential/Prognosis, Swallowing adequate, monitor progress closely  -RS good, to achieve stated therapy goals  -RS (r) MC (t) RS (c)       Swallow Criteria for Skilled Therapeutic Interventions Met demonstrates skilled criteria  -RS demonstrates skilled criteria  -RS (r) MC (t) RS (c)          Recommendations    Therapy Frequency (Swallow) 5 days per week  -RS --  \  -RS (r) MC (t) RS (c)       Predicted Duration Therapy Intervention (Days) 1 week  -RS --       SLP Diet Recommendation NPO;temporary alternate methods of nutrition/hydration;long term alternate methods of nutrition/hydration;comfort diet, per physician discretion  -RS NPO;temporary alternate methods of nutrition/hydration;other (see comments)  -RS (r) MC (t) RS (c)       Recommended Diagnostics -- reassess via VFSS (MBS)  -RS (r) MC (t) RS (c)       Recommended Precautions and Strategies general aspiration precautions  -RS general aspiration precautions  -RS (r) MC (t) RS (c)       Oral Care Recommendations Oral Care BID/PRN;Suction toothbrush  -RS Oral Care BID/PRN;Suction toothbrush  -RS (r) MC (t) RS (c)       SLP Rec. for Method of Medication Administration meds via alternate route  -RS meds via alternate route  -RS (r) MC (t) RS (c)       Monitor for Signs of Aspiration notify SLP if any concerns;yes  -RS notify SLP if any concerns;yes  -RS (r) MC (t) RS (c)       Anticipated Discharge Disposition (SLP) inpatient rehabilitation facility  -RS inpatient rehabilitation facility  -RS (r) MC (t) RS (c)                 User Key  (r)  = Recorded By, (t) = Taken By, (c) = Cosigned By      Initials Name Effective Dates    RS Erick Freeman MS CCC-SLP 09/14/23 -     Dejah Rubi, Speech Therapy Student 01/16/25 -                     EDUCATION  The patient has been educated in the following areas:   Dysphagia (Swallowing Impairment) Oral Care/Hydration.        SLP GOALS       Row Name 03/17/25 1100             (LTG) Patient will demonstrate functional swallow for    Diet Texture (Demonstrate functional swallow) soft to chew (whole) textures  -RS      Liquid viscosity (Demonstrate functional swallow) thin liquids  -RS      [place] (Demonstrate functional swallow) with minimal cues (75-90% accuracy)  -RS      Time Frame (Demonstrate functional swallow) 1 week  -RS      Progress/Outcomes (Demonstrate functional swallow) new goal  -RS         (STG) Lingual Strengthening Goal 1 (SLP)    Activity (Lingual Strengthening Goal 1, SLP) increase tongue back strength  -RS      Increase Tongue Back Strength lingual resistance exercises  -RS      [place]/Accuracy (Lingual Strengthening Goal 1, SLP) with minimal cues (75-90% accuracy)  -RS      Time Frame (Lingual Strengthening Goal 1, SLP) 1 week  -RS      Progress/Outcomes (Lingual Strengthening Goal 1, SLP) new goal  -RS         (STG) Pharyngeal Strengthening Exercise Goal 1 (SLP)    Activity (Pharyngeal Strengthening Goal 1, SLP) increase timing;increase superior movement of the hyolaryngeal complex;increase anterior movement of the hyolaryngeal complex;increase closure at entrance to airway/closure of airway at glottis;increase squeeze/positive pressure generation;increase tongue base retraction  -RS      Increase Timing prepping - 3 second prep or suck swallow or 3-step swallow  -RS      Increase Superior Movement of the Hyolaryngeal Complex Mendelsohn  -RS      Increase Anterior Movement of the Hyolaryngeal Complex chin tuck against resistance (CTAR)  -RS      Increase Closure at Entrance to  Airway/Closure of Airway at Glottis supraglottic swallow  -RS      Increase Squeeze/Positive Pressure Generation hard effortful swallow  -RS      Increase Tongue Base Retraction edith  -RS      [place]/Accuracy (Pharyngeal Strengthening Goal 1, SLP) with minimal cues (75-90% accuracy)  -RS      Time Frame (Pharyngeal Strengthening Goal 1, SLP) 1 week  -RS      Progress/Outcomes (Pharyngeal Strengthening Goal 1, SLP) new goal  -RS                User Key  (r) = Recorded By, (t) = Taken By, (c) = Cosigned By      Initials Name Provider Type    RS Erick Freeman MS CCC-SLP Speech and Language Pathologist                         Time Calculation:    Time Calculation- SLP       Row Name 03/17/25 1142 03/17/25 1014          Time Calculation- SLP    SLP Start Time 1000  -RS 0830  -RS (r) MC (t) RS (c)     SLP Received On 03/17/25  -RS 03/17/25  -RS (r) MC (t) RS (c)        Untimed Charges    75993-ZA Eval Oral Pharyng Swallow Minutes -- 53  -RS (r) MC (t) RS (c)     [id number] Motion Fluoro Eval Swallow Minutes 60  -RS --        Total Minutes    Untimed Charges Total Minutes 60  -RS 53  -RS (r) MC (t)      Total Minutes 60  -RS 53  -RS (r) MC (t)               User Key  (r) = Recorded By, (t) = Taken By, (c) = Cosigned By      Initials Name Provider Type    Erick Berry MS CCC-SLP Speech and Language Pathologist    Dejah Rubi, Speech Therapy Student SLP Student                    Therapy Charges for Today       Code Description Service Date Service Provider Modifiers Qty    [id number]  ST MOTION FLUORO EVAL SWALLOW 4 3/17/2025 Erick Freeman MS CCC-SLP GN 1                 Erick Freeman MS CCC-SLP  3/17/2025

## 2025-03-18 LAB
ANION GAP SERPL CALCULATED.3IONS-SCNC: 7 MMOL/L (ref 5–15)
BUN SERPL-MCNC: 44 MG/DL (ref 8–23)
BUN/CREAT SERPL: 60.3 (ref 7–25)
CALCIUM SPEC-SCNC: 9.8 MG/DL (ref 8.2–9.6)
CHLORIDE SERPL-SCNC: 113 MMOL/L (ref 98–107)
CO2 SERPL-SCNC: 23 MMOL/L (ref 22–29)
CREAT SERPL-MCNC: 0.73 MG/DL (ref 0.76–1.27)
EGFRCR SERPLBLD CKD-EPI 2021: 83.3 ML/MIN/1.73
GLUCOSE BLDC GLUCOMTR-MCNC: 127 MG/DL (ref 70–130)
GLUCOSE BLDC GLUCOMTR-MCNC: 128 MG/DL (ref 70–130)
GLUCOSE BLDC GLUCOMTR-MCNC: 129 MG/DL (ref 70–130)
GLUCOSE BLDC GLUCOMTR-MCNC: 96 MG/DL (ref 70–130)
GLUCOSE SERPL-MCNC: 144 MG/DL (ref 65–99)
MAGNESIUM SERPL-MCNC: 1.9 MG/DL (ref 1.7–2.3)
PHOSPHATE SERPL-MCNC: 2.1 MG/DL (ref 2.5–4.5)
PHOSPHATE SERPL-MCNC: 2.7 MG/DL (ref 2.5–4.5)
POTASSIUM SERPL-SCNC: 4.1 MMOL/L (ref 3.5–5.2)
SODIUM SERPL-SCNC: 143 MMOL/L (ref 136–145)

## 2025-03-18 PROCEDURE — 25010000002 PIPERACILLIN SOD-TAZOBACTAM PER 1 G: Performed by: INTERNAL MEDICINE

## 2025-03-18 PROCEDURE — 82948 REAGENT STRIP/BLOOD GLUCOSE: CPT

## 2025-03-18 PROCEDURE — 83735 ASSAY OF MAGNESIUM: CPT | Performed by: PHYSICIAN ASSISTANT

## 2025-03-18 PROCEDURE — 84100 ASSAY OF PHOSPHORUS: CPT | Performed by: PHYSICIAN ASSISTANT

## 2025-03-18 PROCEDURE — 25810000003 SODIUM CHLORIDE 0.9 % SOLUTION 250 ML FLEX CONT: Performed by: PHYSICIAN ASSISTANT

## 2025-03-18 PROCEDURE — 99232 SBSQ HOSP IP/OBS MODERATE 35: CPT | Performed by: PHYSICIAN ASSISTANT

## 2025-03-18 PROCEDURE — 92526 ORAL FUNCTION THERAPY: CPT

## 2025-03-18 PROCEDURE — 97535 SELF CARE MNGMENT TRAINING: CPT

## 2025-03-18 PROCEDURE — 80048 BASIC METABOLIC PNL TOTAL CA: CPT | Performed by: PHYSICIAN ASSISTANT

## 2025-03-18 RX ORDER — AMINO AC/PROTEIN HYDR/WHEY PRO 10G-100/30
45 LIQUID (ML) ORAL DAILY
Status: DISCONTINUED | OUTPATIENT
Start: 2025-03-19 | End: 2025-03-22

## 2025-03-18 RX ORDER — GUAR GUM
1 PACKET (EA) ORAL 2 TIMES DAILY
Status: DISCONTINUED | OUTPATIENT
Start: 2025-03-18 | End: 2025-03-18

## 2025-03-18 RX ADMIN — Medication 10 ML: at 22:14

## 2025-03-18 RX ADMIN — PIPERACILLIN AND TAZOBACTAM 4.5 G: 4; .5 INJECTION, POWDER, FOR SOLUTION INTRAVENOUS at 22:14

## 2025-03-18 RX ADMIN — RIVAROXABAN 15 MG: 15 TABLET, FILM COATED ORAL at 17:45

## 2025-03-18 RX ADMIN — PIPERACILLIN AND TAZOBACTAM 4.5 G: 4; .5 INJECTION, POWDER, FOR SOLUTION INTRAVENOUS at 12:55

## 2025-03-18 RX ADMIN — DOXYCYCLINE 100 MG: 100 INJECTION, POWDER, LYOPHILIZED, FOR SOLUTION INTRAVENOUS at 09:18

## 2025-03-18 RX ADMIN — QUETIAPINE FUMARATE 50 MG: 25 TABLET ORAL at 22:14

## 2025-03-18 RX ADMIN — Medication 45 ML: at 09:20

## 2025-03-18 RX ADMIN — PIPERACILLIN AND TAZOBACTAM 4.5 G: 4; .5 INJECTION, POWDER, FOR SOLUTION INTRAVENOUS at 05:20

## 2025-03-18 RX ADMIN — METOCLOPRAMIDE HYDROCHLORIDE 5 MG: 5 SOLUTION ORAL at 09:17

## 2025-03-18 RX ADMIN — Medication 10 ML: at 09:20

## 2025-03-18 RX ADMIN — DOXYCYCLINE 100 MG: 100 INJECTION, POWDER, LYOPHILIZED, FOR SOLUTION INTRAVENOUS at 20:58

## 2025-03-18 RX ADMIN — BUSPIRONE HYDROCHLORIDE 5 MG: 10 TABLET ORAL at 12:55

## 2025-03-18 RX ADMIN — SODIUM CHLORIDE 15 MMOL: 9 INJECTION, SOLUTION INTRAVENOUS at 13:29

## 2025-03-18 RX ADMIN — POLYETHYLENE GLYCOL 3350 17 G: 17 POWDER, FOR SOLUTION ORAL at 09:18

## 2025-03-18 RX ADMIN — PANTOPRAZOLE SODIUM 40 MG: 40 INJECTION, POWDER, FOR SOLUTION INTRAVENOUS at 05:20

## 2025-03-18 RX ADMIN — HYDROXYZINE HYDROCHLORIDE 25 MG: 25 TABLET ORAL at 22:14

## 2025-03-18 RX ADMIN — METOCLOPRAMIDE HYDROCHLORIDE 5 MG: 5 SOLUTION ORAL at 16:23

## 2025-03-18 RX ADMIN — METOCLOPRAMIDE HYDROCHLORIDE 5 MG: 5 SOLUTION ORAL at 05:20

## 2025-03-18 RX ADMIN — METOCLOPRAMIDE HYDROCHLORIDE 5 MG: 5 SOLUTION ORAL at 22:13

## 2025-03-18 RX ADMIN — Medication 10 MG: at 22:14

## 2025-03-18 NOTE — THERAPY TREATMENT NOTE
Patient Name: Carlos Preston  : 3/20/1928    MRN: [id number]                              Today's Date: 3/18/2025       Admit Date: 3/11/2025    Visit Dx:     ICD-10-CM ICD-9-CM   1. Pneumonia of both lungs due to infectious organism, unspecified part of lung  J18.9 483.8   2. Traumatic rhabdomyolysis, initial encounter  T79.6XXA 958.6   3. Elevated troponin  R79.89 790.6   4. RSV (acute bronchiolitis due to respiratory syncytial virus)  J21.0 466.11   5. Impaired mobility and ADLs  Z74.09 V49.89    Z78.9    6. Oropharyngeal dysphagia  R13.12 787.22     Patient Active Problem List   Diagnosis    Pulmonary HTN    Hyperlipemia    HTN (hypertension)    GERD (gastroesophageal reflux disease)    Hx of CABG    IHD (ischemic heart disease)    SOB (shortness of breath)    Abnormal chest x-ray    Esophageal stricture    Rhabdomyolysis    Moderate protein-calorie malnutrition     Past Medical History:   Diagnosis Date    Cancer     H/O prostatectomy     Hearing loss     Heart disease     History of back surgery     Hypercholesterolemia     Hyperlipidemia     Hypertension     IHD (ischemic heart disease)     S/P CABG    Osteoarthritis of spine with myelopathy, lumbar region     Paroxysmal atrial fibrillation 2018    Pulmonary hypertension     S/P knee replacement     Sinus disorder     thyroid ultrasound 2009    Normal     Past Surgical History:   Procedure Laterality Date    CARDIAC CATHETERIZATION  2007    Dr. Mace: 60% LM and 70% LCX.    CARDIAC CATHETERIZATION  2009    %, OM 85-90%,Patent grafts.    CARDIAC CATHETERIZATION  2018    Patent Grafts    CARDIOVASCULAR STRESS TEST  2009    [facility] George: Dr. Mace- EF 29%, Diffuse ischemia.    CARDIOVASCULAR STRESS TEST  2012    L. Myoview- Inferoseptal infarct with jon infarct ischemia. EF 41%.    CARDIOVASCULAR STRESS TEST  2015    L. Myoview- EF51%,fixed defect , no ischemia burden.    CARDIOVASCULAR STRESS TEST   "10/11/2017    L.Myoview- EF 54%. Small inferior Ischemia.    CONVERTED (HISTORICAL) HOLTER  06/12/2023    3 days. A.Fib. Avg 60. . 3 VT. One 3 Sec Pause    CONVERTED (HISTORICAL) HOLTER  05/28/2024    3 Days. A.Fib. Avg 70. . 4.6% PVC. 2 VT    CORONARY ARTERY BYPASS GRAFT  05/17/2007    CABG:  SVG to LAD and LCX.    ECHO - CONVERTED  08/06/2009    Dr. Mace- EF 50%.    ECHO - CONVERTED  02/14/2011    EF 50%, Septal WMA.    ECHO - CONVERTED  11/07/2012    EF 40-45%, RVSP 33 mmHg.    ECHO - CONVERTED  03/23/2015    EF 45-50%, RVSP 40mmHg,mild MR, mild PHT    ECHO - CONVERTED  05/01/2017    EF 50-55%, mild MR    ECHO - CONVERTED  10/11/2017    EF 55%    ECHO - CONVERTED  03/21/2022    TLS. EF 55%. LA- 5.2 cm. Mild MR. RVSP- 44 mmHg    ECHO - CONVERTED  02/15/2024    TLS. EF 55%.RHE. LA- 5.0. Mild MR.AO- 3.8. RVSP- 56 mmHg    US CAROTID UNILATERAL  04/16/2007    Mild Plaque in Rt. bulb and RECA.      General Information       Row Name 03/18/25 1149          OT Time and Intention    Document Type therapy note (daily note)  -JR     Mode of Treatment occupational therapy  -JR       Row Name 03/18/25 1149          General Information    Patient Profile Reviewed yes  -JR     Existing Precautions/Restrictions fall;other (see comments)  NG tube  -JR     Barriers to Rehab medically complex  -JR       Row Name 03/18/25 1149          Cognition    Orientation Status (Cognition) oriented x 3  Pt reported being at a \"[facility]\"  -JR       Row Name 03/18/25 1149          Safety Issues/Impairments Affecting Functional Mobility    Safety Issues Affecting Function (Mobility) awareness of need for assistance;insight into deficits/self-awareness;safety precaution awareness;safety precautions follow-through/compliance;sequencing abilities;problem-solving;judgment  -JR     Impairments Affecting Function (Mobility) balance;cognition;coordination;endurance/activity tolerance;postural/trunk control;shortness of " breath;strength  -     Cognitive Impairments, Mobility Safety/Performance awareness, need for assistance;insight into deficits/self-awareness;problem-solving/reasoning;judgment;safety precaution awareness;safety precaution follow-through;sequencing abilities  -               User Key  (r) = Recorded By, (t) = Taken By, (c) = Cosigned By      Initials Name Provider Type    JR Katerina Bernardo OT Occupational Therapist                     Mobility/ADL's       Row Name 03/18/25 1151          Bed Mobility    Bed Mobility supine-sit;sit-supine;rolling left;rolling right;scooting/bridging  -     Rolling Left [place] (Bed Mobility) dependent (less than 25% patient effort);2 person assist;verbal cues  -JR     Rolling Right [place] (Bed Mobility) dependent (less than 25% patient effort);2 person assist;verbal cues  -JR     Scooting/Bridging [place] (Bed Mobility) dependent (less than 25% patient effort);2 person assist;verbal cues  -JR     Supine-Sit [place] (Bed Mobility) dependent (less than 25% patient effort)  -     Sit-Supine [place] (Bed Mobility) dependent (less than 25% patient effort)  -     Bed Mobility, Safety Issues decreased use of arms for pushing/pulling;decreased use of legs for bridging/pushing;impaired trunk control for bed mobility  -     Assistive Device (Bed Mobility) bed rails;head of bed elevated;repositioning sheet  -     Comment, (Bed Mobility) Pt required increased time and verbal cues for supine to sit on EOB and sit to supine. Pt noted to be SOA sitting EOB, but O2 95% on RA. Pt noted to be incontinent of bowel once supine, dependent for hygiene, linen change and rolling in bed.  -       Row Name 03/18/25 1151          Transfers    Comment, (Transfers) Defer-not appropriate this date  -       Row Name 03/18/25 1151          Activities of Daily Living    BADL Assessment/Intervention upper body dressing;lower body dressing;toileting  -       Row Name  03/18/25 1151          Upper Body Dressing Assessment/Training    [place] Level (Upper Body Dressing) don;doff;dependent (less than 25% patient effort)  -JR     Position (Upper Body Dressing) supine  -JR     Comment, (Upper Body Dressing) hospital gown  -JR       Row Name 03/18/25 1151          Lower Body Dressing Assessment/Training    [place] Level (Lower Body Dressing) don;doff;socks;dependent (less than 25% patient effort)  -JR     Position (Lower Body Dressing) supine  -JR       Row Name 03/18/25 1151          Toileting Assessment/Training    [place] Level (Toileting) adjust/manage clothing;change pad/brief;perform perineal hygiene;dependent (less than 25% patient effort)  -JR     Position (Toileting) supine  -JR               User Key  (r) = Recorded By, (t) = Taken By, (c) = Cosigned By      Initials Name Provider Type    Katerina Torrez, OT Occupational Therapist                   Obj/Interventions       Row Name 03/18/25 1154          Balance    Static Sitting Balance minimal assist;verbal cues  -JR     Position, Sitting Balance supported;sitting edge of bed  -JR               User Key  (r) = Recorded By, (t) = Taken By, (c) = Cosigned By      Initials Name Provider Type    Katerina Torrez, OT Occupational Therapist                   Goals/Plan    No documentation.                  Clinical Impression       Row Name 03/18/25 1154          Pain Assessment    Pretreatment Pain Rating 0/10 - no pain  -JR     Posttreatment Pain Rating 0/10 - no pain  -JR       Row Name 03/18/25 1154          Plan of Care Review    Plan of Care Reviewed With patient;family  -     Progress declining  -     Outcome Evaluation Pt requiring increased assist with bed mobility, sitting balance and ADL's this date. Pt remains below baseline with ADL's and mobility due to decreased strength, balance, activity tolerance and SOA. Recommend continued skilled OT services and transfer to SNF at d/c.  -JR        Row Name 03/18/25 1154          Therapy Assessment/Plan (OT)    Patient/Family Therapy Goal Statement (OT) Family would like to take pt home at d/c.  -JR       Row Name 03/18/25 1154          Vital Signs    Pre Systolic BP Rehab 148  -JR     Pre Treatment Diastolic BP 77  -JR     Post Systolic BP Rehab 137  -JR     Post Treatment Diastolic BP 77  -JR     Pretreatment Heart Rate (beats/min) 79  -JR     Posttreatment Heart Rate (beats/min) 71  -JR     Pre SpO2 (%) 97  -JR     O2 Delivery Pre Treatment room air  -JR     Post SpO2 (%) 97  -JR     O2 Delivery Post Treatment room air  -JR     Pre Patient Position Supine  -JR     Intra Patient Position Sitting  -JR     Post Patient Position Supine  -JR       Row Name 03/18/25 1154          Positioning and Restraints    Pre-Treatment Position in bed  -JR     Post Treatment Position bed  -JR     In Bed notified nsg;supine;call light within reach;encouraged to call for assist;exit alarm on;with family/caregiver;legs elevated;RUE elevated;LUE elevated  -JR               User Key  (r) = Recorded By, (t) = Taken By, (c) = Cosigned By      Initials Name Provider Type    JR Katerina Bernardo, OT Occupational Therapist                   Outcome Measures       Row Name 03/18/25 1158          How much help from another is currently needed...    Putting on and taking off regular lower body clothing? 1  -JR     Bathing (including washing, rinsing, and drying) 1  -JR     Toileting (which includes using toilet bed pan or urinal) 1  -JR     Putting on and taking off regular upper body clothing 1  -JR     Taking care of personal grooming (such as brushing teeth) 2  -JR     Eating meals 1  -JR     AM-PAC 6 Clicks Score (OT) 7  -JR       Row Name 03/18/25 0917          How much help from another person do you currently need...    Turning from your back to your side while in flat bed without using bedrails? 2 (P)   -ALLIE     Moving from lying on back to sitting on the side of a flat bed without  bedrails? 2 (P)   -ALLIE     Moving to and from a bed to a chair (including a wheelchair)? 1 (P)   -ALLIE     Standing up from a chair using your arms (e.g., wheelchair, bedside chair)? 2 (P)   -ALLIE     Climbing 3-5 steps with a railing? 1 (P)   -ALLIE     To walk in hospital room? 1 (P)   -ALLIE     AM-PAC 6 Clicks Score (PT) 9 (P)   -ALLIE     Highest Level of Mobility Goal 3 --> Sit at edge of bed (P)   -ALLIE       Row Name 03/18/25 1158          Functional Assessment    Outcome Measure Options AM-PAC 6 Clicks Daily Activity (OT)  -               User Key  (r) = Recorded By, (t) = Taken By, (c) = Cosigned By      Initials Name Provider Type    Katerina Torrez, OT Occupational Therapist    Maida Veliz, Nursing Student Nursing Student                    Occupational Therapy Education       Title: PT OT SLP Therapies (In Progress)       Topic: Occupational Therapy (Done)       Point: ADL training (Done)       Learning Progress Summary            Patient Acceptance, E, VU,NR by  at 3/18/2025 1018    Comment: role of therapy, benefits of activity                      Point: Home exercise program (Done)       Learning Progress Summary            Patient Acceptance, E, VU,NR by  at 3/18/2025 1018    Comment: role of therapy, benefits of activity                                      User Key       Initials Effective Dates Name Provider Type Marcelo     02/03/23 -  Katerina Bernardo, OT Occupational Therapist OT                  OT Recommendation and Plan     Plan of Care Review  Plan of Care Reviewed With: patient, family  Progress: declining  Outcome Evaluation: Pt requiring increased assist with bed mobility, sitting balance and ADL's this date. Pt remains below baseline with ADL's and mobility due to decreased strength, balance, activity tolerance and SOA. Recommend continued skilled OT services and transfer to SNF at d/c.     Time Calculation:         Time Calculation- OT       Row Name 03/18/25 7305              Time Calculation- OT    OT Start Time 1018  -JR      OT Received On 03/18/25  -JR         Timed Charges    35493 - OT Self Care/Mgmt Minutes 46  -JR         Total Minutes    Timed Charges Total Minutes 46  -JR       Total Minutes 46  -JR                User Key  (r) = Recorded By, (t) = Taken By, (c) = Cosigned By      Initials Name Provider Type     Katerina Bernardo, OT Occupational Therapist                  Therapy Charges for Today       Code Description Service Date Service Provider Modifiers Qty    [id number]  OT SELF CARE/MGMT/TRAIN EA 15 MIN 3/18/2025 Katerina Bernardo OT GO 3                 Katerina Bernardo OT  3/18/2025

## 2025-03-18 NOTE — PLAN OF CARE
Goal Outcome Evaluation:  Plan of Care Reviewed With: (P) patient, child        Progress: (P) no change  Outcome Evaluation: (P) Pt still appears lethargic. Tube feeding was changed to nocturnal feeding, currently running at goal of 60ml/hr. Speech was consulted and now pt is no longer NPO, but on a comfort diet as tolerated. Pt denied any pain throughout shfit. Still requires full assistance with ADLs. Alert and orient x3 to sometimes x4, VSS, Afib on tele, and RA. Will continue with POC.

## 2025-03-18 NOTE — PLAN OF CARE
Goal Outcome Evaluation:  Plan of Care Reviewed With: patient, family        Progress: declining  Outcome Evaluation: Pt requiring increased assist with bed mobility, sitting balance and ADL's this date. Pt remains below baseline with ADL's and mobility due to decreased strength, balance, activity tolerance and SOA. Recommend continued skilled OT services and transfer to SNF at d/c.

## 2025-03-18 NOTE — PROGRESS NOTES
[facility]  PROGRESS NOTE    Patient Name: Carlos Preston  : 3/20/1928  MRN: [id number]    Date of Admission: 3/11/2025  Primary Care Physician: Newton Hankins MD    Subjective     CC: f/u dysphagia     HPI:  Sitting on side of bed, working with therapy. He denied pain or dyspnea. Oriented to self, location and time.   In bed. Drowsy but interacted. Denied pain or dyspnea. Failed FEES this AM. Discussed options with daughter at bedside. Patient has communicated that he does not want any type of feeding tube. After multiple discussions throughout the day, was notified by SLP provider that daughter has opted for comfort diet.     Spoke with daughter over telephone at 1540 - she would like to leave Keofeed in at least overnight but does not anticipate PEG placement prior to DC and would transition to comfort diet.     Objective     Vital Signs:   Temp:  [96.3 °F (35.7 °C)-97.9 °F (36.6 °C)] 97.9 °F (36.6 °C)  Heart Rate:  [71-75] 75  Resp:  [10-20] 18  BP: (112-148)/(56-77) 142/74     Physical Exam:  Constitutional: No acute distress, drowsy but conversational. Ill appearing   HENT: NCAT, mucous membranes moist. Keofeed in place. Voice very weak   Respiratory: Clear to auscultation bilaterally, respiratory effort normal on room air   Cardiovascular: RRR  Gastrointestinal: Positive bowel sounds, soft, nontender, nondistended  Musculoskeletal: No bilateral ankle edema  Psychiatric: Appropriate affect, cooperative with exam  Neurologic: Orientated x 3, conversationally appropriate, moves all extremities spontaneously without focal deficits, globally weak, speech clear  Skin: No rashes to exposed surfaces     Results Reviewed:  LAB RESULTS:      Lab 03/15/25  0435 25  0504 25  1856 25  1454 25  0456 25  1150 25  0417 25  1528 25  1430   WBC 12.04* 11.04*  --   --  9.13  --  15.52*  --  23.50*   HEMOGLOBIN 10.1* 10.4* 10.9*  10.2* 9.7*   < > 7.6*  --  9.2*   HEMATOCRIT 33.3* 35.3* 36.1* 34.0* 32.0*   < > 25.7*  --  29.6*   PLATELETS 121* 119*  --   --  120*  --  93*  --  103*   NEUTROS ABS 10.93* 10.29*  --   --  8.52*  --   --   --  21.84*   IMMATURE GRANS (ABS) 0.18* 0.06*  --   --  0.06*  --   --   --  0.07*   LYMPHS ABS 0.51* 0.37*  --   --  0.34*  --   --   --  0.45*   MONOS ABS 0.40 0.31  --   --  0.20  --   --   --  1.13*   EOS ABS 0.00 0.00  --   --  0.00  --   --   --  0.00   MCV 94.9 97.2*  --   --  95.0  --  95.2  --  92.8   CRP  --  5.67*  --   --  12.39*  --   --   --   --    PROCALCITONIN  --   --   --   --  0.73*  --   --   --  1.17*   LACTATE  --   --   --   --   --   --   --   --  1.8   LDH  --   --   --   --   --   --  340*  --   --    HSTROP T  --   --   --   --   --   --  96* 123* 124*    < > = values in this interval not displayed.         Lab 03/18/25  0831 03/15/25  0929 03/15/25  0435 03/15/25  0004 03/14/25  1902 03/14/25  0824 03/14/25  0504 03/13/25  0913 03/13/25  0456 03/12/25  0002 03/11/25  1430   SODIUM 143 146* 147*  147* 148* 149*   < > 146*   < > 145   < > 143   POTASSIUM 4.1 3.9 3.9  3.9 3.8 3.8   < > 3.8   < > 3.8   < > 3.8   CHLORIDE 113* 115* 115*  115* 118* 118*   < > 116*   < > 115*   < > 108*   CO2 23.0 21.0* 19.0*  19.0* 20.0* 19.0*   < > 18.0*   < > 16.0*   < > 20.3*   ANION GAP 7.0 10.0 13.0  13.0 10.0 12.0   < > 12.0   < > 14.0   < > 14.7   BUN 44* 62* 56*  56* 61* 61*   < > 63*   < > 61*   < > 63*   CREATININE 0.73* 1.10 1.05  1.05 1.04 1.02   < > 1.11   < > 0.92   < > 1.42*   EGFR 83.3 61.4 65.0  65.0 65.7 67.3   < > 60.8   < > 76.1   < > 45.2*   GLUCOSE 144* 226* 213*  213* 181* 159*   < > 132*   < > 118*   < > 108*   CALCIUM 9.8* 9.2 9.0  9.0 9.0 8.9   < > 8.8   < > 9.0   < > 9.5   MAGNESIUM 1.9  --  2.4*  --   --   --  2.4*  --  2.4*  --  2.3   PHOSPHORUS 2.1*  --  2.1*  --   --   --  2.8  --  3.4  --   --     < > = values in this interval not displayed.         Lab  03/15/25  0929 03/15/25  0435 03/15/25  0004 03/14/25  1902 03/14/25  1339   TOTAL PROTEIN 5.2* 5.2*  5.2* 5.3* 5.6* 5.5*   ALBUMIN 2.5* 2.5*  2.5* 2.4* 2.5* 2.4*   GLOBULIN 2.7 2.7  2.7 2.9 3.1 3.1   ALT (SGPT) 35 34  34 32 36 35   AST (SGOT) 28 31  31 34 35 36   BILIRUBIN 0.7 0.8  0.8 0.8 0.9 0.9   ALK PHOS 113 110  110 104 110 99         Lab 03/12/25  0417 03/11/25  1528 03/11/25  1430   PROBNP  --   --  29,350.0*   HSTROP T 96* 123* 124*         Lab 03/15/25  0435   CHOLESTEROL 65   TRIGLYCERIDES 79         Lab 03/12/25  1150 03/12/25  0833 03/12/25  0417   IRON  --   --  9*   IRON SATURATION (TSAT)  --   --  4*   TIBC  --   --  222*   TRANSFERRIN  --   --  149*   FERRITIN  --   --  238.60   FOLATE  --  8.44  --    VITAMIN B 12  --  1,188*  --    ABO TYPING O  --  O   RH TYPING Positive  --  Positive   ANTIBODY SCREEN Negative  --   --      Brief Urine Lab Results  (Last result in the past 365 days)        Color   Clarity   Blood   Leuk Est   Nitrite   Protein   CREAT   Urine HCG        03/11/25 1604 [place]   Clear   Moderate (2+)   Negative   Negative   30 mg/dL (1+)                 Microbiology Results Abnormal       Procedure Component Value - Date/Time    COVID-19, FLU A/B, RSV PCR 1 HR TAT - Swab, Nasopharynx [[id number]]  (Abnormal) Collected: 03/11/25 1431    Lab Status: Final result Specimen: Swab from Nasopharynx Updated: 03/11/25 1518     COVID19 Not Detected     Influenza A PCR Not Detected     Influenza B PCR Not Detected     RSV, PCR Detected    Narrative:      Fact sheet for providers: https://www.fda.gov/media/967740/download    Fact sheet for patients: https://www.fda.gov/media/734435/download    Test performed by PCR.          FL Video Swallow With Speech Single Contrast  Result Date: 3/17/2025  FL VIDEO SWALLOW W SPEECH SINGLE-CONTRAST Date of Exam: 3/17/2025 10:34 AM EDT Indication: aspiration.   Comparison: None available. Technique:   The speech pathologist administered food and/or  liquid mixed with barium to the patient with cine/video imaging.  Imaging assistance was provided to the speech pathologist and an image was saved. Fluoroscopic Time: 54 seconds Number of Images: 7 associated fluoroscopic loops were saved Findings: Aspiration was seen during fluoroscopic guided modified barium swallowing series. Please see speech therapy report for full details and recommendations.     Impression: Impression: Fluoroscopy provided for a modified barium swallow. Aspiration of barium was seen during swallowing evaluation. Please see speech therapy report for full details and recommendations. Report dictated by: Shira Renner PA-c  I have personally reviewed this case and agree with the findings above: Electronically Signed: Mendez Navarro MD  3/17/2025 3:24 PM EDT  Workstation ID: [id number]    XR Chest 1 View  Result Date: 3/17/2025  XR CHEST 1 VW Date of Exam: 3/17/2025 3:26 AM EDT Indication: SOB Comparison: 3/11/2025 Findings: Patient is status post sternotomy. Heart is mildly enlarged. Flexible feeding tube is noted in the fundus of the stomach. Interstitial changes are noted in the lungs, particularly in the left lower lung compatible with fibrosis seen by recent chest CT. No new infiltrates. No pneumothorax. Gas-filled loops of colon are noted below the diaphragm.     Impression: Interstitial changes in the lungs compatible with fibrosis seen by recent chest CT. No new infiltrates. Electronically Signed: Newton Keene MD  3/17/2025 3:47 AM EDT  Workstation ID: [id number]    Results for orders placed during the hospital encounter of 02/15/24    Adult Transthoracic Echo Complete W/ Cont if Necessary Per Protocol    Interpretation Summary    Atrial fibrillation was the predominant rhythm observed during the procedure.    The study is technically difficult    Left ventricular wall thickness is consistent with mild concentric hypertrophy.    Left ventricular ejection fraction appears to be 51 - 55%.     Left ventricular diastolic function is consistent with (grade I) impaired relaxation.    The right ventricular cavity is borderline dilated.    The left atrial cavity is moderately dilated.  5.0 cm    The right atrial cavity is mildly  dilated.    No aortic valve regurgitation or stenosis is present    Mild mitral valve regurgitation with MAC is present.    Moderate tricuspid valve regurgitation is present.  RVSP- 56 mmHg    Borderline dilation of the aortic root is present.  3.8 cm    Current medications:  Scheduled Meds:busPIRone, 5 mg, Nasogastric, Daily With Lunch  [Held by provider] cetirizine, 10 mg, Oral, Daily  doxycycline, 100 mg, Intravenous, Q12H  hydrOXYzine, 25 mg, Nasogastric, Nightly  Insulin Lispro, 2-7 Units, Subcutaneous, Q6H  [Held by provider] isosorbide mononitrate, 30 mg, Oral, QAM  melatonin, 10 mg, Nasogastric, Nightly  metoclopramide, 5 mg, Nasogastric, Q6H  [Held by provider] NIFEdipine XL, 30 mg, Oral, Daily  pantoprazole, 40 mg, Intravenous, Q AM  piperacillin-tazobactam, 4.5 g, Intravenous, Q8H  polyethylene glycol, 17 g, Nasogastric, Daily  [START ON 3/19/2025] ProSource TF, 45 mL, Nasogastric, Daily  [Held by provider] QUEtiapine, 25 mg, Nasogastric, Daily  QUEtiapine, 50 mg, Nasogastric, Nightly  [Held by provider] rivaroxaban, 15 mg, Nasogastric, Daily With Dinner  sodium chloride, 10 mL, Intravenous, Q12H  sodium phosphate, 15 mmol, Intravenous, Once      Continuous Infusions:     PRN Meds:.  senna-docusate sodium **AND** polyethylene glycol **AND** [DISCONTINUED] bisacodyl **AND** bisacodyl    Calcium Replacement - Follow Nurse / BPA Driven Protocol    dextrose    dextrose    glucagon (human recombinant)    ipratropium-albuterol    Magnesium Standard Dose Replacement - Follow Nurse / BPA Driven Protocol    nitroglycerin    ondansetron    Phosphorus Replacement - Follow Nurse / BPA Driven Protocol    Potassium Replacement - Follow Nurse / BPA Driven Protocol    sodium chloride     "sodium chloride    sodium chloride    traMADol    trolamine salicylate    Assessment & Plan   Assessment & Plan     Active Hospital Problems    Diagnosis  POA    **Rhabdomyolysis [M62.82]  Yes    Moderate protein-calorie malnutrition [E44.0]  Yes      Resolved Hospital Problems   No resolved problems to display.     Brief Hospital Course to date:  Carlos Preston is a 89 y.o. male with a history of heart disease, hyperlipidemia, ischemic heart disease status post CABG, paroxysmal A-fib on Eliquis presented to the hospital with progressive weakness.  Found to have bilateral pneumonia and acute rhabdomyolysis    Acute hypoxia respiratory insufficiency  RSV infection  Superimposed left lower lobe bacterial pneumonia, unspecified  Bilateral basal lung fibrosis concerning for chronic dysphagia / aspiration  CT chest with bilateral chronic fibrotic changes in both lung bases and developing left lower lobe pneumonia.  Patient is forced for RSV  Was hypoxic, requiring 4 L nasal cannula - has weaned to room air   Continue IV Zosyn / Doxycycline   s/p IV Solu-Medrol - now on PO Prednisone  Negative Legionella antigen, strep antigen and MRSA swab  Sputum culture negative to date  DuMarcela, Basiaex and IS    Chronic dysphagia  History of esophageal stricture  SLP following - aspirating all consistencies  Keofeed was placed and patient started on tube feeds  Failed FEES on 3/17  Family deferring PEG due to patient preference  Family requested TPN, however, I do not feel this is an appropriate use of TPN   Family indicate they will likely elect comfort diet but seem hesitant to transition fully to comfort diet  Transition to nocturnal tube feeds today with \"safest possible\" PO diet    Acute rhabdomyolysis, resolved   Elevated troponin  Patient fell x 2 and was on the floor for 4 to 5 hours  CPK on admission 2500.  Trended down to 1200 with IV fluids by 3/12  Elevated troponin felt to be demand ischemia and secondary to " rhabdomyolysis.  Patient with no chest pain or ischemic changes in EKG.  Defer ischemic workup given his frailty and age       Severe iron deficiency anemia   Symptomatic anemia   Patient was anemic with Hgb 7.6 on admission - s/p 1 unit PRBCs    s/p IV iron x 3 doses  Hemoglobin stable around 10  No evidence of GI bleed    Possible ileus  Hypoactive bowel sounds and KUB with nonobstructive gas pattern  Continue Reglan 5mg PO Q6H    Elevated liver enzymes, improved  CBD dilation  Abnormal CT abdomen with common bile duct dilation as well as pancreas duct dilation.   No focal mass or obstructive process seen on noncontrast CT  Total bilirubin is normal  GI following and recommended MRCP when more stable    A-fib  Hx of CABG  HTN  Xarelto held for possible PEG placement. Will resume this evening  CT head no acute intracranial abnormality identified  ECHO: 02/2024: EF: 51-55%     Debility  PT/OT and OT recommended rehab but family declined despite extensive counseling. Daughter reports this is because patient does not want to go    Family planning to hire 24/7 caregivers. Will need home equipment arranged per CM as we get closer to DC    Hypoactive delirium  Sleep hygiene - limit interruptions at night  Continue Seroquel 50mg QHS, Melatonin 10mg QHS and Hydroxyzine 25mg QHS    Expected Discharge Location and Transportation: Home with home health / 24/7 caregivers   Expected Discharge Expected Discharge Date: 3/20/2025; Expected Discharge Time:      VTE Prophylaxis:Pharmacologic & mechanical VTE prophylaxis orders are present.    AM-PAC 6 Clicks Score (PT): (P) 9 (03/18/25 1946)    CODE STATUS:   Code Status and Medical Interventions: CPR (Attempt to Resuscitate); Full Support   Ordered at: 03/11/25 2404     Code Status (Patient has no pulse and is not breathing):    CPR (Attempt to Resuscitate)     Medical Interventions (Patient has pulse or is breathing):    Full Support     Level Of Support Discussed With:    Patient      Zeinab Shell PA-C  03/18/25

## 2025-03-18 NOTE — PLAN OF CARE
Goal Outcome Evaluation:  Plan of Care Reviewed With: patient, child        Progress: no change       Anticipated Discharge Disposition (SLP): home with OP services             Treatment Assessment (SLP): continued, pharyngeal dysphagia (03/18/25 7090)

## 2025-03-18 NOTE — PLAN OF CARE
Goal Outcome Evaluation:              Outcome Evaluation: Patient resting on room air. VSS, Afib with controlled rate on monitor. Remains Q&Ox4. Abx infused overnight and tolerated well. Patient slept most of the night. Tube feed running and patient tolerating well.  No complaints of pain overnight.

## 2025-03-18 NOTE — PROGRESS NOTES
Clinical Nutrition Assessment     Patient Name: Carlos Preston  YOB: 1928  MRN: [id number]  Date of Encounter: 03/18/25 11:55 EDT  Admission date: 3/11/2025  Reason for Visit: Follow-up protocol, EN    Assessment   Nutrition Assessment   Admission Diagnosis:  Rhabdomyolysis [M62.82]    Problem List:    Rhabdomyolysis    Moderate protein-calorie malnutrition      PMH:   He  has a past medical history of Cancer, H/O prostatectomy, Hearing loss, Heart disease, History of back surgery, Hypercholesterolemia, Hyperlipidemia, Hypertension, IHD (ischemic heart disease), Osteoarthritis of spine with myelopathy, lumbar region, Paroxysmal atrial fibrillation (07/03/2018), Pulmonary hypertension, S/P knee replacement, Sinus disorder, and thyroid ultrasound (08/28/2009).    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    Applicable Nutrition History:   3/12 SLP rec NPO w repeat 3/13  3/13-SLP Diet Recommendation: NPO, temporary alternate methods of nutrition/hydration, other (see comments) (vs consideration of comfort diet if aligns w/ GOC/POC)     Labs    Labs Reviewed: Yes     Results from last 7 days   Lab Units 03/18/25  0831 03/15/25  0929 03/15/25  0435 03/15/25  0004 03/14/25  0824 03/14/25  0504  "03/12/25  0002 03/11/25  1430   GLUCOSE mg/dL 144* 226* 213*  213* 181*   < > 132*   < > 108*   BUN mg/dL 44* 62* 56*  56* 61*   < > 63*   < > 63*   CREATININE mg/dL 0.73* 1.10 1.05  1.05 1.04   < > 1.11   < > 1.42*   SODIUM mmol/L 143 146* 147*  147* 148*   < > 146*   < > 143   CHLORIDE mmol/L 113* 115* 115*  115* 118*   < > 116*   < > 108*   POTASSIUM mmol/L 4.1 3.9 3.9  3.9 3.8   < > 3.8   < > 3.8   PHOSPHORUS mg/dL 2.1*  --  2.1*  --   --  2.8   < >  --    MAGNESIUM mg/dL 1.9  --  2.4*  --   --  2.4*   < > 2.3   ALT (SGPT) U/L  --  35 34  34 32   < > 35   < > 59*   LACTATE mmol/L  --   --   --   --   --   --   --  1.8    < > = values in this interval not displayed.       Results from last 7 days   Lab Units 03/15/25  0929 03/15/25  0435 03/15/25  0004 03/14/25  0824 03/14/25  0504 03/13/25  0913 03/13/25  0456   ALBUMIN g/dL 2.5* 2.5*  2.5* 2.4*   < > 2.3*   < > 2.5*   CRP mg/dL  --   --   --   --  5.67*  --  12.39*   CHOLESTEROL mg/dL  --  65  --   --   --   --   --    TRIGLYCERIDES mg/dL  --  79  --   --   --   --   --     < > = values in this interval not displayed.       Results from last 7 days   Lab Units 03/18/25  1125 03/18/25  0603 03/17/25  2342 03/17/25  1742 03/17/25  1152 03/17/25  0612   GLUCOSE mg/dL 127 129 134* 134* 115 135*     No results found for: \"HGBA1C\"      Results from last 7 days   Lab Units 03/11/25  1430   PROBNP pg/mL 29,350.0*       Medications    Medications Reviewed: Yes    Scheduled Meds:busPIRone, 5 mg, Nasogastric, Daily With Lunch  [Held by provider] cetirizine, 10 mg, Oral, Daily  doxycycline, 100 mg, Intravenous, Q12H  hydrOXYzine, 25 mg, Nasogastric, Nightly  Insulin Lispro, 2-7 Units, Subcutaneous, Q6H  [Held by provider] isosorbide mononitrate, 30 mg, Oral, QAM  melatonin, 10 mg, Nasogastric, Nightly  metoclopramide, 5 mg, Nasogastric, Q6H  [Held by provider] NIFEdipine XL, 30 mg, Oral, Daily  Nutrisource fiber, 1 packet, Nasogastric, BID  pantoprazole, 40 mg, " "Intravenous, Q AM  piperacillin-tazobactam, 4.5 g, Intravenous, Q8H  polyethylene glycol, 17 g, Nasogastric, Daily  ProSource TF, 45 mL, Nasogastric, BID  [Held by provider] QUEtiapine, 25 mg, Nasogastric, Daily  QUEtiapine, 50 mg, Nasogastric, Nightly  [Held by provider] rivaroxaban, 15 mg, Nasogastric, Daily With Dinner  sodium chloride, 10 mL, Intravenous, Q12H  sodium phosphate, 15 mmol, Intravenous, Once      Continuous Infusions:   PRN Meds:.  senna-docusate sodium **AND** polyethylene glycol **AND** [DISCONTINUED] bisacodyl **AND** bisacodyl    Calcium Replacement - Follow Nurse / BPA Driven Protocol    dextrose    dextrose    glucagon (human recombinant)    ipratropium-albuterol    Magnesium Standard Dose Replacement - Follow Nurse / BPA Driven Protocol    nitroglycerin    ondansetron    Phosphorus Replacement - Follow Nurse / BPA Driven Protocol    Potassium Replacement - Follow Nurse / BPA Driven Protocol    sodium chloride    sodium chloride    sodium chloride    traMADol    trolamine salicylate    Intake/Ouptut 24 hrs (0701 - 0700)   I&O's Reviewed: Yes   Intake & Output (last day)         03/17 0701  03/18 0700 03/18 0701  03/19 0700    Other  120    NG/GT      Total Intake(mL/kg)  120 (1.4)    Urine (mL/kg/hr) 1100 (0.5)     Stool      Total Output 1100     Net -1100 +120              Last stool x 1 on 3/11    Anthropometrics     Height: Height: 182.9 cm (72\")  Last Filed Weight: Weight: 83.9 kg (185 lb) (03/11/25 1402)  Method:    BMI: BMI (Calculated): 25.1    UBW:  250lbs per pt's dtr, appears ~215lbs in the past yr  Weight change:  possible 30lb/14% wt loss x 1 yr-need measured wt from [facility]   Weight      Weight (kg) Weight (lbs) Visit Report   1/10/2024 97.523 kg  215 lb  --    2/14/2024 95.074 kg  209 lb 9.6 oz  --    2/15/2024 95.1 kg  209 lb 10.5 oz     8/21/2024 88.542 kg  195 lb 3.2 oz  --    3/11/2025 83.915 kg  185 lb         Nutrition Focused Physical Exam    Date: 3/13    Patient meets " "criteria for malnutrition diagnosis, see MSA note.     Subjective   Reported/Observed/Food/Nutrition Related History:     3/18  Spoke w/PA, plans for comfort diet and adjustment to nocturnal feeds.     3/17  Per RN pt doing well on current TF regimen, notes watery Bms. Pt states he is tolerating TF, denies abdominal pain or bloating.     3/14  RN states keofeed placed, in stomach and MD okay to feed.     3/13  Family rpt pt at ok until 4 da ago then unable to eat .Repeat has had signif wt loss. Family aware of plan for feeding tube. GI note indicated will place tube 2/2 aspiration risk at this time.    3/12  Pt up in chair at bedside, very sleepy. Dtr at bedside provides nutrition hx. Dtr states pt has had poor intake x 2 days, states prior to recent fall pt was eating well. Dtr confirms pt has had esophageal dilation in the past, ~1.5 yrs ago and states worsening dysphagia over the past yr and pt managing this w/cutting food into small pieces and drinking adequate fluids w/meals. Dtr states pt has also had significant wt loss over the past yr, states UBW is 250lbs and was 185lbs at MD office <1 week ago. Dtr unsure etiology as pt tells her he eats well.     Needs Assessment   Date: 3/13    Height used:Height: 182.9 cm (72\")  Weights used: 83.9 Kg    Estimated Calorie needs: ~ 2000 Kcal/day  Method:  Kcals/KG x 25 = 2098  Method:  [id number] x 1.3 = 1968    Estimated Protein needs: ~ 109 g PRO/day  Method: g/Kg x 1.3    Estimated Fluid needs: ~ ml/day   Per clinical status    Current Nutrition Prescription     EN: IsoSource 1.5  Goal Rate: 60  Water Flushes: 45  Modular: Prosource TF 2/day  Route: NG  Tube: Small bore    At goal over: 22Hrs/day     Rx will supply:   Goal Volume 1320  mL/day     Flush Volume 990 mL/day     Energy 2060 Kcal/day 103 % Est Need   Protein 112 g/day 103 % Est Need   Fiber 20 g/day     Water in  EN 1003 mL     Total Water 1993 mL     Meet DRI Yes    "     --------------------------------------------------------------------------  Product/Rate verified at bedside: Yes  Infusing Rate at time of visit: 60    Average Delivery from [facility] Day:   Volume 1496 mL/day   % Goal Vol.   Flush Volume 1054 mL/day     Energy  Kcal/day  % Est Need   Protein  g/day  % Est Need   Fiber  g/day     Water in  EN  mL     Total Water  mL     Meet DRI Yes        PO: NPO Diet NPO Type: Tube Feeding  Oral Nutrition Supplement:   Intake: N/A    Assessment & Plan   Nutrition Diagnosis   Date:  3/12            Updated:  3/13  Problem Inadequate oral intake    Etiology dysphagia   Signs/Symptoms NPO   Status: New EN rec in place    Date:   3/12  Updated:     Problem Unintended weight loss   Etiology ? Dysphagia, advanced age   Signs/Symptoms Possible 14% wt loss x 1 yr   Status: New    Date:  3/13 Updated:  Problem Malnutrition moderate acute   Etiology Alt Swallow fx   Signs/Symptoms Intake hx w some Wasting   Status: New    Goal / Objectives:   Nutrition to support treatment, Advance diet as medically feasible/appropriate, and Tolerate EN at goal      Nutrition Intervention      Follow treatment progress, Care plan reviewed, Nutrition support order placed    Adjust EN regimen to nocturnal feeds (6p-6a):  Isosource 1.5 @ 60ml/hr. Prosource TF daily. Water flush @ 35ml/hr.   =720ml, 1140kcals (57% est needs), 64g pro (57% est needs), 11g fiber, 547ml water from formula, +420ml water from flushes, 967ml TFW.     Comfort diet w/consistencies per SLP    Monitoring/Evaluation:   Per protocol, I&O, Pertinent labs, EN delivery/tolerance, Weight, Symptoms, Swallow function    Lorna Cuevas, MS,RD,LD  Time Spent:30 min

## 2025-03-18 NOTE — THERAPY TREATMENT NOTE
Acute Care - Speech Language Pathology   Swallow Treatment Note [facility]     Patient Name: Carlos Preston  : 3/20/1928  MRN: [id number]  Today's Date: 3/18/2025               Admit Date: 3/11/2025    Visit Dx:     ICD-10-CM ICD-9-CM   1. Pneumonia of both lungs due to infectious organism, unspecified part of lung  J18.9 483.8   2. Traumatic rhabdomyolysis, initial encounter  T79.6XXA 958.6   3. Elevated troponin  R79.89 790.6   4. RSV (acute bronchiolitis due to respiratory syncytial virus)  J21.0 466.11   5. Impaired mobility and ADLs  Z74.09 V49.89    Z78.9    6. Oropharyngeal dysphagia  R13.12 787.22     Patient Active Problem List   Diagnosis    Pulmonary HTN    Hyperlipemia    HTN (hypertension)    GERD (gastroesophageal reflux disease)    Hx of CABG    IHD (ischemic heart disease)    SOB (shortness of breath)    Abnormal chest x-ray    Esophageal stricture    Rhabdomyolysis    Moderate protein-calorie malnutrition     Past Medical History:   Diagnosis Date    Cancer     H/O prostatectomy     Hearing loss     Heart disease     History of back surgery     Hypercholesterolemia     Hyperlipidemia     Hypertension     IHD (ischemic heart disease)     S/P CABG    Osteoarthritis of spine with myelopathy, lumbar region     Paroxysmal atrial fibrillation 2018    Pulmonary hypertension     S/P knee replacement     Sinus disorder     thyroid ultrasound 2009    Normal     Past Surgical History:   Procedure Laterality Date    CARDIAC CATHETERIZATION  2007    Dr. Mace: 60% LM and 70% LCX.    CARDIAC CATHETERIZATION  2009    %, OM 85-90%,Patent grafts.    CARDIAC CATHETERIZATION  2018    Patent Grafts    CARDIOVASCULAR STRESS TEST  2009    ROBIN George: Dr. Mace- EF 29%, Diffuse ischemia.    CARDIOVASCULAR STRESS TEST  2012    L. Myoview- Inferoseptal infarct with jon infarct ischemia. EF 41%.    CARDIOVASCULAR STRESS TEST  2015    L. Myoview- EF51%,fixed  "defect , no ischemia burden.    CARDIOVASCULAR STRESS TEST  10/11/2017    L.Myoview- EF 54%. Small inferior Ischemia.    CONVERTED (HISTORICAL) HOLTER  06/12/2023    3 days. A.Fib. Avg 60. . 3 VT. One 3 Sec Pause    CONVERTED (HISTORICAL) HOLTER  05/28/2024    3 Days. A.Fib. Avg 70. . 4.6% PVC. 2 VT    CORONARY ARTERY BYPASS GRAFT  05/17/2007    CABG:  SVG to LAD and LCX.    ECHO - CONVERTED  08/06/2009    Dr. Mace- EF 50%.    ECHO - CONVERTED  02/14/2011    EF 50%, Septal WMA.    ECHO - CONVERTED  11/07/2012    EF 40-45%, RVSP 33 mmHg.    ECHO - CONVERTED  03/23/2015    EF 45-50%, RVSP 40mmHg,mild MR, mild PHT    ECHO - CONVERTED  05/01/2017    EF 50-55%, mild MR    ECHO - CONVERTED  10/11/2017    EF 55%    ECHO - CONVERTED  03/21/2022    TLS. EF 55%. LA- 5.2 cm. Mild MR. RVSP- 44 mmHg    ECHO - CONVERTED  02/15/2024    TLS. EF 55%.RHE. LA- 5.0. Mild MR.AO- 3.8. RVSP- 56 mmHg    US CAROTID UNILATERAL  04/16/2007    Mild Plaque in Rt. bulb and RECA.       SLP Recommendation and Plan     SLP Diet Recommendation: comfort diet, per physician discretion (\"safest\" comfort diet would be puree/xtra gravy/ntl) (03/18/25 1130)  Recommended Precautions and Strategies: upright posture during/after eating, small bites of food and sips of liquid, general aspiration precautions, assist with feeding (03/18/25 1130)  SLP Rec. for Method of Medication Administration: meds via alternate route (03/18/25 1130)     Monitor for Signs of Aspiration: notify SLP if any concerns, yes (03/18/25 1130)        Anticipated Discharge Disposition (SLP): home with OP services (03/18/25 1130)     Therapy Frequency (Swallow): PRN, 3 days per week (03/18/25 1130)  Predicted Duration Therapy Intervention (Days): 1 week (03/18/25 1130)  Oral Care Recommendations: Oral Care BID/PRN, Suction toothbrush (03/18/25 1130)  Demonstrates Need for Referral to Another Service: palliative care services (03/18/25 1130)                     Treatment " Assessment (SLP): continued, pharyngeal dysphagia (03/18/25 1130)  Treatment Assessment Comments (SLP): Continued education. Pt states again that he wants the Keofeed removed and to be allowed to eat. Dtr at bedside is currently not agreeable to pt's request. She states that current plan is to initiate a modified comfort diet with nocturnal TF and then d/c home.She does not want Keofeed removed. She would like puree foods initiated, pt states that he wants a turkey sandwich to which the daughter also says no. Discussed w RN, hospitalist PA, and RD following session. SLP will provide pharyngeal exs for independent practice and check back later in the week for f/u / family education (03/18/25 1130)            Progress: no change      SWALLOW EVALUATION (Last 72 Hours)       SLP Adult Swallow Evaluation       Row Name 03/18/25 1130 03/17/25 1350 03/17/25 1100 03/17/25 0830          Rehab Evaluation    Document Type therapy note (daily note)  -RS other (see comments)  -RS re-evaluation  -RS re-evaluation  -RS (r) MC (t) RS (c)     Subjective Information complains of  hunger and thirst  -RS no complaints  -RS no complaints  -RS no complaints  -RS (r) MC (t) RS (c)     Patient Observations cooperative  -RS cooperative  -RS cooperative  -RS alert;cooperative;agree to therapy  -RS (r) MC (t) RS (c)     Patient/Family/Caregiver Comments/Observations daughter present  -RS Daughter, son in law, great-granddaughter present in person, granddaughter present via phone  -RS no family present in radiology  -RS Daughter present  -RS (r) MC (t) RS (c)     Patient Effort adequate  -RS -- adequate  -RS good  -RS (r) MC (t) RS (c)     Symptoms Noted During/After Treatment none  -RS -- none  -RS none  -RS (r) MC (t) RS (c)        General Information    Patient Profile Reviewed -- -- -- yes  -RS (r) MC (t) RS (c)     Pertinent History Of Current Problem -- -- -- See intial eval  -RS (r) MC (t) RS (c)        Pain    Pretreatment Pain Rating  -- 0/10 - no pain  -RS -- 0/10 - no pain  -RS (r) MC (t) RS (c)     Posttreatment Pain Rating -- 0/10 - no pain  -RS -- 0/10 - no pain  -RS (r) MC (t) RS (c)     Additional Documentation Pain Scale: FACES Pre/Post-Treatment (Group)  -RS -- -- --        Pain Scale: FACES Pre/Post-Treatment    Pain: FACES Scale, Pretreatment 0-->no hurt  -RS -- -- --     Posttreatment Pain Rating 0-->no hurt  -RS -- -- --        General Eating/Swallowing Observations    Respiratory Support Currently in Use -- -- -- nasal cannula  -RS (r) MC (t) RS (c)     Eating/Swallowing Skills -- -- -- fed by SLP  -RS (r) MC (t) RS (c)     Positioning During Eating -- -- -- upright in bed  -RS (r) MC (t) RS (c)     Utensils Used -- -- -- spoon  -RS (r) MC (t) RS (c)     Consistencies Trialed -- -- -- ice chips;thin liquids;pureed  -RS (r) MC (t) RS (c)        Clinical Swallow Eval    Oral Prep Phase -- -- -- impaired  -RS (r) MC (t) RS (c)     Oral Transit -- -- -- impaired  -RS (r) MC (t) RS (c)     Oral Residue -- -- -- WFL  -RS (r) MC (t) RS (c)     Pharyngeal Phase -- -- -- suspected pharyngeal impairment  -RS (r) MC (t) RS (c)     Esophageal Phase -- -- -- unremarkable  -RS (r) MC (t) RS (c)     Clinical Swallow Evaluation Summary -- -- -- Pt w/ wet vocal quality prior to PO trials, cued to throat clear. Up to 3 swallows observed with ice chips, followed by WVQ and throat clear. Pt coughed following presentation of thins. Cough is intermittenly productive. Multiple swallows w/ puree, continued throat clears. Dicussion w/ daughter regarding GOC, recommended MBS for further pharyngeal evaluation.  -RS (r) MC (t) RS (c)        Oral Prep Concerns    Oral Prep Concerns -- -- -- prolonged mastication  -RS (r) MC (t) RS (c)     Prolonged Mastication -- -- -- other (see comments)  Ice chips  -RS (r) MC (t) RS (c)        Oral Transit Concerns    Oral Transit Concerns -- -- -- increased oral transit time  -RS (r) MC (t) RS (c)     Increased Oral Transit  Time -- -- -- other (see comments);pudding  Ice chips  -RS (r) MC (t) RS (c)        Pharyngeal Phase Concerns    Pharyngeal Phase Concerns -- -- -- wet vocal quality;cough;throat clear  -RS (r) MC (t) RS (c)     Wet Vocal Quality -- -- -- all consistencies  -RS (r) MC (t) RS (c)     Multiple Swallows -- -- -- other (see comments);pudding  Ice chips  -RS (r) MC (t) RS (c)     Cough -- -- -- thin  -RS (r) MC (t) RS (c)     Throat Clear -- -- -- other (see comments)  Ice chips  -RS (r) MC (t) RS (c)        Esophageal Phase Concerns    Esophageal Phase Concerns, Comment -- -- -- Hx of esophageal diliation  -RS (r) MC (t) RS (c)        MBS/VFSS    Utensils Used -- -- spoon;straw  -RS --     Consistencies Trialed -- -- thin liquids;nectar/syrup-thick liquids;pureed  -RS --        MBS/VFSS Interpretation    Oral Prep Phase -- -- WFL  -RS --     Oral Transit Phase -- -- impaired  -RS --     Oral Residue -- -- WFL  -RS --        Oral Transit Phase    Impaired Oral Transit Phase -- -- delayed initiation of bolus transit;premature spillage of liquids into pharynx  -RS --        Initiation of Pharyngeal Swallow    Initiation of Pharyngeal Swallow -- -- bolus in valleculae  -RS --     Pharyngeal Phase -- -- impaired pharyngeal phase of swallowing  -RS --     Anatomical abnormalities noted -- -- osteophyte/bone spur per radiology report  -RS --     Anatomical abnormalities functional impact -- -- functional impact on swallowing  large osteophyte at c5, appears to negatively impact pharyngeal phase  -RS --     Penetration During the Swallow -- -- thin liquids;nectar-thick liquids;pudding/puree;secondary to delayed swallow initiation or mistiming;secondary to reduced laryngeal elevation;secondary to reduced vestibular closure  -RS --     Penetration After the Swallow -- -- thin liquids;nectar-thick liquids;pudding/puree;secondary to residue;in valleculae;in pyriform sinuses  -RS --     Depth of Penetration -- -- deep  -RS --      Response to Penetration -- -- No  -RS --     No spontaneous response to penetration and -- -- non-effective laryngeal clearance with cue (see comments)  -RS --     Rosenbek's Scale -- -- thin:;nectar:;pudding/puree:;3--->level 3  -RS --     Pharyngeal Residue -- -- diffuse within pharynx;secondary to reduced base of tongue retraction;secondary to reduced posterior pharyngeal wall stripping;secondary to reduced laryngeal elevation;secondary to reduced hyolaryngeal excursion;other (see comments)  also ? mechanical component 2/2 osteophyte  -RS --     Response to Residue -- -- unable to clear residue  -RS --        Swallowing Quality of Life Assessment    Patient/family preferences -- -- Avoid nasogastric tube;Avoid gastrostomy tube  Pt verbalizes several times that he does NOT want Keofeed or PEG, that he wants to eat and drink  -RS --     Education and counseling provided -- Signs of aspiration;Silent aspiration;Risks of aspiration;Safest diet options;Comfort diet options;Pleasure feedings;Alternate nutrition/hydration options, risks, and benefits;Oral care recommendations and rationale;Aspiration precautions  -RS Signs of aspiration;Silent aspiration;Risks of aspiration;Comfort diet options;Pleasure feedings;Alternate nutrition/hydration options, risks, and benefits  -RS Signs of aspiration;Silent aspiration;Risks of aspiration;Safest diet options;Comfort diet options  -RS (r) MC (t) RS (c)        SLP Evaluation Clinical Impression    SLP Swallowing Diagnosis -- -- mild-moderate;oral dysphagia;severe;pharyngeal dysphagia;other (see comments)  ? mechanical component to dysphagia 2/2 osteophyte  -RS oral dysphagia;R/O pharyngeal dysphagia  -RS (r) MC (t) RS (c)     Functional Impact -- -- risk of aspiration/pneumonia;risk of malnutrition  -RS risk of aspiration/pneumonia;risk of malnutrition  -RS (r) MC (t) RS (c)     Rehab Potential/Prognosis, Swallowing -- -- adequate, monitor progress closely  -RS good, to achieve  stated therapy goals  -RS (r) MC (t) RS (c)     Swallow Criteria for Skilled Therapeutic Interventions Met -- -- demonstrates skilled criteria  -RS demonstrates skilled criteria  -RS (r) MC (t) RS (c)        SLP Treatment Clinical Impressions    Treatment Assessment (SLP) continued;pharyngeal dysphagia  -RS -- -- --     Treatment Assessment Comments (SLP) Continued education. Pt states again that he wants the Keofeed removed and to be allowed to eat. Dtr at bedside is currently not agreeable to pt's request. She states that current plan is to initiate a modified comfort diet with nocturnal TF and then d/c home.She does not want Keofeed removed. She would like puree foods initiated, pt states that he wants a turkey sandwich to which the daughter also says no. Discussed w RN, hospitalist PA, and RD following session. SLP will provide pharyngeal exs for independent practice and check back later in the week for f/u / family education  -RS Lengthy education provided to pt and family members. Education included complete review of MBSs including images on monitor, dysphagia, aspiration, aspiration pna, NPO, comfort diet / pleasure feeding, PEG v Keofeed v TPN (pt's dtr requesting pt be placed on TPN, Hospitalist PA aware). Pt states several times throughout session that he wants to eat and drink, and he does accept the risk of aspiration. Daughter requests again to discuss PEG placement with PO diet. We review burdens of NPO status + PEG placement, the fact that a PEG does not eliminate aspiration, and potential impact on QOL. Numerous questions were asked by all family members which I answered to the best of my ability. Daughter and great granddaughter verbalized understanding and appreciation. SLP will f/u for continued GOC conversations/education/tx as appropriate. D/w Hospitalist PA and MD via secure chat.  -RS -- --     Barriers to Overall Progress (SLP) Advanced age  -RS -- -- --     Care Plan Review risks/benefits  "reviewed;current/potential barriers reviewed  -RS -- -- --     Care Plan Review, Other Participant(s) daughter  -RS -- -- --        Recommendations    Therapy Frequency (Swallow) PRN;3 days per week  -RS 5 days per week  -RS 5 days per week  -RS --  \  -RS (r) MC (t) RS (c)     Predicted Duration Therapy Intervention (Days) 1 week  -RS 1 week  -RS 1 week  -RS --     SLP Diet Recommendation comfort diet, per physician discretion  \"safest\" comfort diet would be puree/xtra gravy/ntl  -RS NPO;temporary alternate methods of nutrition/hydration;long term alternate methods of nutrition/hydration;comfort diet, per physician discretion  -RS NPO;temporary alternate methods of nutrition/hydration;long term alternate methods of nutrition/hydration;comfort diet, per physician discretion  -RS NPO;temporary alternate methods of nutrition/hydration;other (see comments)  -RS (r) MC (t) RS (c)     Recommended Diagnostics -- -- -- reassess via VFSS (MBS)  -RS (r) MC (t) RS (c)     Recommended Precautions and Strategies upright posture during/after eating;small bites of food and sips of liquid;general aspiration precautions;assist with feeding  -RS general aspiration precautions  -RS general aspiration precautions  -RS general aspiration precautions  -RS (r) MC (t) RS (c)     Oral Care Recommendations Oral Care BID/PRN;Suction toothbrush  -RS Oral Care BID/PRN;Suction toothbrush  -RS Oral Care BID/PRN;Suction toothbrush  -RS Oral Care BID/PRN;Suction toothbrush  -RS (r) MC (t) RS (c)     SLP Rec. for Method of Medication Administration meds via alternate route  -RS meds via alternate route  -RS meds via alternate route  -RS meds via alternate route  -RS (r) MC (t) RS (c)     Monitor for Signs of Aspiration notify SLP if any concerns;yes  -RS notify SLP if any concerns;yes  -RS notify SLP if any concerns;yes  -RS notify SLP if any concerns;yes  -RS (r) MC (t) RS (c)     Anticipated Discharge Disposition (SLP) home with OP services  -RS " inpatient rehabilitation facility  -RS inpatient rehabilitation facility  -RS inpatient rehabilitation facility  -RS (r) MC (t) RS (c)     Demonstrates Need for Referral to Another Service palliative care services  -RS -- -- --               User Key  (r) = Recorded By, (t) = Taken By, (c) = Cosigned By      Initials Name Effective Dates    RS Erick Freeman, MS CCC-SLP 09/14/23 -     Dejah Rubi, Speech Therapy Student 01/16/25 -                     EDUCATION  The patient has been educated in the following areas:   Dysphagia (Swallowing Impairment).        SLP GOALS       Row Name 03/18/25 1130 03/17/25 1100          (LTG) Patient will demonstrate functional swallow for    Diet Texture (Demonstrate functional swallow) soft to chew (whole) textures  -RS soft to chew (whole) textures  -RS     Liquid viscosity (Demonstrate functional swallow) thin liquids  -RS thin liquids  -RS     [place] (Demonstrate functional swallow) with minimal cues (75-90% accuracy)  -RS with minimal cues (75-90% accuracy)  -RS     Time Frame (Demonstrate functional swallow) 1 week  -RS 1 week  -RS     Progress/Outcomes (Demonstrate functional swallow) goal ongoing  -RS new goal  -RS        (STG) Lingual Strengthening Goal 1 (SLP)    Activity (Lingual Strengthening Goal 1, SLP) increase tongue back strength  -RS increase tongue back strength  -RS     Increase Tongue Back Strength lingual resistance exercises  -RS lingual resistance exercises  -RS     [place]/Accuracy (Lingual Strengthening Goal 1, SLP) with minimal cues (75-90% accuracy)  -RS with minimal cues (75-90% accuracy)  -RS     Time Frame (Lingual Strengthening Goal 1, SLP) 1 week  -RS 1 week  -RS     Progress/Outcomes (Lingual Strengthening Goal 1, SLP) goal ongoing  -RS new goal  -RS        (STG) Pharyngeal Strengthening Exercise Goal 1 (SLP)    Activity (Pharyngeal Strengthening Goal 1, SLP) increase timing;increase superior movement of the hyolaryngeal  complex;increase anterior movement of the hyolaryngeal complex;increase closure at entrance to airway/closure of airway at glottis;increase squeeze/positive pressure generation;increase tongue base retraction  -RS increase timing;increase superior movement of the hyolaryngeal complex;increase anterior movement of the hyolaryngeal complex;increase closure at entrance to airway/closure of airway at glottis;increase squeeze/positive pressure generation;increase tongue base retraction  -RS     Increase Timing prepping - 3 second prep or suck swallow or 3-step swallow  -RS prepping - 3 second prep or suck swallow or 3-step swallow  -RS     Increase Superior Movement of the Hyolaryngeal Complex Mendelsohn  -RS Mendelsohn  -RS     Increase Anterior Movement of the Hyolaryngeal Complex chin tuck against resistance (CTAR)  -RS chin tuck against resistance (CTAR)  -RS     Increase Closure at Entrance to Airway/Closure of Airway at Glottis supraglottic swallow  -RS supraglottic swallow  -RS     Increase Squeeze/Positive Pressure Generation hard effortful swallow  -RS hard effortful swallow  -RS     Increase Tongue Base Retraction edith  -RS edith  -RS     [place]/Accuracy (Pharyngeal Strengthening Goal 1, SLP) with minimal cues (75-90% accuracy)  -RS with minimal cues (75-90% accuracy)  -RS     Time Frame (Pharyngeal Strengthening Goal 1, SLP) 1 week  -RS 1 week  -RS     Progress/Outcomes (Pharyngeal Strengthening Goal 1, SLP) goal ongoing  -RS new goal  -RS               User Key  (r) = Recorded By, (t) = Taken By, (c) = Cosigned By      Initials Name Provider Type    RS Erick Freeman MS CCC-SLP Speech and Language Pathologist                         Time Calculation:    Time Calculation- SLP       Row Name 03/18/25 1243             Time Calculation- SLP    SLP Start Time 1130  -RS      SLP Received On 03/18/25  -RS         Untimed Charges    84886-[id number] Treatment Swallow Minutes 39  -RS         Total Minutes    Untimed  Charges Total Minutes 39  -RS       Total Minutes 39  -RS                User Key  (r) = Recorded By, (t) = Taken By, (c) = Cosigned By      Initials Name Provider Type    Erick Berry MS CCC-SLP Speech and Language Pathologist                    Therapy Charges for Today       Code Description Service Date Service Provider Modifiers Qty    80254569703 HC ST MOTION FLUORO EVAL SWALLOW 4 3/17/2025 Erick Freeman MS CCC-SLP GN 1    00237281441 HC ST TREATMENT SWALLOW 6 3/17/2025 Erick Freeman MS CCC-SLP GN 1    73176211807 HC ST TREATMENT SWALLOW 3 3/18/2025 Erick Freeman MS CCC-SLP GN 1                 MS NADINE Escoto  3/18/2025

## 2025-03-19 LAB
ANION GAP SERPL CALCULATED.3IONS-SCNC: 8 MMOL/L (ref 5–15)
BUN SERPL-MCNC: 36 MG/DL (ref 8–23)
BUN/CREAT SERPL: 50 (ref 7–25)
CALCIUM SPEC-SCNC: 9.7 MG/DL (ref 8.2–9.6)
CHLORIDE SERPL-SCNC: 112 MMOL/L (ref 98–107)
CO2 SERPL-SCNC: 23 MMOL/L (ref 22–29)
CREAT SERPL-MCNC: 0.72 MG/DL (ref 0.76–1.27)
DEPRECATED RDW RBC AUTO: 57.7 FL (ref 37–54)
EGFRCR SERPLBLD CKD-EPI 2021: 83.6 ML/MIN/1.73
ERYTHROCYTE [DISTWIDTH] IN BLOOD BY AUTOMATED COUNT: 17 % (ref 12.3–15.4)
GLUCOSE BLDC GLUCOMTR-MCNC: 100 MG/DL (ref 70–130)
GLUCOSE BLDC GLUCOMTR-MCNC: 113 MG/DL (ref 70–130)
GLUCOSE BLDC GLUCOMTR-MCNC: 84 MG/DL (ref 70–130)
GLUCOSE SERPL-MCNC: 97 MG/DL (ref 65–99)
HCT VFR BLD AUTO: 35.2 % (ref 37.5–51)
HGB BLD-MCNC: 10.5 G/DL (ref 13–17.7)
MAGNESIUM SERPL-MCNC: 1.8 MG/DL (ref 1.7–2.3)
MCH RBC QN AUTO: 28.9 PG (ref 26.6–33)
MCHC RBC AUTO-ENTMCNC: 29.8 G/DL (ref 31.5–35.7)
MCV RBC AUTO: 97 FL (ref 79–97)
PHOSPHATE SERPL-MCNC: 2.5 MG/DL (ref 2.5–4.5)
PLATELET # BLD AUTO: 98 10*3/MM3 (ref 140–450)
PMV BLD AUTO: 11 FL (ref 6–12)
POTASSIUM SERPL-SCNC: 4.2 MMOL/L (ref 3.5–5.2)
RBC # BLD AUTO: 3.63 10*6/MM3 (ref 4.14–5.8)
SODIUM SERPL-SCNC: 143 MMOL/L (ref 136–145)
WBC NRBC COR # BLD AUTO: 18.24 10*3/MM3 (ref 3.4–10.8)

## 2025-03-19 PROCEDURE — 82948 REAGENT STRIP/BLOOD GLUCOSE: CPT

## 2025-03-19 PROCEDURE — 97530 THERAPEUTIC ACTIVITIES: CPT

## 2025-03-19 PROCEDURE — 84100 ASSAY OF PHOSPHORUS: CPT | Performed by: PHYSICIAN ASSISTANT

## 2025-03-19 PROCEDURE — 80048 BASIC METABOLIC PNL TOTAL CA: CPT | Performed by: PHYSICIAN ASSISTANT

## 2025-03-19 PROCEDURE — 85027 COMPLETE CBC AUTOMATED: CPT | Performed by: PHYSICIAN ASSISTANT

## 2025-03-19 PROCEDURE — 25010000002 PIPERACILLIN SOD-TAZOBACTAM PER 1 G: Performed by: INTERNAL MEDICINE

## 2025-03-19 PROCEDURE — 83735 ASSAY OF MAGNESIUM: CPT | Performed by: PHYSICIAN ASSISTANT

## 2025-03-19 PROCEDURE — 99232 SBSQ HOSP IP/OBS MODERATE 35: CPT | Performed by: PHYSICIAN ASSISTANT

## 2025-03-19 RX ORDER — BISACODYL 10 MG
10 SUPPOSITORY, RECTAL RECTAL DAILY PRN
Status: DISCONTINUED | OUTPATIENT
Start: 2025-03-19 | End: 2025-03-19

## 2025-03-19 RX ORDER — AMOXICILLIN 250 MG
2 CAPSULE ORAL 2 TIMES DAILY
Status: DISCONTINUED | OUTPATIENT
Start: 2025-03-19 | End: 2025-03-19

## 2025-03-19 RX ORDER — POLYETHYLENE GLYCOL 3350 17 G/17G
17 POWDER, FOR SOLUTION ORAL DAILY PRN
Status: DISCONTINUED | OUTPATIENT
Start: 2025-03-19 | End: 2025-03-19

## 2025-03-19 RX ORDER — AMOXICILLIN 250 MG
2 CAPSULE ORAL NIGHTLY
Status: DISCONTINUED | OUTPATIENT
Start: 2025-03-19 | End: 2025-03-26 | Stop reason: HOSPADM

## 2025-03-19 RX ORDER — BISACODYL 10 MG
10 SUPPOSITORY, RECTAL RECTAL DAILY PRN
Status: DISCONTINUED | OUTPATIENT
Start: 2025-03-19 | End: 2025-03-26 | Stop reason: HOSPADM

## 2025-03-19 RX ADMIN — BUSPIRONE HYDROCHLORIDE 5 MG: 10 TABLET ORAL at 12:24

## 2025-03-19 RX ADMIN — PANTOPRAZOLE SODIUM 40 MG: 40 INJECTION, POWDER, FOR SOLUTION INTRAVENOUS at 06:12

## 2025-03-19 RX ADMIN — PIPERACILLIN AND TAZOBACTAM 4.5 G: 4; .5 INJECTION, POWDER, FOR SOLUTION INTRAVENOUS at 04:16

## 2025-03-19 RX ADMIN — Medication 10 ML: at 08:50

## 2025-03-19 RX ADMIN — HYDROXYZINE HYDROCHLORIDE 25 MG: 25 TABLET ORAL at 20:57

## 2025-03-19 RX ADMIN — Medication 10 ML: at 20:57

## 2025-03-19 RX ADMIN — Medication 10 MG: at 20:57

## 2025-03-19 RX ADMIN — METOCLOPRAMIDE HYDROCHLORIDE 5 MG: 5 SOLUTION ORAL at 04:16

## 2025-03-19 RX ADMIN — Medication 45 ML: at 08:57

## 2025-03-19 RX ADMIN — DOXYCYCLINE 100 MG: 100 INJECTION, POWDER, LYOPHILIZED, FOR SOLUTION INTRAVENOUS at 08:50

## 2025-03-19 RX ADMIN — QUETIAPINE FUMARATE 50 MG: 25 TABLET ORAL at 20:57

## 2025-03-19 NOTE — CASE MANAGEMENT/SOCIAL WORK
Continued Stay Note  [facility]     Patient Name: Carlos Preston  MRN: [id number]  Today's Date: 3/19/2025    Admit Date: 3/11/2025    Plan: Home with home health   Discharge Plan       Row Name 03/19/25 1306       Plan    Plan Home with home health    Patient/Family in Agreement with Plan yes    Plan Comments Discussed Mr Preston in MDR.  Mr Pretson family is discussing whether or not to put in a feeding tube.  I spoke with Malena/[employer].  Mr Preston has been accepted by [facility].  I spoke with Mr Muse's family at bedside.  Along with suction equipment they now want a hospital bed.  I have updated them that I am waiting on final recommendations for tube feeding from nutrition before I will be able to make referrals for tube feeding.  Case management will continue to follow.    I called and spoke with Marisela/[employer].  Marisela states that she will pull the information from Epic and continue to follow Mr Preston.    Final Discharge Disposition Code 06 - home with home health care                   Discharge Codes    No documentation.                 Expected Discharge Date and Time       Expected Discharge Date Expected Discharge Time    Mar 20, 2025               Agnes Hodge, RN

## 2025-03-19 NOTE — PROGRESS NOTES
[facility]  PROGRESS NOTE    Patient Name: Carlos Preston  : 3/20/1928  MRN: [id number]    Date of Admission: 3/11/2025  Primary Care Physician: Newton Hankins MD    Subjective     CC: f/u dysphagia     HPI:  Patient sitting up on bedside commode. He is alert and oriented x 4. He asks when the tube can be taken out of his nose. Family then arrived to bedside. They note new RUE edema. They are now considering PEG tube placement and would like to discuss this as well as his code status amongst themselves today. He did eat some of his breakfast. They ask when his cough will resolve, we discussed.     Family working to arrange caregivers for home.  at bedside towards the end of my exam.     Objective     Vital Signs:   Temp:  [96.6 °F (35.9 °C)-97.5 °F (36.4 °C)] 96.8 °F (36 °C)  Heart Rate:  [67-85] 71  Resp:  [14-20] 16  BP: (132-162)/(83-87) 162/87     Physical Exam:  Constitutional: No acute distress, more awake today. Ill appearing. Conversational  HENT: NCAT, mucous membranes moist. Keofeed in place. Voice very weak, slightly improved today  Respiratory: Clear to auscultation bilaterally, respiratory effort normal on room air   Cardiovascular: RRR  Gastrointestinal: Positive bowel sounds, soft, nontender, nondistended  Musculoskeletal: No bilateral ankle edema  Psychiatric: Appropriate affect, cooperative with exam  Neurologic: Orientated x 3, conversationally appropriate, moves all extremities spontaneously without focal deficits, globally weak, speech clear  Skin: No rashes to exposed surfaces     Results Reviewed:  LAB RESULTS:      Lab 25  0929 03/15/25  0435 25  0504 25  1856 25  1454 25  0456   WBC 18.24* 12.04* 11.04*  --   --  9.13   HEMOGLOBIN 10.5* 10.1* 10.4* 10.9* 10.2* 9.7*   HEMATOCRIT 35.2* 33.3* 35.3* 36.1* 34.0* 32.0*   PLATELETS 98* 121* 119*  --   --  120*   NEUTROS ABS  --  10.93* 10.29*  --   --   8.52*   IMMATURE GRANS (ABS)  --  0.18* 0.06*  --   --  0.06*   LYMPHS ABS  --  0.51* 0.37*  --   --  0.34*   MONOS ABS  --  0.40 0.31  --   --  0.20   EOS ABS  --  0.00 0.00  --   --  0.00   MCV 97.0 94.9 97.2*  --   --  95.0   CRP  --   --  5.67*  --   --  12.39*   PROCALCITONIN  --   --   --   --   --  0.73*         Lab 03/19/25  0928 03/18/25  2103 03/18/25  0831 03/15/25  0929 03/15/25  0435 03/15/25  0004 03/14/25  0824 03/14/25  0504 03/13/25  0913 03/13/25  0456   SODIUM 143  --  143 146* 147*  147* 148*   < > 146*   < > 145   POTASSIUM 4.2  --  4.1 3.9 3.9  3.9 3.8   < > 3.8   < > 3.8   CHLORIDE 112*  --  113* 115* 115*  115* 118*   < > 116*   < > 115*   CO2 23.0  --  23.0 21.0* 19.0*  19.0* 20.0*   < > 18.0*   < > 16.0*   ANION GAP 8.0  --  7.0 10.0 13.0  13.0 10.0   < > 12.0   < > 14.0   BUN 36*  --  44* 62* 56*  56* 61*   < > 63*   < > 61*   CREATININE 0.72*  --  0.73* 1.10 1.05  1.05 1.04   < > 1.11   < > 0.92   EGFR 83.6  --  83.3 61.4 65.0  65.0 65.7   < > 60.8   < > 76.1   GLUCOSE 97  --  144* 226* 213*  213* 181*   < > 132*   < > 118*   CALCIUM 9.7*  --  9.8* 9.2 9.0  9.0 9.0   < > 8.8   < > 9.0   MAGNESIUM 1.8  --  1.9  --  2.4*  --   --  2.4*  --  2.4*   PHOSPHORUS 2.5 2.7 2.1*  --  2.1*  --   --  2.8  --  3.4    < > = values in this interval not displayed.         Lab 03/15/25  0929 03/15/25  0435 03/15/25  0004 03/14/25  1902 03/14/25  1339   TOTAL PROTEIN 5.2* 5.2*  5.2* 5.3* 5.6* 5.5*   ALBUMIN 2.5* 2.5*  2.5* 2.4* 2.5* 2.4*   GLOBULIN 2.7 2.7  2.7 2.9 3.1 3.1   ALT (SGPT) 35 34  34 32 36 35   AST (SGOT) 28 31  31 34 35 36   BILIRUBIN 0.7 0.8  0.8 0.8 0.9 0.9   ALK PHOS 113 110  110 104 110 99         Lab 03/15/25  0435   CHOLESTEROL 65   TRIGLYCERIDES 79     Brief Urine Lab Results  (Last result in the past 365 days)        Color   Clarity   Blood   Leuk Est   Nitrite   Protein   CREAT   Urine HCG        03/11/25 1604 Floyd   Clear   Moderate (2+)   Negative   Negative   30  mg/dL (1+)                 Microbiology Results Abnormal       Procedure Component Value - Date/Time    COVID-19, FLU A/B, RSV PCR 1 HR TAT - Swab, Nasopharynx [[id number]]  (Abnormal) Collected: 03/11/25 1431    Lab Status: Final result Specimen: Swab from Nasopharynx Updated: 03/11/25 1518     COVID19 Not Detected     Influenza A PCR Not Detected     Influenza B PCR Not Detected     RSV, PCR Detected    Narrative:      Fact sheet for providers: https://www.fda.gov/media/406827/download    Fact sheet for patients: https://www.fda.gov/media/858191/download    Test performed by PCR.          No radiology results from the last 24 hrs    Results for orders placed during the hospital encounter of 02/15/24    Adult Transthoracic Echo Complete W/ Cont if Necessary Per Protocol    Interpretation Summary    Atrial fibrillation was the predominant rhythm observed during the procedure.    The study is technically difficult    Left ventricular wall thickness is consistent with mild concentric hypertrophy.    Left ventricular ejection fraction appears to be 51 - 55%.    Left ventricular diastolic function is consistent with (grade I) impaired relaxation.    The right ventricular cavity is borderline dilated.    The left atrial cavity is moderately dilated.  5.0 cm    The right atrial cavity is mildly  dilated.    No aortic valve regurgitation or stenosis is present    Mild mitral valve regurgitation with MAC is present.    Moderate tricuspid valve regurgitation is present.  RVSP- 56 mmHg    Borderline dilation of the aortic root is present.  3.8 cm    Current medications:  Scheduled Meds:busPIRone, 5 mg, Nasogastric, Daily With Lunch  [Held by provider] cetirizine, 10 mg, Oral, Daily  hydrOXYzine, 25 mg, Nasogastric, Nightly  Insulin Lispro, 2-7 Units, Subcutaneous, Q6H  [Held by provider] isosorbide mononitrate, 30 mg, Oral, QAM  melatonin, 10 mg, Nasogastric, Nightly  [Held by provider] NIFEdipine XL, 30 mg, Oral,  Daily  pantoprazole, 40 mg, Intravenous, Q AM  polyethylene glycol, 17 g, Nasogastric, Daily  ProSource TF, 45 mL, Nasogastric, Daily  QUEtiapine, 50 mg, Nasogastric, Nightly  rivaroxaban, 20 mg, Nasogastric, Daily With Dinner  sodium chloride, 10 mL, Intravenous, Q12H      Continuous Infusions:     PRN Meds:.  senna-docusate sodium **AND** polyethylene glycol **AND** [DISCONTINUED] bisacodyl **AND** bisacodyl    Calcium Replacement - Follow Nurse / BPA Driven Protocol    dextrose    dextrose    glucagon (human recombinant)    ipratropium-albuterol    Magnesium Standard Dose Replacement - Follow Nurse / BPA Driven Protocol    nitroglycerin    ondansetron    Phosphorus Replacement - Follow Nurse / BPA Driven Protocol    Potassium Replacement - Follow Nurse / BPA Driven Protocol    sodium chloride    sodium chloride    sodium chloride    traMADol    trolamine salicylate    Assessment & Plan   Assessment & Plan     Active Hospital Problems    Diagnosis  POA    **Rhabdomyolysis [M62.82]  Yes    Moderate protein-calorie malnutrition [E44.0]  Yes      Resolved Hospital Problems   No resolved problems to display.     Brief Hospital Course to date:  Carlos Preston is a 89 y.o. male with a history of heart disease, hyperlipidemia, ischemic heart disease status post CABG, paroxysmal A-fib on Eliquis presented to the hospital with progressive weakness.  Found to have bilateral pneumonia and acute rhabdomyolysis    Acute hypoxia respiratory insufficiency, resolved   RSV infection  Superimposed left lower lobe bacterial pneumonia, unspecified  Bilateral basal lung fibrosis concerning for chronic dysphagia / aspiration  CT chest with bilateral chronic fibrotic changes in both lung bases and developing left lower lobe pneumonia.  Patient is forced for RSV  Was hypoxic, requiring 4 L nasal cannula - has weaned to room air   Completed 7 days Zosyn / Doxycycline - monitor off antibiotics. WBCs elevated today, recheck CBC in AM  "  s/p IV Solu-Medrol and PO Prednisone  Negative Legionella antigen, strep antigen and MRSA swab  Sputum culture negative to date  DuMarcela, Basiaex and IS    Chronic dysphagia  History of esophageal stricture  SLP following - aspirating all consistencies  Keofeed was placed and patient started on tube feeds  Failed FEES on 3/17  Family requested TPN on 3/18, however, I do not feel this is an appropriate use of TPN   Family indicated they would will likely elect comfort diet but have seemed hesitant to transition fully to comfort diet. As of 3/19, they are reconsidering PEG so will place Xarelto back on hold   Transitioned to nocturnal tube feeds on 3/18 with \"safest possible\" PO diet  Discussed with SLP today - will repeat MBS tomorrow     Acute rhabdomyolysis, resolved   Elevated troponin  Patient fell x 2 and was on the floor for 4-5 hours  CPK on admission 2500.  Trended down to 1200 with IV fluids by 3/12  Elevated troponin felt to be demand ischemia and secondary to rhabdomyolysis. Patient with no chest pain or ischemic changes in EKG.  Defer ischemic workup given his frailty and age       Severe iron deficiency anemia   Symptomatic anemia   Patient was anemic with Hgb 7.6 on admission - s/p 1 unit PRBCs    s/p IV iron x 3 doses  Hemoglobin stable around 10  No evidence of GI bleed    Possible ileus  Hypoactive bowel sounds and KUB with nonobstructive gas pattern  DC Reglan. Start scheduled bowel regimen     Elevated liver enzymes, improved  CBD dilation  Abnormal CT abdomen with common bile duct dilation as well as pancreas duct dilation.   No focal mass or obstructive process seen on noncontrast CT  Total bilirubin is normal  GI following and recommended MRCP when more stable    A-fib  Hx of CABG  HTN  Xarelto held for possible PEG placement. Will resume this evening  CT head no acute intracranial abnormality identified  ECHO: 02/2024: EF: 51-55%     Debility  PT/OT and OT recommended rehab but family " declined despite extensive counseling. Daughter reports this is because patient does not want to go    Family planning to hire 24/7 caregivers. Will need home equipment arranged per CM as we get closer to DC    Hypoactive delirium  Sleep hygiene - limit interruptions at night  Continue Seroquel 50mg QHS, Melatonin 10mg QHS and Hydroxyzine 25mg QHS    Expected Discharge Location and Transportation: Home with home health / 24/7 caregivers   Expected Discharge Expected Discharge Date: 3/21/2025; Expected Discharge Time:      VTE Prophylaxis:Pharmacologic & mechanical VTE prophylaxis orders are present.    AM-PAC 6 Clicks Score (PT): 10 (03/19/25 3627)    CODE STATUS:   Code Status and Medical Interventions: CPR (Attempt to Resuscitate); Full Support   Ordered at: 03/11/25 3383     Code Status (Patient has no pulse and is not breathing):    CPR (Attempt to Resuscitate)     Medical Interventions (Patient has pulse or is breathing):    Full Support     Level Of Support Discussed With:    Patient     Zeinab Shell PA-C  03/19/25

## 2025-03-19 NOTE — THERAPY TREATMENT NOTE
Patient Name: Carlos Preston  : 3/20/1928    MRN: [id number]                              Today's Date: 3/19/2025       Admit Date: 3/11/2025    Visit Dx:     ICD-10-CM ICD-9-CM   1. Pneumonia of both lungs due to infectious organism, unspecified part of lung  J18.9 483.8   2. Traumatic rhabdomyolysis, initial encounter  T79.6XXA 958.6   3. Elevated troponin  R79.89 790.6   4. RSV (acute bronchiolitis due to respiratory syncytial virus)  J21.0 466.11   5. Impaired mobility and ADLs  Z74.09 V49.89    Z78.9    6. Oropharyngeal dysphagia  R13.12 787.22     Patient Active Problem List   Diagnosis    Pulmonary HTN    Hyperlipemia    HTN (hypertension)    GERD (gastroesophageal reflux disease)    Hx of CABG    IHD (ischemic heart disease)    SOB (shortness of breath)    Abnormal chest x-ray    Esophageal stricture    Rhabdomyolysis    Moderate protein-calorie malnutrition     Past Medical History:   Diagnosis Date    Cancer     H/O prostatectomy     Hearing loss     Heart disease     History of back surgery     Hypercholesterolemia     Hyperlipidemia     Hypertension     IHD (ischemic heart disease)     S/P CABG    Osteoarthritis of spine with myelopathy, lumbar region     Paroxysmal atrial fibrillation 2018    Pulmonary hypertension     S/P knee replacement     Sinus disorder     thyroid ultrasound 2009    Normal     Past Surgical History:   Procedure Laterality Date    CARDIAC CATHETERIZATION  2007    Dr. Mace: 60% LM and 70% LCX.    CARDIAC CATHETERIZATION  2009    %, OM 85-90%,Patent grafts.    CARDIAC CATHETERIZATION  2018    Patent Grafts    CARDIOVASCULAR STRESS TEST  2009    ROBIN George: Dr. Mace- EF 29%, Diffuse ischemia.    CARDIOVASCULAR STRESS TEST  2012    L. Myoview- Inferoseptal infarct with jon infarct ischemia. EF 41%.    CARDIOVASCULAR STRESS TEST  2015    L. Myoview- EF51%,fixed defect , no ischemia burden.    CARDIOVASCULAR STRESS TEST   10/11/2017    L.Myoview- EF 54%. Small inferior Ischemia.    CONVERTED (HISTORICAL) HOLTER  06/12/2023    3 days. A.Fib. Avg 60. . 3 VT. One 3 Sec Pause    CONVERTED (HISTORICAL) HOLTER  05/28/2024    3 Days. A.Fib. Avg 70. . 4.6% PVC. 2 VT    CORONARY ARTERY BYPASS GRAFT  05/17/2007    CABG:  SVG to LAD and LCX.    ECHO - CONVERTED  08/06/2009    Dr. Mace- EF 50%.    ECHO - CONVERTED  02/14/2011    EF 50%, Septal WMA.    ECHO - CONVERTED  11/07/2012    EF 40-45%, RVSP 33 mmHg.    ECHO - CONVERTED  03/23/2015    EF 45-50%, RVSP 40mmHg,mild MR, mild PHT    ECHO - CONVERTED  05/01/2017    EF 50-55%, mild MR    ECHO - CONVERTED  10/11/2017    EF 55%    ECHO - CONVERTED  03/21/2022    TLS. EF 55%. LA- 5.2 cm. Mild MR. RVSP- 44 mmHg    ECHO - CONVERTED  02/15/2024    TLS. EF 55%.RHE. LA- 5.0. Mild MR.AO- 3.8. RVSP- 56 mmHg    US CAROTID UNILATERAL  04/16/2007    Mild Plaque in Rt. bulb and RECA.      General Information       Row Name 03/19/25 1535          Physical Therapy Time and Intention    Document Type therapy note (daily note)  -KE     Mode of Treatment physical therapy  -KE       Row Name 03/19/25 1535          General Information    Patient Profile Reviewed yes  -KE     Existing Precautions/Restrictions fall;other (see comments)  NG  -KE     Barriers to Rehab medically complex  -KE       Row Name 03/19/25 1535          Cognition    Orientation Status (Cognition) oriented x 3  -KE       Row Name 03/19/25 1535          Safety Issues/Impairments Affecting Functional Mobility    Safety Issues Affecting Function (Mobility) awareness of need for assistance;safety precaution awareness;safety precautions follow-through/compliance;insight into deficits/self-awareness;sequencing abilities  -KE     Impairments Affecting Function (Mobility) balance;cognition;coordination;endurance/activity tolerance;postural/trunk control;shortness of breath;strength;motor planning  -KE               User Key  (r) = Recorded  By, (t) = Taken By, (c) = Cosigned By      Initials Name Provider Type    Flory Begum PT Physical Therapist                   Mobility       Row Name 03/19/25 1536          Bed Mobility    Bed Mobility supine-sit  -KE     Supine-Sit [place] (Bed Mobility) maximum assist (25% patient effort);2 person assist  -KE     Assistive Device (Bed Mobility) bed rails;head of bed elevated;repositioning sheet  -KE     Comment, (Bed Mobility) VCs for sequencing, assist at trunk and LEs  -KE       Row Name 03/19/25 1536          Bed-Chair Transfer    Bed-Chair [place] (Transfers) maximum assist (25% patient effort);2 person assist  -KE     Assistive Device (Bed-Chair Transfers) other (see comments)  UE support  -KE       Row Name 03/19/25 1536          Sit-Stand Transfer    Sit-Stand [place] (Transfers) maximum assist (25% patient effort);2 person assist  -KE     Assistive Device (Sit-Stand Transfers) other (see comments)  B UE support  -KE     Comment, (Sit-Stand Transfer) x1 from EOB, x2 from chair; tolerated standing ~20 sec once upright  -KE               User Key  (r) = Recorded By, (t) = Taken By, (c) = Cosigned By      Initials Name Provider Type    Flory Begum PT Physical Therapist                   Obj/Interventions       Row Name 03/19/25 1537          Balance    Balance Assessment sitting static balance;sitting dynamic balance;standing static balance;standing dynamic balance  -KE     Static Sitting Balance minimal assist  -KE     Dynamic Sitting Balance moderate assist  -KE     Position, Sitting Balance sitting edge of bed  -KE     Static Standing Balance maximum assist;2-person assist  -KE     Dynamic Standing Balance maximum assist;2-person assist  -KE     Position/Device Used, Standing Balance supported  -KE     Balance Interventions sitting;standing;sit to stand;supported;static;dynamic  -KE     Comment, Balance posterior lean; VCs throughout for anterior weight shift  -KE                User Key  (r) = Recorded By, (t) = Taken By, (c) = Cosigned By      Initials Name Provider Type    Flory Begum PT Physical Therapist                   Goals/Plan    No documentation.                  Clinical Impression       Row Name 03/19/25 1538          Pain    Pretreatment Pain Rating 0/10 - no pain  -KE     Posttreatment Pain Rating 0/10 - no pain  -KE       Row Name 03/19/25 1538          Plan of Care Review    Plan of Care Reviewed With patient;family  -KE     Progress improving  -KE     Outcome Evaluation Pt able to complete SPT w/ B UE support w/ MaxAx2. Noted improved alertness w/ good participation in therapy session, however continues to req sig assist for functional mobility. Pt may continue to benefit from IPPT to address deficits in strength, balance, and functional endurance. PT continue to rec SNF at d/c.  -KE       Row Name 03/19/25 1538          Vital Signs    Post Systolic BP Rehab 153  -KE     Post Treatment Diastolic BP 83  -KE     Posttreatment Heart Rate (beats/min) 73  -KE     O2 Delivery Pre Treatment room air  -KE     O2 Delivery Intra Treatment room air  -KE     Post SpO2 (%) 97  -KE     O2 Delivery Post Treatment room air  -KE     Pre Patient Position Supine  -KE     Intra Patient Position Standing  -KE     Post Patient Position Sitting  -KE       Row Name 03/19/25 1538          Positioning and Restraints    Pre-Treatment Position in bed  -KE     Post Treatment Position chair  -KE     In Chair notified nsg;reclined;waffle cushion;on mechanical lift sling;call light within reach;encouraged to call for assist;exit alarm on;with family/caregiver;legs elevated;heels elevated  -KE               User Key  (r) = Recorded By, (t) = Taken By, (c) = Cosigned By      Initials Name Provider Type    Flory Begum PT Physical Therapist                   Outcome Measures       Row Name 03/19/25 1549 03/19/25 0855       How much help from another person do you currently need...     Turning from your back to your side while in flat bed without using bedrails? 2  -KE 2  -MICHAEL    Moving from lying on back to sitting on the side of a flat bed without bedrails? 2  -KE 2  -MICHAEL    Moving to and from a bed to a chair (including a wheelchair)? 2  -KE 2  -MICHAEL    Standing up from a chair using your arms (e.g., wheelchair, bedside chair)? 2  -KE 2  -MICHAEL    Climbing 3-5 steps with a railing? 1  -KE 1  -MICHAEL    To walk in hospital room? 1  -KE 1  -MICHAEL    AM-PAC 6 Clicks Score (PT) 10  -KE 10  -MICHAEL    Highest Level of Mobility Goal 4 --> Transfer to chair/commode  -KE 4 --> Transfer to chair/commode  -MICHAEL      Row Name 03/19/25 1549          Functional Assessment    Outcome Measure Options AM-PAC 6 Clicks Basic Mobility (PT)  -KE               User Key  (r) = Recorded By, (t) = Taken By, (c) = Cosigned By      Initials Name Provider Type    Maria E Salcido, RN Registered Nurse    Flory Begum, DAMIEN Physical Therapist                                 Physical Therapy Education       Title: PT OT SLP Therapies (In Progress)       Topic: Physical Therapy (In Progress)       Point: Mobility training (In Progress)       Learning Progress Summary            Patient Acceptance, E, NR by WHITNEY at 3/19/2025 1549    Comment: provided HEP handout for bed level exercises to family    Acceptance, E, NR by WHITNEY at 3/17/2025 1608    Acceptance, E, NR by WHITNEY at 3/12/2025 1606   Family Acceptance, E, NR by WHITNEY at 3/19/2025 1549    Comment: provided HEP handout for bed level exercises to family                      Point: Home exercise program (In Progress)       Learning Progress Summary            Patient Acceptance, E, NR by WHITNEY at 3/19/2025 1549    Comment: provided HEP handout for bed level exercises to family    Acceptance, E, NR by WHITNEY at 3/17/2025 1608    Acceptance, E, NR by WHITNEY at 3/12/2025 1606   Family Acceptance, E, NR by WHITNEY at 3/19/2025 1549    Comment: provided HEP handout for bed level exercises to family                       Point: Body mechanics (In Progress)       Learning Progress Summary            Patient Acceptance, E, NR by WHITNEY at 3/19/2025 1549    Comment: provided HEP handout for bed level exercises to family    Acceptance, E, NR by WHITNEY at 3/17/2025 1608    Acceptance, E, NR by KE at 3/12/2025 1606   Family Acceptance, E, NR by KE at 3/19/2025 1549    Comment: provided HEP handout for bed level exercises to family                      Point: Precautions (In Progress)       Learning Progress Summary            Patient Acceptance, E, NR by WHITNEY at 3/19/2025 1549    Comment: provided HEP handout for bed level exercises to family    Acceptance, E, NR by KE at 3/17/2025 1608    Acceptance, E, NR by KE at 3/12/2025 1606   Family Acceptance, E, NR by KE at 3/19/2025 1549    Comment: provided HEP handout for bed level exercises to family                                      User Key       Initials Effective Dates Name Provider Type Discipline     11/16/23 -  Flory Little, PT Physical Therapist PT                  PT Recommendation and Plan  Planned Therapy Interventions (PT): home exercise program, balance training, bed mobility training, gait training, neuromuscular re-education, patient/family education, postural re-education, transfer training, stretching, strengthening, stair training, ROM (range of motion)  Progress: improving  Outcome Evaluation: Pt able to complete SPT w/ B UE support w/ MaxAx2. Noted improved alertness w/ good participation in therapy session, however continues to req sig assist for functional mobility. Pt may continue to benefit from IPPT to address deficits in strength, balance, and functional endurance. PT continue to rec SNF at d/c.     Time Calculation:   PT Evaluation Complexity  History, PT Evaluation Complexity: 1-2 personal factors and/or comorbidities  Examination of Body Systems (PT Eval Complexity): 1-2 elements  Clinical Presentation (PT Evaluation Complexity): stable  Clinical Decision  Making (PT Evaluation Complexity): low complexity  Overall Complexity (PT Evaluation Complexity): low complexity     PT Charges       Row Name 03/19/25 1550             Time Calculation    Start Time 1021  -KE      PT Received On 03/19/25  -KE         Timed Charges    28688 - PT Therapeutic Activity Minutes 30  -KE         Total Minutes    Timed Charges Total Minutes 30  -KE       Total Minutes 30  -KE                User Key  (r) = Recorded By, (t) = Taken By, (c) = Cosigned By      Initials Name Provider Type    Flory Begum PT Physical Therapist                  Therapy Charges for Today       Code Description Service Date Service Provider Modifiers Qty    43618362519 HC PT THERAPEUTIC ACT EA 15 MIN 3/19/2025 Flory Little PT GP 2    [id number] HC PT THER SUPP EA 15 MIN 3/19/2025 Flory Little, PT GP 30            PT G-Codes  Outcome Measure Options: AM-PAC 6 Clicks Basic Mobility (PT)  AM-PAC 6 Clicks Score (PT): 10  AM-PAC 6 Clicks Score (OT): 7  PT Discharge Summary  Anticipated Discharge Disposition (PT): skilled nursing facility    Flory Little PT  3/19/2025     Ambulatory

## 2025-03-19 NOTE — PLAN OF CARE
Problem: Adult Inpatient Plan of Care  Goal: Plan of Care Review  3/19/2025 1820 by Maria E Momin RN  Outcome: Progressing  Flowsheets (Taken 3/19/2025 1820)  Progress: improving  Outcome Evaluation: Patient more alert and conversive this shift. Keofeed remains in place with nocturnal TF running at goal of 60ml/hr. Patient denies pain. Patient tolerating sitting up in chair majority of shift. Patient is a.fib on tele, remains on RA, and VSS. Will continue with POC.  Plan of Care Reviewed With:   patient   family   Goal Outcome Evaluation:  Plan of Care Reviewed With: patient, family        Progress: improving  Outcome Evaluation: Patient more alert and conversive this shift. Keofeed remains in place with nocturnal TF running at goal of 60ml/hr. Patient denies pain. Patient tolerating sitting up in chair majority of shift. Patient is a.fib on tele, remains on RA, and VSS. Will continue with POC.

## 2025-03-19 NOTE — PLAN OF CARE
Goal Outcome Evaluation:  Plan of Care Reviewed With: patient, family        Progress: improving  Outcome Evaluation: Pt able to complete SPT w/ B UE support w/ MaxAx2. Noted improved alertness w/ good participation in therapy session, however continues to req sig assist for functional mobility. Pt may continue to benefit from IPPT to address deficits in strength, balance, and functional endurance. PT continue to rec SNF at d/c.    Anticipated Discharge Disposition (PT): skilled nursing facility

## 2025-03-19 NOTE — PLAN OF CARE
Goal Outcome Evaluation:           Progress: no change  Outcome Evaluation: A&O x4 this night. Calm demeanor. Sleeping between care. Voice very soft. Productive cough noted. Pt able to utilize oral suction himself. Nocturnal feeding running at goal rate of 60 ml/hr with 30ml free water flush. Tolerating TF. Minimal PO intake consisting of ice chips and nectar thick water. Keofeed secured with nasal septal bridle. Pressure offloaded. Adequate UOP from external catheter. 2 large loose/liquid BMs. Turns conducted. Denies pain or nausea. Plan is to d/c home with home health when medically ready.

## 2025-03-20 ENCOUNTER — APPOINTMENT (OUTPATIENT)
Dept: CARDIOLOGY | Facility: HOSPITAL | Age: OVER 89
End: 2025-03-20
Payer: MEDICARE

## 2025-03-20 ENCOUNTER — ANESTHESIA EVENT (OUTPATIENT)
Dept: GASTROENTEROLOGY | Facility: HOSPITAL | Age: OVER 89
End: 2025-03-20
Payer: MEDICARE

## 2025-03-20 ENCOUNTER — APPOINTMENT (OUTPATIENT)
Dept: GENERAL RADIOLOGY | Facility: HOSPITAL | Age: OVER 89
End: 2025-03-20
Payer: MEDICARE

## 2025-03-20 LAB
ANION GAP SERPL CALCULATED.3IONS-SCNC: 10 MMOL/L (ref 5–15)
BH CV UPPER VENOUS LEFT SUBCLAVIAN PHASIC: NORMAL
BH CV UPPER VENOUS LEFT SUBCLAVIAN SPONT: NORMAL
BH CV UPPER VENOUS RIGHT AXILLARY AUGMENT: NORMAL
BH CV UPPER VENOUS RIGHT AXILLARY COMPRESS: NORMAL
BH CV UPPER VENOUS RIGHT AXILLARY PHASIC: NORMAL
BH CV UPPER VENOUS RIGHT AXILLARY SPONT: NORMAL
BH CV UPPER VENOUS RIGHT BASILIC FOREARM COLOR: 1
BH CV UPPER VENOUS RIGHT BASILIC FOREARM COMPRESS: NORMAL
BH CV UPPER VENOUS RIGHT BASILIC UPPER COLOR: 1
BH CV UPPER VENOUS RIGHT BASILIC UPPER COMPRESS: NORMAL
BH CV UPPER VENOUS RIGHT BRACHIAL COMPRESS: NORMAL
BH CV UPPER VENOUS RIGHT CEPHALIC FOREARM COMPRESS: NORMAL
BH CV UPPER VENOUS RIGHT CEPHALIC UPPER COMPRESS: NORMAL
BH CV UPPER VENOUS RIGHT INTERNAL JUGULAR AUGMENT: NORMAL
BH CV UPPER VENOUS RIGHT INTERNAL JUGULAR COMPRESS: NORMAL
BH CV UPPER VENOUS RIGHT INTERNAL JUGULAR PHASIC: NORMAL
BH CV UPPER VENOUS RIGHT INTERNAL JUGULAR SPONT: NORMAL
BH CV UPPER VENOUS RIGHT RADIAL COMPRESS: NORMAL
BH CV UPPER VENOUS RIGHT SUBCLAVIAN AUGMENT: NORMAL
BH CV UPPER VENOUS RIGHT SUBCLAVIAN COMPRESS: NORMAL
BH CV UPPER VENOUS RIGHT SUBCLAVIAN PHASIC: NORMAL
BH CV UPPER VENOUS RIGHT SUBCLAVIAN SPONT: NORMAL
BH CV UPPER VENOUS RIGHT ULNAR COMPRESS: NORMAL
BH CV VAS PRELIMINARY FINDINGS SCRIPTING: 1
BUN SERPL-MCNC: 32 MG/DL (ref 8–23)
BUN/CREAT SERPL: 47.8 (ref 7–25)
CALCIUM SPEC-SCNC: 9.5 MG/DL (ref 8.2–9.6)
CHLORIDE SERPL-SCNC: 107 MMOL/L (ref 98–107)
CO2 SERPL-SCNC: 24 MMOL/L (ref 22–29)
CREAT SERPL-MCNC: 0.67 MG/DL (ref 0.76–1.27)
CRP SERPL-MCNC: 0.79 MG/DL (ref 0–0.5)
DEPRECATED RDW RBC AUTO: 56.2 FL (ref 37–54)
EGFRCR SERPLBLD CKD-EPI 2021: 84.9 ML/MIN/1.73
ERYTHROCYTE [DISTWIDTH] IN BLOOD BY AUTOMATED COUNT: 17.2 % (ref 12.3–15.4)
GLUCOSE BLDC GLUCOMTR-MCNC: 66 MG/DL (ref 70–130)
GLUCOSE SERPL-MCNC: 115 MG/DL (ref 65–99)
HCT VFR BLD AUTO: 32.2 % (ref 37.5–51)
HGB BLD-MCNC: 9.8 G/DL (ref 13–17.7)
MAGNESIUM SERPL-MCNC: 1.9 MG/DL (ref 1.7–2.3)
MCH RBC QN AUTO: 28.3 PG (ref 26.6–33)
MCHC RBC AUTO-ENTMCNC: 30.4 G/DL (ref 31.5–35.7)
MCV RBC AUTO: 93.1 FL (ref 79–97)
PHOSPHATE SERPL-MCNC: 2.5 MG/DL (ref 2.5–4.5)
PLATELET # BLD AUTO: 85 10*3/MM3 (ref 140–450)
PMV BLD AUTO: 11 FL (ref 6–12)
POTASSIUM SERPL-SCNC: 4.3 MMOL/L (ref 3.5–5.2)
PROCALCITONIN SERPL-MCNC: 0.09 NG/ML (ref 0–0.25)
RBC # BLD AUTO: 3.46 10*6/MM3 (ref 4.14–5.8)
SODIUM SERPL-SCNC: 141 MMOL/L (ref 136–145)
WBC NRBC COR # BLD AUTO: 17.52 10*3/MM3 (ref 3.4–10.8)

## 2025-03-20 PROCEDURE — 80048 BASIC METABOLIC PNL TOTAL CA: CPT | Performed by: PHYSICIAN ASSISTANT

## 2025-03-20 PROCEDURE — 99232 SBSQ HOSP IP/OBS MODERATE 35: CPT | Performed by: INTERNAL MEDICINE

## 2025-03-20 PROCEDURE — 85027 COMPLETE CBC AUTOMATED: CPT | Performed by: PHYSICIAN ASSISTANT

## 2025-03-20 PROCEDURE — 86140 C-REACTIVE PROTEIN: CPT | Performed by: INTERNAL MEDICINE

## 2025-03-20 PROCEDURE — 82948 REAGENT STRIP/BLOOD GLUCOSE: CPT

## 2025-03-20 PROCEDURE — 92611 MOTION FLUOROSCOPY/SWALLOW: CPT

## 2025-03-20 PROCEDURE — 93971 EXTREMITY STUDY: CPT | Performed by: INTERNAL MEDICINE

## 2025-03-20 PROCEDURE — 74230 X-RAY XM SWLNG FUNCJ C+: CPT

## 2025-03-20 PROCEDURE — 93971 EXTREMITY STUDY: CPT

## 2025-03-20 PROCEDURE — 84145 PROCALCITONIN (PCT): CPT | Performed by: PHYSICIAN ASSISTANT

## 2025-03-20 PROCEDURE — 83735 ASSAY OF MAGNESIUM: CPT | Performed by: PHYSICIAN ASSISTANT

## 2025-03-20 PROCEDURE — 84100 ASSAY OF PHOSPHORUS: CPT | Performed by: PHYSICIAN ASSISTANT

## 2025-03-20 RX ORDER — MIDAZOLAM HYDROCHLORIDE 1 MG/ML
0.5 INJECTION, SOLUTION INTRAMUSCULAR; INTRAVENOUS
Status: CANCELLED | OUTPATIENT
Start: 2025-03-20

## 2025-03-20 RX ORDER — WATER 10 ML/10ML
INJECTION INTRAMUSCULAR; INTRAVENOUS; SUBCUTANEOUS
Status: ACTIVE
Start: 2025-03-20 | End: 2025-03-20

## 2025-03-20 RX ORDER — TORSEMIDE 20 MG/1
40 TABLET ORAL ONCE
Status: COMPLETED | OUTPATIENT
Start: 2025-03-20 | End: 2025-03-20

## 2025-03-20 RX ORDER — FAMOTIDINE 20 MG/1
20 TABLET, FILM COATED ORAL ONCE
Status: CANCELLED | OUTPATIENT
Start: 2025-03-20 | End: 2025-03-20

## 2025-03-20 RX ORDER — HYOSCYAMINE SULFATE 0.12 MG/1
125 TABLET SUBLINGUAL EVERY 4 HOURS PRN
Status: DISCONTINUED | OUTPATIENT
Start: 2025-03-20 | End: 2025-03-26 | Stop reason: HOSPADM

## 2025-03-20 RX ORDER — DICYCLOMINE HCL 20 MG
20 TABLET ORAL 4 TIMES DAILY
Status: DISCONTINUED | OUTPATIENT
Start: 2025-03-20 | End: 2025-03-26 | Stop reason: HOSPADM

## 2025-03-20 RX ORDER — FAMOTIDINE 10 MG/ML
20 INJECTION, SOLUTION INTRAVENOUS ONCE
Status: CANCELLED | OUTPATIENT
Start: 2025-03-20 | End: 2025-03-20

## 2025-03-20 RX ORDER — QUETIAPINE FUMARATE 25 MG/1
25 TABLET, FILM COATED ORAL NIGHTLY
Status: DISCONTINUED | OUTPATIENT
Start: 2025-03-20 | End: 2025-03-23

## 2025-03-20 RX ADMIN — QUETIAPINE FUMARATE 25 MG: 25 TABLET ORAL at 22:06

## 2025-03-20 RX ADMIN — HYDROXYZINE HYDROCHLORIDE 25 MG: 25 TABLET ORAL at 22:07

## 2025-03-20 RX ADMIN — Medication 10 MG: at 22:07

## 2025-03-20 RX ADMIN — DICYCLOMINE HYDROCHLORIDE 20 MG: 20 TABLET ORAL at 22:07

## 2025-03-20 RX ADMIN — TORSEMIDE 40 MG: 20 TABLET ORAL at 12:16

## 2025-03-20 RX ADMIN — Medication 45 ML: at 09:03

## 2025-03-20 RX ADMIN — PANTOPRAZOLE SODIUM 40 MG: 40 INJECTION, POWDER, FOR SOLUTION INTRAVENOUS at 06:12

## 2025-03-20 RX ADMIN — Medication 10 ML: at 09:00

## 2025-03-20 RX ADMIN — BARIUM SULFATE 50 ML: 400 SUSPENSION ORAL at 10:29

## 2025-03-20 RX ADMIN — BARIUM SULFATE 100 ML: 0.81 POWDER, FOR SUSPENSION ORAL at 10:29

## 2025-03-20 RX ADMIN — BARIUM SULFATE 20 ML: 400 PASTE ORAL at 10:29

## 2025-03-20 RX ADMIN — BUSPIRONE HYDROCHLORIDE 5 MG: 10 TABLET ORAL at 12:16

## 2025-03-20 RX ADMIN — Medication 10 ML: at 22:11

## 2025-03-20 RX ADMIN — TRAMADOL HYDROCHLORIDE 25 MG: 50 TABLET, COATED ORAL at 22:14

## 2025-03-20 NOTE — PLAN OF CARE
Problem: Adult Inpatient Plan of Care  Goal: Plan of Care Review  Outcome: Progressing  Flowsheets (Taken 3/20/2025 1851)  Progress: no change  Outcome Evaluation: No complaints of pain/soa from patient. Repeat MBS done this shift, patient now on mech. ground diet. Tube feeds held and NPO at midnight for PEG placement tomorrow.  Plan of Care Reviewed With:   patient   family   Goal Outcome Evaluation:  Plan of Care Reviewed With: patient, family        Progress: no change  Outcome Evaluation: No complaints of pain/soa from patient. Repeat MBS done this shift, patient now on mech. ground diet. Tube feeds held and NPO at midnight for PEG placement tomorrow.

## 2025-03-20 NOTE — MBS/VFSS/FEES
Acute Care - Speech Language Pathology   Swallow Re-Evaluation [facility]  Modified Barium Swallow Study (MBS)     Patient Name: Carlos Preston  : 3/20/1928  MRN: [id number]  Today's Date: 3/20/2025               Admit Date: 3/11/2025    Visit Dx:     ICD-10-CM ICD-9-CM   1. Pneumonia of both lungs due to infectious organism, unspecified part of lung  J18.9 483.8   2. Traumatic rhabdomyolysis, initial encounter  T79.6XXA 958.6   3. Elevated troponin  R79.89 790.6   4. RSV (acute bronchiolitis due to respiratory syncytial virus)  J21.0 466.11   5. Impaired mobility and ADLs  Z74.09 V49.89    Z78.9    6. Oropharyngeal dysphagia  R13.12 787.22     Patient Active Problem List   Diagnosis    Pulmonary HTN    Hyperlipemia    HTN (hypertension)    GERD (gastroesophageal reflux disease)    Hx of CABG    IHD (ischemic heart disease)    SOB (shortness of breath)    Abnormal chest x-ray    Esophageal stricture    Rhabdomyolysis    Moderate protein-calorie malnutrition     Past Medical History:   Diagnosis Date    Cancer     H/O prostatectomy     Hearing loss     Heart disease     History of back surgery     Hypercholesterolemia     Hyperlipidemia     Hypertension     IHD (ischemic heart disease)     S/P CABG    Osteoarthritis of spine with myelopathy, lumbar region     Paroxysmal atrial fibrillation 2018    Pulmonary hypertension     S/P knee replacement     Sinus disorder     thyroid ultrasound 2009    Normal     Past Surgical History:   Procedure Laterality Date    CARDIAC CATHETERIZATION  2007    Dr. Mace: 60% LM and 70% LCX.    CARDIAC CATHETERIZATION  2009    %, OM 85-90%,Patent grafts.    CARDIAC CATHETERIZATION  2018    Patent Grafts    CARDIOVASCULAR STRESS TEST  2009    ROBIN George: Dr. Mace- EF 29%, Diffuse ischemia.    CARDIOVASCULAR STRESS TEST  2012    L. Myoview- Inferoseptal infarct with jon infarct ischemia. EF 41%.    CARDIOVASCULAR STRESS TEST   03/23/2015    L. Myoview- EF51%,fixed defect , no ischemia burden.    CARDIOVASCULAR STRESS TEST  10/11/2017    L.Myoview- EF 54%. Small inferior Ischemia.    CONVERTED (HISTORICAL) HOLTER  06/12/2023    3 days. A.Fib. Avg 60. . 3 VT. One 3 Sec Pause    CONVERTED (HISTORICAL) HOLTER  05/28/2024    3 Days. A.Fib. Avg 70. . 4.6% PVC. 2 VT    CORONARY ARTERY BYPASS GRAFT  05/17/2007    CABG:  SVG to LAD and LCX.    ECHO - CONVERTED  08/06/2009    Dr. Mace- EF 50%.    ECHO - CONVERTED  02/14/2011    EF 50%, Septal WMA.    ECHO - CONVERTED  11/07/2012    EF 40-45%, RVSP 33 mmHg.    ECHO - CONVERTED  03/23/2015    EF 45-50%, RVSP 40mmHg,mild MR, mild PHT    ECHO - CONVERTED  05/01/2017    EF 50-55%, mild MR    ECHO - CONVERTED  10/11/2017    EF 55%    ECHO - CONVERTED  03/21/2022    TLS. EF 55%. LA- 5.2 cm. Mild MR. RVSP- 44 mmHg    ECHO - CONVERTED  02/15/2024    TLS. EF 55%.RHE. LA- 5.0. Mild MR.AO- 3.8. RVSP- 56 mmHg    US CAROTID UNILATERAL  04/16/2007    Mild Plaque in Rt. bulb and RECA.       SLP Recommendation and Plan  SLP Swallowing Diagnosis: mild-moderate, oral dysphagia, severe, pharyngeal dysphagia (03/20/25 1015)  SLP Diet Recommendation: mechanical ground textures, nectar thick liquids, other (see comments) (extra gravy on trays. Consider thins for comfort or thin H2O between meals via small cup sips as safest of intake of thin liquids.) (03/20/25 1015)  Recommended Precautions and Strategies: upright posture during/after eating, no straw, general aspiration precautions (03/20/25 1015)  SLP Rec. for Method of Medication Administration: meds via alternate route, meds crushed, with puree, as tolerated (03/20/25 1015)     Monitor for Signs of Aspiration: yes, notify SLP if any concerns (03/20/25 1015)  Recommended Diagnostics: reassess via clinical swallow evaluation (will f/u tomorrow for ? dysphagia POC) (03/20/25 1015)     Anticipated Discharge Disposition (SLP): skilled nursing facility  (03/20/25 1015)  Rehab Potential/Prognosis, Swallowing: re-evaluate goals as necessary (03/20/25 1015)        Oral Care Recommendations: Oral Care BID/PRN, Toothbrush (03/20/25 1015)                                               SWALLOW EVALUATION (Last 72 Hours)       SLP Adult Swallow Evaluation       Row Name 03/20/25 1015 03/18/25 1130 03/17/25 1350             Rehab Evaluation    Document Type re-evaluation  -SM therapy note (daily note)  -RS other (see comments)  -RS      Subjective Information no complaints  -SM complains of  hunger and thirst  -RS no complaints  -RS      Patient Observations alert;cooperative  -SM cooperative  -RS cooperative  -RS      Patient/Family/Caregiver Comments/Observations -- daughter present  -RS Daughter, son in law, great-granddaughter present in person, granddaughter present via phone  -RS      Patient Effort good  -SM adequate  -RS --      Symptoms Noted During/After Treatment -- none  -RS --         General Information    Pertinent History Of Current Problem Family now considering PEG. MBS requested to assist in GOC/POC decisions.  -SM -- --      Current Method of Nutrition pureed;nectar/syrup-thick liquids;small-bore;nasogastric feedings  - -- --      Plans/Goals Discussed with patient  - -- --      Barriers to Rehab medically complex  - -- --      Patient's Goals for Discharge patient did not state  - -- --         Pain    Pretreatment Pain Rating 0/10 - no pain  -SM -- 0/10 - no pain  -RS      Posttreatment Pain Rating 0/10 - no pain  -SM -- 0/10 - no pain  -RS      Additional Documentation -- Pain Scale: FACES Pre/Post-Treatment (Group)  -RS --         Pain Scale: FACES Pre/Post-Treatment    Pain: FACES Scale, Pretreatment -- 0-->no hurt  -RS --      Posttreatment Pain Rating -- 0-->no hurt  -RS --         MBS/VFSS    Utensils Used spoon;cup;straw  - -- --      Consistencies Trialed thin liquids;nectar/syrup-thick liquids;pureed;mixed consistency;regular textures   -SM -- --         MBS/VFSS Interpretation    Oral Prep Phase WFL  -SM -- --      Oral Transit Phase impaired  -SM -- --      Oral Residue WFL  -SM -- --         Oral Transit Phase    Impaired Oral Transit Phase increased A-P transit time  -SM -- --         Initiation of Pharyngeal Swallow    Pharyngeal Phase impaired pharyngeal phase of swallowing  -SM -- --      Penetration During the Swallow thin liquids;nectar-thick liquids;secondary to delayed swallow initiation or mistiming;secondary to reduced laryngeal elevation;secondary to reduced vestibular closure;other (see comments)  only deep with thin > approximately 5ml or by straw. Shallow penetration with nectar-thick  -SM -- --      Aspiration After the Swallow thin liquids;secondary to residue;in pyriform sinuses;in laryngeal vestibule  -SM -- --      Response to Penetration No  -SM -- --      No spontaneous response to penetration and effective laryngeal clearance with cue (see comments)  mostly cleared with cue to cough/clear throat.  -SM -- --      Response to Aspiration Yes  -SM -- --      Responded to aspiration with inconsistent;delayed;cough;effective  -SM -- --      Rosenbek's Scale thin:;6--->level 6;nectar:;3--->level 3  -SM -- --      Pharyngeal Residue all consistencies tested;diffuse within pharynx;secondary to reduced base of tongue retraction;secondary to reduced posterior pharyngeal wall stripping;secondary to reduced laryngeal elevation;secondary to reduced hyolaryngeal excursion;other (see comments)  greatest in pyriform sinuses r/t poor hyolaryngeal excursion  -SM -- --      Response to Residue partial residue clearance;cleared residue with spontaneous subsequent swallow  -SM -- --      Attempted Compensatory Maneuvers bolus size;bolus presentation style;head turn to left;head turn to right;additional subsequent swallow;throat clear after swallow  -SM -- --      Response to Attempted Compensatory Maneuvers did not prevent penetration;did not  prevent aspiration;did not reduce residue  -SM -- --      Pharyngeal Phase, Comment No significant improvement. Fatigue factor also present. Some degree risk aspiration with all r/t significant pharyngeal residue though no direct aspiration observed with nectar-thick liquids, puree, mixed consistency, or solids. Choking risk with non-cohesive textures. Greater aspiration by straw than small tsp-size cup sip thin liquids. Is able to clear with either spontaneous or cued cough. Attempted to discuss GOC/POC with pt. He likes the idea of thin liquids and more substance of solids though was not able to convey understanding to the correlating abstract decision making needed for such preference. For now, advancing to mechanical ground with extra gravy on tray to make if more cohesive. Discussed results with MD, who is planning to f/u with pt/family.  -SM -- --         Swallowing Quality of Life Assessment    Education and counseling provided -- -- Signs of aspiration;Silent aspiration;Risks of aspiration;Safest diet options;Comfort diet options;Pleasure feedings;Alternate nutrition/hydration options, risks, and benefits;Oral care recommendations and rationale;Aspiration precautions  -RS         SLP Evaluation Clinical Impression    SLP Swallowing Diagnosis mild-moderate;oral dysphagia;severe;pharyngeal dysphagia  -SM -- --      Functional Impact risk of aspiration/pneumonia;risk of malnutrition;risk of dehydration  -SM -- --      Rehab Potential/Prognosis, Swallowing re-evaluate goals as necessary  -SM -- --         SLP Treatment Clinical Impressions    Treatment Assessment (SLP) -- continued;pharyngeal dysphagia  -RS --      Treatment Assessment Comments (SLP) -- Continued education. Pt states again that he wants the Keofeed removed and to be allowed to eat. Dtr at bedside is currently not agreeable to pt's request. She states that current plan is to initiate a modified comfort diet with nocturnal TF and then d/c home.She  "does not want Keofeed removed. She would like puree foods initiated, pt states that he wants a turkey sandwich to which the daughter also says no. Discussed w RN, hospitalist PA, and RD following session. SLP will provide pharyngeal exs for independent practice and check back later in the week for f/u / family education  -RS Lengthy education provided to pt and family members. Education included complete review of MBSs including images on monitor, dysphagia, aspiration, aspiration pna, NPO, comfort diet / pleasure feeding, PEG v Keofeed v TPN (pt's dtr requesting pt be placed on TPN, Hospitalist PA aware). Pt states several times throughout session that he wants to eat and drink, and he does accept the risk of aspiration. Daughter requests again to discuss PEG placement with PO diet. We review burdens of NPO status + PEG placement, the fact that a PEG does not eliminate aspiration, and potential impact on QOL. Numerous questions were asked by all family members which I answered to the best of my ability. Daughter and great granddaughter verbalized understanding and appreciation. SLP will f/u for continued GOC conversations/education/tx as appropriate. D/w Hospitalist PA and MD via secure chat.  -RS      Barriers to Overall Progress (SLP) -- Advanced age  -RS --      Care Plan Review -- risks/benefits reviewed;current/potential barriers reviewed  -RS --      Care Plan Review, Other Participant(s) -- daughter  -RS --         Recommendations    Therapy Frequency (Swallow) -- PRN;3 days per week  -RS 5 days per week  -RS      Predicted Duration Therapy Intervention (Days) -- 1 week  -RS 1 week  -RS      SLP Diet Recommendation mechanical ground textures;nectar thick liquids;other (see comments)  extra gravy on trays. Consider thins for comfort or thin H2O between meals via small cup sips as safest of intake of thin liquids.  -SM comfort diet, per physician discretion  \"safest\" comfort diet would be puree/xtra gravy/ntl  " -RS NPO;temporary alternate methods of nutrition/hydration;long term alternate methods of nutrition/hydration;comfort diet, per physician discretion  -      Recommended Diagnostics reassess via clinical swallow evaluation  will f/u tomorrow for ? dysphagia POC  - -- --      Recommended Precautions and Strategies upright posture during/after eating;no straw;general aspiration precautions  - upright posture during/after eating;small bites of food and sips of liquid;general aspiration precautions;assist with feeding  - general aspiration precautions  -RS      Oral Care Recommendations Oral Care BID/PRN;Toothbrush  - Oral Care BID/PRN;Suction toothbrush  -RS Oral Care BID/PRN;Suction toothbrush  -RS      SLP Rec. for Method of Medication Administration meds via alternate route;meds crushed;with puree;as tolerated  - meds via alternate route  -RS meds via alternate route  -RS      Monitor for Signs of Aspiration yes;notify SLP if any concerns  - notify SLP if any concerns;yes  -RS notify SLP if any concerns;yes  -RS      Anticipated Discharge Disposition (SLP) skilled nursing facility  - home with OP services  -RS inpatient rehabilitation facility  -RS      Demonstrates Need for Referral to Another Service -- palliative care services  -RS --                User Key  (r) = Recorded By, (t) = Taken By, (c) = Cosigned By      Initials Name Effective Dates    Araceli Stanley MS CCC-SLP 01/20/25 -     Erick Berry MS CCC-SLP 09/14/23 -                     EDUCATION  The patient has been educated in the following areas:   Dysphagia (Swallowing Impairment) Oral Care/Hydration Modified Diet Instruction.                  Time Calculation:    Time Calculation- SLP       Row Name 03/20/25 5805             Time Calculation- SLP    SLP Start Time 1015  -      SLP Received On 03/20/25  -         Untimed Charges    43325-AE Eval Oral Pharyng Swallow Minutes 72  -         Total Minutes    Untimed  Charges Total Minutes 72  -SM       Total Minutes 72  -SM                User Key  (r) = Recorded By, (t) = Taken By, (c) = Cosigned By      Initials Name Provider Type    Araceli Stanley MS CCC-SLP Speech and Language Pathologist                    Therapy Charges for Today       Code Description Service Date Service Provider Modifiers Qty    [id number] HC ST MOTION FLUORO EVAL SWALLOW 5 3/20/2025 Araceli Martins MS CCC-SLP GN 1                 Araceli Martins MS CCC-SLP  3/20/2025

## 2025-03-20 NOTE — CONSULTS
General Surgery Consultation Note    Date of Service: 3/20/2025  Carlos Preston  [id number]  3/20/1928      Referring Provider: Maksim Delgado MD    Location of Consult: Inpatient     Reason for Consultation: Request for PEG placement       History of Present Illness:  I am seeing, Carlos Preston, in consultation at request of Maksim Delgado MD regarding request for PEG placement.  He is a 89-year-old gentleman with history of coronary artery disease, ischemic heart disease, atrial fibrillation on Eliquis, and hyperlipidemia who was admitted to our facility with respiratory failure and being found down after a fall.  Has dealt with significant weakness over the last 10 days or so.  Was found after a fall and was brought to our facility with findings of rhabdomyolysis.  Also diagnosed with RSV infection.  He has had a slow prolonged recovery.  Now on room air.  There was concern for dysphagia and he has been followed by speech therapy.  He is on a nectar thick/pureed diet but has had low oral intake.  He is receiving supplemental tube feeds.  His daughter is present at the bedside and contributes to history.  She reports that he has difficulty with coughing after he eats and only is able to tolerate a few bites at a time.  This has been ongoing for several weeks.  He has a past abdominal surgical history of prostate surgery and cholecystectomy.    Problems Addressed this Visit          Musculoskeletal and Injuries    * (Principal) Rhabdomyolysis     Other Visit Diagnoses         Pneumonia of both lungs due to infectious organism, unspecified part of lung    -  Primary    Relevant Medications    cefTRIAXone (ROCEPHIN) 2,000 mg in sodium chloride 0.9 % 100 mL MBP (Completed)    azithromycin (ZITHROMAX) 500 mg in sodium chloride 0.9 % 250 mL IVPB-VTB (Completed)    ipratropium-albuterol (DUO-NEB) nebulizer solution 3 mL    cetirizine (zyrTEC) tablet 10 mg    piperacillin-tazobactam (ZOSYN) 4.5 g IVPB  in 100 mL NS MBP (CD) (Completed)    doxycycline (VIBRAMYCIN) 100 mg in sodium chloride 0.9 % 100 mL MBP (Completed)    piperacillin-tazobactam (ZOSYN) 4.5 g IVPB in 100 mL NS MBP (CD) (Completed)    Other Relevant Orders     Respiratory Suction Pump    Hospital Bed      Elevated troponin          RSV (acute bronchiolitis due to respiratory syncytial virus)        Relevant Medications    ipratropium-albuterol (DUO-NEB) nebulizer solution 3 mL    cetirizine (zyrTEC) tablet 10 mg      Impaired mobility and ADLs          Oropharyngeal dysphagia        Relevant Orders     Respiratory Suction Pump    Hospital Bed          Diagnoses         Codes Comments      Pneumonia of both lungs due to infectious organism, unspecified part of lung    -  Primary ICD-10-CM: J18.9  ICD-9-CM: 483.8       Traumatic rhabdomyolysis, initial encounter     ICD-10-CM: T79.6XXA  ICD-9-CM: 958.6       Elevated troponin     ICD-10-CM: R79.89  ICD-9-CM: 790.6       RSV (acute bronchiolitis due to respiratory syncytial virus)     ICD-10-CM: J21.0  ICD-9-CM: 466.11       Impaired mobility and ADLs     ICD-10-CM: Z74.09, Z78.9  ICD-9-CM: V49.89       Oropharyngeal dysphagia     ICD-10-CM: R13.12  ICD-9-CM: 787.22             PMHx:  Past Medical History:   Diagnosis Date    Cancer     H/O prostatectomy     Hearing loss     Heart disease     History of back surgery     Hypercholesterolemia     Hyperlipidemia     Hypertension     IHD (ischemic heart disease)     S/P CABG    Osteoarthritis of spine with myelopathy, lumbar region     Paroxysmal atrial fibrillation 07/03/2018    Pulmonary hypertension     S/P knee replacement     Sinus disorder     thyroid ultrasound 08/28/2009    Normal       Past Surgical History:  Past Surgical History:    CARDIAC CATHETERIZATION    Dr. Mace: 60% LM and 70% LCX.    CARDIAC CATHETERIZATION    %, OM 85-90%,Patent grafts.    CARDIAC CATHETERIZATION    Patent Grafts    CARDIOVASCULAR STRESS TEST    A.  Doris: Dr. Mace- EF 29%, Diffuse ischemia.    CARDIOVASCULAR STRESS TEST    BRYAN Myoview- Inferoseptal infarct with jon infarct ischemia. EF 41%.    CARDIOVASCULAR STRESS TEST    [facility] Myoview- EF51%,fixed defect , no ischemia burden.    CARDIOVASCULAR STRESS TEST    L.Myoview- EF 54%. Small inferior Ischemia.    CONVERTED (HISTORICAL) HOLTER    3 days. A.Fib. Avg 60. . 3 VT. One 3 Sec Pause    CONVERTED (HISTORICAL) HOLTER    3 Days. A.Fib. Avg 70. . 4.6% PVC. 2 VT    CORONARY ARTERY BYPASS GRAFT    CABG:  SVG to LAD and LCX.    ECHO - CONVERTED    Dr. Mace- EF 50%.    ECHO - CONVERTED    EF 50%, Septal WMA.    ECHO - CONVERTED    EF 40-45%, RVSP 33 mmHg.    ECHO - CONVERTED    EF 45-50%, RVSP 40mmHg,mild MR, mild PHT    ECHO - CONVERTED    EF 50-55%, mild MR    ECHO - CONVERTED    EF 55%    ECHO - CONVERTED    TLS. EF 55%. LA- 5.2 cm. Mild MR. RVSP- 44 mmHg    ECHO - CONVERTED    TLS. EF 55%.RHE. LA- 5.0. Mild MR.AO- 3.8. RVSP- 56 mmHg    US CAROTID UNILATERAL    Mild Plaque in Rt. bulb and RECA.       Allergies:  No Known Allergies    Medications:  No current facility-administered medications on file prior to encounter.     Current Outpatient Medications on File Prior to Encounter   Medication Sig Dispense Refill    albuterol sulfate  (90 Base) MCG/ACT inhaler Inhale 2 puffs Every 4 (Four) Hours As Needed for Wheezing.      busPIRone (BUSPAR) 5 MG tablet Take 1 tablet by mouth 2 (Two) Times a Day. 180 tablet 3    Cholecalciferol (VITAMIN D3) 2000 UNITS tablet Take  by mouth daily.      Edarbi 80 MG tablet tablet TAKE 1/2 TO 1 TABLET BY MOUTH ONCE DAILY 180 tablet 0    ferrous sulfate 324 MG tablet delayed-release Take 1 tablet by mouth Daily With Breakfast.      HYDROcodone-acetaminophen (NORCO) 5-325 MG per tablet Take 2 tablets by mouth Every 8 (Eight) Hours As Needed for Severe Pain.      ibuprofen (ADVIL,MOTRIN) 800 MG tablet Take 1 tablet by mouth Every 8 (Eight) Hours As Needed for Mild  Pain.      ipratropium (ATROVENT HFA) 17 MCG/ACT inhaler Inhale 2 puffs 4 (Four) Times a Day. 12.9 g 11    ipratropium-albuterol (DUO-NEB) 0.5-2.5 mg/3 ml nebulizer Take 3 mL by nebulization 4 (Four) Times a Day. 120 mL 5    isosorbide mononitrate (IMDUR) 30 MG 24 hr tablet Take 1 tablet by mouth Every Morning. 90 tablet 3    loratadine (CLARITIN) 10 MG tablet Take 1 tablet by mouth Daily.      Multiple Vitamins-Minerals (VISION VITAMINS) tablet Take  by mouth 2 (two) times a day.      NIFEdipine XL (PROCARDIA XL) 30 MG 24 hr tablet TAKE 1 TABLET BY MOUTH EVERY DAY 90 tablet 2    nitroglycerin (NITROSTAT) 0.4 MG SL tablet 1 under the tongue as needed for angina, may repeat q5mins for up three doses 30 tablet 1    omeprazole (priLOSEC) 20 MG capsule Take 1 capsule by mouth Daily. 90 capsule 3    rivaroxaban (Xarelto) 20 MG tablet Take 1 tablet by mouth Daily. 90 tablet 3    vitamin C (ASCORBIC ACID) 500 MG tablet Take 2 tablets by mouth Daily.           Current Facility-Administered Medications:     sennosides-docusate (PERICOLACE) 8.6-50 MG per tablet 2 tablet, 2 tablet, Nasogastric, Nightly **AND** [DISCONTINUED] polyethylene glycol (MIRALAX) packet 17 g, 17 g, Nasogastric, Daily PRN **AND** [DISCONTINUED] bisacodyl (DULCOLAX) EC tablet 5 mg, 5 mg, Oral, Daily PRN **AND** bisacodyl (DULCOLAX) suppository 10 mg, 10 mg, Rectal, Daily PRN, Zeinab Shell, PADieudonneC    busPIRone (BUSPAR) tablet 5 mg, 5 mg, Nasogastric, Daily With Lunch, Maksim Delgado MD, 5 mg at 03/19/25 1224    Calcium Replacement - Follow Nurse / BPA Driven Protocol, , Not Applicable, PRN, Maksim Delgado MD    [Held by provider] cetirizine (zyrTEC) tablet 10 mg, 10 mg, Oral, Daily, Nataly Renteria MD    dextrose (D50W) (25 g/50 mL) IV injection 25 g, 25 g, Intravenous, Q15 Min PRN, Maksim Delgado MD    dextrose (GLUTOSE) oral gel 15 g, 15 g, Oral, Q15 Min PRN, Maksim Delgado MD    glucagon (GLUCAGEN) injection 1  mg, 1 mg, Intramuscular, Q15 Min PRN, Maksim Delgado MD    hydrOXYzine (ATARAX) tablet 25 mg, 25 mg, Nasogastric, Nightly, Maksim Delgado MD, 25 mg at 03/19/25 2057    ipratropium-albuterol (DUO-NEB) nebulizer solution 3 mL, 3 mL, Nebulization, Q4H PRN, Nataly Renteria MD, 3 mL at 03/12/25 1107    [Held by provider] isosorbide mononitrate (IMDUR) 24 hr tablet 30 mg, 30 mg, Oral, QAM, Nataly Renteria MD    Magnesium Standard Dose Replacement - Follow Nurse / BPA Driven Protocol, , Not Applicable, PRN, Maksim Delgado MD    melatonin tablet 10 mg, 10 mg, Nasogastric, Nightly, Maksim Delgado MD, 10 mg at 03/19/25 2057    [Held by provider] NIFEdipine XL (PROCARDIA XL) 24 hr tablet 30 mg, 30 mg, Oral, Daily, Nataly Renteria MD    nitroglycerin (NITROSTAT) SL tablet 0.4 mg, 0.4 mg, Sublingual, Q5 Min PRN, Nataly Renteria MD    ondansetron (ZOFRAN) injection 4 mg, 4 mg, Intravenous, Q4H PRN, Maksim Delgado MD    pantoprazole (PROTONIX) injection 40 mg, 40 mg, Intravenous, Q AM, Leanne Cody PA, 40 mg at 03/20/25 0612    Phosphorus Replacement - Follow Nurse / BPA Driven Protocol, , Not Applicable, PRN, Maksim Delgado MD    polyethylene glycol (MIRALAX) packet 17 g, 17 g, Nasogastric, Daily, Leanne Cody PA, 17 g at 03/18/25 0918    Potassium Replacement - Follow Nurse / BPA Driven Protocol, , Not Applicable, PRNSandy Mohamed Ahmed, MD    ProSource TF oral liquid 45 mL, 45 mL, Nasogastric, Daily, Lorna Cuevas, MS,RD,LD, 45 mL at 03/20/25 0903    QUEtiapine (SEROquel) tablet 50 mg, 50 mg, Nasogastric, Nightly, Maksim Delgado MD, 50 mg at 03/19/25 2057    [Held by provider] rivaroxaban (XARELTO) tablet 20 mg, 20 mg, Nasogastric, Daily With Dinner, Maksim Delgado MD    sodium chloride 0.9 % flush 10 mL, 10 mL, Intravenous, Q12H, Nataly Renteria MD, 10 mL at 03/20/25 0900    sodium chloride 0.9 % flush 10 mL, 10 mL, Intravenous, PRN, Dexter  "MD Nataly    sodium chloride 0.9 % flush 10 mL, 10 mL, Intravenous, PRN, Nataly Renteria MD    sodium chloride 0.9 % infusion 40 mL, 40 mL, Intravenous, PRN, Nataly Renteria MD    sterile water (preservative free) injection, , , ,     traMADol (ULTRAM) tablet 25 mg, 25 mg, Nasogastric, Q8H PRN, Maksim Delgado MD    trolamine salicylate (ASPERCREME) 10 % cream 1 Application, 1 Application, Topical, PRN, Priti Reardon PA-C      Family History:  Family History   Problem Relation Age of Onset    No Known Problems Mother     No Known Problems Father        Social History: Pt lives in [place].    Tobacco use: Denies     EtOH use : Denies    Illicit drug use: Denies       Review of Systems:   Constitutional: No fevers, chills.  Positive for malaise and weakness   Eyes: Denies visual changes    Cardiovascular: Denies chest pain, palpitations   Pulmonary: Denies cough or shortness of breath   Abdominal/ GI: See HPI    Genitourinary: Denies dysuria or hematuria   Musculoskeletal: Denies any but chronic joint aches, pains or deformities   Psychiatric: No recent mood changes   Neurologic: No paresthesias or loss of function    /74 (BP Location: Right arm, Patient Position: Lying)   Pulse 69   Temp 98 °F (36.7 °C) (Axillary)   Resp 18   Ht 182.9 cm (72\")   Wt 83.9 kg (185 lb)   SpO2 97%   BMI 25.09 kg/m²   Body mass index is 25.09 kg/m².    Gen: Elderly and weak appearing, sitting up in the bed, no obvious distress  Head: Normocephalic, atraumatic.   Eyes: Pupils equal, round, react to light and accommodation.   Mouth: Oral mucosa without lesions, nasoenteral feeding tube in place  Neck: No masses, lymphadenopathy or carotid bruits bilaterally   CV: Rhythm and rate regular, no murmurs, rubs or gallops  Lungs: Clear to auscultation bilaterally, not labored on room air   Abdomen: Soft, not obviously distended, no significant focal tenderness to palpation, lower midline scar  Groin : No obvious " hernias bilaterally   Extremities:  No cyanosis, clubbing or edema bilaterally  Lymphatics: No abnormal lymphadenopathy appreciated   Neurologic: No gross deficits         CBC  Results from last 7 days   Lab Units 03/20/25  0727   WBC 10*3/mm3 17.52*   HEMOGLOBIN g/dL 9.8*   HEMATOCRIT % 32.2*   PLATELETS 10*3/mm3 85*       CMP  Results from last 7 days   Lab Units 03/20/25  0727 03/18/25  0831 03/15/25  0929   SODIUM mmol/L 141   < > 146*   POTASSIUM mmol/L 4.3   < > 3.9   CHLORIDE mmol/L 107   < > 115*   CO2 mmol/L 24.0   < > 21.0*   BUN mg/dL 32*   < > 62*   CREATININE mg/dL 0.67*   < > 1.10   CALCIUM mg/dL 9.5   < > 9.2   BILIRUBIN mg/dL  --   --  0.7   ALK PHOS U/L  --   --  113   ALT (SGPT) U/L  --   --  35   AST (SGOT) U/L  --   --  28   GLUCOSE mg/dL 115*   < > 226*    < > = values in this interval not displayed.       Radiology  Imaging Results (Last 72 Hours)       Procedure Component Value Units Date/Time    FL Video Swallow With Speech Single Contrast [[id number]] Collected: 03/20/25 1032     Updated: 03/20/25 1116    Narrative:      FL VIDEO SWALLOW W SPEECH SINGLE-CONTRAST    Date of Exam: 3/20/2025 10:04 AM EDT    Indication: aspiration.       Comparison: None available.    Technique:   The speech pathologist administered food and/or liquid mixed with barium to the patient with cine/video imaging.  Imaging assistance was provided to the speech pathologist and an image was saved.    Fluoroscopic Time: 1 minute and 54 seconds    Number of Images: 13 associated fluoroscopic loops were saved    Findings:  Aspiration was seen during fluoroscopic guided modified barium swallowing series. Please see speech therapy report for full details and recommendations.      Impression:      Impression:  Fluoroscopy provided for a modified barium swallow. Aspiration was seen during swallowing evaluation. Please see speech therapy report for full details and recommendations.      Report dictated by: Shira Renner PA-c       I have personally reviewed this case and agree with the findings above:    Electronically Signed: Preston Neil MD    3/20/2025 11:13 AM EDT    Workstation ID: [id number]    FL Video Swallow With Speech Single Contrast [[id number]] Collected: 03/17/25 1127     Updated: 03/17/25 1527    Narrative:      FL VIDEO SWALLOW W SPEECH SINGLE-CONTRAST    Date of Exam: 3/17/2025 10:34 AM EDT    Indication: aspiration.       Comparison: None available.    Technique:   The speech pathologist administered food and/or liquid mixed with barium to the patient with cine/video imaging.  Imaging assistance was provided to the speech pathologist and an image was saved.    Fluoroscopic Time: 54 seconds    Number of Images: 7 associated fluoroscopic loops were saved    Findings:  Aspiration was seen during fluoroscopic guided modified barium swallowing series. Please see speech therapy report for full details and recommendations.      Impression:      Impression:  Fluoroscopy provided for a modified barium swallow. Aspiration of barium was seen during swallowing evaluation. Please see speech therapy report for full details and recommendations.      Report dictated by: Shira Renner PA-c      I have personally reviewed this case and agree with the findings above:    Electronically Signed: Mendez Navarro MD    3/17/2025 3:24 PM EDT    Workstation ID: [id number]                Results Review: I have personally reviewed all of the recent lab and imaging results available at this time.     On room air with stable vital signs    Labs show leukocytosis of 17,000.  Hemoglobin is 9.8.  Platelets are 85.    I have personally reviewed a CT scan of the abdomen pelvis from earlier in this admission.  This demonstrates some diffuse bowel distention without any obvious transition point or other acute abdominal pathology    Assessment:  Mr. Preston is a 89-year-old gentleman with history of coronary artery disease, ischemic heart disease, atrial  fibrillation on Eliquis, and hyperlipidemia who I been asked to evaluate for PEG placement due to dysphagia.  I had a long discussion with his daughter who is the power of  at the bedside.  He is currently on a thickened diet, but oral intake has been limited.  I had a long discussion with them regarding treatment options.  I explained to her that given his advanced age and frailty, that I do not expect that PEG placement for supplemental nutrition will provide meaningful prolongation of his life or increased quality of life.  I also explained the risk, benefits, and alternatives to the procedure.  She very much wishes to take her father home with supplemental nutrition and requests gastrostomy placement.  I do not think that this is unreasonable to offer them.  Will gently plan to proceed forward with PEG placement tomorrow in Endo as schedule allows    Plan:  -Keep n.p.o. and hold tube feeds after midnight  -Repeat CBC in a.m. for platelet count  -Tentative plan for PEG placement in Endo tomorrow as schedule allows      I discussed the patient's findings and my recommendations with the patient and/or family, as well as the primary team     Stanley Pa MD  03/20/25  11:18 EDT      Part of this note may be an electronic transcription/translation of spoken language to printed text using the Dragon Dictation System.

## 2025-03-20 NOTE — PLAN OF CARE
Goal Outcome Evaluation:                         Granddaughter at bedside. She complained about patient being turned during shift, yet didn't object to 0000 or 0200 turns. Granddaughter insisted doctor placed order for no one to enter room from 2200 to 0700. There is no such order.

## 2025-03-20 NOTE — PROGRESS NOTES
Clinical Nutrition Assessment     Patient Name: Carlos Preston  YOB: 1928  MRN: [id number]  Date of Encounter: 03/20/25 15:07 EDT  Admission date: 3/11/2025  Reason for Visit: Follow-up protocol, EN    Assessment   Nutrition Assessment   Admission Diagnosis:  Rhabdomyolysis [M62.82]    Problem List:    Rhabdomyolysis    Moderate protein-calorie malnutrition      PMH:   He  has a past medical history of Cancer, H/O prostatectomy, Hearing loss, Heart disease, History of back surgery, Hypercholesterolemia, Hyperlipidemia, Hypertension, IHD (ischemic heart disease), Osteoarthritis of spine with myelopathy, lumbar region, Paroxysmal atrial fibrillation (07/03/2018), Pulmonary hypertension, S/P knee replacement, Sinus disorder, and thyroid ultrasound (08/28/2009).    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    Applicable Nutrition History:   3/12 SLP rec NPO w repeat 3/13  3/13-SLP Diet Recommendation: NPO, temporary alternate methods of nutrition/hydration, other (see comments) (vs consideration of comfort diet if aligns w/ GOC/POC)     Labs    Labs Reviewed: Yes     Results from last 7 days   Lab Units 03/20/25  0727 03/19/25  0928 03/18/25  2103 03/18/25  0831 03/15/25  0929 03/15/25  0431  "03/15/25  0004   GLUCOSE mg/dL 115* 97  --  144* 226* 213*  213* 181*   BUN mg/dL 32* 36*  --  44* 62* 56*  56* 61*   CREATININE mg/dL 0.67* 0.72*  --  0.73* 1.10 1.05  1.05 1.04   SODIUM mmol/L 141 143  --  143 146* 147*  147* 148*   CHLORIDE mmol/L 107 112*  --  113* 115* 115*  115* 118*   POTASSIUM mmol/L 4.3 4.2  --  4.1 3.9 3.9  3.9 3.8   PHOSPHORUS mg/dL 2.5 2.5 2.7 2.1*  --  2.1*  --    MAGNESIUM mg/dL 1.9 1.8  --  1.9  --  2.4*  --    ALT (SGPT) U/L  --   --   --   --  35 34  34 32       Results from last 7 days   Lab Units 03/15/25  0929 03/15/25  0435 03/15/25  0004 03/14/25  0824 03/14/25  0504   ALBUMIN g/dL 2.5* 2.5*  2.5* 2.4*   < > 2.3*   CRP mg/dL  --   --   --   --  5.67*   CHOLESTEROL mg/dL  --  65  --   --   --    TRIGLYCERIDES mg/dL  --  79  --   --   --     < > = values in this interval not displayed.       Results from last 7 days   Lab Units 03/19/25  1737 03/19/25  1147 03/19/25  0550 03/18/25  2356 03/18/25  1750 03/18/25  1125   GLUCOSE mg/dL 84 100 113 128 96 127     No results found for: \"HGBA1C\"              Medications    Medications Reviewed: Yes    Scheduled Meds:busPIRone, 5 mg, Nasogastric, Daily With Lunch  [Held by provider] cetirizine, 10 mg, Oral, Daily  hydrOXYzine, 25 mg, Nasogastric, Nightly  [Held by provider] isosorbide mononitrate, 30 mg, Oral, QAM  melatonin, 10 mg, Nasogastric, Nightly  [Held by provider] NIFEdipine XL, 30 mg, Oral, Daily  pantoprazole, 40 mg, Intravenous, Q AM  polyethylene glycol, 17 g, Nasogastric, Daily  ProSource TF, 45 mL, Nasogastric, Daily  QUEtiapine, 50 mg, Nasogastric, Nightly  [Held by provider] rivaroxaban, 20 mg, Nasogastric, Daily With Dinner  senna-docusate sodium, 2 tablet, Nasogastric, Nightly  sodium chloride, 10 mL, Intravenous, Q12H  sterile water (preservative free), , ,       Continuous Infusions:   PRN Meds:.  senna-docusate sodium **AND** [DISCONTINUED] polyethylene glycol **AND** [DISCONTINUED] bisacodyl **AND** " "bisacodyl    Calcium Replacement - Follow Nurse / BPA Driven Protocol    dextrose    dextrose    glucagon (human recombinant)    ipratropium-albuterol    Magnesium Standard Dose Replacement - Follow Nurse / BPA Driven Protocol    nitroglycerin    ondansetron    Phosphorus Replacement - Follow Nurse / BPA Driven Protocol    Potassium Replacement - Follow Nurse / BPA Driven Protocol    sodium chloride    sodium chloride    sodium chloride    sterile water (preservative free)    traMADol    trolamine salicylate    Intake/Ouptut 24 hrs (0701 - 0700)   I&O's Reviewed: Yes   Intake & Output (last day)         03/19 0701 03/20 0700 03/20 0701 03/21 0700    P.O.      Other  552    NG/GT  835    Total Intake(mL/kg)  1387 (16.5)    Urine (mL/kg/hr)  850 (1.2)    Stool      Total Output  850    Net  +537              Last stool x 1 on 3/11    Anthropometrics     Height: Height: 182.9 cm (72.01\")  Last Filed Weight: Weight: 83.9 kg (184 lb 15.5 oz) (03/20/25 1131)  Method:    BMI: BMI (Calculated): 25.1    UBW:  250lbs per pt's dtr, appears ~215lbs in the past yr  Weight change:  possible 30lb/14% wt loss x 1 yr-[place] [facility] [place]      Weight (kg) Weight (lbs) Visit Report   1/10/2024 97.523 kg  215 lb  --    2/14/2024 95.074 kg  209 lb 9.6 oz  --    2/15/2024 95.1 kg  209 lb 10.5 oz     8/21/2024 88.542 kg  195 lb 3.2 oz  --    3/11/2025 83.915 kg  185 lb         Nutrition Focused Physical Exam    Date: 3/13    Patient meets criteria for malnutrition diagnosis, see MSA note.     Subjective   Reported/Observed/Food/Nutrition Related History:     3/20  Pt OOR for MBS, dtr at bedside. Dtr states likely plan for PEG pending results of MBS, per sx note plan for PEG tomorrow. Dtr would like to have Magic Cup. RN states pt tolerating EN regimen well, having 2-3 Bms daily    3/18  Spoke w/PA, plans for comfort diet and adjustment to nocturnal feeds.     3/17  Per RN pt doing well on current TF regimen, notes watery " "Bms. Pt states he is tolerating TF, denies abdominal pain or bloating.     3/14  RN states keofeed placed, in stomach and MD okay to feed.     3/13  Family rpt pt at ok until 4 da ago then unable to eat .Repeat has had signif wt loss. Family aware of plan for feeding tube. GI note indicated will place tube 2/2 aspiration risk at this time.    3/12  Pt up in chair at bedside, very sleepy. Dtr at bedside provides nutrition hx. Dtr states pt has had poor intake x 2 days, states prior to recent fall pt was eating well. Dtr confirms pt has had esophageal dilation in the past, ~1.5 yrs ago and states worsening dysphagia over the past yr and pt managing this w/cutting food into small pieces and drinking adequate fluids w/meals. Dtr states pt has also had significant wt loss over the past yr, states UBW is 250lbs and was 185lbs at MD office <1 week ago. Dtr unsure etiology as pt tells her he eats well.     Needs Assessment   Date: 3/13    Height used:Height: 182.9 cm (72.01\")  Weights used: 83.9 Kg    Estimated Calorie needs: ~ 2000 Kcal/day  Method:  Kcals/KG x 25 =   Method:  [id number] x 1.3 =     Estimated Protein needs: ~ 109 g PRO/day  Method: g/Kg x 1.3    Estimated Fluid needs: ~ ml/day   Per clinical status    Current Nutrition Prescription     EN: IsoSource 1.5  Goal Rate: 60  Water Flushes: 35  Modular: Prosource TF 1/day  Route: NG  Tube: Small bore    At goal over: 12Hrs/day     Rx will supply:   Goal Volume 720  mL/day     Flush Volume 420 mL/day     Energy 1140 Kcal/day 57 % Est Need   Protein 64 g/day 57 % Est Need   Fiber 11 g/day     Water in   mL     Total Water 420 mL     Meet DRI No        --------------------------------------------------------------------------  Product/Rate verified at bedside: No  Infusing Rate at time of visit: 60    Average Delivery from [facility] Day:   Volume 835 mL/day   % Goal Vol.   Flush Volume 552 mL/day     Energy  Kcal/day  % Est Need   Protein  g/day  % Est " Need   Fiber  g/day     Water in  EN  mL     Total Water  mL     Meet DRI Yes        PO: Diet: Regular/House; No Straw; Texture: Mechanical Ground (NDD 2); Fluid Consistency: Nectar Thick  NPO Diet NPO Type: Strict NPO  Oral Nutrition Supplement:   Intake: N/A    Assessment & Plan   Nutrition Diagnosis   Date:  3/12            Updated:  3/13  Problem Inadequate oral intake    Etiology dysphagia   Signs/Symptoms NPO   Status: New EN rec in place    Date:   3/12  Updated:     Problem Unintended weight loss   Etiology ? Dysphagia, advanced age   Signs/Symptoms Possible 14% wt loss x 1 yr   Status: New    Date:  3/13 Updated:  Problem Malnutrition moderate acute   Etiology Alt Swallow fx   Signs/Symptoms Intake hx w some Wasting   Status: New    Goal / Objectives:   Nutrition to support treatment, Advance diet as medically feasible/appropriate, and Tolerate EN at goal      Nutrition Intervention      Follow treatment progress, Care plan reviewed, Nutrition support order placed    Continue nocturnal feeds (6p-6a):  Isosource 1.5 @ 60ml/hr. Prosource TF daily. Water flush @ 35ml/hr.   =720ml, 1140kcals (57% est needs), 64g pro (57% est needs), 11g fiber, 547ml water from formula, +420ml water from flushes, 967ml TFW.     Magic Cup BID  RD communicated preferences to kitchen    Monitoring/Evaluation:   Per protocol, I&O, Pertinent labs, EN delivery/tolerance, Weight, Symptoms, Swallow function    Lorna Cuevas, MS,RD,LD  Time Spent:30 min

## 2025-03-20 NOTE — PROGRESS NOTES
[facility] Medicine Services  PROGRESS NOTE    Patient Name: Carlos Preston  : 3/20/1928  MRN: [id number]    Date of Admission: 3/11/2025  Primary Care Physician: Newton Hankins MD    Subjective     CC:   Follow-up for dysphagia     HPI:  Patient seen and examined this morning.  He just returned from his modified barium swallow still aspirating and thin liquids.  Appetite is coming back but slowly.  Family now interested in PEG tube.  Surgery consulted    Objective     Vital Signs:   Temp:  [98 °F (36.7 °C)-98.8 °F (37.1 °C)] 98.4 °F (36.9 °C)  Heart Rate:  [61-75] 73  Resp:  [17-20] 20  BP: (117-150)/(71-84) 150/84     Physical Exam:  General: Chronically ill looking, debilitated, conversant and pleasant   Head: Atraumatic and normocephalic  Eyes: No Icterus. No pallor  Ears:  Ears appear intact with no abnormalities noted  Throat: No oral lesions, no thrush  Neck: Supple, trachea midline  Lungs: Improved air entry bilateral lung fields.  No crackles or wheezing.  Poor cough effort  Heart:  Normal S1 and S2, no murmur, no gallop, No JVD, no lower extremity swelling  Abdomen:  Soft, no tenderness, no organomegaly, normal bowel sounds, no organomegaly  Extremities: pulses equal bilaterally  Skin: No bleeding, bruising or rash, normal skin turgor and elasticity  Neurologic: Cranial nerves appear intact with no evidence of facial asymmetry, normal motor and sensory functions in all 4 extremities  Psych: Alert and oriented x 3, normal mood    Results Reviewed:  LAB RESULTS:      Lab 25  0727 25  0929 03/15/25  0435 25  0504 25  1856   WBC 17.52* 18.24* 12.04* 11.04*  --    HEMOGLOBIN 9.8* 10.5* 10.1* 10.4* 10.9*   HEMATOCRIT 32.2* 35.2* 33.3* 35.3* 36.1*   PLATELETS 85* 98* 121* 119*  --    NEUTROS ABS  --   --  10.93* 10.29*  --    IMMATURE GRANS (ABS)  --   --  0.18* 0.06*  --    LYMPHS ABS  --   --  0.51* 0.37*  --    MONOS ABS  --   --  0.40 0.31  --    EOS  ABS  --   --  0.00 0.00  --    MCV 93.1 97.0 94.9 97.2*  --    CRP  --   --   --  5.67*  --    PROCALCITONIN 0.09  --   --   --   --          Lab 03/20/25  0727 03/19/25  0928 03/18/25  2103 03/18/25  0831 03/15/25  0929 03/15/25  0435 03/14/25  0824 03/14/25  0504   SODIUM 141 143  --  143 146* 147*  147*   < > 146*   POTASSIUM 4.3 4.2  --  4.1 3.9 3.9  3.9   < > 3.8   CHLORIDE 107 112*  --  113* 115* 115*  115*   < > 116*   CO2 24.0 23.0  --  23.0 21.0* 19.0*  19.0*   < > 18.0*   ANION GAP 10.0 8.0  --  7.0 10.0 13.0  13.0   < > 12.0   BUN 32* 36*  --  44* 62* 56*  56*   < > 63*   CREATININE 0.67* 0.72*  --  0.73* 1.10 1.05  1.05   < > 1.11   EGFR 84.9 83.6  --  83.3 61.4 65.0  65.0   < > 60.8   GLUCOSE 115* 97  --  144* 226* 213*  213*   < > 132*   CALCIUM 9.5 9.7*  --  9.8* 9.2 9.0  9.0   < > 8.8   MAGNESIUM 1.9 1.8  --  1.9  --  2.4*  --  2.4*   PHOSPHORUS 2.5 2.5 2.7 2.1*  --  2.1*  --  2.8    < > = values in this interval not displayed.         Lab 03/15/25  0929 03/15/25  0435 03/15/25  0004 03/14/25  1902 03/14/25  1339   TOTAL PROTEIN 5.2* 5.2*  5.2* 5.3* 5.6* 5.5*   ALBUMIN 2.5* 2.5*  2.5* 2.4* 2.5* 2.4*   GLOBULIN 2.7 2.7  2.7 2.9 3.1 3.1   ALT (SGPT) 35 34  34 32 36 35   AST (SGOT) 28 31  31 34 35 36   BILIRUBIN 0.7 0.8  0.8 0.8 0.9 0.9   ALK PHOS 113 110  110 104 110 99         Lab 03/15/25  0435   CHOLESTEROL 65   TRIGLYCERIDES 79     Brief Urine Lab Results  (Last result in the past 365 days)        Color   Clarity   Blood   Leuk Est   Nitrite   Protein   CREAT   Urine HCG        03/11/25 1604 [place]   Clear   Moderate (2+)   Negative   Negative   30 mg/dL (1+)                 Microbiology Results Abnormal       Procedure Component Value - Date/Time    COVID-19, FLU A/B, RSV PCR 1 HR TAT - Swab, Nasopharynx [[id number]]  (Abnormal) Collected: 03/11/25 1431    Lab Status: Final result Specimen: Swab from Nasopharynx Updated: 03/11/25 1518     COVID19 Not Detected     Influenza A PCR  Not Detected     Influenza B PCR Not Detected     RSV, PCR Detected    Narrative:      Fact sheet for providers: https://www.fda.gov/media/316752/download    Fact sheet for patients: https://www.fda.gov/media/959526/download    Test performed by PCR.          Duplex Venous Upper Extremity - Right CAR  Result Date: 3/20/2025    Sub-acute right upper extremity superficial thrombophlebitis noted in the basilic (upper arm) and basilic (forearm).   All other right sided vessels appear normal.     FL Video Swallow With Speech Single Contrast  Result Date: 3/20/2025  FL VIDEO SWALLOW W SPEECH SINGLE-CONTRAST Date of Exam: 3/20/2025 10:04 AM EDT Indication: aspiration.   Comparison: None available. Technique:   The speech pathologist administered food and/or liquid mixed with barium to the patient with cine/video imaging.  Imaging assistance was provided to the speech pathologist and an image was saved. Fluoroscopic Time: 1 minute and 54 seconds Number of Images: 13 associated fluoroscopic loops were saved Findings: Aspiration was seen during fluoroscopic guided modified barium swallowing series. Please see speech therapy report for full details and recommendations.     Impression: Impression: Fluoroscopy provided for a modified barium swallow. Aspiration was seen during swallowing evaluation. Please see speech therapy report for full details and recommendations. Report dictated by: Shira Renner PA-c  I have personally reviewed this case and agree with the findings above: Electronically Signed: Preston Neil MD  3/20/2025 11:13 AM EDT  Workstation ID: [id number]      Results for orders placed during the hospital encounter of 02/15/24    Adult Transthoracic Echo Complete W/ Cont if Necessary Per Protocol    Interpretation Summary    Atrial fibrillation was the predominant rhythm observed during the procedure.    The study is technically difficult    Left ventricular wall thickness is consistent with mild concentric  hypertrophy.    Left ventricular ejection fraction appears to be 51 - 55%.    Left ventricular diastolic function is consistent with (grade I) impaired relaxation.    The right ventricular cavity is borderline dilated.    The left atrial cavity is moderately dilated.  5.0 cm    The right atrial cavity is mildly  dilated.    No aortic valve regurgitation or stenosis is present    Mild mitral valve regurgitation with MAC is present.    Moderate tricuspid valve regurgitation is present.  RVSP- 56 mmHg    Borderline dilation of the aortic root is present.  3.8 cm    Current medications:  Scheduled Meds:busPIRone, 5 mg, Nasogastric, Daily With Lunch  [Held by provider] cetirizine, 10 mg, Oral, Daily  hydrOXYzine, 25 mg, Nasogastric, Nightly  [Held by provider] isosorbide mononitrate, 30 mg, Oral, QAM  melatonin, 10 mg, Nasogastric, Nightly  [Held by provider] NIFEdipine XL, 30 mg, Oral, Daily  pantoprazole, 40 mg, Intravenous, Q AM  polyethylene glycol, 17 g, Nasogastric, Daily  ProSource TF, 45 mL, Nasogastric, Daily  QUEtiapine, 25 mg, Nasogastric, Nightly  [Held by provider] rivaroxaban, 20 mg, Nasogastric, Daily With Dinner  senna-docusate sodium, 2 tablet, Nasogastric, Nightly  sodium chloride, 10 mL, Intravenous, Q12H  sterile water (preservative free), , ,       Continuous Infusions:     PRN Meds:.  senna-docusate sodium **AND** [DISCONTINUED] polyethylene glycol **AND** [DISCONTINUED] bisacodyl **AND** bisacodyl    Calcium Replacement - Follow Nurse / BPA Driven Protocol    dextrose    dextrose    glucagon (human recombinant)    ipratropium-albuterol    Magnesium Standard Dose Replacement - Follow Nurse / BPA Driven Protocol    nitroglycerin    ondansetron    Phosphorus Replacement - Follow Nurse / BPA Driven Protocol    Potassium Replacement - Follow Nurse / BPA Driven Protocol    sodium chloride    sodium chloride    sodium chloride    sterile water (preservative free)    traMADol    trolamine  salicylate    Assessment & Plan   Assessment & Plan     Active Hospital Problems    Diagnosis  POA    **Rhabdomyolysis [M62.82]  Yes    Moderate protein-calorie malnutrition [E44.0]  Yes      Resolved Hospital Problems   No resolved problems to display.     Brief Hospital Course to date:  Carlos Preston is a 89 y.o. male with a history of heart disease, hyperlipidemia, ischemic heart disease status post CABG, paroxysmal A-fib on Eliquis presented to the hospital with progressive weakness.  Found to have bilateral pneumonia and acute rhabdomyolysis    Acute hypoxia respiratory insufficiency, resolved   RSV infection, resolved  Superimposed left lower lobe bacterial pneumonia, unspecified, improved  Bilateral basal lung fibrosis concerning for chronic dysphagia / aspiration  CT chest with bilateral chronic fibrotic changes in both lung bases and developing left lower lobe pneumonia.  Patient is forced for RSV  Was hypoxic, requiring 4 L nasal cannula - has weaned to room air   Completed 7 days Zosyn / Doxycycline  s/p IV Solu-Medrol and PO Prednisone  Negative Legionella antigen, strep antigen and MRSA swab  Sputum culture negative to date  DuoNebs, Mucinex and IS    Chronic dysphagia  History of esophageal stricture  SLP following   MBS 3/20/2025 --> nectar thick liquids.  Continues to have poor oral intake.  Family interested in PEG tube.  Dr. Pa/general surgery consulted.  Plan for PEG tube tomorrow.  Holding Xarelto.  N.p.o. from midnight    Acute rhabdomyolysis, resolved   Elevated troponin  Patient fell x 2 and was on the floor for 4-5 hours  CPK on admission 2500.  Trended down to 1200 with IV fluids by 3/12  Elevated troponin felt to be demand ischemia and secondary to rhabdomyolysis. Patient with no chest pain or ischemic changes in EKG.  Defer ischemic workup given his frailty and age       Severe iron deficiency anemia   Symptomatic anemia   Patient was anemic with Hgb 7.6 on admission - s/p 1 unit  PRBCs    s/p IV iron x 3 doses  Hemoglobin stable around 10  No evidence of GI bleed    Elevated liver enzymes, improved  CBD dilation  Abnormal CT abdomen with common bile duct dilation as well as pancreas duct dilation.   No focal mass or obstructive process seen on noncontrast CT  Total bilirubin is normal  GI following and recommended MRCP when more stable    A-fib  Hypertension of CABG  HTN  Xarelto held for possible PEG placement.  CT head no acute intracranial abnormality identified  ECHO: 02/2024: EF: 51-55%     Debility  PT/OT and OT recommended rehab but the patient and his family declined despite extensive counseling  Family arrange for caregiver at home.  Tentative plan to discharge him on Sunday, 3/23/2025    Hypoactive delirium, resolved  Sleep hygiene - limit interruptions at night  Continue Seroquel 25 mg QHS, Melatonin 10 mg QHS and Hydroxyzine 25 mg QHS    Expected Discharge Location and Transportation: Home with home health / 24/7 caregivers   Expected Discharge Expected Discharge Date: 3/23/2025; Expected Discharge Time:      VTE Prophylaxis:Pharmacologic & mechanical VTE prophylaxis orders are present.    AM-PAC 6 Clicks Score (PT): 10 (03/19/25 1549)    CODE STATUS:   Code Status and Medical Interventions: CPR (Attempt to Resuscitate); Full Support   Ordered at: 03/11/25 4250     Code Status (Patient has no pulse and is not breathing):    CPR (Attempt to Resuscitate)     Medical Interventions (Patient has pulse or is breathing):    Full Support     Level Of Support Discussed With:    Patient     Maksim Delgado MD  03/20/25

## 2025-03-20 NOTE — PLAN OF CARE
Goal Outcome Evaluation:  Plan of Care Reviewed With: patient                Anticipated Discharge Disposition (SLP): skilled nursing facility          SLP Swallowing Diagnosis: mild-moderate, oral dysphagia, severe, pharyngeal dysphagia (03/20/25 1015)           MBS completed. No significant improvement. No direct aspiration of nectar-thick liquids, puree, or ground solids (choking risk with non-cohesive textures). Aspiration of thin liquids though is able to clear spontaneously + intermittent cueing. Minimal aspiration with small tsp-size cup sips. Results discussed with pt, who was not able to provide adequate insight into preference. All relayed to MD, who plans to further discuss with pt/family.

## 2025-03-21 ENCOUNTER — ANESTHESIA (OUTPATIENT)
Dept: GASTROENTEROLOGY | Facility: HOSPITAL | Age: OVER 89
End: 2025-03-21
Payer: MEDICARE

## 2025-03-21 LAB
ALBUMIN SERPL-MCNC: 2.4 G/DL (ref 3.5–5.2)
ALBUMIN/GLOB SERPL: 0.8 G/DL
ALP SERPL-CCNC: 102 U/L (ref 39–117)
ALT SERPL W P-5'-P-CCNC: 23 U/L (ref 1–41)
ANION GAP SERPL CALCULATED.3IONS-SCNC: 9 MMOL/L (ref 5–15)
AST SERPL-CCNC: 24 U/L (ref 1–40)
BASOPHILS # BLD AUTO: 0.02 10*3/MM3 (ref 0–0.2)
BASOPHILS NFR BLD AUTO: 0.1 % (ref 0–1.5)
BILIRUB SERPL-MCNC: 0.7 MG/DL (ref 0–1.2)
BUN SERPL-MCNC: 33 MG/DL (ref 8–23)
BUN/CREAT SERPL: 37.5 (ref 7–25)
CALCIUM SPEC-SCNC: 9.7 MG/DL (ref 8.2–9.6)
CHLORIDE SERPL-SCNC: 104 MMOL/L (ref 98–107)
CO2 SERPL-SCNC: 29 MMOL/L (ref 22–29)
CREAT SERPL-MCNC: 0.88 MG/DL (ref 0.76–1.27)
CRP SERPL-MCNC: 1.32 MG/DL (ref 0–0.5)
DEPRECATED RDW RBC AUTO: 55.3 FL (ref 37–54)
EGFRCR SERPLBLD CKD-EPI 2021: 78.2 ML/MIN/1.73
EOSINOPHIL # BLD AUTO: 0.18 10*3/MM3 (ref 0–0.4)
EOSINOPHIL NFR BLD AUTO: 1 % (ref 0.3–6.2)
ERYTHROCYTE [DISTWIDTH] IN BLOOD BY AUTOMATED COUNT: 16.9 % (ref 12.3–15.4)
GLOBULIN UR ELPH-MCNC: 3.1 GM/DL
GLUCOSE BLDC GLUCOMTR-MCNC: 67 MG/DL (ref 70–130)
GLUCOSE BLDC GLUCOMTR-MCNC: 89 MG/DL (ref 70–130)
GLUCOSE SERPL-MCNC: 81 MG/DL (ref 65–99)
HCT VFR BLD AUTO: 35.3 % (ref 37.5–51)
HGB BLD-MCNC: 10.8 G/DL (ref 13–17.7)
IMM GRANULOCYTES # BLD AUTO: 0.12 10*3/MM3 (ref 0–0.05)
IMM GRANULOCYTES NFR BLD AUTO: 0.6 % (ref 0–0.5)
LYMPHOCYTES # BLD AUTO: 0.85 10*3/MM3 (ref 0.7–3.1)
LYMPHOCYTES NFR BLD AUTO: 4.5 % (ref 19.6–45.3)
MAGNESIUM SERPL-MCNC: 1.9 MG/DL (ref 1.7–2.3)
MCH RBC QN AUTO: 28.6 PG (ref 26.6–33)
MCHC RBC AUTO-ENTMCNC: 30.6 G/DL (ref 31.5–35.7)
MCV RBC AUTO: 93.6 FL (ref 79–97)
MONOCYTES # BLD AUTO: 2 10*3/MM3 (ref 0.1–0.9)
MONOCYTES NFR BLD AUTO: 10.6 % (ref 5–12)
NEUTROPHILS NFR BLD AUTO: 15.68 10*3/MM3 (ref 1.7–7)
NEUTROPHILS NFR BLD AUTO: 83.2 % (ref 42.7–76)
NRBC BLD AUTO-RTO: 0 /100 WBC (ref 0–0.2)
PHOSPHATE SERPL-MCNC: 2.9 MG/DL (ref 2.5–4.5)
PLATELET # BLD AUTO: 101 10*3/MM3 (ref 140–450)
PMV BLD AUTO: 11.5 FL (ref 6–12)
POTASSIUM SERPL-SCNC: 3.9 MMOL/L (ref 3.5–5.2)
PROT SERPL-MCNC: 5.5 G/DL (ref 6–8.5)
RBC # BLD AUTO: 3.77 10*6/MM3 (ref 4.14–5.8)
SODIUM SERPL-SCNC: 142 MMOL/L (ref 136–145)
WBC NRBC COR # BLD AUTO: 18.85 10*3/MM3 (ref 3.4–10.8)

## 2025-03-21 PROCEDURE — 25810000003 LACTATED RINGERS PER 1000 ML: Performed by: ANESTHESIOLOGY

## 2025-03-21 PROCEDURE — 99232 SBSQ HOSP IP/OBS MODERATE 35: CPT | Performed by: INTERNAL MEDICINE

## 2025-03-21 PROCEDURE — 85025 COMPLETE CBC W/AUTO DIFF WBC: CPT | Performed by: INTERNAL MEDICINE

## 2025-03-21 PROCEDURE — 86140 C-REACTIVE PROTEIN: CPT | Performed by: INTERNAL MEDICINE

## 2025-03-21 PROCEDURE — 83735 ASSAY OF MAGNESIUM: CPT | Performed by: INTERNAL MEDICINE

## 2025-03-21 PROCEDURE — 25010000002 PROPOFOL 10 MG/ML EMULSION

## 2025-03-21 PROCEDURE — 25010000002 LIDOCAINE PF 1% 1 % SOLUTION

## 2025-03-21 PROCEDURE — 25810000003 SODIUM CHLORIDE 0.9 % SOLUTION: Performed by: NURSE PRACTITIONER

## 2025-03-21 PROCEDURE — 82948 REAGENT STRIP/BLOOD GLUCOSE: CPT

## 2025-03-21 PROCEDURE — 25010000002 CEFAZOLIN PER 500 MG

## 2025-03-21 PROCEDURE — 84100 ASSAY OF PHOSPHORUS: CPT | Performed by: INTERNAL MEDICINE

## 2025-03-21 PROCEDURE — 80053 COMPREHEN METABOLIC PANEL: CPT | Performed by: INTERNAL MEDICINE

## 2025-03-21 PROCEDURE — 0DH63UZ INSERTION OF FEEDING DEVICE INTO STOMACH, PERCUTANEOUS APPROACH: ICD-10-PCS | Performed by: SURGERY

## 2025-03-21 RX ORDER — LIDOCAINE HYDROCHLORIDE 10 MG/ML
0.5 INJECTION, SOLUTION EPIDURAL; INFILTRATION; INTRACAUDAL; PERINEURAL ONCE AS NEEDED
Status: DISCONTINUED | OUTPATIENT
Start: 2025-03-21 | End: 2025-03-21 | Stop reason: HOSPADM

## 2025-03-21 RX ORDER — SODIUM CHLORIDE 0.9 % (FLUSH) 0.9 %
10 SYRINGE (ML) INJECTION EVERY 12 HOURS SCHEDULED
Status: DISCONTINUED | OUTPATIENT
Start: 2025-03-21 | End: 2025-03-21 | Stop reason: HOSPADM

## 2025-03-21 RX ORDER — SODIUM CHLORIDE 0.9 % (FLUSH) 0.9 %
10 SYRINGE (ML) INJECTION AS NEEDED
Status: DISCONTINUED | OUTPATIENT
Start: 2025-03-21 | End: 2025-03-21 | Stop reason: HOSPADM

## 2025-03-21 RX ORDER — BUPIVACAINE HCL/0.9 % NACL/PF 0.125 %
PLASTIC BAG, INJECTION (ML) EPIDURAL AS NEEDED
Status: DISCONTINUED | OUTPATIENT
Start: 2025-03-21 | End: 2025-03-21 | Stop reason: SURG

## 2025-03-21 RX ORDER — PANTOPRAZOLE SODIUM 40 MG/10ML
40 INJECTION, POWDER, LYOPHILIZED, FOR SOLUTION INTRAVENOUS
Status: DISCONTINUED | OUTPATIENT
Start: 2025-03-21 | End: 2025-03-23

## 2025-03-21 RX ORDER — SODIUM CHLORIDE, SODIUM LACTATE, POTASSIUM CHLORIDE, CALCIUM CHLORIDE 600; 310; 30; 20 MG/100ML; MG/100ML; MG/100ML; MG/100ML
9 INJECTION, SOLUTION INTRAVENOUS CONTINUOUS
Status: ACTIVE | OUTPATIENT
Start: 2025-03-22 | End: 2025-03-22

## 2025-03-21 RX ORDER — FERROUS SULFATE 325(65) MG
325 TABLET ORAL
Status: DISCONTINUED | OUTPATIENT
Start: 2025-03-22 | End: 2025-03-26 | Stop reason: HOSPADM

## 2025-03-21 RX ORDER — LIDOCAINE HYDROCHLORIDE 10 MG/ML
INJECTION, SOLUTION EPIDURAL; INFILTRATION; INTRACAUDAL; PERINEURAL AS NEEDED
Status: DISCONTINUED | OUTPATIENT
Start: 2025-03-21 | End: 2025-03-21 | Stop reason: SURG

## 2025-03-21 RX ORDER — PROPOFOL 10 MG/ML
VIAL (ML) INTRAVENOUS AS NEEDED
Status: DISCONTINUED | OUTPATIENT
Start: 2025-03-21 | End: 2025-03-21 | Stop reason: SURG

## 2025-03-21 RX ORDER — CEFAZOLIN SODIUM 1 G/3ML
INJECTION, POWDER, FOR SOLUTION INTRAMUSCULAR; INTRAVENOUS AS NEEDED
Status: DISCONTINUED | OUTPATIENT
Start: 2025-03-21 | End: 2025-03-21 | Stop reason: SURG

## 2025-03-21 RX ORDER — GUAR GUM
2 PACKET (EA) ORAL 2 TIMES DAILY
Status: DISCONTINUED | OUTPATIENT
Start: 2025-03-21 | End: 2025-03-26 | Stop reason: HOSPADM

## 2025-03-21 RX ADMIN — HYDROXYZINE HYDROCHLORIDE 25 MG: 25 TABLET ORAL at 22:06

## 2025-03-21 RX ADMIN — QUETIAPINE FUMARATE 25 MG: 25 TABLET ORAL at 22:06

## 2025-03-21 RX ADMIN — PROPOFOL 30 MG: 10 INJECTION, EMULSION INTRAVENOUS at 07:42

## 2025-03-21 RX ADMIN — DICYCLOMINE HYDROCHLORIDE 20 MG: 20 TABLET ORAL at 22:06

## 2025-03-21 RX ADMIN — Medication 50 MCG: at 07:55

## 2025-03-21 RX ADMIN — PROPOFOL 50 MCG/KG/MIN: 10 INJECTION, EMULSION INTRAVENOUS at 07:44

## 2025-03-21 RX ADMIN — DICYCLOMINE HYDROCHLORIDE 20 MG: 20 TABLET ORAL at 14:54

## 2025-03-21 RX ADMIN — Medication 10 ML: at 22:45

## 2025-03-21 RX ADMIN — LIDOCAINE HYDROCHLORIDE 20 MG: 10 INJECTION, SOLUTION EPIDURAL; INFILTRATION; INTRACAUDAL; PERINEURAL at 07:42

## 2025-03-21 RX ADMIN — CEFAZOLIN 2 G: 1 INJECTION, POWDER, FOR SOLUTION INTRAMUSCULAR; INTRAVENOUS at 07:39

## 2025-03-21 RX ADMIN — Medication 10 MG: at 22:06

## 2025-03-21 RX ADMIN — PROPOFOL 20 MG: 10 INJECTION, EMULSION INTRAVENOUS at 07:43

## 2025-03-21 RX ADMIN — DICYCLOMINE HYDROCHLORIDE 20 MG: 20 TABLET ORAL at 18:00

## 2025-03-21 RX ADMIN — PANTOPRAZOLE SODIUM 40 MG: 40 INJECTION, POWDER, FOR SOLUTION INTRAVENOUS at 18:00

## 2025-03-21 RX ADMIN — SODIUM CHLORIDE 250 ML: 900 INJECTION, SOLUTION INTRAVENOUS at 23:22

## 2025-03-21 RX ADMIN — SODIUM CHLORIDE, POTASSIUM CHLORIDE, SODIUM LACTATE AND CALCIUM CHLORIDE: 600; 310; 30; 20 INJECTION, SOLUTION INTRAVENOUS at 07:39

## 2025-03-21 RX ADMIN — Medication 2 PACKET: at 22:16

## 2025-03-21 RX ADMIN — BUSPIRONE HYDROCHLORIDE 5 MG: 10 TABLET ORAL at 14:54

## 2025-03-21 NOTE — CASE MANAGEMENT/SOCIAL WORK
Case Management Discharge Note      Final Note: Discussed Mr Preston in MDR.  Mr Preston will be discharged home on Sunday.  I have set up for [facility] to transport him on Sunday March 23 at 9 AM. [facility] is in the drop box.  I have called Malena/Gary to let her know that Mr Preston will be returning home on Sunday.  I have set up tube feeding and supplies through [facility], and they are able to pull information from [facility].  Mr Preston will not be going home with a pump for tube feedings unless he tries bolus feedings and fails.  I have ordered suction equipment from [employer], and [facility] that he will be discharged home.  I have ordered a hospital bed a lift from [facility] in [place].  At this time there are no discharge needs.         Selected Continued Care - Admitted Since 3/11/2025       Destination    No services have been selected for the patient.                Durable Medical Equipment       Service Provider Services Address Phone Fax Patient Preferred    [facility] [address] [facility] [place] [address] [phone] --       Internal Comment last updated by Click, Agnes LEWIS, RN 3/21/2025 1222    Suction equipment                           Dialysis/Infusion    No services have been selected for the patient.                Home Medical Care    No services have been selected for the patient.                Therapy    No services have been selected for the patient.                Community Resources    No services have been selected for the patient.                Community & DME    No services have been selected for the patient.                    Transportation Services  Ambulance: [facility]  [facility] Ambulance Status: Accepted (Sunday, March 23, 2025 9 AM)    Final Discharge Disposition Code: 06 - home with home health care

## 2025-03-21 NOTE — PROGRESS NOTES
"Patient Name:  Carlos Preston  YOB: 1928  [id number]    Surgery Progress Note    Date of visit: 3/21/2025      Subjective: No events overnight.  Tube feeds have been held          Objective:     /72 (BP Location: Right arm, Patient Position: Lying)   Pulse 74   Temp 98.1 °F (36.7 °C) (Axillary)   Resp 18   Ht 182.9 cm (72.01\")   Wt 83.9 kg (184 lb 15.5 oz)   SpO2 95%   BMI 25.08 kg/m²     Intake/Output Summary (Last 24 hours) at 3/21/2025 0654  Last data filed at 3/21/2025 0600  Gross per 24 hour   Intake 1387 ml   Output 5550 ml   Net -4163 ml       GEN:   Awake, resting in bed, elderly and chronically ill-appearing  CV:   Regular rate and rhythm  L:  Symmetric expansion, not labored on room air  Abd:  Soft, no distress to palpation  Ext:  No cyanosis, clubbing, or edema    Recent labs that are back at this time have been reviewed.           Assessment/ Plan:    Mr. Preston is a 89-year-old gentleman with history of coronary artery disease, ischemic heart disease, atrial fibrillation on Eliquis, and hyperlipidemia who I been asked to evaluate for PEG placement due to dysphagia     -Plan to proceed forward with PEG today      Stanley Pa MD  3/21/2025  06:54 EDT      "

## 2025-03-21 NOTE — CASE MANAGEMENT/SOCIAL WORK
Continued Stay Note  [facility]     Patient Name: Carlos Preston  MRN: [id number]  Today's Date: 3/21/2025    Admit Date: 3/11/2025    Plan: Home with home health   Discharge Plan       Row Name 03/21/25 1230       Plan    Plan Home with home health    Patient/Family in Agreement with Plan yes    Plan Comments Discussed Mr Preston in MDR.  He may go home on Sunday pending medical readiness.  Family has requested that his hospital bed and lift come from [facility] in [facility], and I have faxed those orders to [phone].  I have also faxed an EMS request for an ambulance on Sunday.  I have called Raj/Micaela and updated him that the family only wants the suction equipment.  I have called and left a voicemail for Malena/Gary and updated her that patient is expected to be discharged on Sunday.  I am waiting for nutrition's final recommendations regarding tube feedings as family has opted for night feedings.    Final Discharge Disposition Code 06 - home with home health care                   Discharge Codes    No documentation.                 Expected Discharge Date and Time       Expected Discharge Date Expected Discharge Time    Mar 23, 2025               Agnes Hodge RN

## 2025-03-21 NOTE — PROGRESS NOTES
Clinical Nutrition Assessment     Patient Name: Carlos Preston  YOB: 1928  MRN: [id number]  Date of Encounter: 03/21/25 12:39 EDT  Admission date: 3/11/2025  Reason for Visit: Follow-up protocol, EN    Assessment   Nutrition Assessment   Admission Diagnosis:  Rhabdomyolysis [M62.82]    Problem List:    Rhabdomyolysis    Moderate protein-calorie malnutrition      PMH:   He  has a past medical history of Cancer, H/O prostatectomy, Hearing loss, Heart disease, History of back surgery, Hypercholesterolemia, Hyperlipidemia, Hypertension, IHD (ischemic heart disease), Osteoarthritis of spine with myelopathy, lumbar region, Paroxysmal atrial fibrillation (07/03/2018), Pulmonary hypertension, S/P knee replacement, Sinus disorder, and thyroid ultrasound (08/28/2009).    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    Applicable Nutrition History:   3/12 SLP rec NPO w repeat 3/13  3/13-SLP Diet Recommendation: NPO, temporary alternate methods of nutrition/hydration, other (see comments) (vs consideration of comfort diet if aligns w/ GOC/POC)     Labs    Labs Reviewed: Yes     Results from last 7 days   Lab Units 03/21/25  0517 03/20/25  0727 03/19/25  0928 03/18/25  0831 03/15/25  0929 03/15/25  043  "  GLUCOSE mg/dL 81 115* 97   < > 226* 213*  213*   BUN mg/dL 33* 32* 36*   < > 62* 56*  56*   CREATININE mg/dL 0.88 0.67* 0.72*   < > 1.10 1.05  1.05   SODIUM mmol/L 142 141 143   < > 146* 147*  147*   CHLORIDE mmol/L 104 107 112*   < > 115* 115*  115*   POTASSIUM mmol/L 3.9 4.3 4.2   < > 3.9 3.9  3.9   PHOSPHORUS mg/dL 2.9 2.5 2.5   < >  --  2.1*   MAGNESIUM mg/dL 1.9 1.9 1.8   < >  --  2.4*   ALT (SGPT) U/L 23  --   --   --  35 34  34    < > = values in this interval not displayed.       Results from last 7 days   Lab Units 03/21/25  0517 03/20/25  0727 03/15/25  0929 03/15/25  0435   ALBUMIN g/dL 2.4*  --  2.5* 2.5*  2.5*   CRP mg/dL 1.32* 0.79*  --   --    CHOLESTEROL mg/dL  --   --   --  65   TRIGLYCERIDES mg/dL  --   --   --  79       Results from last 7 days   Lab Units 03/21/25  0147 03/21/25  0004 03/20/25  2321 03/19/25  1737 03/19/25  1147 03/19/25  0550   GLUCOSE mg/dL 89 67* 66* 84 100 113     No results found for: \"HGBA1C\"            Medications    Medications Reviewed: Yes    Scheduled Meds:busPIRone, 5 mg, Nasogastric, Daily With Lunch  [Held by provider] cetirizine, 10 mg, Oral, Daily  dicyclomine, 20 mg, Oral, 4x Daily  hydrOXYzine, 25 mg, Nasogastric, Nightly  [Held by provider] isosorbide mononitrate, 30 mg, Oral, QAM  melatonin, 10 mg, Nasogastric, Nightly  [Held by provider] NIFEdipine XL, 30 mg, Oral, Daily  pantoprazole, 40 mg, Intravenous, BID AC  polyethylene glycol, 17 g, Nasogastric, Daily  ProSource TF, 45 mL, Nasogastric, Daily  QUEtiapine, 25 mg, Nasogastric, Nightly  [Held by provider] rivaroxaban, 20 mg, Nasogastric, Daily With Dinner  senna-docusate sodium, 2 tablet, Nasogastric, Nightly  sodium chloride, 10 mL, Intravenous, Q12H      Continuous Infusions:[START ON 3/22/2025] lactated ringers, 9 mL/hr      PRN Meds:.  senna-docusate sodium **AND** [DISCONTINUED] polyethylene glycol **AND** [DISCONTINUED] bisacodyl **AND** bisacodyl    Calcium Replacement - Follow Nurse / BPA " "Driven Protocol    dextrose    dextrose    glucagon (human recombinant)    hyoscyamine    ipratropium-albuterol    Magnesium Standard Dose Replacement - Follow Nurse / BPA Driven Protocol    nitroglycerin    ondansetron    Phosphorus Replacement - Follow Nurse / BPA Driven Protocol    Potassium Replacement - Follow Nurse / BPA Driven Protocol    sodium chloride    sodium chloride    sodium chloride    traMADol    trolamine salicylate    Intake/Ouptut 24 hrs (0701 - 0700)   I&O's Reviewed: Yes   Intake & Output (last day)         03/20 0701 03/21 0700 03/21 0701 03/22 0700    I.V. (mL/kg)  150 (1.8)    Other 552     NG/     Total Intake(mL/kg) 1387 (16.5) 150 (1.8)    Urine (mL/kg/hr) 5550 (2.8)     Stool 0     Total Output 5550     Net -4163 +150          Urine Unmeasured Occurrence  2 x    Stool Unmeasured Occurrence 3 x 1 x        Last stool x 1 on 3/11    Anthropometrics     Height: Height: 182.9 cm (72\")  Last Filed Weight: Weight: 83.5 kg (184 lb) (03/21/25 0709)  Method: Weight Method: Stated  BMI: BMI (Calculated): 24.9    UBW:  250lbs per pt's dtr, appears ~215lbs in the past yr  Weight change:  possible 30lb/14% wt loss x 1 yr-need measured wt from [facility]   Weight      Weight (kg) Weight (lbs) Visit Report   1/10/2024 97.523 kg  215 lb  --    2/14/2024 95.074 kg  209 lb 9.6 oz  --    2/15/2024 95.1 kg  209 lb 10.5 oz     8/21/2024 88.542 kg  195 lb 3.2 oz  --    3/11/2025 83.915 kg  185 lb         Nutrition Focused Physical Exam    Date: 3/13    Patient meets criteria for malnutrition diagnosis, see MSA note.     Subjective   Reported/Observed/Food/Nutrition Related History:     3/21  Pt s/p PEG, RN states sx okay to resume feeds this evening. Spoke w/family at bedside, would like to continue nocturnal feeds for now. Discussed bolus option as well and family would like to consider this in the future. Family states pt w/frequent Bms but not large quantity-will add fiber packets.     3/20  Pt OOR for " "MBS, dtr at bedside. Dtr states likely plan for PEG pending results of MBS, per sx note plan for PEG tomorrow. Dtr would like to have Magic Cup. RN states pt tolerating EN regimen well, having 2-3 Bms daily    3/18  Spoke w/PA, plans for comfort diet and adjustment to nocturnal feeds.     3/17  Per RN pt doing well on current TF regimen, notes watery Bms. Pt states he is tolerating TF, denies abdominal pain or bloating.     3/14  RN states keofeed placed, in stomach and MD okay to feed.     3/13  Family rpt pt at ok until 4 da ago then unable to eat .Repeat has had signif wt loss. Family aware of plan for feeding tube. GI note indicated will place tube 2/2 aspiration risk at this time.    3/12  Pt up in chair at bedside, very sleepy. Dtr at bedside provides nutrition hx. Dtr states pt has had poor intake x 2 days, states prior to recent fall pt was eating well. Dtr confirms pt has had esophageal dilation in the past, ~1.5 yrs ago and states worsening dysphagia over the past yr and pt managing this w/cutting food into small pieces and drinking adequate fluids w/meals. Dtr states pt has also had significant wt loss over the past yr, states UBW is 250lbs and was 185lbs at MD office <1 week ago. Dtr unsure etiology as pt tells her he eats well.     Needs Assessment   Date: 3/13    Height used:Height: 182.9 cm (72\")  Weights used: 83.9 Kg    Estimated Calorie needs: ~ 2000 Kcal/day  Method:  Kcals/KG x 25 = 2098  Method:  [id number] x 1.3 = 1968    Estimated Protein needs: ~ 109 g PRO/day  Method: g/Kg x 1.3    Estimated Fluid needs: ~ ml/day   Per clinical status    Current Nutrition Prescription     EN: IsoSource 1.5  Goal Rate: 60  Water Flushes: 35  Modular: Prosource TF 1/day  Route: NG  Tube: Small bore    At goal over: 12Hrs/day     Rx will supply:   Goal Volume 720  mL/day     Flush Volume 420 mL/day     Energy 1140 Kcal/day 57 % Est Need   Protein 64 g/day 57 % Est Need   Fiber 11 g/day     Water in   mL   "   Total Water 420 mL     Meet DRI No        --------------------------------------------------------------------------  Product/Rate verified at bedside: No  Infusing Rate at time of visit: N/A s/p PEG    Average Delivery from [facility]: insufficient data, s/p PEG  Volume  mL/day   % Goal Vol.   Flush Volume  mL/day     Energy  Kcal/day  % Est Need   Protein  g/day  % Est Need   Fiber  g/day     Water in  EN  mL     Total Water  mL     Meet DRI Yes        PO: NPO Diet NPO Type: Strict NPO  Oral Nutrition Supplement:   Intake: N/A    Assessment & Plan   Nutrition Diagnosis   Date:  3/12            Updated:  3/13  Problem Inadequate oral intake    Etiology dysphagia   Signs/Symptoms NPO   Status: New EN rec in place    Date:   3/12  Updated:     Problem Unintended weight loss   Etiology ? Dysphagia, advanced age   Signs/Symptoms Possible 14% wt loss x 1 yr   Status: New    Date:  3/13 Updated:  Problem Malnutrition moderate acute   Etiology Alt Swallow fx   Signs/Symptoms Intake hx w some Wasting   Status: New    Goal / Objectives:   Nutrition to support treatment, Advance diet as medically feasible/appropriate, and Tolerate EN at goal      Nutrition Intervention      Follow treatment progress, Care plan reviewed, Nutrition support order placed    Once PEG okay for use, resume nocturnal feeds (6p-6a):  Isosource 1.5 @ 60ml/hr. Prosource TF daily. 2 Nutrisource fiber packets BID. Water flush @ 35ml/hr.   =720ml, 1140kcals (57% est needs), 64g pro (57% est needs), 23g fiber, 547ml water from formula, +420ml water from flushes, 967ml TFW.     Recommend pt/family chooses home infusion company w/RD to manage EN regimen after discharge.   Adjust EN regimen based on adequacy of po intake.     Resume diet as medically feasible, previously on mechanical ground/NTL, no straw.   Magic Cup BID    Monitoring/Evaluation:   Per protocol, I&O, Pertinent labs, EN delivery/tolerance, Weight, Symptoms, Swallow function    Lorna  MS Mason,RD,LD  Time Spent:30 min

## 2025-03-21 NOTE — PLAN OF CARE
Goal Outcome Evaluation:            Pt. Is on RA, A.Fib, Aox3 d/o to situation, family remains at bedside, got a PEG tube today, now on Continuous TF, may switch to bolus feeds tomorrow, likely going home on Sunday, VSS - will continue POC.

## 2025-03-21 NOTE — ANESTHESIA POSTPROCEDURE EVALUATION
Patient: Carlos Preston    Procedure Summary       Date: 03/21/25 Room / Location:  [facility] /  [facility]    Anesthesia Start: 0736 Anesthesia Stop: 0805    Procedure: ESOPHAGOGASTRODUODENOSCOPY WITH PERCUTANEOUS ENDOSCOPIC GASTROSTOMY TUBE INSERTION (Esophagus) Diagnosis:     Surgeons: Stanley Pa MD Provider: Samuel Weathers MD    Anesthesia Type: general, MAC ASA Status: 3            Anesthesia Type: general, MAC    Vitals  No vitals data found for the desired time range.          Post Anesthesia Care and Evaluation    Patient location during evaluation: PACU (inpatient endo recovery)  Level of consciousness: responsive to light touch  Pain management: adequate    Airway patency: patent  Anesthetic complications: No anesthetic complications  PONV Status: none  Cardiovascular status: acceptable, hemodynamically stable and stable  Respiratory status: acceptable and nasal cannula  Hydration status: acceptable    Comments: Report to RN, RN accepts report. VSS. Native airway patent, nasal cannula in place.

## 2025-03-21 NOTE — DISCHARGE PLACEMENT REQUEST
"Case Management  Agnes Hodge, [phone]    Nazanin Preston \"JOSHUA\" (89 y.o. Male)       Date of Birth   03/20/1928    Social Security Number       [place]   [facility] [address]    Home Phone   [phone]    MRN   [id number]       Sabianist   Yazidi    Marital Status                               Admission Date   3/11/2025    Admission Type   Emergency    Admitting Provider   Maksim Delgado MD    Attending Provider   Maksim Delgado MD    Department, Room/Bed   [facility] 6B, N636/1       Discharge Date       Discharge Disposition       Discharge Destination                                 Attending Provider: Maksim Delgado MD    Allergies: No Known Allergies    Isolation: None   Infection: None   Code Status: CPR    Ht: 182.9 cm (72\")   Wt: 83.5 kg (184 lb)    Admission Cmt: None   Principal Problem: Rhabdomyolysis [M62.82]                   Active Insurance as of 3/11/2025       Primary Coverage       Payor Plan Insurance Group Employer/Plan Group    MEDICARE MEDICARE A & B        Payor Plan Address Payor Plan Phone Number Payor Plan Fax Number Effective Dates    [address] [phone]  3/1/1993 - None Entered    [facility] [id number]         Subscriber Name Subscriber Birth Date Member ID       NAZANIN PRESTON 3/20/1928 [id number]               Secondary Coverage       Payor Plan Insurance Group Employer/Plan Group    [employer] SUP AAR HEALTH CARE OPTIONS        Payor Plan Address Payor Plan Phone Number Payor Plan Fax Number Effective Dates    [facility] [phone]  1/1/2016 - None Entered    [address]       [facility] [id number]         Subscriber Name Subscriber Birth Date Member ID       NAZANIN PRESTON 3/20/1928 [id number]                     Emergency Contacts        (Rel.) Home Phone Work Phone Mobile Phone    Bettye Hollingsworth (Daughter) [phone] -- --             [facility] 6B  [address] "[address] [facility] [address]  Dept. Phone:  [phone]  Dept. Fax:  [phone] Date Ordered: Mar 21, 2025         Patient:  Carlos Preston MRN:  [id number]   [place] [address] :  3/20/1928  SSN:    Phone: [phone] Sex:  M     Weight: 83.5 kg (184 lb)         Ht Readings from Last 1 Encounters:   25 182.9 cm (72\")         Patient Lift       (Order ID: [id number])    Diagnosis:  Pneumonia of both lungs due to infectious organism, unspecified part of lung (J18.9 [ICD-10-CM] 483.8 [ICD-9-CM])   Quantity:  1  Comments: Patient is bed fast     Lift type: W/ Seat or Sling Hydraulic  Does the patient have severe arthritis of the hip or knee? No  Does the patient have a severe neurouscular disease? No  Is the patient completely incapable of standing up from a regular armchair or any chair in his/her home: Yes  Once standing, does the patient have the ability to ambulate: No  Have all appropriate therapeutic modalities to enable the patient to transfer from a chair to a standing position (medication, physical therapy) been tried or failed? If YES, this is documented in the patient's medical records. Yes  Length of Need: 99 Months = Lifetime        Authorizing Provider's Phone: [phone]  Authorizing Provider:Maksim Delgado MD  Authorizing Provider's NPI: [id number]  Order Entered By: Agnes Hodge RN 3/21/2025  3:49 PM     Electronically signed by: Maksim Delgado MD 3/21/2025  3:49 PM          Physician Progress Notes (last 24 hours)        Maksim Delgado MD at 25 1347              [facility] Medicine Services  PROGRESS NOTE    Patient Name: Carlos Preston  : 3/20/1928  MRN: [id number]    Date of Admission: 3/11/2025  Primary Care Physician: Newton Hankins MD    Subjective     CC:   Follow-up for dysphagia     HPI:  Patient seen and examined this morning.  Just returned from PEG tube placement.  " Slightly lethargic from anesthesia.  Does not have any pain or acute complaints.  On room air.  Family at bedside and hoping to able to take him home on Sunday    Objective     Vital Signs:   Temp:  [97.3 °F (36.3 °C)-98.5 °F (36.9 °C)] 98.5 °F (36.9 °C)  Heart Rate:  [66-84] 74  Resp:  [16-20] 16  BP: (111-174)/(55-96) 143/78  Flow (L/min) (Oxygen Therapy):  [4] 4     Physical Exam:  General: Chronically ill looking, debilitated, conversant and pleasant   Head: Atraumatic and normocephalic  Eyes: No Icterus. No pallor  Ears:  Ears appear intact with no abnormalities noted  Throat: No oral lesions, no thrush  Neck: Supple, trachea midline  Lungs: Improved air entry bilateral lung fields.  No crackles or wheezing.  Poor cough effort  Heart:  Normal S1 and S2, no murmur, no gallop, No JVD, no lower extremity swelling  Abdomen:  Soft, no tenderness, no organomegaly, normal bowel sounds, no organomegaly  Extremities: pulses equal bilaterally  Skin: No bleeding, bruising or rash, normal skin turgor and elasticity  Neurologic: Cranial nerves appear intact with no evidence of facial asymmetry, normal motor and sensory functions in all 4 extremities  Psych: Alert and oriented x 3, normal mood    Results Reviewed:  LAB RESULTS:      Lab 03/21/25  0517 03/20/25  0727 03/19/25  0929 03/15/25  0435   WBC 18.85* 17.52* 18.24* 12.04*   HEMOGLOBIN 10.8* 9.8* 10.5* 10.1*   HEMATOCRIT 35.3* 32.2* 35.2* 33.3*   PLATELETS 101* 85* 98* 121*   NEUTROS ABS 15.68*  --   --  10.93*   IMMATURE GRANS (ABS) 0.12*  --   --  0.18*   LYMPHS ABS 0.85  --   --  0.51*   MONOS ABS 2.00*  --   --  0.40   EOS ABS 0.18  --   --  0.00   MCV 93.6 93.1 97.0 94.9   CRP 1.32* 0.79*  --   --    PROCALCITONIN  --  0.09  --   --          Lab 03/21/25  0517 03/20/25  0727 03/19/25  0928 03/18/25  2103 03/18/25  0831 03/15/25  0929 03/15/25  0435   SODIUM 142 141 143  --  143 146* 147*  147*   POTASSIUM 3.9 4.3 4.2  --  4.1 3.9 3.9  3.9   CHLORIDE 104 107 112*   --  113* 115* 115*  115*   CO2 29.0 24.0 23.0  --  23.0 21.0* 19.0*  19.0*   ANION GAP 9.0 10.0 8.0  --  7.0 10.0 13.0  13.0   BUN 33* 32* 36*  --  44* 62* 56*  56*   CREATININE 0.88 0.67* 0.72*  --  0.73* 1.10 1.05  1.05   EGFR 78.2 84.9 83.6  --  83.3 61.4 65.0  65.0   GLUCOSE 81 115* 97  --  144* 226* 213*  213*   CALCIUM 9.7* 9.5 9.7*  --  9.8* 9.2 9.0  9.0   MAGNESIUM 1.9 1.9 1.8  --  1.9  --  2.4*   PHOSPHORUS 2.9 2.5 2.5 2.7 2.1*  --  2.1*         Lab 03/21/25  0517 03/15/25  0929 03/15/25  0435 03/15/25  0004 03/14/25  1902   TOTAL PROTEIN 5.5* 5.2* 5.2*  5.2* 5.3* 5.6*   ALBUMIN 2.4* 2.5* 2.5*  2.5* 2.4* 2.5*   GLOBULIN 3.1 2.7 2.7  2.7 2.9 3.1   ALT (SGPT) 23 35 34  34 32 36   AST (SGOT) 24 28 31  31 34 35   BILIRUBIN 0.7 0.7 0.8  0.8 0.8 0.9   ALK PHOS 102 113 110  110 104 110         Lab 03/15/25  0435   CHOLESTEROL 65   TRIGLYCERIDES 79     Brief Urine Lab Results  (Last result in the past 365 days)        Color   Clarity   Blood   Leuk Est   Nitrite   Protein   CREAT   Urine HCG        03/11/25 1604 [place]   Clear   Moderate (2+)   Negative   Negative   30 mg/dL (1+)                 Microbiology Results Abnormal       Procedure Component Value - Date/Time    COVID-19, FLU A/B, RSV PCR 1 HR TAT - Swab, Nasopharynx [[id number]]  (Abnormal) Collected: 03/11/25 1431    Lab Status: Final result Specimen: Swab from Nasopharynx Updated: 03/11/25 1518     COVID19 Not Detected     Influenza A PCR Not Detected     Influenza B PCR Not Detected     RSV, PCR Detected    Narrative:      Fact sheet for providers: https://www.fda.gov/media/120815/download    Fact sheet for patients: https://www.fda.gov/media/151094/download    Test performed by PCR.          Duplex Venous Upper Extremity - Right CAR  Result Date: 3/20/2025    Sub-acute right upper extremity superficial thrombophlebitis noted in the basilic (upper arm) and basilic (forearm).   All other right sided vessels appear normal.     FL Video  Swallow With Speech Single Contrast  Result Date: 3/20/2025  FL VIDEO SWALLOW W SPEECH SINGLE-CONTRAST Date of Exam: 3/20/2025 10:04 AM EDT Indication: aspiration.   Comparison: None available. Technique:   The speech pathologist administered food and/or liquid mixed with barium to the patient with cine/video imaging.  Imaging assistance was provided to the speech pathologist and an image was saved. Fluoroscopic Time: 1 minute and 54 seconds Number of Images: 13 associated fluoroscopic loops were saved Findings: Aspiration was seen during fluoroscopic guided modified barium swallowing series. Please see speech therapy report for full details and recommendations.     Impression: Impression: Fluoroscopy provided for a modified barium swallow. Aspiration was seen during swallowing evaluation. Please see speech therapy report for full details and recommendations. Report dictated by: Shira Renner PA-c  I have personally reviewed this case and agree with the findings above: Electronically Signed: Preston Neil MD  3/20/2025 11:13 AM EDT  Workstation ID: [id number]      Results for orders placed during the hospital encounter of 02/15/24    Adult Transthoracic Echo Complete W/ Cont if Necessary Per Protocol    Interpretation Summary    Atrial fibrillation was the predominant rhythm observed during the procedure.    The study is technically difficult    Left ventricular wall thickness is consistent with mild concentric hypertrophy.    Left ventricular ejection fraction appears to be 51 - 55%.    Left ventricular diastolic function is consistent with (grade I) impaired relaxation.    The right ventricular cavity is borderline dilated.    The left atrial cavity is moderately dilated.  5.0 cm    The right atrial cavity is mildly  dilated.    No aortic valve regurgitation or stenosis is present    Mild mitral valve regurgitation with MAC is present.    Moderate tricuspid valve regurgitation is present.  RVSP- 56 mmHg     Borderline dilation of the aortic root is present.  3.8 cm    Current medications:  Scheduled Meds:busPIRone, 5 mg, Nasogastric, Daily With Lunch  [Held by provider] cetirizine, 10 mg, Oral, Daily  dicyclomine, 20 mg, Oral, 4x Daily  hydrOXYzine, 25 mg, Nasogastric, Nightly  [Held by provider] isosorbide mononitrate, 30 mg, Oral, QAM  melatonin, 10 mg, Nasogastric, Nightly  [Held by provider] NIFEdipine XL, 30 mg, Oral, Daily  Nutrisource fiber, 2 packet, Per G Tube, BID  pantoprazole, 40 mg, Intravenous, BID AC  polyethylene glycol, 17 g, Nasogastric, Daily  ProSource TF, 45 mL, Nasogastric, Daily  QUEtiapine, 25 mg, Nasogastric, Nightly  [Held by provider] rivaroxaban, 20 mg, Nasogastric, Daily With Dinner  senna-docusate sodium, 2 tablet, Nasogastric, Nightly  sodium chloride, 10 mL, Intravenous, Q12H      Continuous Infusions:[START ON 3/22/2025] lactated ringers, 9 mL/hr        PRN Meds:.  senna-docusate sodium **AND** [DISCONTINUED] polyethylene glycol **AND** [DISCONTINUED] bisacodyl **AND** bisacodyl    Calcium Replacement - Follow Nurse / BPA Driven Protocol    dextrose    dextrose    glucagon (human recombinant)    hyoscyamine    ipratropium-albuterol    Magnesium Standard Dose Replacement - Follow Nurse / BPA Driven Protocol    nitroglycerin    ondansetron    Phosphorus Replacement - Follow Nurse / BPA Driven Protocol    Potassium Replacement - Follow Nurse / BPA Driven Protocol    sodium chloride    sodium chloride    sodium chloride    traMADol    trolamine salicylate    Assessment & Plan   Assessment & Plan     Active Hospital Problems    Diagnosis  POA    **Rhabdomyolysis [M62.82]  Yes    Moderate protein-calorie malnutrition [E44.0]  Yes      Resolved Hospital Problems   No resolved problems to display.     Brief Hospital Course to date:  Carlos Preston is a 89 y.o. male with a history of heart disease, hyperlipidemia, ischemic heart disease status post CABG, paroxysmal A-fib on Eliquis presented  to the hospital with progressive weakness.  Found to have bilateral pneumonia and acute rhabdomyolysis    Acute hypoxia respiratory insufficiency, resolved   RSV infection, resolved  Superimposed left lower lobe bacterial pneumonia, unspecified, improved  Bilateral basal lung fibrosis concerning for chronic dysphagia / aspiration  CT chest with bilateral chronic fibrotic changes in both lung bases and developing left lower lobe pneumonia.  Patient is forced for RSV  Was hypoxic, requiring 4 L nasal cannula - has weaned to room air   Completed 7 days Zosyn / Doxycycline  Status post IV Solu-Medrol and PO Prednisone  Negative Legionella antigen, strep antigen and MRSA swab  Sputum culture negative to date  DuoNebs, Mucinex and IS    Chronic dysphagia  History of esophageal stricture  SLP following   MBS 3/20/2025 --> nectar thick liquids.  Continues to have poor oral intake.  Family interested in PEG tube.  Dr. Pa/general surgery consulted.  Status post PEG tube placement 3/21/2025.  Continue holding Xarelto.    Nutrition team will start nocturnal feed  Case management consulted to arrange for home health and home tube feed    Acute rhabdomyolysis, resolved   Elevated troponin  Patient fell x 2 and was on the floor for 4-5 hours  CPK on admission 2500.  Trended down to 1200 with IV fluids by 3/12  Elevated troponin felt to be demand ischemia and secondary to rhabdomyolysis. Patient with no chest pain or ischemic changes in EKG.  Defer ischemic workup given his frailty and age       Severe iron deficiency anemia   Symptomatic anemia   Patient was anemic with Hgb 7.6 on admission - s/p 1 unit PRBCs    s/p IV iron x 3 doses  Start p.o. iron  Hemoglobin stable around 10  No evidence of GI bleed    Elevated liver enzymes, improved  CBD dilation  Abnormal CT abdomen with common bile duct dilation as well as pancreas duct dilation.   No focal mass or obstructive process seen on noncontrast CT  Total bilirubin is  "normal  GI following and recommended MRCP when more stable    A-fib  Hypertension of CABG  HTN  Xarelto held for PEG placement.  Will restart tomorrow  CT head no acute intracranial abnormality identified  ECHO: 02/2024: EF: 51-55%     Debility  PT/OT and OT recommended rehab but the patient and his family declined despite extensive counseling  Family arrange for caregiver at home.  Tentative plan to discharge him on Sunday, 3/23/2025    Hypoactive delirium, resolved  Sleep hygiene - limit interruptions at night  Continue Seroquel 25 mg QHS, Melatonin 10 mg QHS and Hydroxyzine 25 mg QHS    Expected Discharge Location and Transportation: Home with home health / 24/7 caregivers   Expected Discharge Expected Discharge Date: 3/23/2025; Expected Discharge Time:      VTE Prophylaxis:Pharmacologic & mechanical VTE prophylaxis orders are present.    AM-PAC 6 Clicks Score (PT): 9 (03/21/25 1210)    CODE STATUS:   Code Status and Medical Interventions: CPR (Attempt to Resuscitate); Full Support   Ordered at: 03/11/25 3815     Code Status (Patient has no pulse and is not breathing):    CPR (Attempt to Resuscitate)     Medical Interventions (Patient has pulse or is breathing):    Full Support     Level Of Support Discussed With:    Patient     Maksim Delgado MD  03/21/25        Electronically signed by Maksim Delgado MD at 03/21/25 1350       Stanley Pa MD at 03/21/25 0699          Patient Name:  Carlos Preston  YOB: 1928  [id number]    Surgery Progress Note    Date of visit: 3/21/2025      Subjective: No events overnight.  Tube feeds have been held          Objective:     /72 (BP Location: Right arm, Patient Position: Lying)   Pulse 74   Temp 98.1 °F (36.7 °C) (Axillary)   Resp 18   Ht 182.9 cm (72.01\")   Wt 83.9 kg (184 lb 15.5 oz)   SpO2 95%   BMI 25.08 kg/m²     Intake/Output Summary (Last 24 hours) at 3/21/2025 0654  Last data filed at 3/21/2025 0600  Gross per 24 hour "   Intake 1387 ml   Output 5550 ml   Net -4163 ml       GEN:   Awake, resting in bed, elderly and chronically ill-appearing  CV:   Regular rate and rhythm  L:  Symmetric expansion, not labored on room air  Abd:  Soft, no distress to palpation  Ext:  No cyanosis, clubbing, or edema    Recent labs that are back at this time have been reviewed.           Assessment/ Plan:    Mr. Preston is a 89-year-old gentleman with history of coronary artery disease, ischemic heart disease, atrial fibrillation on Eliquis, and hyperlipidemia who I been asked to evaluate for PEG placement due to dysphagia     -Plan to proceed forward with PEG today      Stanley Pa MD  3/21/2025  06:54 EDT        Electronically signed by Stanley Pa MD at 03/21/25 0653

## 2025-03-21 NOTE — DISCHARGE PLACEMENT REQUEST
"Case Management  Agnes Hodge -[phone]    Nazanin Preston \"JOSHUA\" (89 y.o. Male)       Date of Birth   03/20/1928    Social Security Number       Address   [facility] [address] [facility] [address]    Home Phone   [phone]    MRN   [id number]       [employer]   [employer]    Marital Status                               Admission Date   3/11/2025    Admission Type   Emergency    Admitting Provider   Maksim Delgado MD    Attending Provider   Maksim Delgado MD    Department, Room/Bed   [facility] 6B, N636/1       Discharge Date       Discharge Disposition       Discharge Destination                                 Attending Provider: Maksim Delgado MD    Allergies: No Known Allergies    Isolation: None   Infection: None   Code Status: CPR    Ht: 182.9 cm (72\")   Wt: 83.5 kg (184 lb)    Admission Cmt: None   Principal Problem: Rhabdomyolysis [M62.82]                   Active Insurance as of 3/11/2025       Primary Coverage       Payor Plan Insurance Group Employer/Plan Group    MEDICARE MEDICARE A & B        Payor Plan Address Payor Plan Phone Number Payor Plan Fax Number Effective Dates    [address] [phone]  3/1/1993 - None Entered    [facility] [address]         Subscriber Name Subscriber Birth Date Member ID       NAZANIN PRESTON 3/20/1928 [id number]               Secondary Coverage       Payor Plan Insurance Group Employer/Plan Group    [employer] MC SUP AAR HEALTH CARE OPTIONS        Payor Plan Address Payor Plan Phone Number Payor Plan Fax Number Effective Dates    [facility] [phone]  1/1/2016 - None Entered    [address]       [facility] [id number]         Subscriber Name Subscriber Birth Date Member ID       NAZANIN PRESTON 3/20/1928 [id number]                    [facility] 6B  [address] [facility] [address]  Dept. Phone:  [phone]  Dept. Fax:  [phone] Date Ordered: Mar 20, 2025         Patient:  " "Carlos Preston MRN:  [id number]   [address] [place] [address] :  3/20/1928  SSN:    Phone: [phone] Sex:  M     Weight: 83.5 kg (184 lb)         Ht Readings from Last 1 Encounters:   25 182.9 cm (72\")         Patient Lift       (Order ID: [id number])    Diagnosis:  Pneumonia of both lungs due to infectious organism, unspecified part of lung (J18.9 [ICD-10-CM] 483.8 [ICD-9-CM])  Traumatic rhabdomyolysis, initial encounter (T79.6XXA [ICD-10-CM] 958.6 [ICD-9-CM])  Impaired mobility and ADLs (Z74.09,Z78.9 [ICD-10-CM] V49.89 [ICD-9-CM])   Quantity:  1     Lift type: W/ Seat or Sling Hydraulic  Does the patient have severe arthritis of the hip or knee? No  Does the patient have a severe neurouscular disease? No  Is the patient completely incapable of standing up from a regular armchair or any chair in his/her home: Yes  Once standing, does the patient have the ability to ambulate: No  Have all appropriate therapeutic modalities to enable the patient to transfer from a chair to a standing position (medication, physical therapy) been tried or failed? If YES, this is documented in the patient's medical records. Yes  Length of Need: 99 Months = Lifetime        Authorizing Provider's Phone: [phone]  Authorizing Provider:Maksim Delgado MD  Authorizing Provider's NPI: [id number]  Order Entered By: Agnes Hodge RN 3/20/2025 11:41 AM     Electronically signed by: Maksim Delgado MD 3/20/2025 11:41 AM        [address] [address]  Dept. Phone:  [phone]  Dept. Fax:  [phone] Date Ordered: Mar 19, 2025         Patient:  Carlos Preston MRN:  [id number]   [address] [place] [address] :  3/20/1928  SSN:    Phone: [phone] Sex:  M     Weight: 83.5 kg (184 lb)         Ht Readings from Last 1 Encounters:   25 182.9 cm (72\")         Hospital Bed  (Order ID: [id number])    Diagnosis:  " "Pneumonia of both lungs due to infectious organism, unspecified part of lung (J18.9 [ICD-10-CM] 483.8 [ICD-9-CM])  Oropharyngeal dysphagia (R13.12 [ICD-10-CM] 787.22 [ICD-9-CM])   Quantity:  1     Semi-Electric Bed Type:  Head / Foot Adjustment, Side Rails & Mattress  Necessity: Requires Head of Bed Elevated More Than 30 Degrees Most of the Time  Due to: Problems with Aspiration  Pressure Reducing Surface:  Gel Pressure Mattress  Criteria: Limited Mobility (Cannot Independently Make Significant Enough Changes to Alleviate Pressure)  Due to: Impaired Nutritional Status  Length of Need: 99 Months = Lifetime        Authorizing Provider's Phone: [phone]  Authorizing Provider:Maksim Delgado MD  Authorizing Provider's NPI: [id number]  Order Entered By: Agnes Hodge RN 3/19/2025  1:11 PM     Electronically signed by: Maksim Delgado MD 3/19/2025  1:11 PM     Emergency Contacts        (Rel.) Home Phone Work Phone Mobile Phone    Bettye Hollingsworth (Daughter) [phone] -- --                 Physician Progress Notes (last 24 hours)        Stanley Pa MD at 03/21/25 0654          Patient Name:  Carlos Preston  YOB: 1928  [id number]    Surgery Progress Note    Date of visit: 3/21/2025      Subjective: No events overnight.  Tube feeds have been held          Objective:     /72 (BP Location: Right arm, Patient Position: Lying)   Pulse 74   Temp 98.1 °F (36.7 °C) (Axillary)   Resp 18   Ht 182.9 cm (72.01\")   Wt 83.9 kg (184 lb 15.5 oz)   SpO2 95%   BMI 25.08 kg/m²     Intake/Output Summary (Last 24 hours) at 3/21/2025 0654  Last data filed at 3/21/2025 0600  Gross per 24 hour   Intake 1387 ml   Output 5550 ml   Net -4163 ml       GEN:   Awake, resting in bed, elderly and chronically ill-appearing  CV:   Regular rate and rhythm  L:  Symmetric expansion, not labored on room air  Abd:  Soft, no distress to palpation  Ext:  No cyanosis, clubbing, or " edema    Recent labs that are back at this time have been reviewed.           Assessment/ Plan:    Mr. Preston is a 89-year-old gentleman with history of coronary artery disease, ischemic heart disease, atrial fibrillation on Eliquis, and hyperlipidemia who I been asked to evaluate for PEG placement due to dysphagia     -Plan to proceed forward with PEG today      Stanley Pa MD  3/21/2025  06:54 EDT        Electronically signed by Stanley Pa MD at 25 0655          Physical Therapy Notes (most recent note)        Flory Little, PT at 25 1021  Version 1 of 1         Patient Name: Carlos Preston  : 3/20/1928    MRN: [id number]                              Today's Date: 3/19/2025       Admit Date: 3/11/2025    Visit Dx:     ICD-10-CM ICD-9-CM   1. Pneumonia of both lungs due to infectious organism, unspecified part of lung  J18.9 483.8   2. Traumatic rhabdomyolysis, initial encounter  T79.6XXA 958.6   3. Elevated troponin  R79.89 790.6   4. RSV (acute bronchiolitis due to respiratory syncytial virus)  J21.0 466.11   5. Impaired mobility and ADLs  Z74.09 V49.89    Z78.9    6. Oropharyngeal dysphagia  R13.12 787.22     Patient Active Problem List   Diagnosis    Pulmonary HTN    Hyperlipemia    HTN (hypertension)    GERD (gastroesophageal reflux disease)    Hx of CABG    IHD (ischemic heart disease)    SOB (shortness of breath)    Abnormal chest x-ray    Esophageal stricture    Rhabdomyolysis    Moderate protein-calorie malnutrition     Past Medical History:   Diagnosis Date    Cancer     H/O prostatectomy     Hearing loss     Heart disease     History of back surgery     Hypercholesterolemia     Hyperlipidemia     Hypertension     IHD (ischemic heart disease)     S/P CABG    Osteoarthritis of spine with myelopathy, lumbar region     Paroxysmal atrial fibrillation 2018    Pulmonary hypertension     S/P knee replacement     Sinus disorder     thyroid ultrasound 2009    Normal      Past Surgical History:   Procedure Laterality Date    CARDIAC CATHETERIZATION  05/16/2007    Dr. Mace: 60% LM and 70% LCX.    CARDIAC CATHETERIZATION  08/19/2009    %, OM 85-90%,Patent grafts.    CARDIAC CATHETERIZATION  07/23/2018    Patent Grafts    CARDIOVASCULAR STRESS TEST  08/05/2009    ROBIN George: Dr. Mace- EF 29%, Diffuse ischemia.    CARDIOVASCULAR STRESS TEST  11/07/2012    L. Myoview- Inferoseptal infarct with jon infarct ischemia. EF 41%.    CARDIOVASCULAR STRESS TEST  03/23/2015    L. Myoview- EF51%,fixed defect , no ischemia burden.    CARDIOVASCULAR STRESS TEST  10/11/2017    L.Myoview- EF 54%. Small inferior Ischemia.    CONVERTED (HISTORICAL) HOLTER  06/12/2023    3 days. A.Fib. Avg 60. . 3 VT. One 3 Sec Pause    CONVERTED (HISTORICAL) HOLTER  05/28/2024    3 Days. A.Fib. Avg 70. . 4.6% PVC. 2 VT    CORONARY ARTERY BYPASS GRAFT  05/17/2007    CABG:  SVG to LAD and LCX.    ECHO - CONVERTED  08/06/2009    Dr. Mace- EF 50%.    ECHO - CONVERTED  02/14/2011    EF 50%, Septal WMA.    ECHO - CONVERTED  11/07/2012    EF 40-45%, RVSP 33 mmHg.    ECHO - CONVERTED  03/23/2015    EF 45-50%, RVSP 40mmHg,mild MR, mild PHT    ECHO - CONVERTED  05/01/2017    EF 50-55%, mild MR    ECHO - CONVERTED  10/11/2017    EF 55%    ECHO - CONVERTED  03/21/2022    TLS. EF 55%. LA- 5.2 cm. Mild MR. RVSP- 44 mmHg    ECHO - CONVERTED  02/15/2024    TLS. EF 55%.RHE. LA- 5.0. Mild MR.AO- 3.8. RVSP- 56 mmHg    US CAROTID UNILATERAL  04/16/2007    Mild Plaque in Rt. bulb and RECA.      General Information       Row Name 03/19/25 7197          Physical Therapy Time and Intention    Document Type therapy note (daily note)  -KE     Mode of Treatment physical therapy  -KE       Row Name 03/19/25 6938          General Information    Patient Profile Reviewed yes  -KE     Existing Precautions/Restrictions fall;other (see comments)  NG  -KE     Barriers to Rehab medically complex  -KE       Row Name 03/19/25 1539           Cognition    Orientation Status (Cognition) oriented x 3  -KE       Row Name 03/19/25 1535          Safety Issues/Impairments Affecting Functional Mobility    Safety Issues Affecting Function (Mobility) awareness of need for assistance;safety precaution awareness;safety precautions follow-through/compliance;insight into deficits/self-awareness;sequencing abilities  -KE     Impairments Affecting Function (Mobility) balance;cognition;coordination;endurance/activity tolerance;postural/trunk control;shortness of breath;strength;motor planning  -KE               User Key  (r) = Recorded By, (t) = Taken By, (c) = Cosigned By      Initials Name Provider Type    Flory Begum PT Physical Therapist                   Mobility       Row Name 03/19/25 1536          Bed Mobility    Bed Mobility supine-sit  -KE     Supine-Sit [place] (Bed Mobility) maximum assist (25% patient effort);2 person assist  -KE     Assistive Device (Bed Mobility) bed rails;head of bed elevated;repositioning sheet  -KE     Comment, (Bed Mobility) VCs for sequencing, assist at trunk and LEs  -KE       Row Name 03/19/25 1536          Bed-Chair Transfer    Bed-Chair [place] (Transfers) maximum assist (25% patient effort);2 person assist  -KE     Assistive Device (Bed-Chair Transfers) other (see comments)  UE support  -KE       Row Name 03/19/25 1536          Sit-Stand Transfer    Sit-Stand [place] (Transfers) maximum assist (25% patient effort);2 person assist  -KE     Assistive Device (Sit-Stand Transfers) other (see comments)  B UE support  -KE     Comment, (Sit-Stand Transfer) x1 from EOB, x2 from chair; tolerated standing ~20 sec once upright  -WHITNEY               User Key  (r) = Recorded By, (t) = Taken By, (c) = Cosigned By      Initials Name Provider Type    Flory Begum PT Physical Therapist                   Obj/Interventions       Row Name 03/19/25 1537          Balance    Balance Assessment sitting static  balance;sitting dynamic balance;standing static balance;standing dynamic balance  -KE     Static Sitting Balance minimal assist  -KE     Dynamic Sitting Balance moderate assist  -KE     Position, Sitting Balance sitting edge of bed  -KE     Static Standing Balance maximum assist;2-person assist  -KE     Dynamic Standing Balance maximum assist;2-person assist  -KE     Position/Device Used, Standing Balance supported  -KE     Balance Interventions sitting;standing;sit to stand;supported;static;dynamic  -KE     Comment, Balance posterior lean; VCs throughout for anterior weight shift  -KE               User Key  (r) = Recorded By, (t) = Taken By, (c) = Cosigned By      Initials Name Provider Type    Flory Begum PT Physical Therapist                   Goals/Plan    No documentation.                  Clinical Impression       Row Name 03/19/25 1538          Pain    Pretreatment Pain Rating 0/10 - no pain  -KE     Posttreatment Pain Rating 0/10 - no pain  -KE       Row Name 03/19/25 1538          Plan of Care Review    Plan of Care Reviewed With patient;family  -KE     Progress improving  -KE     Outcome Evaluation Pt able to complete SPT w/ B UE support w/ MaxAx2. Noted improved alertness w/ good participation in therapy session, however continues to req sig assist for functional mobility. Pt may continue to benefit from IPPT to address deficits in strength, balance, and functional endurance. PT continue to rec SNF at d/c.  -KE       Row Name 03/19/25 1538          Vital Signs    Post Systolic BP Rehab 153  -KE     Post Treatment Diastolic BP 83  -KE     Posttreatment Heart Rate (beats/min) 73  -KE     O2 Delivery Pre Treatment room air  -KE     O2 Delivery Intra Treatment room air  -KE     Post SpO2 (%) 97  -KE     O2 Delivery Post Treatment room air  -KE     Pre Patient Position Supine  -KE     Intra Patient Position Standing  -KE     Post Patient Position Sitting  -KE       Row Name 03/19/25 1532           Positioning and Restraints    Pre-Treatment Position in bed  -KE     Post Treatment Position chair  -KE     In Chair notified nsg;reclined;waffle cushion;on mechanical lift sling;call light within reach;encouraged to call for assist;exit alarm on;with family/caregiver;legs elevated;heels elevated  -WHITNEY               User Key  (r) = Recorded By, (t) = Taken By, (c) = Cosigned By      Initials Name Provider Type    Flory Begum, DAMIEN Physical Therapist                   Outcome Measures       Row Name 03/19/25 1549 03/19/25 0855       How much help from another person do you currently need...    Turning from your back to your side while in flat bed without using bedrails? 2  -KE 2  -MICHAEL    Moving from lying on back to sitting on the side of a flat bed without bedrails? 2  -KE 2  -MICHAEL    Moving to and from a bed to a chair (including a wheelchair)? 2  -KE 2  -MICHAEL    Standing up from a chair using your arms (e.g., wheelchair, bedside chair)? 2  -KE 2  -MICHAEL    Climbing 3-5 steps with a railing? 1  -KE 1  -MICHAEL    To walk in hospital room? 1  -KE 1  -MICHAEL    AM-PAC 6 Clicks Score (PT) 10  -KE 10  -MICHAEL    Highest Level of Mobility Goal 4 --> Transfer to chair/commode  -KE 4 --> Transfer to chair/commode  -MICHAEL      Row Name 03/19/25 1549          Functional Assessment    Outcome Measure Options AM-PAC 6 Clicks Basic Mobility (PT)  -WHITNEY               User Key  (r) = Recorded By, (t) = Taken By, (c) = Cosigned By      Initials Name Provider Type    Maria E Salcido, RN Registered Nurse    Flory Begum, DAMIEN Physical Therapist                                 Physical Therapy Education       Title: PT OT SLP Therapies (In Progress)       Topic: Physical Therapy (In Progress)       Point: Mobility training (In Progress)       Learning Progress Summary            Patient Acceptance, E, NR by WHITNEY at 3/19/2025 1549    Comment: provided HEP handout for bed level exercises to family    Acceptance, E, NR by WHITNEY at 3/17/2025 0347     Acceptance, E, NR by KE at 3/12/2025 1606   Family Acceptance, E, NR by KE at 3/19/2025 1549    Comment: provided HEP handout for bed level exercises to family                      Point: Home exercise program (In Progress)       Learning Progress Summary            Patient Acceptance, E, NR by KE at 3/19/2025 1549    Comment: provided HEP handout for bed level exercises to family    Acceptance, E, NR by KE at 3/17/2025 1608    Acceptance, E, NR by KE at 3/12/2025 1606   Family Acceptance, E, NR by KE at 3/19/2025 1549    Comment: provided HEP handout for bed level exercises to family                      Point: Body mechanics (In Progress)       Learning Progress Summary            Patient Acceptance, E, NR by KE at 3/19/2025 1549    Comment: provided HEP handout for bed level exercises to family    Acceptance, E, NR by KE at 3/17/2025 1608    Acceptance, E, NR by KE at 3/12/2025 1606   Family Acceptance, E, NR by KE at 3/19/2025 1549    Comment: provided HEP handout for bed level exercises to family                      Point: Precautions (In Progress)       Learning Progress Summary            Patient Acceptance, E, NR by KE at 3/19/2025 1549    Comment: provided HEP handout for bed level exercises to family    Acceptance, E, NR by KE at 3/17/2025 1608    Acceptance, E, NR by KE at 3/12/2025 1606   Family Acceptance, E, NR by KE at 3/19/2025 1549    Comment: provided HEP handout for bed level exercises to family                                      User Key       Initials Effective Dates Name Provider Type Discipline     11/16/23 -  Flory Little, PT Physical Therapist PT                  PT Recommendation and Plan  Planned Therapy Interventions (PT): home exercise program, balance training, bed mobility training, gait training, neuromuscular re-education, patient/family education, postural re-education, transfer training, stretching, strengthening, stair training, ROM (range of motion)  Progress:  improving  Outcome Evaluation: Pt able to complete SPT w/ B UE support w/ MaxAx2. Noted improved alertness w/ good participation in therapy session, however continues to req sig assist for functional mobility. Pt may continue to benefit from IPPT to address deficits in strength, balance, and functional endurance. PT continue to rec SNF at d/c.     Time Calculation:   PT Evaluation Complexity  History, PT Evaluation Complexity: 1-2 personal factors and/or comorbidities  Examination of Body Systems (PT Eval Complexity): 1-2 elements  Clinical Presentation (PT Evaluation Complexity): stable  Clinical Decision Making (PT Evaluation Complexity): low complexity  Overall Complexity (PT Evaluation Complexity): low complexity     PT Charges       Row Name 25 1550             Time Calculation    Start Time 1021  -KE      PT Received On 25  -KE         Timed Charges    46476 - PT Therapeutic Activity Minutes 30  -KE         Total Minutes    Timed Charges Total Minutes 30  -KE       Total Minutes 30  -KE                User Key  (r) = Recorded By, (t) = Taken By, (c) = Cosigned By      Initials Name Provider Type    Flory Begum PT Physical Therapist                  Therapy Charges for Today       Code Description Service Date Service Provider Modifiers Qty    49176434119 HC PT THERAPEUTIC ACT EA 15 MIN 3/19/2025 Flory Little, PT GP 2    39304017725 HC PT THER SUPP EA 15 MIN 3/19/2025 Flory Little, PT GP 30            PT G-Codes  Outcome Measure Options: AM-PAC 6 Clicks Basic Mobility (PT)  AM-PAC 6 Clicks Score (PT): 10  AM-PAC 6 Clicks Score (OT): 7  PT Discharge Summary  Anticipated Discharge Disposition (PT): skilled nursing facility    Flory Little PT  3/19/2025      Electronically signed by Flory Little PT at 25 1551          Occupational Therapy Notes (most recent note)        Katerina Bernardo, OT at 25 1118          Patient Name: Carlos Preston  : 3/20/1928    MRN:  [id number]                              Today's Date: 3/18/2025       Admit Date: 3/11/2025    Visit Dx:     ICD-10-CM ICD-9-CM   1. Pneumonia of both lungs due to infectious organism, unspecified part of lung  J18.9 483.8   2. Traumatic rhabdomyolysis, initial encounter  T79.6XXA 958.6   3. Elevated troponin  R79.89 790.6   4. RSV (acute bronchiolitis due to respiratory syncytial virus)  J21.0 466.11   5. Impaired mobility and ADLs  Z74.09 V49.89    Z78.9    6. Oropharyngeal dysphagia  R13.12 787.22     Patient Active Problem List   Diagnosis    Pulmonary HTN    Hyperlipemia    HTN (hypertension)    GERD (gastroesophageal reflux disease)    Hx of CABG    IHD (ischemic heart disease)    SOB (shortness of breath)    Abnormal chest x-ray    Esophageal stricture    Rhabdomyolysis    Moderate protein-calorie malnutrition     Past Medical History:   Diagnosis Date    Cancer     H/O prostatectomy     Hearing loss     Heart disease     History of back surgery     Hypercholesterolemia     Hyperlipidemia     Hypertension     IHD (ischemic heart disease)     S/P CABG    Osteoarthritis of spine with myelopathy, lumbar region     Paroxysmal atrial fibrillation 07/03/2018    Pulmonary hypertension     S/P knee replacement     Sinus disorder     thyroid ultrasound 08/28/2009    Normal     Past Surgical History:   Procedure Laterality Date    CARDIAC CATHETERIZATION  05/16/2007    Dr. Mace: 60% LM and 70% LCX.    CARDIAC CATHETERIZATION  08/19/2009    %, OM 85-90%,Patent grafts.    CARDIAC CATHETERIZATION  07/23/2018    Patent Grafts    CARDIOVASCULAR STRESS TEST  08/05/2009    ROBIN George: Dr. Mace- EF 29%, Diffuse ischemia.    CARDIOVASCULAR STRESS TEST  11/07/2012    L. Myoview- Inferoseptal infarct with jon infarct ischemia. EF 41%.    CARDIOVASCULAR STRESS TEST  03/23/2015    L. Myoview- EF51%,fixed defect , no ischemia burden.    CARDIOVASCULAR STRESS TEST  10/11/2017    L.Myoview- EF 54%. Small inferior Ischemia.  "   CONVERTED (HISTORICAL) HOLTER  06/12/2023    3 days. A.Fib. Avg 60. . 3 VT. One 3 Sec Pause    CONVERTED (HISTORICAL) HOLTER  05/28/2024    3 Days. A.Fib. Avg 70. . 4.6% PVC. 2 VT    CORONARY ARTERY BYPASS GRAFT  05/17/2007    CABG:  SVG to LAD and LCX.    ECHO - CONVERTED  08/06/2009    Dr. Mace- EF 50%.    ECHO - CONVERTED  02/14/2011    EF 50%, Septal WMA.    ECHO - CONVERTED  11/07/2012    EF 40-45%, RVSP 33 mmHg.    ECHO - CONVERTED  03/23/2015    EF 45-50%, RVSP 40mmHg,mild MR, mild PHT    ECHO - CONVERTED  05/01/2017    EF 50-55%, mild MR    ECHO - CONVERTED  10/11/2017    EF 55%    ECHO - CONVERTED  03/21/2022    TLS. EF 55%. LA- 5.2 cm. Mild MR. RVSP- 44 mmHg    ECHO - CONVERTED  02/15/2024    TLS. EF 55%.RHE. LA- 5.0. Mild MR.AO- 3.8. RVSP- 56 mmHg    US CAROTID UNILATERAL  04/16/2007    Mild Plaque in Rt. bulb and RECA.      General Information       Row Name 03/18/25 1149          OT Time and Intention    Document Type therapy note (daily note)  -JR     Mode of Treatment occupational therapy  -JR       Row Name 03/18/25 1149          General Information    Patient Profile Reviewed yes  -JR     Existing Precautions/Restrictions fall;other (see comments)  NG tube  -JR     Barriers to Rehab medically complex  -JR       Row Name 03/18/25 1149          Cognition    Orientation Status (Cognition) oriented x 3  Pt reported being at a \"[facility]\"  -JR       Row Name 03/18/25 1149          Safety Issues/Impairments Affecting Functional Mobility    Safety Issues Affecting Function (Mobility) awareness of need for assistance;insight into deficits/self-awareness;safety precaution awareness;safety precautions follow-through/compliance;sequencing abilities;problem-solving;judgment  -JR     Impairments Affecting Function (Mobility) balance;cognition;coordination;endurance/activity tolerance;postural/trunk control;shortness of breath;strength  -JR     Cognitive Impairments, Mobility " Safety/Performance awareness, need for assistance;insight into deficits/self-awareness;problem-solving/reasoning;judgment;safety precaution awareness;safety precaution follow-through;sequencing abilities  -               User Key  (r) = Recorded By, (t) = Taken By, (c) = Cosigned By      Initials Name Provider Type    Katerina Torrez OT Occupational Therapist                     Mobility/ADL's       Row Name 03/18/25 1151          Bed Mobility    Bed Mobility supine-sit;sit-supine;rolling left;rolling right;scooting/bridging  -     Rolling Left [place] (Bed Mobility) dependent (less than 25% patient effort);2 person assist;verbal cues  -JR     Rolling Right [place] (Bed Mobility) dependent (less than 25% patient effort);2 person assist;verbal cues  -JR     Scooting/Bridging [place] (Bed Mobility) dependent (less than 25% patient effort);2 person assist;verbal cues  -JR     Supine-Sit [place] (Bed Mobility) dependent (less than 25% patient effort)  -     Sit-Supine [place] (Bed Mobility) dependent (less than 25% patient effort)  -     Bed Mobility, Safety Issues decreased use of arms for pushing/pulling;decreased use of legs for bridging/pushing;impaired trunk control for bed mobility  -     Assistive Device (Bed Mobility) bed rails;head of bed elevated;repositioning sheet  -     Comment, (Bed Mobility) Pt required increased time and verbal cues for supine to sit on EOB and sit to supine. Pt noted to be SOA sitting EOB, but O2 95% on RA. Pt noted to be incontinent of bowel once supine, dependent for hygiene, linen change and rolling in bed.  -       Row Name 03/18/25 1151          Transfers    Comment, (Transfers) Defer-not appropriate this date  -       Row Name 03/18/25 1151          Activities of Daily Living    BADL Assessment/Intervention upper body dressing;lower body dressing;toileting  -       Row Name 03/18/25 1151          Upper Body Dressing  Assessment/Training    Adrian Level (Upper Body Dressing) don;doff;dependent (less than 25% patient effort)  -JR     Position (Upper Body Dressing) supine  -JR     Comment, (Upper Body Dressing) hospital gown  -JR       Row Name 03/18/25 1151          Lower Body Dressing Assessment/Training    Adrian Level (Lower Body Dressing) don;doff;socks;dependent (less than 25% patient effort)  -JR     Position (Lower Body Dressing) supine  -JR       Row Name 03/18/25 1151          Toileting Assessment/Training    [place] Level (Toileting) adjust/manage clothing;change pad/brief;perform perineal hygiene;dependent (less than 25% patient effort)  -JR     Position (Toileting) supine  -JR               User Key  (r) = Recorded By, (t) = Taken By, (c) = Cosigned By      Initials Name Provider Type    Katerina Torrez OT Occupational Therapist                   Obj/Interventions       Row Name 03/18/25 1154          Balance    Static Sitting Balance minimal assist;verbal cues  -JR     Position, Sitting Balance supported;sitting edge of bed  -JR               User Key  (r) = Recorded By, (t) = Taken By, (c) = Cosigned By      Initials Name Provider Type    Katerina Torrez, OT Occupational Therapist                   Goals/Plan    No documentation.                  Clinical Impression       Row Name 03/18/25 1154          Pain Assessment    Pretreatment Pain Rating 0/10 - no pain  -JR     Posttreatment Pain Rating 0/10 - no pain  -JR       Row Name 03/18/25 1154          Plan of Care Review    Plan of Care Reviewed With patient;family  -JR     Progress declining  -JR     Outcome Evaluation Pt requiring increased assist with bed mobility, sitting balance and ADL's this date. Pt remains below baseline with ADL's and mobility due to decreased strength, balance, activity tolerance and SOA. Recommend continued skilled OT services and transfer to SNF at d/c.  -JR       Row Name 03/18/25 1154          Therapy  Assessment/Plan (OT)    Patient/Family Therapy Goal Statement (OT) Family would like to take pt home at d/c.  -JR       Row Name 03/18/25 1154          Vital Signs    Pre Systolic BP Rehab 148  -JR     Pre Treatment Diastolic BP 77  -JR     Post Systolic BP Rehab 137  -JR     Post Treatment Diastolic BP 77  -JR     Pretreatment Heart Rate (beats/min) 79  -JR     Posttreatment Heart Rate (beats/min) 71  -JR     Pre SpO2 (%) 97  -JR     O2 Delivery Pre Treatment room air  -JR     Post SpO2 (%) 97  -JR     O2 Delivery Post Treatment room air  -JR     Pre Patient Position Supine  -JR     Intra Patient Position Sitting  -JR     Post Patient Position Supine  -JR       Row Name 03/18/25 1154          Positioning and Restraints    Pre-Treatment Position in bed  -JR     Post Treatment Position bed  -JR     In Bed notified nsg;supine;call light within reach;encouraged to call for assist;exit alarm on;with family/caregiver;legs elevated;RUE elevated;LUE elevated  -JR               User Key  (r) = Recorded By, (t) = Taken By, (c) = Cosigned By      Initials Name Provider Type    JR Katerina Bernardo, OT Occupational Therapist                   Outcome Measures       Row Name 03/18/25 1158          How much help from another is currently needed...    Putting on and taking off regular lower body clothing? 1  -JR     Bathing (including washing, rinsing, and drying) 1  -JR     Toileting (which includes using toilet bed pan or urinal) 1  -JR     Putting on and taking off regular upper body clothing 1  -JR     Taking care of personal grooming (such as brushing teeth) 2  -JR     Eating meals 1  -JR     AM-PAC 6 Clicks Score (OT) 7  -JR       Row Name 03/18/25 5167          How much help from another person do you currently need...    Turning from your back to your side while in flat bed without using bedrails? 2 (P)   -ALLIE     Moving from lying on back to sitting on the side of a flat bed without bedrails? 2 (P)   -ALLIE     Moving to and  from a bed to a chair (including a wheelchair)? 1 (P)   -ALLIE     Standing up from a chair using your arms (e.g., wheelchair, bedside chair)? 2 (P)   -ALLIE     Climbing 3-5 steps with a railing? 1 (P)   -ALLIE     To walk in hospital room? 1 (P)   -ALLIE     AM-PAC 6 Clicks Score (PT) 9 (P)   -ALLIE     Highest Level of Mobility Goal 3 --> Sit at edge of bed (P)   -ALLIE       Row Name 03/18/25 115          Functional Assessment    Outcome Measure Options AM-PAC 6 Clicks Daily Activity (OT)  -               User Key  (r) = Recorded By, (t) = Taken By, (c) = Cosigned By      Initials Name Provider Type    Katerina Torrez, OT Occupational Therapist    Maida Veliz, Nursing Student Nursing Student                    Occupational Therapy Education       Title: PT OT SLP Therapies (In Progress)       Topic: Occupational Therapy (Done)       Point: ADL training (Done)       Learning Progress Summary            Patient Acceptance, E, VU,NR by  at 3/18/2025 1018    Comment: role of therapy, benefits of activity                      Point: Home exercise program (Done)       Learning Progress Summary            Patient Acceptance, E, VU,NR by  at 3/18/2025 1018    Comment: role of therapy, benefits of activity                                      User Key       Initials Effective Dates Name Provider Type [facility] 02/03/23 -  Katerina Bernardo, OT Occupational Therapist OT                  OT Recommendation and Plan     Plan of Care Review  Plan of Care Reviewed With: patient, family  Progress: declining  Outcome Evaluation: Pt requiring increased assist with bed mobility, sitting balance and ADL's this date. Pt remains below baseline with ADL's and mobility due to decreased strength, balance, activity tolerance and SOA. Recommend continued skilled OT services and transfer to SNF at d/c.     Time Calculation:         Time Calculation- OT       Row Name 03/18/25 1159             Time Calculation- OT    OT Start Time  1018  -JR      OT Received On 03/18/25  -JR         Timed Charges    37433 - OT Self Care/Mgmt Minutes 46  -JR         Total Minutes    Timed Charges Total Minutes 46  -JR       Total Minutes 46  -JR                User Key  (r) = Recorded By, (t) = Taken By, (c) = Cosigned By      Initials Name Provider Type     Katerina Bernardo OT Occupational Therapist                  Therapy Charges for Today       Code Description Service Date Service Provider Modifiers Qty    [id number]  OT SELF CARE/MGMT/TRAIN EA 15 MIN 3/18/2025 Katerina Bernardo OT GO 3                 Katerina Bernardo OT  3/18/2025    Electronically signed by Katerina Bernardo OT at 03/18/25 1200

## 2025-03-21 NOTE — PROGRESS NOTES
[facility]  PROGRESS NOTE    Patient Name: Carlos Preston  : 3/20/1928  MRN: [id number]    Date of Admission: 3/11/2025  Primary Care Physician: Newton Hankins MD    Subjective     CC:   Follow-up for dysphagia     HPI:  Patient seen and examined this morning.  Just returned from PEG tube placement.  Slightly lethargic from anesthesia.  Does not have any pain or acute complaints.  On room air.  Family at bedside and hoping to able to take him home on     Objective     Vital Signs:   Temp:  [97.3 °F (36.3 °C)-98.5 °F (36.9 °C)] 98.5 °F (36.9 °C)  Heart Rate:  [66-84] 74  Resp:  [16-20] 16  BP: (111-174)/(55-96) 143/78  Flow (L/min) (Oxygen Therapy):  [4] 4     Physical Exam:  General: Chronically ill looking, debilitated, conversant and pleasant   Head: Atraumatic and normocephalic  Eyes: No Icterus. No pallor  Ears:  Ears appear intact with no abnormalities noted  Throat: No oral lesions, no thrush  Neck: Supple, trachea midline  Lungs: Improved air entry bilateral lung fields.  No crackles or wheezing.  Poor cough effort  Heart:  Normal S1 and S2, no murmur, no gallop, No JVD, no lower extremity swelling  Abdomen:  Soft, no tenderness, no organomegaly, normal bowel sounds, no organomegaly  Extremities: pulses equal bilaterally  Skin: No bleeding, bruising or rash, normal skin turgor and elasticity  Neurologic: Cranial nerves appear intact with no evidence of facial asymmetry, normal motor and sensory functions in all 4 extremities  Psych: Alert and oriented x 3, normal mood    Results Reviewed:  LAB RESULTS:      Lab 25  0517 25  0727 25  0929 03/15/25  0435   WBC 18.85* 17.52* 18.24* 12.04*   HEMOGLOBIN 10.8* 9.8* 10.5* 10.1*   HEMATOCRIT 35.3* 32.2* 35.2* 33.3*   PLATELETS 101* 85* 98* 121*   NEUTROS ABS 15.68*  --   --  10.93*   IMMATURE GRANS (ABS) 0.12*  --   --  0.18*   LYMPHS ABS 0.85  --   --  0.51*   MONOS ABS 2.00*  --   --  0.40    EOS ABS 0.18  --   --  0.00   MCV 93.6 93.1 97.0 94.9   CRP 1.32* 0.79*  --   --    PROCALCITONIN  --  0.09  --   --          Lab 03/21/25  0517 03/20/25  0727 03/19/25  0928 03/18/25  2103 03/18/25  0831 03/15/25  0929 03/15/25  0435   SODIUM 142 141 143  --  143 146* 147*  147*   POTASSIUM 3.9 4.3 4.2  --  4.1 3.9 3.9  3.9   CHLORIDE 104 107 112*  --  113* 115* 115*  115*   CO2 29.0 24.0 23.0  --  23.0 21.0* 19.0*  19.0*   ANION GAP 9.0 10.0 8.0  --  7.0 10.0 13.0  13.0   BUN 33* 32* 36*  --  44* 62* 56*  56*   CREATININE 0.88 0.67* 0.72*  --  0.73* 1.10 1.05  1.05   EGFR 78.2 84.9 83.6  --  83.3 61.4 65.0  65.0   GLUCOSE 81 115* 97  --  144* 226* 213*  213*   CALCIUM 9.7* 9.5 9.7*  --  9.8* 9.2 9.0  9.0   MAGNESIUM 1.9 1.9 1.8  --  1.9  --  2.4*   PHOSPHORUS 2.9 2.5 2.5 2.7 2.1*  --  2.1*         Lab 03/21/25  0517 03/15/25  0929 03/15/25  0435 03/15/25  0004 03/14/25  1902   TOTAL PROTEIN 5.5* 5.2* 5.2*  5.2* 5.3* 5.6*   ALBUMIN 2.4* 2.5* 2.5*  2.5* 2.4* 2.5*   GLOBULIN 3.1 2.7 2.7  2.7 2.9 3.1   ALT (SGPT) 23 35 34  34 32 36   AST (SGOT) 24 28 31  31 34 35   BILIRUBIN 0.7 0.7 0.8  0.8 0.8 0.9   ALK PHOS 102 113 110  110 104 110         Lab 03/15/25  0435   CHOLESTEROL 65   TRIGLYCERIDES 79     Brief Urine Lab Results  (Last result in the past 365 days)        Color   Clarity   Blood   Leuk Est   Nitrite   Protein   CREAT   Urine HCG        03/11/25 1604 [place]   Clear   Moderate (2+)   Negative   Negative   30 mg/dL (1+)                 Microbiology Results Abnormal       Procedure Component Value - Date/Time    COVID-19, FLU A/B, RSV PCR 1 HR TAT - Swab, Nasopharynx [[id number]]  (Abnormal) Collected: 03/11/25 1431    Lab Status: Final result Specimen: Swab from Nasopharynx Updated: 03/11/25 1518     COVID19 Not Detected     Influenza A PCR Not Detected     Influenza B PCR Not Detected     RSV, PCR Detected    Narrative:      Fact sheet for providers: https://www.fda.gov/media/489991/download     Fact sheet for patients: https://www.fda.gov/media/880409/download    Test performed by [facility].          Duplex Venous Upper Extremity - Right CAR  Result Date: 3/20/2025    Sub-acute right upper extremity superficial thrombophlebitis noted in the basilic (upper arm) and basilic (forearm).   All other right sided vessels appear normal.     FL Video Swallow With Speech Single Contrast  Result Date: 3/20/2025  FL VIDEO SWALLOW W SPEECH SINGLE-CONTRAST Date of Exam: 3/20/2025 10:04 AM EDT Indication: aspiration.   Comparison: None available. Technique:   The speech pathologist administered food and/or liquid mixed with barium to the patient with cine/video imaging.  Imaging assistance was provided to the speech pathologist and an image was saved. Fluoroscopic Time: 1 minute and 54 seconds Number of Images: 13 associated fluoroscopic loops were saved Findings: Aspiration was seen during fluoroscopic guided modified barium swallowing series. Please see speech therapy report for full details and recommendations.     Impression: Impression: Fluoroscopy provided for a modified barium swallow. Aspiration was seen during swallowing evaluation. Please see speech therapy report for full details and recommendations. Report dictated by: Shira Renner PA-c  I have personally reviewed this case and agree with the findings above: Electronically Signed: Preston Neil MD  3/20/2025 11:13 AM EDT  Workstation ID: [id number]      Results for orders placed during the hospital encounter of 02/15/24    Adult Transthoracic Echo Complete W/ Cont if Necessary Per Protocol    Interpretation Summary    Atrial fibrillation was the predominant rhythm observed during the procedure.    The study is technically difficult    Left ventricular wall thickness is consistent with mild concentric hypertrophy.    Left ventricular ejection fraction appears to be 51 - 55%.    Left ventricular diastolic function is consistent with (grade I) impaired  relaxation.    The right ventricular cavity is borderline dilated.    The left atrial cavity is moderately dilated.  5.0 cm    The right atrial cavity is mildly  dilated.    No aortic valve regurgitation or stenosis is present    Mild mitral valve regurgitation with MAC is present.    Moderate tricuspid valve regurgitation is present.  RVSP- 56 mmHg    Borderline dilation of the aortic root is present.  3.8 cm    Current medications:  Scheduled Meds:busPIRone, 5 mg, Nasogastric, Daily With Lunch  [Held by provider] cetirizine, 10 mg, Oral, Daily  dicyclomine, 20 mg, Oral, 4x Daily  hydrOXYzine, 25 mg, Nasogastric, Nightly  [Held by provider] isosorbide mononitrate, 30 mg, Oral, QAM  melatonin, 10 mg, Nasogastric, Nightly  [Held by provider] NIFEdipine XL, 30 mg, Oral, Daily  Nutrisource fiber, 2 packet, Per G Tube, BID  pantoprazole, 40 mg, Intravenous, BID AC  polyethylene glycol, 17 g, Nasogastric, Daily  ProSource TF, 45 mL, Nasogastric, Daily  QUEtiapine, 25 mg, Nasogastric, Nightly  [Held by provider] rivaroxaban, 20 mg, Nasogastric, Daily With Dinner  senna-docusate sodium, 2 tablet, Nasogastric, Nightly  sodium chloride, 10 mL, Intravenous, Q12H      Continuous Infusions:[START ON 3/22/2025] lactated ringers, 9 mL/hr        PRN Meds:.  senna-docusate sodium **AND** [DISCONTINUED] polyethylene glycol **AND** [DISCONTINUED] bisacodyl **AND** bisacodyl    Calcium Replacement - Follow Nurse / BPA Driven Protocol    dextrose    dextrose    glucagon (human recombinant)    hyoscyamine    ipratropium-albuterol    Magnesium Standard Dose Replacement - Follow Nurse / BPA Driven Protocol    nitroglycerin    ondansetron    Phosphorus Replacement - Follow Nurse / BPA Driven Protocol    Potassium Replacement - Follow Nurse / BPA Driven Protocol    sodium chloride    sodium chloride    sodium chloride    traMADol    trolamine salicylate    Assessment & Plan   Assessment & Plan     Active Hospital Problems    Diagnosis  POA     **Rhabdomyolysis [M62.82]  Yes    Moderate protein-calorie malnutrition [E44.0]  Yes      Resolved Hospital Problems   No resolved problems to display.     Brief Hospital Course to date:  Carlos Preston is a 89 y.o. male with a history of heart disease, hyperlipidemia, ischemic heart disease status post CABG, paroxysmal A-fib on Eliquis presented to the hospital with progressive weakness.  Found to have bilateral pneumonia and acute rhabdomyolysis    Acute hypoxia respiratory insufficiency, resolved   RSV infection, resolved  Superimposed left lower lobe bacterial pneumonia, unspecified, improved  Bilateral basal lung fibrosis concerning for chronic dysphagia / aspiration  CT chest with bilateral chronic fibrotic changes in both lung bases and developing left lower lobe pneumonia.  Patient is forced for RSV  Was hypoxic, requiring 4 L nasal cannula - has weaned to room air   Completed 7 days Zosyn / Doxycycline  Status post IV Solu-Medrol and PO Prednisone  Negative Legionella antigen, strep antigen and MRSA swab  Sputum culture negative to date  DuoNebs, Mucinex and IS    Chronic dysphagia  History of esophageal stricture  SLP following   MBS 3/20/2025 --> nectar thick liquids.  Continues to have poor oral intake.  Family interested in PEG tube.  Dr. Pa/general surgery consulted.  Status post PEG tube placement 3/21/2025.  Continue holding Xarelto.    Nutrition team will start nocturnal feed  Case management consulted to arrange for home health and home tube feed    Acute rhabdomyolysis, resolved   Elevated troponin  Patient fell x 2 and was on the floor for 4-5 hours  CPK on admission 2500.  Trended down to 1200 with IV fluids by 3/12  Elevated troponin felt to be demand ischemia and secondary to rhabdomyolysis. Patient with no chest pain or ischemic changes in EKG.  Defer ischemic workup given his frailty and age       Severe iron deficiency anemia   Symptomatic anemia   Patient was anemic with Hgb 7.6  on admission - s/p 1 unit PRBCs    s/p IV iron x 3 doses  Start p.o. iron  Hemoglobin stable around 10  No evidence of GI bleed    Elevated liver enzymes, improved  CBD dilation  Abnormal CT abdomen with common bile duct dilation as well as pancreas duct dilation.   No focal mass or obstructive process seen on noncontrast CT  Total bilirubin is normal  GI following and recommended MRCP when more stable    A-fib  Hypertension of CABG  HTN  Xarelto held for PEG placement.  Will restart tomorrow  CT head no acute intracranial abnormality identified  ECHO: 02/2024: EF: 51-55%     Debility  PT/OT and OT recommended rehab but the patient and his family declined despite extensive counseling  Family arrange for caregiver at home.  Tentative plan to discharge him on Sunday, 3/23/2025    Hypoactive delirium, resolved  Sleep hygiene - limit interruptions at night  Continue Seroquel 25 mg QHS, Melatonin 10 mg QHS and Hydroxyzine 25 mg QHS    Expected Discharge Location and Transportation: Home with home health / 24/7 caregivers   Expected Discharge Expected Discharge Date: 3/23/2025; Expected Discharge Time:      VTE Prophylaxis:Pharmacologic & mechanical VTE prophylaxis orders are present.    AM-PAC 6 Clicks Score (PT): 9 (03/21/25 1210)    CODE STATUS:   Code Status and Medical Interventions: CPR (Attempt to Resuscitate); Full Support   Ordered at: 03/11/25 7478     Code Status (Patient has no pulse and is not breathing):    CPR (Attempt to Resuscitate)     Medical Interventions (Patient has pulse or is breathing):    Full Support     Level Of Support Discussed With:    Patient     Maksim Delgado MD  03/21/25

## 2025-03-21 NOTE — DISCHARGE PLACEMENT REQUEST
"Case Management  Agnes Hodge, [phone]    Nazanin Preston \"JOSHUA\" (89 y.o. Male)       Date of Birth   03/20/1928    Social Security Number       [place] [address]    Home Phone   [phone]    MRN   [id number]       Latter-day   Anabaptist    Marital Status                               Admission Date   3/11/2025    Admission Type   Emergency    Admitting Provider   Maksim Delgado MD    Attending Provider   Maksim Delgado MD    Department, Room/Bed   [facility] 6B, N636/1       Discharge Date       Discharge Disposition       Discharge Destination                                 Attending Provider: Maksim Delgado MD    Allergies: No Known Allergies    Isolation: None   Infection: None   Code Status: CPR    Ht: 182.9 cm (72\")   Wt: 83.5 kg (184 lb)    Admission Cmt: None   Principal Problem: Rhabdomyolysis [M62.82]                   Active Insurance as of 3/11/2025       Primary Coverage       Payor Plan Insurance Group Employer/Plan Group    MEDICARE MEDICARE A & B        Payor Plan Address Payor Plan Phone Number Payor Plan Fax Number Effective Dates    [address] [phone]  3/1/1993 - None Entered    [facility] [id number]         Subscriber Name Subscriber Birth Date Member ID       NAZANIN PRESTON 3/20/1928 [id number]               Secondary Coverage       Payor Plan Insurance Group Employer/Plan Group    [employer] MC SUP AAR HEALTH CARE OPTIONS        Payor Plan Address Payor Plan Phone Number Payor Plan Fax Number Effective Dates    [facility] [phone]  1/1/2016 - None Entered    [address]       [facility] [id number]         Subscriber Name Subscriber Birth Date Member ID       NAZANIN PRESTON 3/20/1928 [id number]                     Emergency Contacts        (Rel.) Home Phone Work Phone Mobile Phone    Bettye Hollingsworth (Daughter) [phone] -- --                 Physician Progress Notes (last 24 " hours)        Maksim Delgado MD at 25 1347              [facility] Medicine Services  PROGRESS NOTE    Patient Name: Carlos Preston  : 3/20/1928  MRN: [id number]    Date of Admission: 3/11/2025  Primary Care Physician: Newton Hankins MD    Subjective     CC:   Follow-up for dysphagia     HPI:  Patient seen and examined this morning.  Just returned from PEG tube placement.  Slightly lethargic from anesthesia.  Does not have any pain or acute complaints.  On room air.  Family at bedside and hoping to able to take him home on     Objective     Vital Signs:   Temp:  [97.3 °F (36.3 °C)-98.5 °F (36.9 °C)] 98.5 °F (36.9 °C)  Heart Rate:  [66-84] 74  Resp:  [16-20] 16  BP: (111-174)/(55-96) 143/78  Flow (L/min) (Oxygen Therapy):  [4] 4     Physical Exam:  General: Chronically ill looking, debilitated, conversant and pleasant   Head: Atraumatic and normocephalic  Eyes: No Icterus. No pallor  Ears:  Ears appear intact with no abnormalities noted  Throat: No oral lesions, no thrush  Neck: Supple, trachea midline  Lungs: Improved air entry bilateral lung fields.  No crackles or wheezing.  Poor cough effort  Heart:  Normal S1 and S2, no murmur, no gallop, No JVD, no lower extremity swelling  Abdomen:  Soft, no tenderness, no organomegaly, normal bowel sounds, no organomegaly  Extremities: pulses equal bilaterally  Skin: No bleeding, bruising or rash, normal skin turgor and elasticity  Neurologic: Cranial nerves appear intact with no evidence of facial asymmetry, normal motor and sensory functions in all 4 extremities  Psych: Alert and oriented x 3, normal mood    Results Reviewed:  LAB RESULTS:      Lab 25  0517 25  0727 25  0929 03/15/25  0435   WBC 18.85* 17.52* 18.24* 12.04*   HEMOGLOBIN 10.8* 9.8* 10.5* 10.1*   HEMATOCRIT 35.3* 32.2* 35.2* 33.3*   PLATELETS 101* 85* 98* 121*   NEUTROS ABS 15.68*  --   --  10.93*   IMMATURE GRANS (ABS) 0.12*  --   --  0.18*    LYMPHS ABS 0.85  --   --  0.51*   MONOS ABS 2.00*  --   --  0.40   EOS ABS 0.18  --   --  0.00   MCV 93.6 93.1 97.0 94.9   CRP 1.32* 0.79*  --   --    PROCALCITONIN  --  0.09  --   --          Lab 03/21/25  0517 03/20/25  0727 03/19/25  0928 03/18/25  2103 03/18/25  0831 03/15/25  0929 03/15/25  0435   SODIUM 142 141 143  --  143 146* 147*  147*   POTASSIUM 3.9 4.3 4.2  --  4.1 3.9 3.9  3.9   CHLORIDE 104 107 112*  --  113* 115* 115*  115*   CO2 29.0 24.0 23.0  --  23.0 21.0* 19.0*  19.0*   ANION GAP 9.0 10.0 8.0  --  7.0 10.0 13.0  13.0   BUN 33* 32* 36*  --  44* 62* 56*  56*   CREATININE 0.88 0.67* 0.72*  --  0.73* 1.10 1.05  1.05   EGFR 78.2 84.9 83.6  --  83.3 61.4 65.0  65.0   GLUCOSE 81 115* 97  --  144* 226* 213*  213*   CALCIUM 9.7* 9.5 9.7*  --  9.8* 9.2 9.0  9.0   MAGNESIUM 1.9 1.9 1.8  --  1.9  --  2.4*   PHOSPHORUS 2.9 2.5 2.5 2.7 2.1*  --  2.1*         Lab 03/21/25  0517 03/15/25  0929 03/15/25  0435 03/15/25  0004 03/14/25  1902   TOTAL PROTEIN 5.5* 5.2* 5.2*  5.2* 5.3* 5.6*   ALBUMIN 2.4* 2.5* 2.5*  2.5* 2.4* 2.5*   GLOBULIN 3.1 2.7 2.7  2.7 2.9 3.1   ALT (SGPT) 23 35 34  34 32 36   AST (SGOT) 24 28 31  31 34 35   BILIRUBIN 0.7 0.7 0.8  0.8 0.8 0.9   ALK PHOS 102 113 110  110 104 110         Lab 03/15/25  0435   CHOLESTEROL 65   TRIGLYCERIDES 79     Brief Urine Lab Results  (Last result in the past 365 days)        Color   Clarity   Blood   Leuk Est   Nitrite   Protein   CREAT   Urine HCG        03/11/25 1604 Kleberg   Clear   Moderate (2+)   Negative   Negative   30 mg/dL (1+)                 Microbiology Results Abnormal       Procedure Component Value - Date/Time    COVID-19, FLU A/B, RSV PCR 1 HR TAT - Swab, Nasopharynx [[id number]]  (Abnormal) Collected: 03/11/25 1431    Lab Status: Final result Specimen: Swab from Nasopharynx Updated: 03/11/25 1518     COVID19 Not Detected     Influenza A PCR Not Detected     Influenza B PCR Not Detected     RSV, PCR Detected    Narrative:       Fact sheet for providers: https://www.fda.gov/media/468432/download    Fact sheet for patients: https://www.fda.gov/media/684190/download    Test performed by [facility].          Duplex Venous Upper Extremity - Right CAR  Result Date: 3/20/2025    Sub-acute right upper extremity superficial thrombophlebitis noted in the basilic (upper arm) and basilic (forearm).   All other right sided vessels appear normal.     FL Video Swallow With Speech Single Contrast  Result Date: 3/20/2025  FL VIDEO SWALLOW W SPEECH SINGLE-CONTRAST Date of Exam: 3/20/2025 10:04 AM EDT Indication: aspiration.   Comparison: None available. Technique:   The speech pathologist administered food and/or liquid mixed with barium to the patient with cine/video imaging.  Imaging assistance was provided to the speech pathologist and an image was saved. Fluoroscopic Time: 1 minute and 54 seconds Number of Images: 13 associated fluoroscopic loops were saved Findings: Aspiration was seen during fluoroscopic guided modified barium swallowing series. Please see speech therapy report for full details and recommendations.     Impression: Impression: Fluoroscopy provided for a modified barium swallow. Aspiration was seen during swallowing evaluation. Please see speech therapy report for full details and recommendations. Report dictated by: Shira Renner PA-c  I have personally reviewed this case and agree with the findings above: Electronically Signed: Preston Neil MD  3/20/2025 11:13 AM EDT  Workstation ID: [id number]      Results for orders placed during the hospital encounter of 02/15/24    Adult Transthoracic Echo Complete W/ Cont if Necessary Per Protocol    Interpretation Summary    Atrial fibrillation was the predominant rhythm observed during the procedure.    The study is technically difficult    Left ventricular wall thickness is consistent with mild concentric hypertrophy.    Left ventricular ejection fraction appears to be 51 - 55%.    Left ventricular  diastolic function is consistent with (grade I) impaired relaxation.    The right ventricular cavity is borderline dilated.    The left atrial cavity is moderately dilated.  5.0 cm    The right atrial cavity is mildly  dilated.    No aortic valve regurgitation or stenosis is present    Mild mitral valve regurgitation with MAC is present.    Moderate tricuspid valve regurgitation is present.  RVSP- 56 mmHg    Borderline dilation of the aortic root is present.  3.8 cm    Current medications:  Scheduled Meds:busPIRone, 5 mg, Nasogastric, Daily With Lunch  [Held by provider] cetirizine, 10 mg, Oral, Daily  dicyclomine, 20 mg, Oral, 4x Daily  hydrOXYzine, 25 mg, Nasogastric, Nightly  [Held by provider] isosorbide mononitrate, 30 mg, Oral, QAM  melatonin, 10 mg, Nasogastric, Nightly  [Held by provider] NIFEdipine XL, 30 mg, Oral, Daily  Nutrisource fiber, 2 packet, Per G Tube, BID  pantoprazole, 40 mg, Intravenous, BID AC  polyethylene glycol, 17 g, Nasogastric, Daily  ProSource TF, 45 mL, Nasogastric, Daily  QUEtiapine, 25 mg, Nasogastric, Nightly  [Held by provider] rivaroxaban, 20 mg, Nasogastric, Daily With Dinner  senna-docusate sodium, 2 tablet, Nasogastric, Nightly  sodium chloride, 10 mL, Intravenous, Q12H      Continuous Infusions:[START ON 3/22/2025] lactated ringers, 9 mL/hr        PRN Meds:.  senna-docusate sodium **AND** [DISCONTINUED] polyethylene glycol **AND** [DISCONTINUED] bisacodyl **AND** bisacodyl    Calcium Replacement - Follow Nurse / BPA Driven Protocol    dextrose    dextrose    glucagon (human recombinant)    hyoscyamine    ipratropium-albuterol    Magnesium Standard Dose Replacement - Follow Nurse / BPA Driven Protocol    nitroglycerin    ondansetron    Phosphorus Replacement - Follow Nurse / BPA Driven Protocol    Potassium Replacement - Follow Nurse / BPA Driven Protocol    sodium chloride    sodium chloride    sodium chloride    traMADol    trolamine salicylate    Assessment & Plan    Assessment & Plan     Active Hospital Problems    Diagnosis  POA    **Rhabdomyolysis [M62.82]  Yes    Moderate protein-calorie malnutrition [E44.0]  Yes      Resolved Hospital Problems   No resolved problems to display.     Brief Hospital Course to date:  Carlos Preston is a 89 y.o. male with a history of heart disease, hyperlipidemia, ischemic heart disease status post CABG, paroxysmal A-fib on Eliquis presented to the hospital with progressive weakness.  Found to have bilateral pneumonia and acute rhabdomyolysis    Acute hypoxia respiratory insufficiency, resolved   RSV infection, resolved  Superimposed left lower lobe bacterial pneumonia, unspecified, improved  Bilateral basal lung fibrosis concerning for chronic dysphagia / aspiration  CT chest with bilateral chronic fibrotic changes in both lung bases and developing left lower lobe pneumonia.  Patient is forced for RSV  Was hypoxic, requiring 4 L nasal cannula - has weaned to room air   Completed 7 days Zosyn / Doxycycline  Status post IV Solu-Medrol and PO Prednisone  Negative Legionella antigen, strep antigen and MRSA swab  Sputum culture negative to date  DuoNebs, Mucinex and IS    Chronic dysphagia  History of esophageal stricture  SLP following   MBS 3/20/2025 --> nectar thick liquids.  Continues to have poor oral intake.  Family interested in PEG tube.  Dr. Pa/general surgery consulted.  Status post PEG tube placement 3/21/2025.  Continue holding Xarelto.    Nutrition team will start nocturnal feed  Case management consulted to arrange for home health and home tube feed    Acute rhabdomyolysis, resolved   Elevated troponin  Patient fell x 2 and was on the floor for 4-5 hours  CPK on admission 2500.  Trended down to 1200 with IV fluids by 3/12  Elevated troponin felt to be demand ischemia and secondary to rhabdomyolysis. Patient with no chest pain or ischemic changes in EKG.  Defer ischemic workup given his frailty and age       Severe iron  deficiency anemia   Symptomatic anemia   Patient was anemic with Hgb 7.6 on admission - s/p 1 unit PRBCs    s/p IV iron x 3 doses  Start p.o. iron  Hemoglobin stable around 10  No evidence of GI bleed    Elevated liver enzymes, improved  CBD dilation  Abnormal CT abdomen with common bile duct dilation as well as pancreas duct dilation.   No focal mass or obstructive process seen on noncontrast CT  Total bilirubin is normal  GI following and recommended MRCP when more stable    A-fib  Hypertension of CABG  HTN  Xarelto held for PEG placement.  Will restart tomorrow  CT head no acute intracranial abnormality identified  ECHO: 02/2024: EF: 51-55%     Debility  PT/OT and OT recommended rehab but the patient and his family declined despite extensive counseling  Family arrange for caregiver at home.  Tentative plan to discharge him on Sunday, 3/23/2025    Hypoactive delirium, resolved  Sleep hygiene - limit interruptions at night  Continue Seroquel 25 mg QHS, Melatonin 10 mg QHS and Hydroxyzine 25 mg QHS    Expected Discharge Location and Transportation: Home with home health / 24/7 caregivers   Expected Discharge Expected Discharge Date: 3/23/2025; Expected Discharge Time:      VTE Prophylaxis:Pharmacologic & mechanical VTE prophylaxis orders are present.    AM-PAC 6 Clicks Score (PT): 9 (03/21/25 1210)    CODE STATUS:   Code Status and Medical Interventions: CPR (Attempt to Resuscitate); Full Support   Ordered at: 03/11/25 8124     Code Status (Patient has no pulse and is not breathing):    CPR (Attempt to Resuscitate)     Medical Interventions (Patient has pulse or is breathing):    Full Support     Level Of Support Discussed With:    Patient     Maksim Delgado MD  03/21/25        Electronically signed by Maksim Delgado MD at 03/21/25 3323       Stanley Pa MD at 03/21/25 6139          Patient Name:  Carlos Preston  YOB: 1928  [id number]    Surgery Progress Note    Date of visit:  "3/21/2025      Subjective: No events overnight.  Tube feeds have been held          Objective:     /72 (BP Location: Right arm, Patient Position: Lying)   Pulse 74   Temp 98.1 °F (36.7 °C) (Axillary)   Resp 18   Ht 182.9 cm (72.01\")   Wt 83.9 kg (184 lb 15.5 oz)   SpO2 95%   BMI 25.08 kg/m²     Intake/Output Summary (Last 24 hours) at 3/21/2025 0654  Last data filed at 3/21/2025 0600  Gross per 24 hour   Intake 1387 ml   Output 5550 ml   Net -4163 ml       GEN:   Awake, resting in bed, elderly and chronically ill-appearing  CV:   Regular rate and rhythm  L:  Symmetric expansion, not labored on room air  Abd:  Soft, no distress to palpation  Ext:  No cyanosis, clubbing, or edema    Recent labs that are back at this time have been reviewed.           Assessment/ Plan:    Mr. Preston is a 89-year-old gentleman with history of coronary artery disease, ischemic heart disease, atrial fibrillation on Eliquis, and hyperlipidemia who I been asked to evaluate for PEG placement due to dysphagia     -Plan to proceed forward with PEG today      Stanley Pa MD  3/21/2025  06:54 EDT        Electronically signed by Stanley Pa MD at 03/21/25 0655          Nutrition Notes (last 24 hours)        Lorna Cuevas, MS,RD,LD at 03/21/25 1239                      Clinical Nutrition Assessment     Patient Name: Carlos Preston  YOB: 1928  MRN: [id number]  Date of Encounter: 03/21/25 12:39 EDT  Admission date: 3/11/2025  Reason for Visit: Follow-up protocol, EN    Assessment   Nutrition Assessment   Admission Diagnosis:  Rhabdomyolysis [M62.82]    Problem List:    Rhabdomyolysis    Moderate protein-calorie malnutrition      PMH:   He  has a past medical history of Cancer, H/O prostatectomy, Hearing loss, Heart disease, History of back surgery, Hypercholesterolemia, Hyperlipidemia, Hypertension, IHD (ischemic heart disease), Osteoarthritis of spine with myelopathy, lumbar region, Paroxysmal atrial " fibrillation (07/03/2018), Pulmonary hypertension, S/P knee replacement, Sinus disorder, and thyroid ultrasound (08/28/2009).    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    Applicable Nutrition History:   3/12 SLP rec NPO w repeat 3/13  3/13-SLP Diet Recommendation: NPO, temporary alternate methods of nutrition/hydration, other (see comments) (vs consideration of comfort diet if aligns w/ GOC/POC)     Labs    Labs Reviewed: Yes     Results from last 7 days   Lab Units 03/21/25  0517 03/20/25  0727 03/19/25  0928 03/18/25  0831 03/15/25  0929 03/15/25  0435   GLUCOSE mg/dL 81 115* 97   < > 226* 213*  213*   BUN mg/dL 33* 32* 36*   < > 62* 56*  56*   CREATININE mg/dL 0.88 0.67* 0.72*   < > 1.10 1.05  1.05   SODIUM mmol/L 142 141 143   < > 146* 147*  147*   CHLORIDE mmol/L 104 107 112*   < > 115* 115*  115*   POTASSIUM mmol/L 3.9 4.3 4.2   < > 3.9 3.9  3.9   PHOSPHORUS mg/dL 2.9 2.5 2.5   < >  --  2.1*   MAGNESIUM mg/dL 1.9 1.9 1.8   < >  --  2.4*   ALT (SGPT) U/L 23  --   --   --  35 34  34    < > = values in this interval not displayed.       Results from last 7 days   Lab Units 03/21/25  0517 03/20/25  0727 03/15/25  0929 03/15/25  0435   ALBUMIN g/dL 2.4*  --  2.5* 2.5*  2.5*   CRP mg/dL 1.32* 0.79*  --   --   "  CHOLESTEROL mg/dL  --   --   --  65   TRIGLYCERIDES mg/dL  --   --   --  79       Results from last 7 days   Lab Units 03/21/25  0147 03/21/25  0004 03/20/25  2321 03/19/25  1737 03/19/25  1147 03/19/25  0550   GLUCOSE mg/dL 89 67* 66* 84 100 113     No results found for: \"HGBA1C\"            Medications    Medications Reviewed: Yes    Scheduled Meds:busPIRone, 5 mg, Nasogastric, Daily With Lunch  [Held by provider] cetirizine, 10 mg, Oral, Daily  dicyclomine, 20 mg, Oral, 4x Daily  hydrOXYzine, 25 mg, Nasogastric, Nightly  [Held by provider] isosorbide mononitrate, 30 mg, Oral, QAM  melatonin, 10 mg, Nasogastric, Nightly  [Held by provider] NIFEdipine XL, 30 mg, Oral, Daily  pantoprazole, 40 mg, Intravenous, BID AC  polyethylene glycol, 17 g, Nasogastric, Daily  ProSource TF, 45 mL, Nasogastric, Daily  QUEtiapine, 25 mg, Nasogastric, Nightly  [Held by provider] rivaroxaban, 20 mg, Nasogastric, Daily With Dinner  senna-docusate sodium, 2 tablet, Nasogastric, Nightly  sodium chloride, 10 mL, Intravenous, Q12H      Continuous Infusions:[START ON 3/22/2025] lactated ringers, 9 mL/hr      PRN Meds:.  senna-docusate sodium **AND** [DISCONTINUED] polyethylene glycol **AND** [DISCONTINUED] bisacodyl **AND** bisacodyl    Calcium Replacement - Follow Nurse / BPA Driven Protocol    dextrose    dextrose    glucagon (human recombinant)    hyoscyamine    ipratropium-albuterol    Magnesium Standard Dose Replacement - Follow Nurse / BPA Driven Protocol    nitroglycerin    ondansetron    Phosphorus Replacement - Follow Nurse / BPA Driven Protocol    Potassium Replacement - Follow Nurse / BPA Driven Protocol    sodium chloride    sodium chloride    sodium chloride    traMADol    trolamine salicylate    Intake/Ouptut 24 hrs (0701 - 0700)   I&O's Reviewed: Yes   Intake & Output (last day)         03/20 0701 03/21 0700 03/21 0701 03/22 0700    I.V. (mL/kg)  150 (1.8)    Other 552     NG/     Total Intake(mL/kg) 1387 (16.5) 150 " "(1.8)    Urine (mL/kg/hr) 5550 (2.8)     Stool 0     Total Output 5550     Net -4163 +150          Urine Unmeasured Occurrence  2 x    Stool Unmeasured Occurrence 3 x 1 x        Last stool x 1 on 3/11    Anthropometrics     Height: Height: 182.9 cm (72\")  Last Filed Weight: Weight: 83.5 kg (184 lb) (03/21/25 0709)  Method: Weight Method: Stated  BMI: BMI (Calculated): 24.9    UBW:  250lbs per pt's dtr, appears ~215lbs in the past yr  Weight change:  possible 30lb/14% wt loss x 1 yr-need measured wt from nsg   Weight      Weight (kg) Weight (lbs) Visit Report   1/10/2024 97.523 kg  215 lb  --    2/14/2024 95.074 kg  209 lb 9.6 oz  --    2/15/2024 95.1 kg  209 lb 10.5 oz     8/21/2024 88.542 kg  195 lb 3.2 oz  --    3/11/2025 83.915 kg  185 lb         Nutrition Focused Physical Exam    Date: 3/13    Patient meets criteria for malnutrition diagnosis, see MSA note.     Subjective   Reported/Observed/Food/Nutrition Related History:     3/21  Pt s/p PEG, RN states sx okay to resume feeds this evening. Spoke w/family at bedside, would like to continue nocturnal feeds for now. Discussed bolus option as well and family would like to consider this in the future. Family states pt w/frequent Bms but not large quantity-will add fiber packets.     3/20  Pt OOR for MBS, dtr at bedside. Dtr states likely plan for PEG pending results of MBS, per sx note plan for PEG tomorrow. Dtr would like to have Magic Cup. RN states pt tolerating EN regimen well, having 2-3 Bms daily    3/18  Spoke w/PA, plans for comfort diet and adjustment to nocturnal feeds.     3/17  Per RN pt doing well on current TF regimen, notes watery Bms. Pt states he is tolerating TF, denies abdominal pain or bloating.     3/14  RN states keofeed placed, in stomach and MD okay to feed.     3/13  Family rpt pt at ok until 4 da ago then unable to eat .Repeat has had signif wt loss. Family aware of plan for feeding tube. GI note indicated will place tube 2/2 aspiration " "risk at this time.    3/12  Pt up in chair at bedside, very sleepy. Dtr at bedside provides nutrition hx. Dtr states pt has had poor intake x 2 days, states prior to recent fall pt was eating well. Dtr confirms pt has had esophageal dilation in the past, ~1.5 yrs ago and states worsening dysphagia over the past yr and pt managing this w/cutting food into small pieces and drinking adequate fluids w/meals. Dtr states pt has also had significant wt loss over the past yr, states UBW is 250lbs and was 185lbs at MD office <1 week ago. Dtr unsure etiology as pt tells her he eats well.     Needs Assessment   Date: 3/13    Height used:Height: 182.9 cm (72\")  Weights used: 83.9 Kg    Estimated Calorie needs: ~ 2000 Kcal/day  Method:  Kcals/KG x 25 =   Method:  [id number] x 1.3 =     Estimated Protein needs: ~ 109 g PRO/day  Method: g/Kg x 1.3    Estimated Fluid needs: ~ ml/day   Per clinical status    Current Nutrition Prescription     EN: IsoSource 1.5  Goal Rate: 60  Water Flushes: 35  Modular: Prosource TF 1/day  Route: NG  Tube: Small bore    At goal over: 12Hrs/day     Rx will supply:   Goal Volume 720  mL/day     Flush Volume 420 mL/day     Energy 1140 Kcal/day 57 % Est Need   Protein 64 g/day 57 % Est Need   Fiber 11 g/day     Water in   mL     Total Water 420 mL     Meet DRI No        --------------------------------------------------------------------------  Product/Rate verified at bedside: No  Infusing Rate at time of visit: N/A s/p PEG    Average Delivery from [facility] Day: insufficient data, s/p PEG  Volume  mL/day   % Goal Vol.   Flush Volume  mL/day     Energy  Kcal/day  % Est Need   Protein  g/day  % Est Need   Fiber  g/day     Water in  EN  mL     Total Water  mL     Meet DRI Yes        PO: NPO Diet NPO Type: Strict NPO  Oral Nutrition Supplement:   Intake: N/A    Assessment & Plan   Nutrition Diagnosis   Date:  3/12            Updated:  3/13  Problem Inadequate oral intake    Etiology dysphagia "   Signs/Symptoms NPO   Status: New EN rec in place    Date:   3/12  Updated:     Problem Unintended weight loss   Etiology ? Dysphagia, advanced age   Signs/Symptoms Possible 14% wt loss x 1 yr   Status: New    Date:  3/13 Updated:  Problem Malnutrition moderate acute   Etiology Alt Swallow fx   Signs/Symptoms Intake hx w some Wasting   Status: New    Goal / Objectives:   Nutrition to support treatment, Advance diet as medically feasible/appropriate, and Tolerate EN at goal      Nutrition Intervention      Follow treatment progress, Care plan reviewed, Nutrition support order placed    Once PEG okay for use, resume nocturnal feeds (6p-6a):  Isosource 1.5 @ 60ml/hr. Prosource TF daily. 2 Nutrisource fiber packets BID. Water flush @ 35ml/hr.   =720ml, 1140kcals (57% est needs), 64g pro (57% est needs), 23g fiber, 547ml water from formula, +420ml water from flushes, 967ml TFW.     Recommend pt/family chooses home infusion company w/RD to manage EN regimen after discharge.   Adjust EN regimen based on adequacy of po intake.     Resume diet as medically feasible, previously on mechanical ground/NTL, no straw.   Magic Cup BID    Monitoring/Evaluation:   Per protocol, I&O, Pertinent labs, EN delivery/tolerance, Weight, Symptoms, Swallow function    Lorna Cuevas MS,RD,LD  Time Spent:30 min    Electronically signed by Lorna Cuevas MS,RD,LD at 03/21/25 0103

## 2025-03-21 NOTE — OP NOTE
"Operative Report    Patient Name:  Carlos Preston  YOB: 1928  [id number]    3/21/2025        PREOPERATIVE DIAGNOSIS:  Dysphagia       POSTOPERATIVE DIAGNOSIS: Same, duodenal ulcerations, esophagitis      PROCEDURE PERFORMED: EGD with PEG placement       SURGEON: Stanley Pa MD      ASSISTANT: None       ANESTHESIA: Monitored Anesthesia Care      ESTIMATED BLOOD LOSS: Minimal      SPECIMENS: None      FINDINGS: 20 [place]onian PEG tube placed at the 2.5 cm level.  There were some shallow superficial duodenal ulcerations.  There is some moderate esophagitis in the proximal and midesophagus      INDICATIONS:       The patient is a 89 y.o. year old male with a history of feeding difficulty who we have been asked to see for long term feeding access. The risks and benefits of EGD with PEG placement were discussed at length with the patient and the patient's family and they agreed to proceed.      DESCRIPTION OF PROCEDURE:     After obtaining informed consent, the patient was taken to the endoscopy suite. They were placed in supine position with the head of bed elevated, and after appropriate sedation as detailed above, a bite block was placed into the patient's mouth. The endoscope was advanced into the mouth and into the esophagus without difficulty. It was advanced into the stomach, and into the duodenum.  We did note that there were some shallow superficial duodenal ulcerations without any obvious bleeding.. The scope was then returned to the stomach, and the stomach was maximally insufflated. An appropriate site for PEG placement was then determined by one-to-one palpation, transillumination, and the \"safe track\" needle technique. This area was prepped and draped in standard sterile fashion, and after infiltrating the skin with local anesthetic, a 7mm skin incision was made in this location. A needle was advanced through this incision and into the stomach under endoscopic guidance. A wire was then " placed through the needle, grasped with the endoscope, and brought out through the mouth. At this point, using the pull-through technique, a 20 [place] PEG tube was brought through the abdominal wall in this location. The endoscope was again advanced through the mouth and esophagus and into the stomach, where the PEG bumper could be seen abutting the anterior gastric wall in standard fashion without bleeding. The endoscope was then used to desufflate the stomach, and removed from the patient's mouth without difficulty.  We did note on endoscopy that there was some moderate esophagitis in the proximal and midesophagus with some shallow linear ulcerations and erythema.  The PEG tube was secured with the bumper at the 2.5 cm level. It was dressed in the standard sterile fashion, and placed to gravity drainage. The patient tolerated the procedure well. They were then transported to the recovery room in stable condition. All needle, instrument, and sponge counts were correct at the completion of the case. There were no immediate complications. I was present for the entire procedure.       Stanley Pa MD  3/21/2025  07:57 EDT

## 2025-03-21 NOTE — ANESTHESIA PREPROCEDURE EVALUATION
Anesthesia Evaluation     Patient summary reviewed and Nursing notes reviewed   NPO Solid Status: > 8 hours  NPO Liquid Status: > 2 hours           Airway   Mallampati: II  TM distance: >3 FB  Neck ROM: full  No difficulty expected  Dental          Pulmonary    (+) pneumonia resolved , a smoker Former, cigarettes, COPD (rare use MDI) mild,shortness of breath  (-) recent URI, sleep apnea, no home oxygen  Cardiovascular     ECG reviewed    (+) hypertension, CAD, CABG >6 Months, dysrhythmias Atrial Fib, hyperlipidemia    ROS comment: ECG A fib PVCs inc RBBB ST TW abn      ECHO 2024  mild concentric LVH EF >51% LVDD (grade I) impaired relaxation.  RV LA mod dilated  5.0 cm  RA mildly  dilated.  Mild MR with MAC is present.  Moderate TI  t  RVSP- 56 mmHg    Cardiolite GXT 2017 inf ischemia     Neuro/Psych  (-) seizures, CVA  GI/Hepatic/Renal/Endo    (+) GERD  (-) no renal disease, diabetes, no thyroid disorder    Musculoskeletal     Abdominal    Substance History      OB/GYN          Other   arthritis, blood dyscrasia anemia,   history of cancer    ROS/Med Hx Other: PEG placement for Oropharyngeal dysphagia  Admitted c PNA : is sp CABG and chronic A Fib    HCT 35                     Anesthesia Plan    ASA 3     general and MAC     (TOP POM PFL)  intravenous induction     Anesthetic plan, risks, benefits, and alternatives have been provided, discussed and informed consent has been obtained with: patient.    Plan discussed with CRNA.        CODE STATUS:    Code Status (Patient has no pulse and is not breathing): CPR (Attempt to Resuscitate)  Medical Interventions (Patient has pulse or is breathing): Full Support  Level Of Support Discussed With: Patient

## 2025-03-21 NOTE — PLAN OF CARE
Problem: Adult Inpatient Plan of Care  Goal: Plan of Care Review  Outcome: Progressing  Goal: Patient-Specific Goal (Individualized)  Outcome: Progressing  Goal: Absence of Hospital-Acquired Illness or Injury  Outcome: Progressing  Intervention: Identify and Manage Fall Risk  Recent Flowsheet Documentation  Taken 3/21/2025 0600 by Mason Church, RN  Safety Promotion/Fall Prevention:   activity supervised   assistive device/personal items within reach   safety round/check completed   clutter free environment maintained   nonskid shoes/slippers when out of bed  Taken 3/21/2025 0400 by Mason Church RN  Safety Promotion/Fall Prevention:   safety round/check completed   activity supervised   assistive device/personal items within reach   clutter free environment maintained   fall prevention program maintained  Taken 3/21/2025 0215 by Mason Church RN  Safety Promotion/Fall Prevention:   activity supervised   assistive device/personal items within reach   safety round/check completed   clutter free environment maintained   nonskid shoes/slippers when out of bed  Taken 3/21/2025 0015 by Mason Church, RN  Safety Promotion/Fall Prevention:   safety round/check completed   activity supervised   assistive device/personal items within reach   clutter free environment maintained   fall prevention program maintained  Taken 3/20/2025 2215 by Mason Church, RN  Safety Promotion/Fall Prevention:   activity supervised   assistive device/personal items within reach   safety round/check completed   clutter free environment maintained   nonskid shoes/slippers when out of bed  Taken 3/20/2025 2015 by Mason Church, RN  Safety Promotion/Fall Prevention:   safety round/check completed   activity supervised   assistive device/personal items within reach   clutter free environment maintained   fall prevention program maintained  Intervention: Prevent Skin Injury  Recent Flowsheet Documentation  Taken  3/21/2025 0600 by Mason Church RN  Body Position:   neutral body alignment   lower extremity elevated   heels elevated   semi-prone   tilted  Skin Protection:   incontinence pads utilized   transparent dressing maintained  Taken 3/21/2025 0400 by Mason Church RN  Body Position: patient/family refused  Skin Protection:   incontinence pads utilized   silicone foam dressing in place   transparent dressing maintained  Taken 3/21/2025 0215 by Mason Church RN  Body Position:   weight shifting   lower extremity elevated   neutral body alignment   heels elevated   legs elevated   semi-prone  Skin Protection:   incontinence pads utilized   transparent dressing maintained  Taken 3/21/2025 0015 by Mason Church RN  Body Position:   weight shifting   neutral body alignment   legs elevated   heels elevated   tilted   left  Skin Protection:   incontinence pads utilized   silicone foam dressing in place   transparent dressing maintained  Taken 3/20/2025 2215 by Mason Church RN  Body Position:   weight shifting   legs elevated   heels elevated   tilted   right  Skin Protection:   incontinence pads utilized   transparent dressing maintained  Taken 3/20/2025 2015 by Mason Church RN  Body Position:   lower extremity elevated   neutral body alignment   weight shifting   tilted  Skin Protection:   incontinence pads utilized   transparent dressing maintained  Intervention: Prevent and Manage VTE (Venous Thromboembolism) Risk  Recent Flowsheet Documentation  Taken 3/20/2025 2015 by Mason Church RN  VTE Prevention/Management:   bilateral   SCDs (sequential compression devices) off  Intervention: Prevent Infection  Recent Flowsheet Documentation  Taken 3/21/2025 0600 by Mason Church RN  Infection Prevention:   environmental surveillance performed   rest/sleep promoted  Taken 3/21/2025 0400 by Mason Church RN  Infection Prevention:   hand hygiene promoted   rest/sleep  promoted  Taken 3/21/2025 0215 by Mason Church RN  Infection Prevention:   environmental surveillance performed   rest/sleep promoted  Taken 3/21/2025 0015 by Mason Church RN  Infection Prevention:   hand hygiene promoted   rest/sleep promoted  Taken 3/20/2025 2215 by Mason Church RN  Infection Prevention:   environmental surveillance performed   rest/sleep promoted  Taken 3/20/2025 2015 by Mason Church RN  Infection Prevention:   environmental surveillance performed   hand hygiene promoted   rest/sleep promoted   single patient room provided   cohorting utilized  Goal: Optimal Comfort and Wellbeing  Outcome: Progressing  Intervention: Provide Person-Centered Care  Recent Flowsheet Documentation  Taken 3/20/2025 2015 by Mason Church RN  Trust Relationship/Rapport:   care explained   emotional support provided   questions answered   questions encouraged   thoughts/feelings acknowledged  Goal: Readiness for Transition of Care  Outcome: Progressing     Problem: Fall Injury Risk  Goal: Absence of Fall and Fall-Related Injury  Outcome: Progressing  Intervention: Identify and Manage Contributors  Recent Flowsheet Documentation  Taken 3/21/2025 0600 by Mason Chruch RN  Self-Care Promotion: independence encouraged  Taken 3/21/2025 0215 by Mason Church RN  Self-Care Promotion: independence encouraged  Taken 3/20/2025 2215 by Mason Church RN  Self-Care Promotion: independence encouraged  Taken 3/20/2025 2015 by Mason Church RN  Medication Review/Management: medications reviewed  Intervention: Promote Injury-Free Environment  Recent Flowsheet Documentation  Taken 3/21/2025 0600 by Mason Church RN  Safety Promotion/Fall Prevention:   activity supervised   assistive device/personal items within reach   safety round/check completed   clutter free environment maintained   nonskid shoes/slippers when out of bed  Taken 3/21/2025 0400 by Mason Church  RN  Safety Promotion/Fall Prevention:   safety round/check completed   activity supervised   assistive device/personal items within reach   clutter free environment maintained   fall prevention program maintained  Taken 3/21/2025 0215 by Mason Church RN  Safety Promotion/Fall Prevention:   activity supervised   assistive device/personal items within reach   safety round/check completed   clutter free environment maintained   nonskid shoes/slippers when out of bed  Taken 3/21/2025 0015 by Mason Church RN  Safety Promotion/Fall Prevention:   safety round/check completed   activity supervised   assistive device/personal items within reach   clutter free environment maintained   fall prevention program maintained  Taken 3/20/2025 2215 by Mason Church RN  Safety Promotion/Fall Prevention:   activity supervised   assistive device/personal items within reach   safety round/check completed   clutter free environment maintained   nonskid shoes/slippers when out of bed  Taken 3/20/2025 2015 by Mason Church RN  Safety Promotion/Fall Prevention:   safety round/check completed   activity supervised   assistive device/personal items within reach   clutter free environment maintained   fall prevention program maintained     Problem: Comorbidity Management  Goal: Maintenance of COPD Symptom Control  Outcome: Progressing  Intervention: Maintain COPD (Chronic Obstructive Pulmonary Disease) Symptom Control  Recent Flowsheet Documentation  Taken 3/20/2025 2015 by Mason Church RN  Medication Review/Management: medications reviewed  Goal: Blood Pressure in Desired Range  Outcome: Progressing  Intervention: Maintain Blood Pressure Management  Recent Flowsheet Documentation  Taken 3/20/2025 2015 by Mason Church RN  Medication Review/Management: medications reviewed     Problem: Skin Injury Risk Increased  Goal: Skin Health and Integrity  Outcome: Progressing  Intervention: Optimize Skin  Protection  Recent Flowsheet Documentation  Taken 3/21/2025 0600 by Mason Church RN  Activity Management: activity minimized  Pressure Reduction Techniques:   frequent weight shift encouraged   pressure points protected  Head of Bed (HOB) Positioning: [facility] elevated  Pressure Reduction Devices:   positioning supports utilized   pressure-redistributing mattress utilized  Skin Protection:   incontinence pads utilized   transparent dressing maintained  Taken 3/21/2025 0400 by Mason Church RN  Activity Management: activity minimized  Pressure Reduction Techniques:   frequent weight shift encouraged   heels elevated off bed   weight shift assistance provided   positioned off wounds  Head of Bed (HOB) Positioning: [facility] elevated  Pressure Reduction Devices:   positioning supports utilized   pressure-redistributing mattress utilized  Skin Protection:   incontinence pads utilized   silicone foam dressing in place   transparent dressing maintained  Taken 3/21/2025 0215 by Mason Church RN  Activity Management: activity minimized  Pressure Reduction Techniques:   frequent weight shift encouraged   pressure points protected  Head of Bed (HOB) Positioning: [facility] elevated  Pressure Reduction Devices:   positioning supports utilized   pressure-redistributing mattress utilized  Skin Protection:   incontinence pads utilized   transparent dressing maintained  Taken 3/21/2025 0015 by Mason Church RN  Activity Management: activity minimized  Pressure Reduction Techniques:   frequent weight shift encouraged   heels elevated off bed   weight shift assistance provided   positioned off wounds  Head of Bed (HOB) Positioning: [facility] elevated  Pressure Reduction Devices:   positioning supports utilized   pressure-redistributing mattress utilized  Skin Protection:   incontinence pads utilized   silicone foam dressing in place   transparent dressing maintained  Taken 3/20/2025 2215 by Mason Church RN  Activity  Management: activity minimized  Pressure Reduction Techniques:   frequent weight shift encouraged   pressure points protected  Head of Bed (HOB) Positioning: HOB elevated  Pressure Reduction Devices:   positioning supports utilized   pressure-redistributing mattress utilized  Skin Protection:   incontinence pads utilized   transparent dressing maintained  Taken 3/20/2025 2015 by Mason Church RN  Activity Management: activity encouraged  Pressure Reduction Techniques:   frequent weight shift encouraged   heels elevated off bed   weight shift assistance provided   pressure points protected  Head of Bed (HOB) Positioning: HOB elevated  Pressure Reduction Devices:   pressure-redistributing mattress utilized   positioning supports utilized  Skin Protection:   incontinence pads utilized   transparent dressing maintained     Problem: Sepsis/Septic Shock  Goal: Optimal Coping  Outcome: Progressing  Intervention: Support Patient and Family Response  Recent Flowsheet Documentation  Taken 3/20/2025 2015 by Mason Church RN  Family/Support System Care:   support provided   self-care encouraged  Goal: Absence of Bleeding  Outcome: Progressing  Goal: Blood Glucose Level Within Target Range  Outcome: Progressing  Goal: Absence of Infection Signs and Symptoms  Outcome: Progressing  Intervention: Initiate Sepsis Management  Recent Flowsheet Documentation  Taken 3/21/2025 0600 by Mason Church RN  Infection Prevention:   environmental surveillance performed   rest/sleep promoted  Taken 3/21/2025 0400 by Mason Church RN  Infection Prevention:   hand hygiene promoted   rest/sleep promoted  Taken 3/21/2025 0215 by Mason Church RN  Infection Prevention:   environmental surveillance performed   rest/sleep promoted  Taken 3/21/2025 0015 by Mason Church RN  Infection Prevention:   hand hygiene promoted   rest/sleep promoted  Taken 3/20/2025 2215 by Mason Church RN  Infection Prevention:    environmental surveillance performed   rest/sleep promoted  Taken 3/20/2025 2015 by Mason Church, RN  Infection Prevention:   environmental surveillance performed   hand hygiene promoted   rest/sleep promoted   single patient room provided   cohorting utilized  Intervention: Promote Recovery  Recent Flowsheet Documentation  Taken 3/21/2025 0600 by Mason Church, RN  Activity Management: activity minimized  Taken 3/21/2025 0400 by aMson Church, RN  Activity Management: activity minimized  Taken 3/21/2025 0215 by Mason Church, RN  Activity Management: activity minimized  Taken 3/21/2025 0015 by Mason Church, RN  Activity Management: activity minimized  Taken 3/20/2025 2215 by Mason Church, RN  Activity Management: activity minimized  Taken 3/20/2025 2015 by Mason Church, RN  Activity Management: activity encouraged  Goal: Optimal Nutrition Delivery  Outcome: Progressing   Goal Outcome Evaluation:               Pt has no new nursing issues at this time. Pt is a&o self & situation @ times, Afib RA, VSS. NPO since midnight for procedure. Multiple loose bowel movements. NG tube clamped. Pt has no complaints at this time. Will continue plan of care

## 2025-03-22 ENCOUNTER — APPOINTMENT (OUTPATIENT)
Dept: GENERAL RADIOLOGY | Facility: HOSPITAL | Age: OVER 89
End: 2025-03-22
Payer: MEDICARE

## 2025-03-22 PROBLEM — R32 URINE INCONTINENCE: Status: ACTIVE | Noted: 2025-03-22

## 2025-03-22 LAB
ALBUMIN SERPL-MCNC: 2.3 G/DL (ref 3.5–5.2)
ALBUMIN/GLOB SERPL: 0.8 G/DL
ALP SERPL-CCNC: 98 U/L (ref 39–117)
ALT SERPL W P-5'-P-CCNC: 16 U/L (ref 1–41)
ANION GAP SERPL CALCULATED.3IONS-SCNC: 8 MMOL/L (ref 5–15)
AST SERPL-CCNC: 22 U/L (ref 1–40)
BASOPHILS # BLD AUTO: 0.01 10*3/MM3 (ref 0–0.2)
BASOPHILS NFR BLD AUTO: 0.1 % (ref 0–1.5)
BILIRUB SERPL-MCNC: 0.7 MG/DL (ref 0–1.2)
BUN SERPL-MCNC: 36 MG/DL (ref 8–23)
BUN/CREAT SERPL: 41.9 (ref 7–25)
CALCIUM SPEC-SCNC: 9.3 MG/DL (ref 8.2–9.6)
CHLORIDE SERPL-SCNC: 106 MMOL/L (ref 98–107)
CO2 SERPL-SCNC: 29 MMOL/L (ref 22–29)
CREAT SERPL-MCNC: 0.86 MG/DL (ref 0.76–1.27)
D-LACTATE SERPL-SCNC: 1.3 MMOL/L (ref 0.5–2)
DEPRECATED RDW RBC AUTO: 54.4 FL (ref 37–54)
EGFRCR SERPLBLD CKD-EPI 2021: 78.8 ML/MIN/1.73
EOSINOPHIL # BLD AUTO: 0.07 10*3/MM3 (ref 0–0.4)
EOSINOPHIL NFR BLD AUTO: 0.6 % (ref 0.3–6.2)
ERYTHROCYTE [DISTWIDTH] IN BLOOD BY AUTOMATED COUNT: 17.1 % (ref 12.3–15.4)
GLOBULIN UR ELPH-MCNC: 2.8 GM/DL
GLUCOSE SERPL-MCNC: 105 MG/DL (ref 65–99)
HCT VFR BLD AUTO: 30 % (ref 37.5–51)
HGB BLD-MCNC: 9.1 G/DL (ref 13–17.7)
IMM GRANULOCYTES # BLD AUTO: 0.04 10*3/MM3 (ref 0–0.05)
IMM GRANULOCYTES NFR BLD AUTO: 0.4 % (ref 0–0.5)
LYMPHOCYTES # BLD AUTO: 0.74 10*3/MM3 (ref 0.7–3.1)
LYMPHOCYTES NFR BLD AUTO: 6.6 % (ref 19.6–45.3)
MAGNESIUM SERPL-MCNC: 2 MG/DL (ref 1.7–2.3)
MCH RBC QN AUTO: 28.2 PG (ref 26.6–33)
MCHC RBC AUTO-ENTMCNC: 30.3 G/DL (ref 31.5–35.7)
MCV RBC AUTO: 92.9 FL (ref 79–97)
MONOCYTES # BLD AUTO: 1.71 10*3/MM3 (ref 0.1–0.9)
MONOCYTES NFR BLD AUTO: 15.3 % (ref 5–12)
NEUTROPHILS NFR BLD AUTO: 77 % (ref 42.7–76)
NEUTROPHILS NFR BLD AUTO: 8.63 10*3/MM3 (ref 1.7–7)
NRBC BLD AUTO-RTO: 0 /100 WBC (ref 0–0.2)
PHOSPHATE SERPL-MCNC: 3.1 MG/DL (ref 2.5–4.5)
PLATELET # BLD AUTO: 94 10*3/MM3 (ref 140–450)
PMV BLD AUTO: 11.9 FL (ref 6–12)
POTASSIUM SERPL-SCNC: 3.7 MMOL/L (ref 3.5–5.2)
PROT SERPL-MCNC: 5.1 G/DL (ref 6–8.5)
RBC # BLD AUTO: 3.23 10*6/MM3 (ref 4.14–5.8)
SODIUM SERPL-SCNC: 143 MMOL/L (ref 136–145)
WBC NRBC COR # BLD AUTO: 11.2 10*3/MM3 (ref 3.4–10.8)

## 2025-03-22 PROCEDURE — 80053 COMPREHEN METABOLIC PANEL: CPT | Performed by: INTERNAL MEDICINE

## 2025-03-22 PROCEDURE — P9047 ALBUMIN (HUMAN), 25%, 50ML: HCPCS | Performed by: NURSE PRACTITIONER

## 2025-03-22 PROCEDURE — 83735 ASSAY OF MAGNESIUM: CPT | Performed by: NURSE PRACTITIONER

## 2025-03-22 PROCEDURE — 99232 SBSQ HOSP IP/OBS MODERATE 35: CPT | Performed by: INTERNAL MEDICINE

## 2025-03-22 PROCEDURE — 92610 EVALUATE SWALLOWING FUNCTION: CPT

## 2025-03-22 PROCEDURE — 85025 COMPLETE CBC W/AUTO DIFF WBC: CPT | Performed by: NURSE PRACTITIONER

## 2025-03-22 PROCEDURE — 83605 ASSAY OF LACTIC ACID: CPT | Performed by: NURSE PRACTITIONER

## 2025-03-22 PROCEDURE — 84100 ASSAY OF PHOSPHORUS: CPT | Performed by: INTERNAL MEDICINE

## 2025-03-22 PROCEDURE — 25010000002 ALBUMIN HUMAN 25% PER 50 ML: Performed by: NURSE PRACTITIONER

## 2025-03-22 PROCEDURE — 71045 X-RAY EXAM CHEST 1 VIEW: CPT

## 2025-03-22 PROCEDURE — 25810000003 SODIUM CHLORIDE 0.9 % SOLUTION: Performed by: NURSE PRACTITIONER

## 2025-03-22 RX ORDER — ALBUMIN (HUMAN) 12.5 G/50ML
12.5 SOLUTION INTRAVENOUS ONCE
Status: COMPLETED | OUTPATIENT
Start: 2025-03-22 | End: 2025-03-22

## 2025-03-22 RX ORDER — AMINO ACIDS/PROTEIN HYDROLYS 11G-40/45
30 LIQUID IN PACKET (ML) ORAL 2 TIMES DAILY
Status: DISCONTINUED | OUTPATIENT
Start: 2025-03-22 | End: 2025-03-26 | Stop reason: HOSPADM

## 2025-03-22 RX ADMIN — PANTOPRAZOLE SODIUM 40 MG: 40 INJECTION, POWDER, FOR SOLUTION INTRAVENOUS at 05:56

## 2025-03-22 RX ADMIN — DICYCLOMINE HYDROCHLORIDE 20 MG: 20 TABLET ORAL at 08:21

## 2025-03-22 RX ADMIN — RIVAROXABAN 10 MG: 10 TABLET, FILM COATED ORAL at 18:28

## 2025-03-22 RX ADMIN — SODIUM CHLORIDE 250 ML: 900 INJECTION, SOLUTION INTRAVENOUS at 01:09

## 2025-03-22 RX ADMIN — ALBUMIN (HUMAN) 12.5 G: 0.25 INJECTION, SOLUTION INTRAVENOUS at 00:11

## 2025-03-22 RX ADMIN — BUSPIRONE HYDROCHLORIDE 5 MG: 10 TABLET ORAL at 12:38

## 2025-03-22 RX ADMIN — Medication 2 PACKET: at 21:59

## 2025-03-22 RX ADMIN — Medication 10 ML: at 08:24

## 2025-03-22 RX ADMIN — Medication 10 ML: at 21:59

## 2025-03-22 RX ADMIN — QUETIAPINE FUMARATE 25 MG: 25 TABLET ORAL at 21:59

## 2025-03-22 RX ADMIN — DICYCLOMINE HYDROCHLORIDE 20 MG: 20 TABLET ORAL at 21:59

## 2025-03-22 RX ADMIN — DICYCLOMINE HYDROCHLORIDE 20 MG: 20 TABLET ORAL at 18:28

## 2025-03-22 RX ADMIN — HYDROXYZINE HYDROCHLORIDE 25 MG: 25 TABLET ORAL at 21:59

## 2025-03-22 RX ADMIN — POLYETHYLENE GLYCOL 3350 17 G: 17 POWDER, FOR SOLUTION ORAL at 08:22

## 2025-03-22 RX ADMIN — Medication 30 ML: at 21:59

## 2025-03-22 RX ADMIN — Medication 10 MG: at 21:59

## 2025-03-22 RX ADMIN — Medication 2 PACKET: at 08:21

## 2025-03-22 RX ADMIN — SODIUM CHLORIDE 1000 ML: 9 INJECTION, SOLUTION INTRAVENOUS at 02:19

## 2025-03-22 RX ADMIN — DICYCLOMINE HYDROCHLORIDE 20 MG: 20 TABLET ORAL at 12:38

## 2025-03-22 RX ADMIN — Medication 45 ML: at 08:23

## 2025-03-22 RX ADMIN — PANTOPRAZOLE SODIUM 40 MG: 40 INJECTION, POWDER, FOR SOLUTION INTRAVENOUS at 18:28

## 2025-03-22 NOTE — PROGRESS NOTES
Clinical Nutrition Assessment     Patient Name: Carlos Preston  YOB: 1928  MRN: [id number]  Date of Encounter: 03/22/25 12:37 EDT  Admission date: 3/11/2025  Reason for Visit: Follow-up protocol, EN    Assessment   Nutrition Assessment   Admission Diagnosis:  Rhabdomyolysis [M62.82]    Problem List:    Rhabdomyolysis    Moderate protein-calorie malnutrition    Urine incontinence      PMH:   He  has a past medical history of Cancer, H/O prostatectomy, Hearing loss, Heart disease, History of back surgery, Hypercholesterolemia, Hyperlipidemia, Hypertension, IHD (ischemic heart disease), Osteoarthritis of spine with myelopathy, lumbar region, Paroxysmal atrial fibrillation (07/03/2018), Pulmonary hypertension, S/P knee replacement, Sinus disorder, and thyroid ultrasound (08/28/2009).    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    Applicable Nutrition History:   3/12 SLP rec NPO w repeat 3/13  3/13-SLP Diet Recommendation: NPO, temporary alternate methods of nutrition/hydration, other (see comments) (vs consideration of comfort diet if aligns w/ GOC/POC)     SLP Recommendation and Plan  SLP Swallowing Diagnosis: mild-moderate, oral dysphagia, severe, pharyngeal dysphagia (03/20/25 1015)  SLP Diet  "Recommendation: mechanical ground textures, nectar thick liquids, other (see comments) (extra gravy on trays. Consider thins for comfort or thin H2O between meals via small cup sips as safest of intake of thin liquids.) (03/20/25 1015)  Recommended Precautions and Strategies: upright posture during/after eating, no straw, general aspiration precautions (03/20/25 1015)    s/p PEG 3-21-25    SKIN: R gluteal ulcer      Labs    Labs Reviewed: Yes     Results from last 7 days   Lab Units 03/22/25  0208 03/21/25  0517 03/20/25  0727   GLUCOSE mg/dL 105* 81 115*   BUN mg/dL 36* 33* 32*   CREATININE mg/dL 0.86 0.88 0.67*   SODIUM mmol/L 143 142 141   CHLORIDE mmol/L 106 104 107   POTASSIUM mmol/L 3.7 3.9 4.3   PHOSPHORUS mg/dL 3.1 2.9 2.5   MAGNESIUM mg/dL 2.0 1.9 1.9   ALT (SGPT) U/L 16 23  --    LACTATE mmol/L 1.3  --   --        Results from last 7 days   Lab Units 03/22/25  0208 03/21/25  0517 03/20/25  0727   ALBUMIN g/dL 2.3* 2.4*  --    CRP mg/dL  --  1.32* 0.79*       Results from last 7 days   Lab Units 03/21/25  0147 03/21/25  0004 03/20/25  2321 03/19/25  1737 03/19/25  1147 03/19/25  0550   GLUCOSE mg/dL 89 67* 66* 84 100 113     No results found for: \"HGBA1C\"            Medications    Medications Reviewed: Yes    Scheduled Meds:busPIRone, 5 mg, Nasogastric, Daily With Lunch  [Held by provider] cetirizine, 10 mg, Oral, Daily  dicyclomine, 20 mg, Oral, 4x Daily  ferrous sulfate, 325 mg, Oral, Daily With Breakfast  hydrOXYzine, 25 mg, Nasogastric, Nightly  [Held by provider] isosorbide mononitrate, 30 mg, Oral, QAM  melatonin, 10 mg, Nasogastric, Nightly  [Held by provider] NIFEdipine XL, 30 mg, Oral, Daily  Nutrisource fiber, 2 packet, Per G Tube, BID  pantoprazole, 40 mg, Intravenous, BID AC  polyethylene glycol, 17 g, Nasogastric, Daily  ProSource TF, 45 mL, Nasogastric, Daily  QUEtiapine, 25 mg, Nasogastric, Nightly  rivaroxaban, 10 mg, Nasogastric, Daily With Dinner  senna-docusate sodium, 2 tablet, " "Nasogastric, Nightly  sodium chloride, 10 mL, Intravenous, Q12H      Continuous Infusions:     PRN Meds:.  senna-docusate sodium **AND** [DISCONTINUED] polyethylene glycol **AND** [DISCONTINUED] bisacodyl **AND** bisacodyl    Calcium Replacement - Follow Nurse / BPA Driven Protocol    dextrose    dextrose    glucagon (human recombinant)    hyoscyamine    ipratropium-albuterol    Magnesium Standard Dose Replacement - Follow Nurse / BPA Driven Protocol    nitroglycerin    ondansetron    Phosphorus Replacement - Follow Nurse / BPA Driven Protocol    Potassium Replacement - Follow Nurse / BPA Driven Protocol    sodium chloride    sodium chloride    sodium chloride    traMADol    trolamine salicylate    Intake/Ouptut 24 hrs (0701 - 0700)   I&O's Reviewed: Yes   Intake & Output (last day)         03/21 0701 03/22 0700 03/22 0701 03/23 0700    I.V. (mL/kg) 150 (1.8)     Other 250     NG/     IV Piggyback 300     Total Intake(mL/kg) 1075 (12.9)     Urine (mL/kg/hr) 1200 (0.6)     Stool 0     Total Output 1200     Net -125           Urine Unmeasured Occurrence 2 x     Stool Unmeasured Occurrence 1 x             Anthropometrics     Height: Height: 182.9 cm (72\")  Last Filed Weight: Weight: 83.5 kg (184 lb) (03/21/25 0709)  Method: Weight Method: Stated  BMI: BMI (Calculated): 24.9    UBW:  250lbs per pt's dtr, appears ~215lbs in the past yr  Weight change:  possible 30lb/14% wt loss x 1 yr-need measured wt from [facility] [place]   Weight      Weight (kg) Weight (lbs) Visit Report   1/10/2024 97.523 kg  215 lb  --    2/14/2024 95.074 kg  209 lb 9.6 oz  --    2/15/2024 95.1 kg  209 lb 10.5 oz     8/21/2024 88.542 kg  195 lb 3.2 oz  --    3/11/2025 83.915 kg  185 lb         Nutrition Focused Physical Exam    Date: 3/13    Patient meets criteria for malnutrition diagnosis, see MSA note.     Subjective   Reported/Observed/Food/Nutrition Related History:     3-22: pt sitting up in bed, is eating food his family brought him    Per RN: pt " tolerated TF last night, MD ok to resume diet    Plan to start bolus feeds today to see if pt tolerates    Noted that pt received regular meal tray,  pt still currently npo  Diet order changed to previous dysphagia order: Mechanical ground/ nectar liquids, no straws, discussed with RN, diet office,SLP, RN to clarify diet order with MD    3/21  Pt s/p PEG, RN states sx okay to resume feeds this evening. Spoke w/family at bedside, would like to continue nocturnal feeds for now. Discussed bolus option as well and family would like to consider this in the future. Family states pt w/frequent Bms but not large quantity-will add fiber packets.     3/20  Pt OOR for MBS, dtr at bedside. Dtr states likely plan for PEG pending results of MBS, per sx note plan for PEG tomorrow. Dtr would like to have Magic Cup. RN states pt tolerating EN regimen well, having 2-3 Bms daily    3/18  Spoke w/PA, plans for comfort diet and adjustment to nocturnal feeds.     3/17  Per RN pt doing well on current TF regimen, notes watery Bms. Pt states he is tolerating TF, denies abdominal pain or bloating.     3/14  RN states keofeed placed, in stomach and MD okay to feed.     3/13  Family rpt pt at ok until 4 da ago then unable to eat .Repeat has had signif wt loss. Family aware of plan for feeding tube. GI note indicated will place tube 2/2 aspiration risk at this time.    3/12  Pt up in chair at bedside, very sleepy. Dtr at bedside provides nutrition hx. Dtr states pt has had poor intake x 2 days, states prior to recent fall pt was eating well. Dtr confirms pt has had esophageal dilation in the past, ~1.5 yrs ago and states worsening dysphagia over the past yr and pt managing this w/cutting food into small pieces and drinking adequate fluids w/meals. Dtr states pt has also had significant wt loss over the past yr, states UBW is 250lbs and was 185lbs at MD office <1 week ago. Dtr unsure etiology as pt tells her he eats well.     Needs Assessment  "  Date: 3/13    Height used:Height: 182.9 cm (72\")  Weights used: 83.9 Kg    Estimated Calorie needs: ~ 2000Kcal/day  Method:  Kcals/KG x 25 = 8kcal  Method:  [id number] x 1.3 = 1968kcal    Estimated Protein needs: ~ 109g protein  Method: g/Kg x 1.3    Estimated Fluid needs: ~2000ml  25ml per kml    Current Nutrition Prescription     EN: IsoSource 1.5  Goal Rate: 60ml  Water Flushes: 35ml  Modular: Prosource TF 1/day and Nutrisource Fiber 4/day  Route: PEG  Tube: Unknown    At goal over: 12Hrs/day     Rx will supply:   Goal Volume 720  mL/day     Flush Volume 420 mL/day     Energy 1180 Kcal/day 59 % Est Need   Protein 60 g/day 55 % Est Need   Fiber 23 g/day     Water in   mL     Total Water 967 mL     Meet DRI No        --------------------------------------------------------------------------  Product/Rate verified at bedside: No  Infusing Rate at time of visit: TF off    Average Delivery from [facility]:   Volume 375 mL/day 52  % Goal Vol.   Flush Volume 250 mL/day     Energy  Kcal/day  % Est Need   Protein  g/day  % Est Need   Fiber  g/day     Water in  EN  mL     Total Water  mL     Meet DRI Yes        PO: Diet: Regular/House; No Straw; Texture: Mechanical Ground (NDD 2); Fluid Consistency: [place] Thick  Oral Nutrition Supplement: Magic Cups 2x/day  Intake: Insufficient data    Assessment & Plan   Nutrition Diagnosis   Date:  3/12            Updated:  3/22  Problem Inadequate oral intake    Etiology dysphagia   Signs/Symptoms NPO   Status: Active PEG placed 3-21    Date:   3/12  Updated:     Problem Unintended weight loss   Etiology ? Dysphagia, advanced age   Signs/Symptoms Possible 14% wt loss x 1 yr   Status: New    Date:  3/13 Updated:  Problem Malnutrition moderate acute   Etiology Alt Swallow fx   Signs/Symptoms Intake hx w some Wasting   Status: New    Goal / Objectives:   Nutrition to support treatment, Advance diet as medically feasible/appropriate, and Tolerate EN at " goal      Nutrition Intervention      Follow treatment progress, Care plan reviewed, Nutrition support order placed    Change to bolus feeds: Isosource 1.5: 1 can 3x/day, Prosource no carb 2x/day, nutrisource fiber 4 packets per day, 60ml flush before and after each bolus feed:    Give 125ml with initial bolus, it tolerates,  may advance to 250ml bolus next feed    Schedule  feeds 8am---1pm---6pm    TF at goal volume will provide: 750ml, 1305kcal, 65% kcal needs, 81g protein, 74% protein needs, 23g fiber, 933ml free water    Suggest add MVI as TF volume too low to meet RDI's    Monitoring/Evaluation:   Per protocol, I&O, PO intake, Supplement intake, Pertinent labs, EN delivery/tolerance, Weight, Skin status, GI status, Symptoms, POC/GOC, Swallow function, Hemodynamic stability    Sarah Mortensen, RD  Time Spent:60min

## 2025-03-22 NOTE — PLAN OF CARE
Goal Outcome Evaluation:         Patient is Aox3, in A. Fib, on Room Air, now on Bolus Tube Feeds of Iso 1.5, a few small BM's today, good UOP through purewick, PLAN is for an MRI tonight to check on a dilated Bile duct per Hospitalist order, currently NPO for that - can resume normal diet afterwards VSS - will continue POC.

## 2025-03-22 NOTE — THERAPY RE-EVALUATION
Acute Care - Speech Language Pathology   Swallow Re-Evaluation [facility]     Patient Name: Carlos Preston  : 3/20/1928  MRN: [id number]  Today's Date: 3/22/2025               Admit Date: 3/11/2025    Visit Dx:     ICD-10-CM ICD-9-CM   1. Pneumonia of both lungs due to infectious organism, unspecified part of lung  J18.9 483.8   2. Traumatic rhabdomyolysis, initial encounter  T79.6XXA 958.6   3. Elevated troponin  R79.89 790.6   4. RSV (acute bronchiolitis due to respiratory syncytial virus)  J21.0 466.11   5. Impaired mobility and ADLs  Z74.09 V49.89    Z78.9    6. Oropharyngeal dysphagia  R13.12 787.22     Patient Active Problem List   Diagnosis    Pulmonary HTN    Hyperlipemia    HTN (hypertension)    GERD (gastroesophageal reflux disease)    Hx of CABG    IHD (ischemic heart disease)    SOB (shortness of breath)    Abnormal chest x-ray    Esophageal stricture    Rhabdomyolysis    Moderate protein-calorie malnutrition    Urine incontinence     Past Medical History:   Diagnosis Date    Cancer     H/O prostatectomy     Hearing loss     Heart disease     History of back surgery     Hypercholesterolemia     Hyperlipidemia     Hypertension     IHD (ischemic heart disease)     S/P CABG    Osteoarthritis of spine with myelopathy, lumbar region     Paroxysmal atrial fibrillation 2018    Pulmonary hypertension     S/P knee replacement     Sinus disorder     thyroid ultrasound 2009    Normal     Past Surgical History:   Procedure Laterality Date    CARDIAC CATHETERIZATION  2007    Dr. Mace: 60% LM and 70% LCX.    CARDIAC CATHETERIZATION  2009    %, OM 85-90%,Patent grafts.    CARDIAC CATHETERIZATION  2018    Patent Grafts    CARDIOVASCULAR STRESS TEST  2009    ROBIN George: Dr. Mace- EF 29%, Diffuse ischemia.    CARDIOVASCULAR STRESS TEST  2012    L. Myoview- Inferoseptal infarct with jon infarct ischemia. EF 41%.    CARDIOVASCULAR STRESS TEST  2015    L.  Myoview- EF51%,fixed defect , no ischemia burden.    CARDIOVASCULAR STRESS TEST  10/11/2017    L.Myoview- EF 54%. Small inferior Ischemia.    CONVERTED (HISTORICAL) HOLTER  06/12/2023    3 days. A.Fib. Avg 60. . 3 VT. One 3 Sec Pause    CONVERTED (HISTORICAL) HOLTER  05/28/2024    3 Days. A.Fib. Avg 70. . 4.6% PVC. 2 VT    CORONARY ARTERY BYPASS GRAFT  05/17/2007    CABG:  SVG to LAD and LCX.    ECHO - CONVERTED  08/06/2009    Dr. Mace- EF 50%.    ECHO - CONVERTED  02/14/2011    EF 50%, Septal WMA.    ECHO - CONVERTED  11/07/2012    EF 40-45%, RVSP 33 mmHg.    ECHO - CONVERTED  03/23/2015    EF 45-50%, RVSP 40mmHg,mild MR, mild PHT    ECHO - CONVERTED  05/01/2017    EF 50-55%, mild MR    ECHO - CONVERTED  10/11/2017    EF 55%    ECHO - CONVERTED  03/21/2022    TLS. EF 55%. LA- 5.2 cm. Mild MR. RVSP- 44 mmHg    ECHO - CONVERTED  02/15/2024    TLS. EF 55%.RHE. LA- 5.0. Mild MR.AO- 3.8. RVSP- 56 mmHg    US CAROTID UNILATERAL  04/16/2007    Mild Plaque in Rt. bulb and RECA.       SLP Recommendation and Plan  SLP Swallowing Diagnosis: pharyngeal dysphagia, moderate (03/22/25 1300)  SLP Diet Recommendation: mechanical ground textures, nectar thick liquids, water between meals after oral care, with supervision (03/22/25 1300)  Recommended Precautions and Strategies: upright posture during/after eating, no straw, general aspiration precautions (03/22/25 1300)  SLP Rec. for Method of Medication Administration: meds whole, with puree, with thick liquids, as tolerated, meds via alternate route (03/22/25 1300)     Monitor for Signs of Aspiration: notify SLP if any concerns (03/22/25 1300)     Swallow Criteria for Skilled Therapeutic Interventions Met: demonstrates skilled criteria (03/22/25 1300)  Anticipated Discharge Disposition (SLP): home with assist, home with home health (03/22/25 1300)  Rehab Potential/Prognosis, Swallowing: adequate, monitor progress closely (03/22/25 1300)  Therapy Frequency (Swallow): PRN  (03/22/25 1300)  Predicted Duration Therapy Intervention (Days): until discharge (03/22/25 1300)  Oral Care Recommendations: Oral Care BID/PRN, Toothbrush (03/22/25 1300)                                        Progress: improving      SWALLOW EVALUATION (Last 72 Hours)       SLP Adult Swallow Evaluation       Row Name 03/22/25 1300 03/20/25 1015                Rehab Evaluation    Document Type re-evaluation  -AW re-evaluation  -SM       Subjective Information no complaints  -AW no complaints  -SM       Patient Observations alert;cooperative  -AW alert;cooperative  -SM       Patient/Family/Caregiver Comments/Observations multiple famliy members at bedside  -AW --       Patient Effort good  -AW good  -SM       Symptoms Noted During/After Treatment none  -AW --          General Information    Patient Profile Reviewed yes  -AW --       Pertinent History Of Current Problem Pt got PEG yesterday, MD has said pt can have comfort diet. Will re-evaluate for most appropriate diet before he dc home tomorrow.  -AW Family now considering PEG. MBS requested to assist in GOC/POC decisions.  -       Current Method of Nutrition soft to chew textures;ground;nectar/syrup-thick liquids  -AW pureed;nectar/syrup-thick liquids;small-bore;nasogastric feedings  -       Precautions/Limitations, Vision WFL  -AW --       Precautions/Limitations, Hearing WFL  -AW --       Prior Level of Function-Communication WFL  -AW --       Prior Level of Function-Swallowing no diet consistency restrictions  -AW --       Plans/Goals Discussed with patient and family;agreed upon  -AW patient  -SM       Barriers to Rehab medically complex  -AW medically complex  -       Patient's Goals for Discharge return to regular diet  thin liquids  -AW patient did not state  -       Family Goals for Discharge patient able to eat/drink without coughing/choking  -AW --          Pain    Pretreatment Pain Rating 0/10 - no pain  -AW 0/10 - no pain  -        Posttreatment Pain Rating 0/10 - no pain  -AW 0/10 - no pain  -SM          Oral Motor Structure and Function    Dentition Assessment natural, present and adequate  -AW --       Secretion Management WNL/WFL  -AW --       Volitional Swallow delayed  -AW --       Volitional Cough WFL  -AW --          Oral Musculature and Cranial Nerve Assessment    Oral Motor General Assessment generalized oral motor weakness  -AW --          General Eating/Swallowing Observations    Respiratory Support Currently in Use nasal cannula  -AW --       Eating/Swallowing Skills fed by SLP  -AW --       Positioning During Eating upright in bed  -AW --       Utensils Used cup  -AW --       Consistencies Trialed thin liquids;ice chips;nectar/syrup-thick liquids  family affirms pt cannot eat regular textures, want to return to [facility] soft ground  -AW --          Respiratory    Respiratory Status WFL  -AW --          Clinical Swallow Eval    Oral Prep Phase --  did not test solids as has been tolerating [facility] soft without issue. MBS 3/20 revealed aspiration of thins  -AW --       Oral Transit WFL  -AW --       Oral Residue WFL  -AW --       Pharyngeal Phase suspected pharyngeal impairment  -AW --       Esophageal Phase unremarkable  -AW --       Clinical Swallow Evaluation Summary Pt coughing with thins, better with nectar. Given that family wanting a balance of safety of comfort, pt should continue nectar thick with meals but may have ice/small water sips between meals. Pt/fam all in agreement. Provided thickener packets and info on purchasing, as well as reviewed swallow exercises with pt. Will get HH at dc  -AW --          Pharyngeal Phase Concerns    Wet Vocal Quality thin  -AW --       Cough thin  -AW --       Throat Clear thin  -AW --       Pharyngeal Phase Concerns, Comment encouraged small sips and throat clear after thins  -AW --          MBS/VFSS    Utensils Used -- spoon;cup;straw  -SM       Consistencies Trialed -- thin  liquids;nectar/syrup-thick liquids;pureed;mixed consistency;regular textures  -SM          MBS/VFSS Interpretation    Oral Prep Phase -- WFL  -SM       Oral Transit Phase -- impaired  -SM       Oral Residue -- WFL  -SM          Oral Transit Phase    Impaired Oral Transit Phase -- increased A-P transit time  -SM          Initiation of Pharyngeal Swallow    Pharyngeal Phase -- impaired pharyngeal phase of swallowing  -SM       Penetration During the Swallow -- thin liquids;nectar-thick liquids;secondary to delayed swallow initiation or mistiming;secondary to reduced laryngeal elevation;secondary to reduced vestibular closure;other (see comments)  only deep with thin > approximately 5ml or by straw. Shallow penetration with nectar-thick  -SM       Aspiration After the Swallow -- thin liquids;secondary to residue;in pyriform sinuses;in laryngeal vestibule  -SM       Response to Penetration -- No  -SM       No spontaneous response to penetration and -- effective laryngeal clearance with cue (see comments)  mostly cleared with cue to cough/clear throat.  -SM       Response to Aspiration -- Yes  -SM       Responded to aspiration with -- inconsistent;delayed;cough;effective  -SM       Rosenbek's Scale -- thin:;6--->level 6;nectar:;3--->level 3  -SM       Pharyngeal Residue -- all consistencies tested;diffuse within pharynx;secondary to reduced base of tongue retraction;secondary to reduced posterior pharyngeal wall stripping;secondary to reduced laryngeal elevation;secondary to reduced hyolaryngeal excursion;other (see comments)  greatest in pyriform sinuses r/t poor hyolaryngeal excursion  -SM       Response to Residue -- partial residue clearance;cleared residue with spontaneous subsequent swallow  -SM       Attempted Compensatory Maneuvers -- bolus size;bolus presentation style;head turn to left;head turn to right;additional subsequent swallow;throat clear after swallow  -SM       Response to Attempted Compensatory  Maneuvers -- did not prevent penetration;did not prevent aspiration;did not reduce residue  -       Pharyngeal Phase, Comment -- No significant improvement. Fatigue factor also present. Some degree risk aspiration with all r/t significant pharyngeal residue though no direct aspiration observed with nectar-thick liquids, puree, mixed consistency, or solids. Choking risk with non-cohesive textures. Greater aspiration by straw than small tsp-size cup sip thin liquids. Is able to clear with either spontaneous or cued cough. Attempted to discuss GOC/POC with pt. He likes the idea of thin liquids and more substance of solids though was not able to convey understanding to the correlating abstract decision making needed for such preference. For now, advancing to mechanical ground with extra gravy on tray to make if more cohesive. Discussed results with MD, who is planning to f/u with pt/family.  -          SLP Evaluation Clinical Impression    SLP Swallowing Diagnosis pharyngeal dysphagia;moderate  -AW mild-moderate;oral dysphagia;severe;pharyngeal dysphagia  -       Functional Impact risk of aspiration/pneumonia;risk of malnutrition;risk of dehydration  -AW risk of aspiration/pneumonia;risk of malnutrition;risk of dehydration  -       Rehab Potential/Prognosis, Swallowing adequate, monitor progress closely  -AW re-evaluate goals as necessary  -       Swallow Criteria for Skilled Therapeutic Interventions Met demonstrates skilled criteria  -AW --          Recommendations    Therapy Frequency (Swallow) PRN  -AW --       Predicted Duration Therapy Intervention (Days) until discharge  -AW --       SLP Diet Recommendation mechanical ground textures;nectar thick liquids;water between meals after oral care, with supervision  -AW mechanical ground textures;nectar thick liquids;other (see comments)  extra gravy on trays. Consider thins for comfort or thin H2O between meals via small cup sips as safest of intake of thin  liquids.  -       Recommended Diagnostics -- reassess via clinical swallow evaluation  will f/u tomorrow for ? dysphagia POC  -       Recommended Precautions and Strategies upright posture during/after eating;no straw;general aspiration precautions  -AW upright posture during/after eating;no straw;general aspiration precautions  -SM       Oral Care Recommendations Oral Care BID/PRN;Toothbrush  -AW Oral Care BID/PRN;Toothbrush  -SM       SLP Rec. for Method of Medication Administration meds whole;with puree;with thick liquids;as tolerated;meds via alternate route  -AW meds via alternate route;meds crushed;with puree;as tolerated  -       Monitor for Signs of Aspiration notify SLP if any concerns  -AW yes;notify SLP if any concerns  -SM       Anticipated Discharge Disposition (SLP) home with assist;home with home health  -AW skilled nursing facility  -                 User Key  (r) = Recorded By, (t) = Taken By, (c) = Cosigned By      Initials Name Effective Dates     Araceli Martins, MS CCC-SLP 01/20/25 -     AW Malena Carmona, MS CCC-SLP 02/03/23 -                     EDUCATION  The patient has been educated in the following areas:   Dysphagia (Swallowing Impairment).        SLP GOALS       Row Name 03/22/25 1400             (LTG) Patient will demonstrate functional swallow for    Diet Texture (Demonstrate functional swallow) soft to chew (whole) textures  -AW      Liquid viscosity (Demonstrate functional swallow) thin liquids  -AW      [place] (Demonstrate functional swallow) with minimal cues (75-90% accuracy)  -AW      Time Frame (Demonstrate functional swallow) 1 week  -AW      Progress/Outcomes (Demonstrate functional swallow) goal no longer appropriate;goal revised this date  -AW      Comment (Demonstrate functional swallow) will do nectar with meals and thin between  -AW         (STG) Lingual Strengthening Goal 1 (SLP)    Activity (Lingual Strengthening Goal 1, SLP) increase tongue back  strength  -AW      Increase Tongue Back Strength lingual resistance exercises  -AW      [place]/Accuracy (Lingual Strengthening Goal 1, SLP) with minimal cues (75-90% accuracy)  -AW      Time Frame (Lingual Strengthening Goal 1, SLP) 1 week  -AW      Progress/Outcomes (Lingual Strengthening Goal 1, SLP) continuing progress toward goal  provided exercises for pt  -AW         (STG) Pharyngeal Strengthening Exercise Goal 1 (SLP)    Activity (Pharyngeal Strengthening Goal 1, SLP) increase timing;increase superior movement of the hyolaryngeal complex;increase anterior movement of the hyolaryngeal complex;increase closure at entrance to airway/closure of airway at glottis;increase squeeze/positive pressure generation;increase tongue base retraction  -AW      Increase Timing prepping - 3 second prep or suck swallow or 3-step swallow  -AW      Increase Superior Movement of the Hyolaryngeal Complex Mendelsohn  -AW      Increase Squeeze/Positive Pressure Generation hard effortful swallow  -AW      Increase Tongue Base Retraction edith  -AW      [place]/Accuracy (Pharyngeal Strengthening Goal 1, SLP) with minimal cues (75-90% accuracy)  -AW      Time Frame (Pharyngeal Strengthening Goal 1, SLP) 1 week  -AW      Progress/Outcomes (Pharyngeal Strengthening Goal 1, SLP) continuing progress toward goal  -AW                User Key  (r) = Recorded By, (t) = Taken By, (c) = Cosigned By      Initials Name Provider Type    Malena Urbina MS CCC-SLP Speech and Language Pathologist                         Time Calculation:    Time Calculation- SLP       Row Name 03/22/25 1406             Time Calculation- SLP    SLP Start Time 1230  -AW      SLP Stop Time 1330  -AW      SLP Time Calculation (min) 60 min  -AW      SLP Received On 03/22/25  -AW                User Key  (r) = Recorded By, (t) = Taken By, (c) = Cosigned By      Initials Name Provider Type    Malena Urbina MS CCC-SLP Speech and Language Pathologist                     Therapy Charges for Today       Code Description Service Date Service Provider Modifiers Qty    [id number]  ST EVAL ORAL PHARYNG SWALLOW 4 3/22/2025 Malena Carmona, MS CCC-SLP GN 1                 Malena Carmona, MS CCC-SLP  3/22/2025

## 2025-03-22 NOTE — PLAN OF CARE
Goal Outcome Evaluation:  Plan of Care Reviewed With: patient        Progress: no change  Outcome Evaluation: Patient alert and oriented x3. Transfter to bed from chair early in shift with three staff members. Vitals taken at 2300. Significant hypotension noted. Patient lethargic and wakes to touch. Provider notified. Normal Saline 250mL bolus given. Improvement to SBP 90 after bolus. Patient map remained low. Provider notified and IV albumin given. No noted improvement with post albumin. Patient remains lethargic. Provider notified and second 250mL normal saline bolus given. Provider at bedside to see patient this shift. Patient noted to have increase in BP after second saline bolus. Patient started on Normal saline 1000ML bolus over two hours per provider. BP monitored throughout the night. SBP 's during liter normal saline bolus. Patient A. Fib on the monitor with occassional PVC, HR 50-70 bmp. Tube feed increased to 35mL/hour at 2330. Tolerating well. Tube feed residual less then 5mL. ABD binder in place. PEG tube site noted dry blood. No new bleeding noted at peg site.

## 2025-03-22 NOTE — PROGRESS NOTES
[facility] Medicine Services  PROGRESS NOTE    Patient Name: Carlos Preston  : 3/20/1928  MRN: [id number]    Date of Admission: 3/11/2025  Primary Care Physician: Newton Hankins MD    Subjective     CC:   Follow-up for dysphagia     HPI:  Patient seen and examined this morning.  Comfortable this morning.  Slightly sleepy.  Will hypotensive overnight but improved with IV fluids and albumin.  Family had few questions and all of them addressed.    Objective     Vital Signs:   Temp:  [97.6 °F (36.4 °C)-98.5 °F (36.9 °C)] 97.6 °F (36.4 °C)  Heart Rate:  [57-86] 63  Resp:  [16-18] 18  BP: ()/(40-78) 131/72  Flow (L/min) (Oxygen Therapy):  [4] 4     Physical Exam:  General: Chronically ill looking, debilitated, conversant and pleasant   Head: Atraumatic and normocephalic  Eyes: No Icterus. No pallor  Ears:  Ears appear intact with no abnormalities noted  Throat: No oral lesions, no thrush  Neck: Supple, trachea midline  Lungs: Improved air entry bilateral lung fields.  No crackles or wheezing.  Poor cough effort  Heart:  Normal S1 and S2, no murmur, no gallop, No JVD, no lower extremity swelling  Abdomen:  Soft, no tenderness, no organomegaly, normal bowel sounds, no organomegaly  Extremities: pulses equal bilaterally  Skin: No bleeding, bruising or rash, normal skin turgor and elasticity  Neurologic: Cranial nerves appear intact with no evidence of facial asymmetry, normal motor and sensory functions in all 4 extremities  Psych: Alert and oriented x 3, normal mood    Results Reviewed:  LAB RESULTS:      Lab 25  0208 25  0517 25  0727 25  0929   WBC 11.20* 18.85* 17.52* 18.24*   HEMOGLOBIN 9.1* 10.8* 9.8* 10.5*   HEMATOCRIT 30.0* 35.3* 32.2* 35.2*   PLATELETS 94* 101* 85* 98*   NEUTROS ABS 8.63* 15.68*  --   --    IMMATURE GRANS (ABS) 0.04 0.12*  --   --    LYMPHS ABS 0.74 0.85  --   --    MONOS ABS 1.71* 2.00*  --   --    EOS ABS 0.07 0.18  --   --    MCV  92.9 93.6 93.1 97.0   CRP  --  1.32* 0.79*  --    PROCALCITONIN  --   --  0.09  --    LACTATE 1.3  --   --   --          Lab 03/22/25  0208 03/21/25  0517 03/20/25  0727 03/19/25  0928 03/18/25  2103 03/18/25  0831   SODIUM 143 142 141 143  --  143   POTASSIUM 3.7 3.9 4.3 4.2  --  4.1   CHLORIDE 106 104 107 112*  --  113*   CO2 29.0 29.0 24.0 23.0  --  23.0   ANION GAP 8.0 9.0 10.0 8.0  --  7.0   BUN 36* 33* 32* 36*  --  44*   CREATININE 0.86 0.88 0.67* 0.72*  --  0.73*   EGFR 78.8 78.2 84.9 83.6  --  83.3   GLUCOSE 105* 81 115* 97  --  144*   CALCIUM 9.3 9.7* 9.5 9.7*  --  9.8*   MAGNESIUM 2.0 1.9 1.9 1.8  --  1.9   PHOSPHORUS 3.1 2.9 2.5 2.5 2.7 2.1*         Lab 03/22/25  0208 03/21/25  0517 03/15/25  0929   TOTAL PROTEIN 5.1* 5.5* 5.2*   ALBUMIN 2.3* 2.4* 2.5*   GLOBULIN 2.8 3.1 2.7   ALT (SGPT) 16 23 35   AST (SGOT) 22 24 28   BILIRUBIN 0.7 0.7 0.7   ALK PHOS 98 102 113           Brief Urine Lab Results  (Last result in the past 365 days)        Color   Clarity   Blood   Leuk Est   Nitrite   Protein   CREAT   Urine HCG        03/11/25 1604 [place]   Clear   Moderate (2+)   Negative   Negative   30 mg/dL (1+)                 Microbiology Results Abnormal       Procedure Component Value - Date/Time    COVID-19, FLU A/B, RSV PCR 1 HR TAT - Swab, Nasopharynx [[id number]]  (Abnormal) Collected: 03/11/25 1431    Lab Status: Final result Specimen: Swab from Nasopharynx Updated: 03/11/25 1518     COVID19 Not Detected     Influenza A PCR Not Detected     Influenza B PCR Not Detected     RSV, PCR Detected    Narrative:      Fact sheet for providers: https://www.fda.gov/media/583781/download    Fact sheet for patients: https://www.fda.gov/media/033392/download    Test performed by PCR.          Duplex Venous Upper Extremity - Right CAR  Result Date: 3/20/2025    Sub-acute right upper extremity superficial thrombophlebitis noted in the basilic (upper arm) and basilic (forearm).   All other right sided vessels appear normal.      FL Video Swallow With Speech Single Contrast  Result Date: 3/20/2025  FL VIDEO SWALLOW W SPEECH SINGLE-CONTRAST Date of Exam: 3/20/2025 10:04 AM EDT Indication: aspiration.   Comparison: None available. Technique:   The speech pathologist administered food and/or liquid mixed with barium to the patient with cine/video imaging.  Imaging assistance was provided to the speech pathologist and an image was saved. Fluoroscopic Time: 1 minute and 54 seconds Number of Images: 13 associated fluoroscopic loops were saved Findings: Aspiration was seen during fluoroscopic guided modified barium swallowing series. Please see speech therapy report for full details and recommendations.     Impression: Impression: Fluoroscopy provided for a modified barium swallow. Aspiration was seen during swallowing evaluation. Please see speech therapy report for full details and recommendations. Report dictated by: Shira Renner PA-c  I have personally reviewed this case and agree with the findings above: Electronically Signed: Preston Neil MD  3/20/2025 11:13 AM EDT  Workstation ID: [id number]      Results for orders placed during the hospital encounter of 02/15/24    Adult Transthoracic Echo Complete W/ Cont if Necessary Per Protocol    Interpretation Summary    Atrial fibrillation was the predominant rhythm observed during the procedure.    The study is technically difficult    Left ventricular wall thickness is consistent with mild concentric hypertrophy.    Left ventricular ejection fraction appears to be 51 - 55%.    Left ventricular diastolic function is consistent with (grade I) impaired relaxation.    The right ventricular cavity is borderline dilated.    The left atrial cavity is moderately dilated.  5.0 cm    The right atrial cavity is mildly  dilated.    No aortic valve regurgitation or stenosis is present    Mild mitral valve regurgitation with MAC is present.    Moderate tricuspid valve regurgitation is present.  RVSP- 56  mmHg    Borderline dilation of the aortic root is present.  3.8 cm    Current medications:  Scheduled Meds:busPIRone, 5 mg, Nasogastric, Daily With Lunch  [Held by provider] cetirizine, 10 mg, Oral, Daily  dicyclomine, 20 mg, Oral, 4x Daily  ferrous sulfate, 325 mg, Oral, Daily With Breakfast  hydrOXYzine, 25 mg, Nasogastric, Nightly  [Held by provider] isosorbide mononitrate, 30 mg, Oral, QAM  melatonin, 10 mg, Nasogastric, Nightly  [Held by provider] NIFEdipine XL, 30 mg, Oral, Daily  Nutrisource fiber, 2 packet, Per G Tube, BID  pantoprazole, 40 mg, Intravenous, BID AC  polyethylene glycol, 17 g, Nasogastric, Daily  ProSource TF, 45 mL, Nasogastric, Daily  QUEtiapine, 25 mg, Nasogastric, Nightly  [Held by provider] rivaroxaban, 20 mg, Nasogastric, Daily With Dinner  senna-docusate sodium, 2 tablet, Nasogastric, Nightly  sodium chloride, 10 mL, Intravenous, Q12H      Continuous Infusions:       PRN Meds:.  senna-docusate sodium **AND** [DISCONTINUED] polyethylene glycol **AND** [DISCONTINUED] bisacodyl **AND** bisacodyl    Calcium Replacement - Follow Nurse / BPA Driven Protocol    dextrose    dextrose    glucagon (human recombinant)    hyoscyamine    ipratropium-albuterol    Magnesium Standard Dose Replacement - Follow Nurse / BPA Driven Protocol    nitroglycerin    ondansetron    Phosphorus Replacement - Follow Nurse / BPA Driven Protocol    Potassium Replacement - Follow Nurse / BPA Driven Protocol    sodium chloride    sodium chloride    sodium chloride    traMADol    trolamine salicylate    Assessment & Plan   Assessment & Plan     Active Hospital Problems    Diagnosis  POA    **Rhabdomyolysis [M62.82]  Yes    Moderate protein-calorie malnutrition [E44.0]  Yes      Resolved Hospital Problems   No resolved problems to display.     Brief Hospital Course to date:  Carlos Preston is a 89 y.o. male with a history of heart disease, hyperlipidemia, ischemic heart disease status post CABG, paroxysmal A-fib on  Eliquis presented to the hospital with progressive weakness.  Found to have bilateral pneumonia and acute rhabdomyolysis    Acute hypoxia respiratory insufficiency, resolved   RSV infection, resolved  Superimposed left lower lobe bacterial pneumonia, unspecified, improved  Bilateral basal lung fibrosis concerning for chronic dysphagia / aspiration  CT chest with bilateral chronic fibrotic changes in both lung bases and developing left lower lobe pneumonia.  Patient is forced for RSV  Was hypoxic, requiring 4 L nasal cannula - has weaned to room air   Completed 7 days Zosyn / Doxycycline  Status post IV Solu-Medrol and PO Prednisone  Negative Legionella antigen, strep antigen and MRSA swab  Sputum culture negative to date  Apryl, Basiaex and IS    Chronic dysphagia  History of esophageal stricture  Poor oral intake  SLP following   MBS 3/20/2025 --> nectar thick liquids.  Continues to have poor oral intake.  Family interested in PEG tube.  Dr. Pa/general surgery consulted.  Status post PEG tube placement 3/21/2025  Nutrition managing tube feed   Case management consulted to arrange for home health and home tube feed arrangements.  Per case management, his nocturnal tube feed and feeding pump will not be approved at length he fails bolus feed    Hypotension  Had an episode of hypotension night of 3/21/2025 secondary to volume depletion.  Improved with IV fluids  Continue to hold Imdur and nifedipine    Acute rhabdomyolysis, resolved   Elevated troponin  Patient fell x 2 and was on the floor for 4-5 hours  CPK on admission 2500.  Trended down to 1200 with IV fluids by 3/12  Elevated troponin felt to be demand ischemia and secondary to rhabdomyolysis. Patient with no chest pain or ischemic changes in EKG.  Defer ischemic workup given his frailty and age       Severe iron deficiency anemia   Symptomatic anemia   Patient was anemic with Hgb 7.6 on admission - s/p 1 unit PRBCs    s/p IV iron x 3 doses  Continue p.o.  iron  Hemoglobin stable around 9-10  No evidence of GI bleed    Elevated liver enzymes, improved  CBD dilation  Abnormal CT abdomen with common bile duct dilation as well as pancreas duct dilation.   No focal mass or obstructive process seen on noncontrast CT  Total bilirubin is normal  GI following and recommended MRCP when more stable    A-fib  Hypertension of CABG  HTN  Xarelto held for PEG placement.  Will restart today at a low dose of 10 mg  CT head no acute intracranial abnormality identified  ECHO: 02/2024: EF: 51-55%     Debility  PT/OT and OT recommended rehab but the patient and his family declined despite extensive counseling  Family arrange for caregiver at home.  Tentative plan to discharge him on Sunday, 3/23/2025    Hypoactive delirium, resolved  Sleep hygiene - limit interruptions at night  Continue Seroquel 25 mg QHS, Melatonin 10 mg QHS and Hydroxyzine 25 mg QHS    Expected Discharge Location and Transportation: Home with home health / 24/7 caregivers   Expected Discharge Expected Discharge Date: 3/23/2025; Expected Discharge Time:      VTE Prophylaxis:Pharmacologic & mechanical VTE prophylaxis orders are present.    AM-PAC 6 Clicks Score (PT): 9 (03/21/25 2016)    CODE STATUS:   Code Status and Medical Interventions: CPR (Attempt to Resuscitate); Full Support   Ordered at: 03/11/25 0393     Code Status (Patient has no pulse and is not breathing):    CPR (Attempt to Resuscitate)     Medical Interventions (Patient has pulse or is breathing):    Full Support     Level Of Support Discussed With:    Patient     Maksim Delgado MD  03/22/25

## 2025-03-22 NOTE — PLAN OF CARE
Problem: Adult Inpatient Plan of Care  Goal: Plan of Care Review  Outcome: Progressing  Flowsheets  Taken 3/22/2025 1405 by Malena Carmona MS CCC-SLP  Progress: improving  Taken 3/22/2025 0652 by Trudi Martinez, RN  Plan of Care Reviewed With: patient   Goal Outcome Evaluation:  Plan of Care Reviewed With: patient, grandchild(ruma)        Progress: improving       Anticipated Discharge Disposition (SLP): home with assist, home with home health          SLP Swallowing Diagnosis: pharyngeal dysphagia, moderate (03/22/25 1300)           SLP re-evaluation completed. Will continue to address dysphagia - dc teaching has been completed, will f/u Monday if pt remains inpatient for some reason, but plan is dc home tomorrow. Please see note for further details and recommendations.

## 2025-03-22 NOTE — SIGNIFICANT NOTE
Pt w/ hypotension overnight. Received a total of 1500ml NS and 12.5g Albumin. BP currently stable. Labs collected and stable. Discussed POC w/ granddaughter at bedside.

## 2025-03-22 NOTE — PROGRESS NOTES
"Patient Name:  Carlos Preston  YOB: 1928  [id number]    Surgery Progress Note    Date of visit: 3/22/2025      Subjective: Had some hypotension overnight that was fluid responsive.  No signs of bleeding around the PEG site and he has tolerated tube feeds without difficulty          Objective:     /72   Pulse 63   Temp 97.6 °F (36.4 °C) (Axillary)   Resp 18   Ht 182.9 cm (72\")   Wt 83.5 kg (184 lb)   SpO2 99%   BMI 24.95 kg/m²     Intake/Output Summary (Last 24 hours) at 3/22/2025 0746  Last data filed at 3/22/2025 0600  Gross per 24 hour   Intake 1075 ml   Output 1200 ml   Net -125 ml       GEN:   Awake, resting in bed in no distress, quite elderly and frail  CV:   Regular rate and rhythm  L:  Symmetric expansion, not labored on room air  Abd:  Soft, not distended, some mild appropriate tenderness palpation along the PEG site, PEG in place in the upper abdomen bolster at 2.5 cm of the skin and no clinical evidence of bleeding  Ext:  No cyanosis, clubbing, or edema    Recent labs that are back at this time have been reviewed.           Assessment/ Plan:    Mr. Preston is a 89-year-old gentleman with history of coronary artery disease, ischemic heart disease, atrial fibrillation on Eliquis, and hyperlipidemia who I been asked to evaluate for PEG placement due to dysphagia      # Dysphagia  -Postoperative day 1 following PEG placement  -Had some hypotension to which she was fluid responsive overnight.  There is no sign of bleeding around the PEG site on exam.  He has not had any clinical evidence of GI bleeding.  Hemoglobin is down slightly at 9.1 from 10.8 yesterday morning but from 9.8 the day before his procedure  -Okay to continue with tube feeds through the PEG site  -Would wait an additional 24 hours to restart Xarelto to ensure stability of his H&H and no recurrent episodes of hypotension         Stanley Pa MD  3/22/2025  07:46 EDT      "

## 2025-03-23 ENCOUNTER — APPOINTMENT (OUTPATIENT)
Dept: MRI IMAGING | Facility: HOSPITAL | Age: OVER 89
End: 2025-03-23
Payer: MEDICARE

## 2025-03-23 ENCOUNTER — APPOINTMENT (OUTPATIENT)
Dept: OTHER | Facility: HOSPITAL | Age: OVER 89
End: 2025-03-23
Payer: MEDICARE

## 2025-03-23 LAB — PROCALCITONIN SERPL-MCNC: 0.32 NG/ML (ref 0–0.25)

## 2025-03-23 PROCEDURE — 74181 MRI ABDOMEN W/O CONTRAST: CPT

## 2025-03-23 PROCEDURE — 84145 PROCALCITONIN (PCT): CPT | Performed by: INTERNAL MEDICINE

## 2025-03-23 PROCEDURE — 99232 SBSQ HOSP IP/OBS MODERATE 35: CPT | Performed by: INTERNAL MEDICINE

## 2025-03-23 RX ORDER — ACETAMINOPHEN 325 MG/1
650 TABLET ORAL EVERY 6 HOURS PRN
Status: DISCONTINUED | OUTPATIENT
Start: 2025-03-23 | End: 2025-03-26 | Stop reason: HOSPADM

## 2025-03-23 RX ORDER — PANTOPRAZOLE SODIUM 40 MG/1
40 TABLET, DELAYED RELEASE ORAL
Status: DISCONTINUED | OUTPATIENT
Start: 2025-03-24 | End: 2025-03-26 | Stop reason: HOSPADM

## 2025-03-23 RX ADMIN — Medication 10 ML: at 20:44

## 2025-03-23 RX ADMIN — RIVAROXABAN 10 MG: 10 TABLET, FILM COATED ORAL at 17:30

## 2025-03-23 RX ADMIN — Medication 5 MG: at 20:42

## 2025-03-23 RX ADMIN — POLYETHYLENE GLYCOL 3350 17 G: 17 POWDER, FOR SOLUTION ORAL at 08:38

## 2025-03-23 RX ADMIN — Medication 30 ML: at 20:41

## 2025-03-23 RX ADMIN — BUSPIRONE HYDROCHLORIDE 5 MG: 10 TABLET ORAL at 13:28

## 2025-03-23 RX ADMIN — Medication 10 ML: at 08:38

## 2025-03-23 RX ADMIN — DICYCLOMINE HYDROCHLORIDE 20 MG: 20 TABLET ORAL at 08:38

## 2025-03-23 RX ADMIN — SENNOSIDES AND DOCUSATE SODIUM 2 TABLET: 50; 8.6 TABLET ORAL at 20:42

## 2025-03-23 RX ADMIN — ACETAMINOPHEN 650 MG: 325 TABLET, FILM COATED ORAL at 14:55

## 2025-03-23 RX ADMIN — Medication 2 PACKET: at 08:41

## 2025-03-23 RX ADMIN — PANTOPRAZOLE SODIUM 40 MG: 40 INJECTION, POWDER, FOR SOLUTION INTRAVENOUS at 08:38

## 2025-03-23 RX ADMIN — Medication 30 ML: at 08:41

## 2025-03-23 RX ADMIN — DICYCLOMINE HYDROCHLORIDE 20 MG: 20 TABLET ORAL at 21:59

## 2025-03-23 RX ADMIN — TRAMADOL HYDROCHLORIDE 25 MG: 50 TABLET, COATED ORAL at 17:38

## 2025-03-23 RX ADMIN — Medication 2 PACKET: at 20:41

## 2025-03-23 RX ADMIN — ACETAMINOPHEN 650 MG: 325 TABLET, FILM COATED ORAL at 20:42

## 2025-03-23 RX ADMIN — DICYCLOMINE HYDROCHLORIDE 20 MG: 20 TABLET ORAL at 13:28

## 2025-03-23 NOTE — PLAN OF CARE
Goal Outcome Evaluation:         Patient is Aox3, was a little febrile this afternoon with an axillary temp of 101.4, gave him tylenol, and an Ice Pack, in A.Fib, and on Room air, plan is to get approval for home tube feeding tomorrow, so far he is tolerating the bolus tube feeds, had some back pain today so I gave him 1 dose of the PRN ultram this afternoon, if insurance approves and he is medically stable, he may go home with family tomorrow, VSS - will continue POC.

## 2025-03-23 NOTE — PROGRESS NOTES
[facility] Medicine Services  PROGRESS NOTE    Patient Name: Carlos Preston  : 3/20/1928  MRN: [id number]    Date of Admission: 3/11/2025  Primary Care Physician: Newton Hankins MD    Subjective     CC:   Follow-up for dysphagia     HPI:  Patient seen and examined this morning.  Comfortable this morning.  Continues to feel well and do well.  Tolerating bolus feed well.  No acute events overnight    Objective     Vital Signs:   Temp:  [97.2 °F (36.2 °C)-99.7 °F (37.6 °C)] 98.3 °F (36.8 °C)  Heart Rate:  [63-80] 80  Resp:  [20] 20  BP: (107-136)/(52-95) 133/79     Physical Exam:  General: Chronically ill looking, debilitated, conversant and pleasant   Head: Atraumatic and normocephalic  Eyes: No Icterus. No pallor  Ears:  Ears appear intact with no abnormalities noted  Throat: No oral lesions, no thrush  Neck: Supple, trachea midline  Lungs: Improved air entry bilateral lung fields.  No crackles or wheezing.  Poor cough effort  Heart:  Normal S1 and S2, no murmur, no gallop, No JVD, no lower extremity swelling  Abdomen:  Soft, no tenderness, no organomegaly, normal bowel sounds, no organomegaly  Extremities: pulses equal bilaterally  Skin: No bleeding, bruising or rash, normal skin turgor and elasticity  Neurologic: Cranial nerves appear intact with no evidence of facial asymmetry, normal motor and sensory functions in all 4 extremities  Psych: Alert and oriented x 3, normal mood    Results Reviewed:  LAB RESULTS:      Lab 25  0208 25  0517 25  0727 25  0929   WBC 11.20* 18.85* 17.52* 18.24*   HEMOGLOBIN 9.1* 10.8* 9.8* 10.5*   HEMATOCRIT 30.0* 35.3* 32.2* 35.2*   PLATELETS 94* 101* 85* 98*   NEUTROS ABS 8.63* 15.68*  --   --    IMMATURE GRANS (ABS) 0.04 0.12*  --   --    LYMPHS ABS 0.74 0.85  --   --    MONOS ABS 1.71* 2.00*  --   --    EOS ABS 0.07 0.18  --   --    MCV 92.9 93.6 93.1 97.0   CRP  --  1.32* 0.79*  --    PROCALCITONIN  --   --  0.09  --     LACTATE 1.3  --   --   --          Lab 03/22/25  0208 03/21/25  0517 03/20/25  0727 03/19/25  0928 03/18/25  2103 03/18/25  0831   SODIUM 143 142 141 143  --  143   POTASSIUM 3.7 3.9 4.3 4.2  --  4.1   CHLORIDE 106 104 107 112*  --  113*   CO2 29.0 29.0 24.0 23.0  --  23.0   ANION GAP 8.0 9.0 10.0 8.0  --  7.0   BUN 36* 33* 32* 36*  --  44*   CREATININE 0.86 0.88 0.67* 0.72*  --  0.73*   EGFR 78.8 78.2 84.9 83.6  --  83.3   GLUCOSE 105* 81 115* 97  --  144*   CALCIUM 9.3 9.7* 9.5 9.7*  --  9.8*   MAGNESIUM 2.0 1.9 1.9 1.8  --  1.9   PHOSPHORUS 3.1 2.9 2.5 2.5 2.7 2.1*         Lab 03/22/25  0208 03/21/25  0517   TOTAL PROTEIN 5.1* 5.5*   ALBUMIN 2.3* 2.4*   GLOBULIN 2.8 3.1   ALT (SGPT) 16 23   AST (SGOT) 22 24   BILIRUBIN 0.7 0.7   ALK PHOS 98 102           Brief Urine Lab Results  (Last result in the past 365 days)        Color   Clarity   Blood   Leuk Est   Nitrite   Protein   CREAT   Urine HCG        03/11/25 1604 [place]   Clear   Moderate (2+)   Negative   Negative   30 mg/dL (1+)                 Microbiology Results Abnormal       Procedure Component Value - Date/Time    COVID-19, FLU A/B, RSV PCR 1 HR TAT - Swab, Nasopharynx [[id number]]  (Abnormal) Collected: 03/11/25 1431    Lab Status: Final result Specimen: Swab from Nasopharynx Updated: 03/11/25 1518     COVID19 Not Detected     Influenza A PCR Not Detected     Influenza B PCR Not Detected     RSV, PCR Detected    Narrative:      Fact sheet for providers: https://www.fda.gov/media/109383/download    Fact sheet for patients: https://www.fda.gov/media/339379/download    Test performed by PCR.          XR Chest 1 View  Result Date: 3/22/2025  XR CHEST 1 VW Date of Exam: 3/22/2025 10:23 AM EDT Indication: SOB Comparison: Chest x-ray 3/17/2025 Findings: Enteric feeding tube has been removed. Stable cardiomediastinal silhouette with top normal size of the heart, and evidence of prior CABG. Similar mild diffuse interstitial prominence without evidence of focal  consolidation. Previously seen left retrocardiac opacities have improved. No pleural effusion or pneumothorax.     Impression: Impression: Improvement of previously seen retrocardiac opacities which may reflect improved aeration/decreased atelectasis. Similar interstitial prominence. Removal of enteric feeding tube. Electronically Signed: Preston Neil MD  3/22/2025 11:47 AM EDT  Workstation ID: [id number]      Results for orders placed during the hospital encounter of 02/15/24    Adult Transthoracic Echo Complete W/ Cont if Necessary Per Protocol    Interpretation Summary    Atrial fibrillation was the predominant rhythm observed during the procedure.    The study is technically difficult    Left ventricular wall thickness is consistent with mild concentric hypertrophy.    Left ventricular ejection fraction appears to be 51 - 55%.    Left ventricular diastolic function is consistent with (grade I) impaired relaxation.    The right ventricular cavity is borderline dilated.    The left atrial cavity is moderately dilated.  5.0 cm    The right atrial cavity is mildly  dilated.    No aortic valve regurgitation or stenosis is present    Mild mitral valve regurgitation with MAC is present.    Moderate tricuspid valve regurgitation is present.  RVSP- 56 mmHg    Borderline dilation of the aortic root is present.  3.8 cm    Current medications:  Scheduled Meds:busPIRone, 5 mg, Nasogastric, Daily With Lunch  [Held by provider] cetirizine, 10 mg, Oral, Daily  dicyclomine, 20 mg, Oral, 4x Daily  ferrous sulfate, 325 mg, Oral, Daily With Breakfast  hydrOXYzine, 25 mg, Nasogastric, Nightly  [Held by provider] isosorbide mononitrate, 30 mg, Oral, QAM  melatonin, 10 mg, Nasogastric, Nightly  [Held by provider] NIFEdipine XL, 30 mg, Oral, Daily  Nutrisource fiber, 2 packet, Per G Tube, BID  pantoprazole, 40 mg, Intravenous, BID AC  polyethylene glycol, 17 g, Nasogastric, Daily  ProSource No Carb, 30 mL, Per PEG Tube,  BID  QUEtiapine, 25 mg, Nasogastric, Nightly  rivaroxaban, 10 mg, Nasogastric, Daily With Dinner  senna-docusate sodium, 2 tablet, Nasogastric, Nightly  sodium chloride, 10 mL, Intravenous, Q12H      Continuous Infusions:       PRN Meds:.  senna-docusate sodium **AND** [DISCONTINUED] polyethylene glycol **AND** [DISCONTINUED] bisacodyl **AND** bisacodyl    Calcium Replacement - Follow Nurse / BPA Driven Protocol    dextrose    dextrose    glucagon (human recombinant)    hyoscyamine    ipratropium-albuterol    Magnesium Standard Dose Replacement - Follow Nurse / BPA Driven Protocol    nitroglycerin    ondansetron    Phosphorus Replacement - Follow Nurse / BPA Driven Protocol    Potassium Replacement - Follow Nurse / BPA Driven Protocol    sodium chloride    sodium chloride    sodium chloride    traMADol    trolamine salicylate    Assessment & Plan   Assessment & Plan     Active Hospital Problems    Diagnosis  POA    **Rhabdomyolysis [M62.82]  Yes    Urine incontinence [R32]  Unknown    Moderate protein-calorie malnutrition [E44.0]  Yes      Resolved Hospital Problems   No resolved problems to display.     Brief Hospital Course to date:  Carlos Preston is a 89 y.o. male with a history of heart disease, hyperlipidemia, ischemic heart disease status post CABG, paroxysmal A-fib on Eliquis presented to the hospital with progressive weakness.  Found to have bilateral pneumonia and acute rhabdomyolysis    Acute hypoxia respiratory insufficiency, resolved   RSV infection, resolved  Superimposed left lower lobe bacterial pneumonia, unspecified, improved  Bilateral basal lung fibrosis concerning for chronic dysphagia / aspiration  CT chest with bilateral chronic fibrotic changes in both lung bases and developing left lower lobe pneumonia.  Patient is forced for RSV  Was hypoxic, requiring 4 L nasal cannula - has weaned to room air   Completed 7 days Zosyn / Doxycycline  Status post IV Solu-Medrol and PO Prednisone  Negative  Legionella antigen, strep antigen and MRSA swab  Sputum culture negative to date  DuoNebs, Mucinex and IS    Chronic dysphagia  History of esophageal stricture  Poor oral intake  SLP following   MBS 3/20/2025 --> nectar thick liquids.  Continues to have poor oral intake.  Family interested in PEG tube.  Dr. Pa/general surgery consulted.  Status post PEG tube placement 3/21/2025  Nutrition managing tube feed   Case management consulted to arrange for home health and home tube feed arrangements.  Per case management, his nocturnal tube feed and feeding pump will not be approved at length he fails bolus feed    Hypotension  Had an episode of hypotension night of 3/21/2025 secondary to volume depletion.  Improved with IV fluids  Continue to hold Imdur and nifedipine    Acute rhabdomyolysis, resolved   Elevated troponin  Patient fell x 2 and was on the floor for 4-5 hours  CPK on admission 2500.  Trended down to 1200 with IV fluids by 3/12  Elevated troponin felt to be demand ischemia and secondary to rhabdomyolysis. Patient with no chest pain or ischemic changes in EKG.  Defer ischemic workup given his frailty and age       Severe iron deficiency anemia   Symptomatic anemia   Patient was anemic with Hgb 7.6 on admission - s/p 1 unit PRBCs    s/p IV iron x 3 doses. Continue p.o. iron  Hemoglobin stable around 9-10  No evidence of GI bleed    Elevated liver enzymes, improved  CBD dilation  Abnormal CT abdomen with common bile duct dilation as well as pancreas duct dilation.   No focal mass or obstructive process seen on noncontrast CT  Total bilirubin is normal  GI following and recommended MRCP.  MRCP done and showed dilated CBD at 18 mm but bilirubin is normal.  Also showed IPMN    A-fib  Hypertension of CABG  HTN  Xarelto held for PEG placement.  Restarted 3/22/2025 at a low dose of 10 mg  CT head no acute intracranial abnormality identified  ECHO: 02/2024: EF: 51-55%     Debility  PT/OT and OT recommended rehab  but the patient and his family declined despite extensive counseling  Family arrange for caregiver at home.  Tentative plan to discharge him on Sunday, 3/23/2025    Hypoactive delirium, resolved  Sleep hygiene - limit interruptions at night  Continue Seroquel 25 mg QHS, Melatonin 10 mg QHS and Hydroxyzine 25 mg QHS    Expected Discharge Location and Transportation: Home with home health / 24/7 caregivers   Expected Discharge Expected Discharge Date: 3/23/2025; Expected Discharge Time:      VTE Prophylaxis:Pharmacologic & mechanical VTE prophylaxis orders are present.    AM-PAC 6 Clicks Score (PT): 9 (03/22/25 1900)    CODE STATUS:   Code Status and Medical Interventions: CPR (Attempt to Resuscitate); Full Support   Ordered at: 03/11/25 3142     Code Status (Patient has no pulse and is not breathing):    CPR (Attempt to Resuscitate)     Medical Interventions (Patient has pulse or is breathing):    Full Support     Level Of Support Discussed With:    Patient     Maksim Delgado MD  03/23/25

## 2025-03-24 ENCOUNTER — APPOINTMENT (OUTPATIENT)
Dept: OTHER | Facility: HOSPITAL | Age: OVER 89
End: 2025-03-24
Payer: MEDICARE

## 2025-03-24 ENCOUNTER — APPOINTMENT (OUTPATIENT)
Dept: CT IMAGING | Facility: HOSPITAL | Age: OVER 89
End: 2025-03-24
Payer: MEDICARE

## 2025-03-24 LAB
ALBUMIN SERPL-MCNC: 2.4 G/DL (ref 3.5–5.2)
ALBUMIN/GLOB SERPL: 1 G/DL
ALP SERPL-CCNC: 100 U/L (ref 39–117)
ALT SERPL W P-5'-P-CCNC: 12 U/L (ref 1–41)
ANION GAP SERPL CALCULATED.3IONS-SCNC: 8 MMOL/L (ref 5–15)
AST SERPL-CCNC: 18 U/L (ref 1–40)
BASOPHILS # BLD AUTO: 0.02 10*3/MM3 (ref 0–0.2)
BASOPHILS NFR BLD AUTO: 0.2 % (ref 0–1.5)
BILIRUB SERPL-MCNC: 0.6 MG/DL (ref 0–1.2)
BUN SERPL-MCNC: 37 MG/DL (ref 8–23)
BUN/CREAT SERPL: 37.4 (ref 7–25)
CALCIUM SPEC-SCNC: 9 MG/DL (ref 8.2–9.6)
CHLORIDE SERPL-SCNC: 108 MMOL/L (ref 98–107)
CO2 SERPL-SCNC: 27 MMOL/L (ref 22–29)
CREAT SERPL-MCNC: 0.99 MG/DL (ref 0.76–1.27)
CRP SERPL-MCNC: 14.33 MG/DL (ref 0–0.5)
DEPRECATED RDW RBC AUTO: 56.8 FL (ref 37–54)
EGFRCR SERPLBLD CKD-EPI 2021: 69.3 ML/MIN/1.73
EOSINOPHIL # BLD AUTO: 0.04 10*3/MM3 (ref 0–0.4)
EOSINOPHIL NFR BLD AUTO: 0.4 % (ref 0.3–6.2)
ERYTHROCYTE [DISTWIDTH] IN BLOOD BY AUTOMATED COUNT: 16.4 % (ref 12.3–15.4)
GLOBULIN UR ELPH-MCNC: 2.5 GM/DL
GLUCOSE SERPL-MCNC: 141 MG/DL (ref 65–99)
HCT VFR BLD AUTO: 30.1 % (ref 37.5–51)
HGB BLD-MCNC: 9 G/DL (ref 13–17.7)
IMM GRANULOCYTES # BLD AUTO: 0.04 10*3/MM3 (ref 0–0.05)
IMM GRANULOCYTES NFR BLD AUTO: 0.4 % (ref 0–0.5)
LYMPHOCYTES # BLD AUTO: 0.72 10*3/MM3 (ref 0.7–3.1)
LYMPHOCYTES NFR BLD AUTO: 6.6 % (ref 19.6–45.3)
MAGNESIUM SERPL-MCNC: 2 MG/DL (ref 1.7–2.3)
MCH RBC QN AUTO: 28.6 PG (ref 26.6–33)
MCHC RBC AUTO-ENTMCNC: 29.9 G/DL (ref 31.5–35.7)
MCV RBC AUTO: 95.6 FL (ref 79–97)
MONOCYTES # BLD AUTO: 1.46 10*3/MM3 (ref 0.1–0.9)
MONOCYTES NFR BLD AUTO: 13.3 % (ref 5–12)
MRSA DNA SPEC QL NAA+PROBE: NEGATIVE
NEUTROPHILS NFR BLD AUTO: 79.1 % (ref 42.7–76)
NEUTROPHILS NFR BLD AUTO: 8.67 10*3/MM3 (ref 1.7–7)
NRBC BLD AUTO-RTO: 0 /100 WBC (ref 0–0.2)
PHOSPHATE SERPL-MCNC: 2.4 MG/DL (ref 2.5–4.5)
PLATELET # BLD AUTO: 115 10*3/MM3 (ref 140–450)
PMV BLD AUTO: 13 FL (ref 6–12)
POTASSIUM SERPL-SCNC: 3.5 MMOL/L (ref 3.5–5.2)
POTASSIUM SERPL-SCNC: 4.4 MMOL/L (ref 3.5–5.2)
PROCALCITONIN SERPL-MCNC: 0.56 NG/ML (ref 0–0.25)
PROT SERPL-MCNC: 4.9 G/DL (ref 6–8.5)
RBC # BLD AUTO: 3.15 10*6/MM3 (ref 4.14–5.8)
SODIUM SERPL-SCNC: 143 MMOL/L (ref 136–145)
WBC NRBC COR # BLD AUTO: 10.95 10*3/MM3 (ref 3.4–10.8)

## 2025-03-24 PROCEDURE — 71250 CT THORAX DX C-: CPT

## 2025-03-24 PROCEDURE — 97530 THERAPEUTIC ACTIVITIES: CPT

## 2025-03-24 PROCEDURE — 85025 COMPLETE CBC W/AUTO DIFF WBC: CPT | Performed by: INTERNAL MEDICINE

## 2025-03-24 PROCEDURE — 86140 C-REACTIVE PROTEIN: CPT | Performed by: INTERNAL MEDICINE

## 2025-03-24 PROCEDURE — 25010000002 MEROPENEM PER 100 MG: Performed by: INTERNAL MEDICINE

## 2025-03-24 PROCEDURE — 99232 SBSQ HOSP IP/OBS MODERATE 35: CPT | Performed by: INTERNAL MEDICINE

## 2025-03-24 PROCEDURE — 84145 PROCALCITONIN (PCT): CPT | Performed by: INTERNAL MEDICINE

## 2025-03-24 PROCEDURE — 97110 THERAPEUTIC EXERCISES: CPT

## 2025-03-24 PROCEDURE — 84100 ASSAY OF PHOSPHORUS: CPT | Performed by: INTERNAL MEDICINE

## 2025-03-24 PROCEDURE — 84132 ASSAY OF SERUM POTASSIUM: CPT | Performed by: INTERNAL MEDICINE

## 2025-03-24 PROCEDURE — 83735 ASSAY OF MAGNESIUM: CPT | Performed by: INTERNAL MEDICINE

## 2025-03-24 PROCEDURE — 94799 UNLISTED PULMONARY SVC/PX: CPT

## 2025-03-24 PROCEDURE — 87641 MR-STAPH DNA AMP PROBE: CPT | Performed by: INTERNAL MEDICINE

## 2025-03-24 PROCEDURE — 80053 COMPREHEN METABOLIC PANEL: CPT | Performed by: INTERNAL MEDICINE

## 2025-03-24 RX ORDER — CASTOR OIL AND BALSAM, PERU 788; 87 MG/G; MG/G
1 OINTMENT TOPICAL EVERY 12 HOURS SCHEDULED
Status: DISCONTINUED | OUTPATIENT
Start: 2025-03-24 | End: 2025-03-26 | Stop reason: HOSPADM

## 2025-03-24 RX ORDER — POTASSIUM CHLORIDE 1.5 G/1.58G
40 POWDER, FOR SOLUTION ORAL EVERY 4 HOURS
Status: COMPLETED | OUTPATIENT
Start: 2025-03-24 | End: 2025-03-24

## 2025-03-24 RX ADMIN — PANTOPRAZOLE SODIUM 40 MG: 40 TABLET, DELAYED RELEASE ORAL at 05:45

## 2025-03-24 RX ADMIN — TRAMADOL HYDROCHLORIDE 25 MG: 50 TABLET, COATED ORAL at 16:42

## 2025-03-24 RX ADMIN — MEROPENEM 1000 MG: 1 INJECTION INTRAVENOUS at 12:21

## 2025-03-24 RX ADMIN — TRAMADOL HYDROCHLORIDE 25 MG: 50 TABLET, COATED ORAL at 05:45

## 2025-03-24 RX ADMIN — RIVAROXABAN 20 MG: 20 TABLET, FILM COATED ORAL at 18:03

## 2025-03-24 RX ADMIN — Medication 1 APPLICATION: at 22:15

## 2025-03-24 RX ADMIN — BUSPIRONE HYDROCHLORIDE 5 MG: 10 TABLET ORAL at 11:44

## 2025-03-24 RX ADMIN — Medication 30 ML: at 22:13

## 2025-03-24 RX ADMIN — MEROPENEM 1000 MG: 1 INJECTION INTRAVENOUS at 18:03

## 2025-03-24 RX ADMIN — Medication 10 ML: at 22:14

## 2025-03-24 RX ADMIN — Medication 2 PACKET: at 22:13

## 2025-03-24 RX ADMIN — IPRATROPIUM BROMIDE AND ALBUTEROL SULFATE 3 ML: 2.5; .5 SOLUTION RESPIRATORY (INHALATION) at 14:12

## 2025-03-24 RX ADMIN — Medication 30 ML: at 08:16

## 2025-03-24 RX ADMIN — DICYCLOMINE HYDROCHLORIDE 20 MG: 20 TABLET ORAL at 22:13

## 2025-03-24 RX ADMIN — POTASSIUM CHLORIDE 40 MEQ: 1.5 FOR SOLUTION ORAL at 14:42

## 2025-03-24 RX ADMIN — Medication 5 MG: at 22:13

## 2025-03-24 RX ADMIN — POLYETHYLENE GLYCOL 3350 17 G: 17 POWDER, FOR SOLUTION ORAL at 08:19

## 2025-03-24 RX ADMIN — POTASSIUM CHLORIDE 40 MEQ: 1.5 FOR SOLUTION ORAL at 11:44

## 2025-03-24 RX ADMIN — Medication 1 APPLICATION: at 11:44

## 2025-03-24 RX ADMIN — Medication 10 ML: at 08:17

## 2025-03-24 RX ADMIN — Medication 2 PACKET: at 08:17

## 2025-03-24 NOTE — PLAN OF CARE
Goal Outcome Evaluation:      Resumed IV antibiotics today, Afebrile throughout shift, Replaced his potassium, continued Bolus feeding, VSS - will continue POC.

## 2025-03-24 NOTE — PROGRESS NOTES
Clinical Nutrition Assessment     Patient Name: Carlos Preston  YOB: 1928  MRN: [id number]  Date of Encounter: 03/24/25 15:17 EDT  Admission date: 3/11/2025  Reason for Visit: Follow-up protocol, EN    Assessment   Nutrition Assessment   Admission Diagnosis:  Rhabdomyolysis [M62.82]    Problem List:    Rhabdomyolysis    Moderate protein-calorie malnutrition    Urine incontinence      PMH:   He  has a past medical history of Cancer, H/O prostatectomy, Hearing loss, Heart disease, History of back surgery, Hypercholesterolemia, Hyperlipidemia, Hypertension, IHD (ischemic heart disease), Osteoarthritis of spine with myelopathy, lumbar region, Paroxysmal atrial fibrillation (07/03/2018), Pulmonary hypertension, S/P knee replacement, Sinus disorder, and thyroid ultrasound (08/28/2009).    PSH:  He  has a past surgical history that includes US carotid unilateral (04/16/2007); Cardiac catheterization (05/16/2007); Coronary artery bypass graft (05/17/2007); Cardiovascular stress test (08/05/2009); Echo - Converted (08/06/2009); Cardiac catheterization (08/19/2009); Echo - Converted (02/14/2011); Echo - Converted (11/07/2012); Cardiovascular stress test (11/07/2012); Cardiovascular stress test (03/23/2015); Echo - Converted (03/23/2015); Echo - Converted (05/01/2017); Cardiovascular stress test (10/11/2017); Echo - Converted (10/11/2017); Cardiac catheterization (07/23/2018); Echo - Converted (03/21/2022); converted (historical) holter (06/12/2023); Echo - Converted (02/15/2024); converted (historical) holter (05/28/2024); and Esophagogastroduodenoscopy w/ PEG (N/A, 3/21/2025).    Applicable Nutrition History:   3/12 SLP rec NPO w repeat 3/13  3/13-SLP Diet Recommendation: NPO, temporary alternate methods of nutrition/hydration, other (see comments) (vs consideration of comfort diet if aligns w/ GOC/POC)   3/20-SLP Swallowing Diagnosis: mild-moderate, oral dysphagia, severe, pharyngeal dysphagia  "    s/p PEG 3-21-25    SKIN: R gluteal ulcer      Labs    Labs Reviewed: Yes     Results from last 7 days   Lab Units 03/24/25  0754 03/22/25  0208 03/21/25  0517   GLUCOSE mg/dL 141* 105* 81   BUN mg/dL 37* 36* 33*   CREATININE mg/dL 0.99 0.86 0.88   SODIUM mmol/L 143 143 142   CHLORIDE mmol/L 108* 106 104   POTASSIUM mmol/L 3.5 3.7 3.9   PHOSPHORUS mg/dL 2.4* 3.1 2.9   MAGNESIUM mg/dL 2.0 2.0 1.9   ALT (SGPT) U/L 12 16 23   LACTATE mmol/L  --  1.3  --        Results from last 7 days   Lab Units 03/24/25  0754 03/22/25  0208 03/21/25  0517 03/20/25  0727   ALBUMIN g/dL 2.4* 2.3* 2.4*  --    CRP mg/dL 14.33*  --  1.32* 0.79*       Results from last 7 days   Lab Units 03/21/25  0147 03/21/25  0004 03/20/25  2321 03/19/25  1737 03/19/25  1147 03/19/25  0550   GLUCOSE mg/dL 89 67* 66* 84 100 113     No results found for: \"HGBA1C\"            Medications    Medications Reviewed: Yes    Scheduled Meds:busPIRone, 5 mg, Nasogastric, Daily With Lunch  castor oil-balsam peru, 1 Application, Topical, Q12H  [Held by provider] cetirizine, 10 mg, Oral, Daily  dicyclomine, 20 mg, Oral, 4x Daily  ferrous sulfate, 325 mg, Oral, Daily With Breakfast  [Held by provider] isosorbide mononitrate, 30 mg, Oral, QAM  melatonin, 5 mg, Nasogastric, Nightly  meropenem, 1,000 mg, Intravenous, Q8H  [Held by provider] NIFEdipine XL, 30 mg, Oral, Daily  Nutrisource fiber, 2 packet, Per G Tube, BID  pantoprazole, 40 mg, Oral, Q AM  polyethylene glycol, 17 g, Nasogastric, Daily  ProSource No Carb, 30 mL, Per PEG Tube, BID  rivaroxaban, 20 mg, Nasogastric, Daily With Dinner  senna-docusate sodium, 2 tablet, Nasogastric, Nightly  sodium chloride, 10 mL, Intravenous, Q12H      Continuous Infusions:     PRN Meds:.  acetaminophen    senna-docusate sodium **AND** [DISCONTINUED] polyethylene glycol **AND** [DISCONTINUED] bisacodyl **AND** bisacodyl    Calcium Replacement - Follow Nurse / BPA Driven Protocol    dextrose    dextrose    glucagon (human " "recombinant)    hyoscyamine    ipratropium-albuterol    Magnesium Standard Dose Replacement - Follow Nurse / BPA Driven Protocol    nitroglycerin    ondansetron    Phosphorus Replacement - Follow Nurse / BPA Driven Protocol    Potassium Replacement - Follow Nurse / BPA Driven Protocol    sodium chloride    sodium chloride    sodium chloride    traMADol    trolamine salicylate    Intake/Ouptut 24 hrs (0701 - 0700)   I&O's Reviewed: Yes   Intake & Output (last day)         03/23 0701 03/24 0700 03/24 0701 03/25 0700    Other 240     NG/     Total Intake(mL/kg) 477 (5.5)     Urine (mL/kg/hr) 600 (0.3)     Stool      Total Output 600     Net -123                   Anthropometrics     Height: Height: 182.9 cm (72\")  Last Filed Weight: Weight: 87.4 kg (192 lb 9.6 oz) (03/24/25 0550)  Method: Weight Method: Stated  BMI: BMI (Calculated): 26.1    UBW:  250lbs per pt's dtr, appears ~215lbs in the past yr  Weight change:  possible 30lb/14% wt loss x 1 yr-[place] [facility] [place]   Weight      Weight (kg) Weight (lbs) Visit Report   1/10/2024 97.523 kg  215 lb  --    2/14/2024 95.074 kg  209 lb 9.6 oz  --    2/15/2024 95.1 kg  209 lb 10.5 oz     8/21/2024 88.542 kg  195 lb 3.2 oz  --    3/11/2025 83.915 kg  185 lb         Nutrition Focused Physical Exam    Date: 3/13    Patient meets criteria for malnutrition diagnosis, see MSA note.     Subjective   Reported/Observed/Food/Nutrition Related History:     3/24  RN and dtr states pt tolerating po intake well, pt eating 25% of trays. RN states pt discharge delayed 2/2 bilateral PNA.     3-22: pt sitting up in bed, is eating food his family brought him  Per RN: pt tolerated TF last night, MD ok to resume diet  Plan to start bolus feeds today to see if pt tolerates  Noted that pt received regular meal tray,  pt still currently npo  Diet order changed to previous dysphagia order: Mechanical ground/ nectar liquids, no straws, discussed with RN, diet office,SLP, RN to clarify " "diet order with MD    3/21  Pt s/p PEG, RN states sx okay to resume feeds this evening. Spoke w/family at bedside, would like to continue nocturnal feeds for now. Discussed bolus option as well and family would like to consider this in the future. Family states pt w/frequent Bms but not large quantity-will add fiber packets.     3/20  Pt OOR for MBS, dtr at bedside. Dtr states likely plan for PEG pending results of MBS, per sx note plan for PEG tomorrow. Dtr would like to have Magic Cup. RN states pt tolerating EN regimen well, having 2-3 Bms daily    3/18  Spoke w/PA, plans for comfort diet and adjustment to nocturnal feeds.     3/17  Per RN pt doing well on current TF regimen, notes watery Bms. Pt states he is tolerating TF, denies abdominal pain or bloating.     3/14  RN states keofeed placed, in stomach and MD okay to feed.     3/13  Family rpt pt at ok until 4 da ago then unable to eat .Repeat has had signif wt loss. Family aware of plan for feeding tube. GI note indicated will place tube 2/2 aspiration risk at this time.    3/12  Pt up in chair at bedside, very sleepy. Dtr at bedside provides nutrition hx. Dtr states pt has had poor intake x 2 days, states prior to recent fall pt was eating well. Dtr confirms pt has had esophageal dilation in the past, ~1.5 yrs ago and states worsening dysphagia over the past yr and pt managing this w/cutting food into small pieces and drinking adequate fluids w/meals. Dtr states pt has also had significant wt loss over the past yr, states UBW is 250lbs and was 185lbs at MD office <1 week ago. Dtr unsure etiology as pt tells her he eats well.     Needs Assessment   Date: 3/13    Height used:Height: 182.9 cm (72\")  Weights used: 83.9 Kg    Estimated Calorie needs: ~ 2000Kcal/day  Method:  Kcals/KG x 25 = 2098kcal  Method:  [id number] x 1.3 = 1968kcal    Estimated Protein needs: ~ 109g protein  Method: g/Kg x 1.3    Estimated Fluid needs: ~2000ml  25ml per kml    Current " Nutrition Prescription     EN: IsoSource 1.5  Goal Rate: 60ml  Water Flushes: 35ml  Modular: Prosource TF 1/day and Nutrisource Fiber 4/day  Route: PEG  Tube: Unknown    At goal over: 12Hrs/day     Rx will supply:   Goal Volume 750  mL/day     Flush Volume 360 mL/day     Energy 1305 Kcal/day 65 % Est Need   Protein 81 g/day 74 % Est Need   Fiber 23 g/day     Water in   mL     Total Water 933 mL     Meet DRI No        --------------------------------------------------------------------------  Product/Rate verified at bedside: No  Infusing Rate at time of visit: bolus regimen    Average Delivery from [facility]:   Volume 237 mL/day 52  % Goal Vol.   Flush Volume 240 mL/day     Energy  Kcal/day  % Est Need   Protein  g/day  % Est Need   Fiber  g/day     Water in  EN  mL     Total Water  mL     Meet DRI Yes        PO: Diet: Regular/House; Texture: Mechanical Ground (NDD 2); Fluid Consistency: Warm [place] Thick  Oral Nutrition Supplement: Magic Cups 2x/day  Intake: Insufficient data    Assessment & Plan   Nutrition Diagnosis   Date:  3/12            Updated:  3/22  Problem Inadequate oral intake    Etiology dysphagia   Signs/Symptoms NPO   Status: Active PEG placed 3-21    Date:   3/12  Updated:     Problem Unintended weight loss   Etiology ? Dysphagia, advanced age   Signs/Symptoms Possible 14% wt loss x 1 yr   Status: New    Date:  3/13 Updated:  Problem Malnutrition moderate acute   Etiology Alt Swallow fx   Signs/Symptoms Intake hx w some Wasting   Status: New    Goal / Objectives:   Nutrition to support treatment, Advance diet as medically feasible/appropriate, and Tolerate EN at goal      Nutrition Intervention      Follow treatment progress, Care plan reviewed, Nutrition support order placed    Bolus feeds:   Isosource 1.5: 1 can 3x/day (8a, 1p, 6p), Prosource no carb 2x/day, nutrisource fiber 4 packets per day, 60ml flush before and after each bolus feed  TF at goal volume will provide: 750ml, 1305kcal (65%  kcal needs), 81g protein (74% protein needs), 23g fiber, 933ml free water    Recommend pt/family chooses home infusion company w/RD to manage EN regimen after discharge.     Magic Cup BID (vanilla only)    Monitoring/Evaluation:   Per protocol, I&O, PO intake, Supplement intake, Pertinent labs, EN delivery/tolerance, Weight, Skin status, GI status, Symptoms, POC/GOC, Swallow function, Hemodynamic stability    Lorna Cuevas, MS,RD,LD  Time Spent:30min

## 2025-03-24 NOTE — PLAN OF CARE
Problem: Adult Inpatient Plan of Care  Goal: Plan of Care Review  Outcome: Progressing  Flowsheets (Taken 3/24/2025 0524)  Progress: improving  Outcome Evaluation: Pleasant, cooperative. Tolerated up in the chair well this past evening, returned to bed using lift system at about 2130. Family requesting that his medications be given as close to 10pm so that he can sleep better/longer. Family also requested that he be disturbed as little as possible, declining turns for pt through the AM hours as he was sleeping soundly--explained that he was to be discharged today and needed to have as much sleep as possible since he had not been getting much.. Pt rounding continued and pt appeared to be sleeping well throughout the night to this point--granddaughter remained at adjacent bedside. VSS. Tele showed a controlled AFib with unifocal PVC's.  Plan of Care Reviewed With:   patient   family  Goal: Patient-Specific Goal (Individualized)  Outcome: Progressing  Goal: Absence of Hospital-Acquired Illness or Injury  Outcome: Progressing  Intervention: Identify and Manage Fall Risk  Recent Flowsheet Documentation  Taken 3/24/2025 0400 by Bettye Escalante, RN  Safety Promotion/Fall Prevention:   activity supervised   assistive device/personal items within reach   clutter free environment maintained   fall prevention program maintained   nonskid shoes/slippers when out of bed   room organization consistent   safety round/check completed  Taken 3/24/2025 0200 by Bettye Escalante, RN  Safety Promotion/Fall Prevention:   activity supervised   assistive device/personal items within reach   clutter free environment maintained   fall prevention program maintained   nonskid shoes/slippers when out of bed   room organization consistent   safety round/check completed  Taken 3/24/2025 0000 by Bettye Escalante, RN  Safety Promotion/Fall Prevention:   assistive device/personal items within reach   clutter free environment maintained   fall prevention  program maintained   nonskid shoes/slippers when out of bed   room organization consistent   safety round/check completed  Taken 3/23/2025 2200 by Bettye Escalante RN  Safety Promotion/Fall Prevention:   activity supervised   assistive device/personal items within reach   clutter free environment maintained   fall prevention program maintained   nonskid shoes/slippers when out of bed   safety round/check completed   room organization consistent  Taken 3/23/2025 2015 by Bettye Escalante RN  Safety Promotion/Fall Prevention:   activity supervised   assistive device/personal items within reach   clutter free environment maintained   fall prevention program maintained   nonskid shoes/slippers when out of bed   room organization consistent   safety round/check completed  Intervention: Prevent Skin Injury  Recent Flowsheet Documentation  Taken 3/24/2025 0400 by Bettye Escalante RN  Body Position: (grand-daughter at bedside declined his turn offer as he was soundly sleeping)   neutral body alignment   neutral head position   patient/family refused  Skin Protection:   incontinence pads utilized   protective footwear used   transparent dressing maintained  Taken 3/24/2025 0200 by Bettye Escalante RN  Body Position:   neutral body alignment   neutral head position  Skin Protection:   protective footwear used   incontinence pads utilized  Taken 3/24/2025 0123 by Bettye Escalante RN  Body Position: (daughter counseled about irregular turn schedules and potential for skin breakdown)   patient/family refused   position maintained   left   tilted  Taken 3/24/2025 0000 by Bettye Escalante RN  Body Position:   neutral body alignment   neutral head position   legs elevated  Skin Protection:   incontinence pads utilized   protective footwear used   transparent dressing maintained  Taken 3/23/2025 2200 by Bettye Escalante RN  Body Position:   turned   heels elevated   legs elevated   neutral body alignment   neutral head position  Skin Protection:    incontinence pads utilized   protective footwear used   transparent dressing maintained  Taken 3/23/2025 2015 by Bettye Escalante RN  Body Position:   legs elevated   neutral body alignment   neutral head position  Skin Protection:   incontinence pads utilized   protective footwear used   transparent dressing maintained  Intervention: Prevent and Manage VTE (Venous Thromboembolism) Risk  Recent Flowsheet Documentation  Taken 3/24/2025 0400 by Bettye Escalante RN  VTE Prevention/Management: (xarelto therapy) --  Taken 3/24/2025 0200 by Bettye Escalante RN  VTE Prevention/Management: (xarelto therapy) --  Taken 3/24/2025 0000 by Bettye Escalante RN  VTE Prevention/Management: (xarelto therapy) other (see comments)  Taken 3/23/2025 2015 by Bettye Escalante RN  VTE Prevention/Management: (xarelto therapy) other (see comments)  Intervention: Prevent Infection  Recent Flowsheet Documentation  Taken 3/24/2025 0400 by Bettye Escalante RN  Infection Prevention:   environmental surveillance performed   equipment surfaces disinfected   hand hygiene promoted   rest/sleep promoted   single patient room provided  Taken 3/24/2025 0200 by Bettye Escalante RN  Infection Prevention:   environmental surveillance performed   equipment surfaces disinfected   hand hygiene promoted   rest/sleep promoted   single patient room provided  Taken 3/24/2025 0000 by Bettye Escalante RN  Infection Prevention:   environmental surveillance performed   equipment surfaces disinfected   hand hygiene promoted   rest/sleep promoted   single patient room provided  Taken 3/23/2025 2200 by Bettye Escalante RN  Infection Prevention:   environmental surveillance performed   equipment surfaces disinfected   hand hygiene promoted   rest/sleep promoted   single patient room provided  Taken 3/23/2025 2015 by Bettye Escalante RN  Infection Prevention:   environmental surveillance performed   equipment surfaces disinfected   hand hygiene promoted   rest/sleep promoted   single patient  room provided  Goal: Optimal Comfort and Wellbeing  Outcome: Progressing  Intervention: Monitor Pain and Promote Comfort  Recent Flowsheet Documentation  Taken 3/24/2025 0000 by Bettye Escalante RN  Pain Management Interventions: quiet environment facilitated  Taken 3/23/2025 2200 by Bettye Escalante RN  Pain Management Interventions:   position adjusted   pillow support provided   care clustered  Taken 3/23/2025 2130 by Bettye Escalante RN  Pain Management Interventions:   pillow support provided   position adjusted   quiet environment facilitated  Taken 3/23/2025 2042 by Bettye Escalante RN  Pain Management Interventions:   pain medication given   pillow support provided   position adjusted  Taken 3/23/2025 2015 by Bettye Escalante RN  Pain Management Interventions:   pain management plan reviewed with patient/caregiver   position adjusted   pillow support provided  Intervention: Provide Person-Centered Care  Recent Flowsheet Documentation  Taken 3/24/2025 0400 by Bettye Escalante RN  Trust Relationship/Rapport:   thoughts/feelings acknowledged   reassurance provided   emotional support provided   choices provided   care explained  Taken 3/23/2025 2200 by Bettye Escalante RN  Trust Relationship/Rapport:   care explained   choices provided   emotional support provided   empathic listening provided   questions answered   questions encouraged   reassurance provided   thoughts/feelings acknowledged  Taken 3/23/2025 2015 by Bettye Escalante RN  Trust Relationship/Rapport:   care explained   choices provided   emotional support provided   empathic listening provided   questions answered   questions encouraged   reassurance provided   thoughts/feelings acknowledged  Goal: Readiness for Transition of Care  Outcome: Progressing   Goal Outcome Evaluation:  Plan of Care Reviewed With: patient, family        Progress: improving  Outcome Evaluation: Pleasant, cooperative. Tolerated up in the chair well this past evening, returned to bed using  lift system at about 2130. Family requesting that his medications be given as close to 10pm so that he can sleep better/longer. Family also requested that he be disturbed as little as possible, declining turns for pt through the AM hours as he was sleeping soundly--explained that he was to be discharged today and needed to have as much sleep as possible since he had not been getting much.. Pt rounding continued and pt appeared to be sleeping well throughout the night to this point--granddaughter remained at adjacent bedside. VSS. Tele showed a controlled AFib with unifocal PVC's.

## 2025-03-24 NOTE — PLAN OF CARE
Goal Outcome Evaluation:  Plan of Care Reviewed With: patient, caregiver        Progress: improving  Outcome Evaluation: Progress recertification completed.  Pt. continues with generalized weakness, limited endurance, ROM and balance impacting PLOF ADLs and related transfers warranting continued skilled OT services.  Recommend SNF at discharge.    Anticipated Discharge Disposition (OT): skilled nursing facility

## 2025-03-24 NOTE — PROGRESS NOTES
[facility] Medicine Services  PROGRESS NOTE    Patient Name: Carlos Preston  : 3/20/1928  MRN: [id number]    Date of Admission: 3/11/2025  Primary Care Physician: Newton Hankins MD    Subjective     CC:   Follow-up for dysphagia     HPI:  Patient seen and examined this morning.  Coughing lots of purulent sputum today.  Had a fever of 101.4.  Discussed with the family the results of the CT scan and lab workup.  Plan to hold off on discharging him and return with IV antibiotic for the next few days and tentative plan to discharge him home on Wednesday      Objective     Vital Signs:   Temp:  [98.2 °F (36.8 °C)-101.4 °F (38.6 °C)] 98.2 °F (36.8 °C)  Heart Rate:  [62-80] 67  Resp:  [16-18] 16  BP: (117-134)/(65-87) 127/65     Physical Exam:  General: Chronically ill looking, debilitated, conversant and pleasant   Head: Atraumatic and normocephalic  Eyes: No Icterus. No pallor  Ears:  Ears appear intact with no abnormalities noted  Throat: No oral lesions, no thrush  Neck: Supple, trachea midline  Lungs: Improved air entry bilateral lung fields.  No crackles or wheezing.  Poor cough effort  Heart:  Normal S1 and S2, no murmur, no gallop, No JVD, no lower extremity swelling  Abdomen:  Soft, no tenderness, no organomegaly, normal bowel sounds, no organomegaly  Extremities: pulses equal bilaterally  Skin: No bleeding, bruising or rash, normal skin turgor and elasticity  Neurologic: Cranial nerves appear intact with no evidence of facial asymmetry, normal motor and sensory functions in all 4 extremities  Psych: Alert and oriented x 3, normal mood    Results Reviewed:  LAB RESULTS:      Lab 25  1355 25  0208 25  0517 25  0727 25  0929   WBC  --  11.20* 18.85* 17.52* 18.24*   HEMOGLOBIN  --  9.1* 10.8* 9.8* 10.5*   HEMATOCRIT  --  30.0* 35.3* 32.2* 35.2*   PLATELETS  --  94* 101* 85* 98*   NEUTROS ABS  --  8.63* 15.68*  --   --    IMMATURE GRANS (ABS)  --  0.04  0.12*  --   --    LYMPHS ABS  --  0.74 0.85  --   --    MONOS ABS  --  1.71* 2.00*  --   --    EOS ABS  --  0.07 0.18  --   --    MCV  --  92.9 93.6 93.1 97.0   CRP  --   --  1.32* 0.79*  --    PROCALCITONIN 0.32*  --   --  0.09  --    LACTATE  --  1.3  --   --   --          Lab 03/22/25  0208 03/21/25  0517 03/20/25  0727 03/19/25  0928 03/18/25  2103 03/18/25  0831   SODIUM 143 142 141 143  --  143   POTASSIUM 3.7 3.9 4.3 4.2  --  4.1   CHLORIDE 106 104 107 112*  --  113*   CO2 29.0 29.0 24.0 23.0  --  23.0   ANION GAP 8.0 9.0 10.0 8.0  --  7.0   BUN 36* 33* 32* 36*  --  44*   CREATININE 0.86 0.88 0.67* 0.72*  --  0.73*   EGFR 78.8 78.2 84.9 83.6  --  83.3   GLUCOSE 105* 81 115* 97  --  144*   CALCIUM 9.3 9.7* 9.5 9.7*  --  9.8*   MAGNESIUM 2.0 1.9 1.9 1.8  --  1.9   PHOSPHORUS 3.1 2.9 2.5 2.5 2.7 2.1*         Lab 03/22/25  0208 03/21/25  0517   TOTAL PROTEIN 5.1* 5.5*   ALBUMIN 2.3* 2.4*   GLOBULIN 2.8 3.1   ALT (SGPT) 16 23   AST (SGOT) 22 24   BILIRUBIN 0.7 0.7   ALK PHOS 98 102           Brief Urine Lab Results  (Last result in the past 365 days)        Color   Clarity   Blood   Leuk Est   Nitrite   Protein   CREAT   Urine HCG        03/11/25 1604 Summers   Clear   Moderate (2+)   Negative   Negative   30 mg/dL (1+)                 Microbiology Results Abnormal       Procedure Component Value - Date/Time    COVID-19, FLU A/B, RSV PCR 1 HR TAT - Swab, Nasopharynx [[id number]]  (Abnormal) Collected: 03/11/25 1431    Lab Status: Final result Specimen: Swab from Nasopharynx Updated: 03/11/25 1518     COVID19 Not Detected     Influenza A PCR Not Detected     Influenza B PCR Not Detected     RSV, PCR Detected    Narrative:      Fact sheet for providers: https://www.fda.gov/media/688408/download    Fact sheet for patients: https://www.fda.gov/media/499635/download    Test performed by PCR.          MRI abdomen wo contrast mrcp  Result Date: 3/23/2025  MRI ABDOMEN WO CONTRAST MRCP Date of Exam: 3/23/2025 12:54 AM EDT  Indication: Dilated pancreatic duct.  Comparison: CT scan of the abdomen and pelvis 3/11/2025. Technique:  Routine multiplanar/multisequence images of the abdomen were obtained with MRCP sequences without contrast administration. Findings: ABDOMEN: Motion artifact obscures the images. There are minimal pleural effusions. The unenhanced liver, spleen and adrenal glands are normal. There is mild bilateral hydronephrosis. There is a 5.5 cm left renal cyst. The visualized urinary bladder is distended. There are numerous cystic areas throughout the pancreas suspicious for IPMN. There are no peripancreatic inflammatory changes. MRCP: Motion artifact obscures the images. There is intrahepatic and extrahepatic biliary ductal dilatation. The common duct measures 1.8 cm.     Impression: Impression: 1. Motion artifact obscures the images. 2. Minimal pleural effusions. 3. The visualized urinary bladder is distended. Mild bilateral hydronephrosis. 4. Dilated common bile duct measuring 1.8 cm. Numerous cystic areas throughout the pancreas suspicious for IPMN. Electronically Signed: Jeff Panda MD  3/23/2025 9:06 AM EDT  Workstation ID: [id number]    XR Chest 1 View  Result Date: 3/22/2025  XR CHEST 1 VW Date of Exam: 3/22/2025 10:23 AM EDT Indication: SOB Comparison: Chest x-ray 3/17/2025 Findings: Enteric feeding tube has been removed. Stable cardiomediastinal silhouette with top normal size of the heart, and evidence of prior CABG. Similar mild diffuse interstitial prominence without evidence of focal consolidation. Previously seen left retrocardiac opacities have improved. No pleural effusion or pneumothorax.     Impression: Impression: Improvement of previously seen retrocardiac opacities which may reflect improved aeration/decreased atelectasis. Similar interstitial prominence. Removal of enteric feeding tube. Electronically Signed: Preston Neil MD  3/22/2025 11:47 AM EDT  Workstation ID: [id number]      Results for orders  placed during the hospital encounter of 02/15/24    Adult Transthoracic Echo Complete W/ Cont if Necessary Per Protocol    Interpretation Summary    Atrial fibrillation was the predominant rhythm observed during the procedure.    The study is technically difficult    Left ventricular wall thickness is consistent with mild concentric hypertrophy.    Left ventricular ejection fraction appears to be 51 - 55%.    Left ventricular diastolic function is consistent with (grade I) impaired relaxation.    The right ventricular cavity is borderline dilated.    The left atrial cavity is moderately dilated.  5.0 cm    The right atrial cavity is mildly  dilated.    No aortic valve regurgitation or stenosis is present    Mild mitral valve regurgitation with MAC is present.    Moderate tricuspid valve regurgitation is present.  RVSP- 56 mmHg    Borderline dilation of the aortic root is present.  3.8 cm    Current medications:  Scheduled Meds:busPIRone, 5 mg, Nasogastric, Daily With Lunch  [Held by provider] cetirizine, 10 mg, Oral, Daily  dicyclomine, 20 mg, Oral, 4x Daily  ferrous sulfate, 325 mg, Oral, Daily With Breakfast  [Held by provider] isosorbide mononitrate, 30 mg, Oral, QAM  melatonin, 5 mg, Nasogastric, Nightly  [Held by provider] NIFEdipine XL, 30 mg, Oral, Daily  Nutrisource fiber, 2 packet, Per G Tube, BID  pantoprazole, 40 mg, Oral, Q AM  polyethylene glycol, 17 g, Nasogastric, Daily  ProSource No Carb, 30 mL, Per PEG Tube, BID  rivaroxaban, 10 mg, Nasogastric, Daily With Dinner  senna-docusate sodium, 2 tablet, Nasogastric, Nightly  sodium chloride, 10 mL, Intravenous, Q12H      Continuous Infusions:       PRN Meds:.  acetaminophen    senna-docusate sodium **AND** [DISCONTINUED] polyethylene glycol **AND** [DISCONTINUED] bisacodyl **AND** bisacodyl    Calcium Replacement - Follow Nurse / BPA Driven Protocol    dextrose    dextrose    glucagon (human recombinant)    hyoscyamine    ipratropium-albuterol    Magnesium  Standard Dose Replacement - Follow Nurse / BPA Driven Protocol    nitroglycerin    ondansetron    Phosphorus Replacement - Follow Nurse / BPA Driven Protocol    Potassium Replacement - Follow Nurse / BPA Driven Protocol    sodium chloride    sodium chloride    sodium chloride    traMADol    trolamine salicylate    Assessment & Plan   Assessment & Plan     Active Hospital Problems    Diagnosis  POA    **Rhabdomyolysis [M62.82]  Yes    Urine incontinence [R32]  Unknown    Moderate protein-calorie malnutrition [E44.0]  Yes      Resolved Hospital Problems   No resolved problems to display.     Brief Hospital Course to date:  Carlos Preston is a 89 y.o. male with a history of heart disease, hyperlipidemia, ischemic heart disease status post CABG, paroxysmal A-fib on Eliquis presented to the hospital with progressive weakness.  Found to have bilateral pneumonia and acute rhabdomyolysis    Acute hypoxia respiratory insufficiency, resolved   RSV infection, resolved  Superimposed left lower lobe bacterial pneumonia, unspecified, improved  Bilateral basal lung fibrosis concerning for chronic dysphagia / aspiration  CT chest with bilateral chronic fibrotic changes in both lung bases and developing left lower lobe pneumonia.  Patient is forced for RSV  Was hypoxic, requiring 4 L nasal cannula - has weaned to room air   Completed 7 days Zosyn / Doxycycline  Status post IV Solu-Medrol and PO Prednisone  Negative Legionella antigen, strep antigen and MRSA swab  Sputum culture negative to date  DuoNebs, Mucinex and IS    On 3/24/2025 patient with worsening dyspnea and excessive sputum production.  CRP is elevated at 14.  Had a fever of 101.4.  CT chest 3/24/2025 with bilateral pneumonia.  I gave the family the options to keep the plan of discharging home today with oral antibiotic versus holding to discharge and initiating IV antibiotics given his frailty.  They opted to postpone the discharge  Start IV Merrem  MRSA swab and  sputum culture  Mucinex, DuoNebs and I-S as tolerated    Chronic dysphagia  History of esophageal stricture  Poor oral intake  SLP following   MBS 3/20/2025 --> nectar thick liquids.  Continues to have poor oral intake.  Family interested in PEG tube.  Dr. Pa/general surgery consulted.  Status post PEG tube placement 3/21/2025  Nutrition managing tube feed   Case management consulted to arrange for home health and home tube feed arrangements.  Per case management, his nocturnal tube feed and feeding pump will not be approved at length he fails bolus feed    Hypotension  Had an episode of hypotension night of 3/21/2025 secondary to volume depletion.  Improved with IV fluids  Continue to hold Imdur and nifedipine    Acute rhabdomyolysis, resolved   Elevated troponin  Patient fell x 2 and was on the floor for 4-5 hours  CPK on admission 2500.  Trended down to 1200 with IV fluids by 3/12  Elevated troponin felt to be demand ischemia and secondary to rhabdomyolysis. Patient with no chest pain or ischemic changes in EKG.  Defer ischemic workup given his frailty and age       Severe iron deficiency anemia   Symptomatic anemia   Patient was anemic with Hgb 7.6 on admission - s/p 1 unit PRBCs    s/p IV iron x 3 doses. Continue p.o. iron  Hemoglobin stable around 9-10  No evidence of GI bleed    Elevated liver enzymes, improved  CBD dilation  Abnormal CT abdomen with common bile duct dilation as well as pancreas duct dilation.   No focal mass or obstructive process seen on noncontrast CT  Total bilirubin is normal  GI following and recommended MRCP.  MRCP done and showed dilated CBD at 18 mm but bilirubin is normal.  Also showed IPMN    A-fib  Hypertension of CABG  HTN  Continue Xarelto 20 mg HS  CT head no acute intracranial abnormality identified  ECHO: 02/2024: EF: 51-55%     Debility  PT/OT and OT recommended rehab but the patient and his family declined despite extensive counseling  Family arrange for caregiver at  home.  Tentative plan to discharge him on Sunday, 3/23/2025    Hypoactive delirium, resolved  Sleep hygiene - limit interruptions at night  Continue Seroquel 25 mg QHS, Melatonin 10 mg QHS and Hydroxyzine 25 mg QHS    Expected Discharge Location and Transportation: Home with home health / 24/7 caregivers   Expected Discharge Expected Discharge Date: 3/23/2025; Expected Discharge Time:      VTE Prophylaxis:Pharmacologic & mechanical VTE prophylaxis orders are present.    AM-PAC 6 Clicks Score (PT): 8 (03/23/25 2015)    CODE STATUS:   Code Status and Medical Interventions: CPR (Attempt to Resuscitate); Full Support   Ordered at: 03/11/25 6679     Code Status (Patient has no pulse and is not breathing):    CPR (Attempt to Resuscitate)     Medical Interventions (Patient has pulse or is breathing):    Full Support     Level Of Support Discussed With:    Patient     Maksim Delgado MD  03/24/25

## 2025-03-24 NOTE — PLAN OF CARE
Goal Outcome Evaluation:  Plan of Care Reviewed With: patient        Progress: improving  Outcome Evaluation: PT re-cert completed and goals updated. Pt able to perform STS w slightly less aid compared to previous sessions. Still requiring assist of 2. Pt remains limited by strength, functional endurance, and activity tolerance. continue PT POC as tolerated. continue to recommend SNF upon d/c.    Anticipated Discharge Disposition (PT): skilled nursing facility

## 2025-03-24 NOTE — THERAPY PROGRESS REPORT/RE-CERT
Patient Name: Carlos Preston  : 3/20/1928    MRN: [id number]                              Today's Date: 3/24/2025       Admit Date: 3/11/2025    Visit Dx:     ICD-10-CM ICD-9-CM   1. Pneumonia of both lungs due to infectious organism, unspecified part of lung  J18.9 483.8   2. Traumatic rhabdomyolysis, initial encounter  T79.6XXA 958.6   3. Elevated troponin  R79.89 790.6   4. RSV (acute bronchiolitis due to respiratory syncytial virus)  J21.0 466.11   5. Impaired mobility and ADLs  Z74.09 V49.89    Z78.9    6. Oropharyngeal dysphagia  R13.12 787.22     Patient Active Problem List   Diagnosis    Pulmonary HTN    Hyperlipemia    HTN (hypertension)    GERD (gastroesophageal reflux disease)    Hx of CABG    IHD (ischemic heart disease)    SOB (shortness of breath)    Abnormal chest x-ray    Esophageal stricture    Rhabdomyolysis    Moderate protein-calorie malnutrition    Urine incontinence     Past Medical History:   Diagnosis Date    Cancer     H/O prostatectomy     Hearing loss     Heart disease     History of back surgery     Hypercholesterolemia     Hyperlipidemia     Hypertension     IHD (ischemic heart disease)     S/P CABG    Osteoarthritis of spine with myelopathy, lumbar region     Paroxysmal atrial fibrillation 2018    Pulmonary hypertension     S/P knee replacement     Sinus disorder     thyroid ultrasound 2009    Normal     Past Surgical History:   Procedure Laterality Date    CARDIAC CATHETERIZATION  2007    Dr. Mace: 60% LM and 70% LCX.    CARDIAC CATHETERIZATION  2009    %, OM 85-90%,Patent grafts.    CARDIAC CATHETERIZATION  2018    Patent Grafts    CARDIOVASCULAR STRESS TEST  2009    ROBIN George: Dr. Mace- EF 29%, Diffuse ischemia.    CARDIOVASCULAR STRESS TEST  2012    L. Myoview- Inferoseptal infarct with jon infarct ischemia. EF 41%.    CARDIOVASCULAR STRESS TEST  2015    L. Myoview- EF51%,fixed defect , no ischemia burden.     CARDIOVASCULAR STRESS TEST  10/11/2017    L.Myoview- EF 54%. Small inferior Ischemia.    CONVERTED (HISTORICAL) HOLTER  06/12/2023    3 days. A.Fib. Avg 60. . 3 VT. One 3 Sec Pause    CONVERTED (HISTORICAL) HOLTER  05/28/2024    3 Days. A.Fib. Avg 70. . 4.6% PVC. 2 VT    CORONARY ARTERY BYPASS GRAFT  05/17/2007    CABG:  SVG to LAD and LCX.    ECHO - CONVERTED  08/06/2009    Dr. Mace- EF 50%.    ECHO - CONVERTED  02/14/2011    EF 50%, Septal WMA.    ECHO - CONVERTED  11/07/2012    EF 40-45%, RVSP 33 mmHg.    ECHO - CONVERTED  03/23/2015    EF 45-50%, RVSP 40mmHg,mild MR, mild PHT    ECHO - CONVERTED  05/01/2017    EF 50-55%, mild MR    ECHO - CONVERTED  10/11/2017    EF 55%    ECHO - CONVERTED  03/21/2022    TLS. EF 55%. LA- 5.2 cm. Mild MR. RVSP- 44 mmHg    ECHO - CONVERTED  02/15/2024    TLS. EF 55%.RHE. LA- 5.0. Mild MR.AO- 3.8. RVSP- 56 mmHg    ENDOSCOPY W/ PEG TUBE PLACEMENT N/A 3/21/2025    Procedure: ESOPHAGOGASTRODUODENOSCOPY WITH PERCUTANEOUS ENDOSCOPIC GASTROSTOMY TUBE INSERTION;  Surgeon: Stanley Pa MD;  Location: [facility] ENDOSCOPY;  Service: General;  Laterality: N/A;    US CAROTID UNILATERAL  04/16/2007    Mild Plaque in Rt. bulb and RECA.      General Information       Row Name 03/24/25 0114          Physical Therapy Time and Intention    Document Type progress note/recertification  -ER     Mode of Treatment physical therapy;co-treatment  -ER       Row Name 03/24/25 0237          General Information    Patient Profile Reviewed yes  -ER     Existing Precautions/Restrictions fall;other (see comments)  NG tube  -ER       Row Name 03/24/25 8073          Cognition    Orientation Status (Cognition) oriented x 3  -ER       Row Name 03/24/25 0968          Safety Issues/Impairments Affecting Functional Mobility    Impairments Affecting Function (Mobility) balance;cognition;coordination;endurance/activity tolerance;postural/trunk control;shortness of breath;strength;motor planning  -ER                User Key  (r) = Recorded By, (t) = Taken By, (c) = Cosigned By      Initials Name Provider Type    ER Kerry Guadarrama, PT Physical Therapist                   Mobility       Row Name 03/24/25 1337          Bed Mobility    Bed Mobility supine-sit;rolling right  -ER     Rolling Right [place] (Bed Mobility) verbal cues;minimum assist (75% patient effort);1 person assist  -ER     Supine-Sit [place] (Bed Mobility) minimum assist (75% patient effort);1 person assist  -ER     Assistive Device (Bed Mobility) bed rails;head of bed elevated;repositioning sheet  -ER       Row Name 03/24/25 1337          Bed-Chair Transfer    Bed-Chair [place] (Transfers) 2 person assist;maximum assist (25% patient effort)  -ER     Assistive Device (Bed-Chair Transfers) --  BUE support  -ER       Row Name 03/24/25 1337          Sit-Stand Transfer    Sit-Stand [place] (Transfers) maximum assist (25% patient effort);2 person assist;moderate assist (50% patient effort)  -ER     Assistive Device (Sit-Stand Transfers) other (see comments)  BUE support  -ER     Comment, (Sit-Stand Transfer) 1 from bed, 3 from chair - progressed towards modAx2  -ER       Row Name 03/24/25 1337          Gait/Stairs (Locomotion)    Comment, (Gait/Stairs) unable, only performed a stand pivot to chair  -ER               User Key  (r) = Recorded By, (t) = Taken By, (c) = Cosigned By      Initials Name Provider Type    ER Kerry Guadarrama, PT Physical Therapist                   Obj/Interventions       Row Name 03/24/25 1339          Range of Motion Comprehensive    General Range of Motion lower extremity range of motion deficits identified  -ER     Comment, General Range of Motion unable to cross ankles to don socks  -ER       Row Name 03/24/25 1339          Strength Comprehensive (MMT)    General Manual Muscle Testing (MMT) Assessment lower extremity strength deficits identified  -ER     Comment, General Manual Muscle Testing (MMT) Assessment BLE  4/5  -ER       Row Name 03/24/25 1339          Balance    Balance Assessment sitting static balance;sitting dynamic balance;standing static balance;standing dynamic balance  -ER     Static Sitting Balance standby assist  -ER     Dynamic Sitting Balance minimal assist  -ER     Position, Sitting Balance unsupported;sitting edge of bed  -ER     Static Standing Balance maximum assist;2-person assist  -ER     Dynamic Standing Balance maximum assist;2-person assist  -ER     Position/Device Used, Standing Balance supported  -ER     Balance Interventions sitting;standing;sit to stand;supported;static;dynamic;minimal challenge  -ER               User Key  (r) = Recorded By, (t) = Taken By, (c) = Cosigned By      Initials Name Provider Type    ER Kerry Guadarrama, PT Physical Therapist                   Goals/Plan       Row Name 03/24/25 1342          Bed Mobility Goal 1 (PT)    Activity/Assistive Device (Bed Mobility Goal 1, PT) sit to supine/supine to sit  -ER     [place] Level/Cues Needed (Bed Mobility Goal 1, PT) contact guard required  -ER     Time Frame (Bed Mobility Goal 1, PT) short term goal (STG);5 days  -ER     Progress/Outcomes (Bed Mobility Goal 1, PT) good progress toward goal  -ER       Row Name 03/24/25 1342          Transfer Goal 1 (PT)    Activity/Assistive Device (Transfer Goal 1, PT) sit-to-stand/stand-to-sit;bed-to-chair/chair-to-bed  -ER     [place] Level/Cues Needed (Transfer Goal 1, PT) standby assist  -ER     Time Frame (Transfer Goal 1, PT) long term goal (LTG);10 days  -ER     Progress/Outcome (Transfer Goal 1, PT) progress slower than expected  -ER       Row Name 03/24/25 1342          Gait Training Goal 1 (PT)    Activity/Assistive Device (Gait Training Goal 1, PT) gait (walking locomotion);assistive device use;improve balance and speed  -ER     [place] Level (Gait Training Goal 1, PT) contact guard required  -ER     Distance (Gait Training Goal 1, PT) 20  -ER     Time Frame (Gait  Training Goal 1, PT) long term goal (LTG);10 days  -ER     Progress/Outcome (Gait Training Goal 1, PT) goal revised this date  -ER       Row Name 03/24/25 1342          Therapy Assessment/Plan (PT)    Planned Therapy Interventions (PT) balance training;bed mobility training;gait training;home exercise program;motor coordination training;postural re-education;patient/family education;neuromuscular re-education;strengthening;transfer training  -ER               User Key  (r) = Recorded By, (t) = Taken By, (c) = Cosigned By      Initials Name Provider Type    ER Kerry Guadarrama, PT Physical Therapist                   Clinical Impression       Row Name 03/24/25 1340          Pain    Pretreatment Pain Rating 0/10 - no pain  -ER     Posttreatment Pain Rating 0/10 - no pain  -ER       Row Name 03/24/25 1340          Plan of Care Review    Plan of Care Reviewed With patient  -ER     Progress improving  -ER     Outcome Evaluation PT re-cert completed and goals updated. Pt able to perform STS w slightly less aid compared to previous sessions. Still requiring assist of 2. Pt remains limited by strength, functional endurance, and activity tolerance. continue PT POC as tolerated. continue to recommend SNF upon d/c.  -ER       Row Name 03/24/25 1340          Therapy Assessment/Plan (PT)    Patient/Family Therapy Goals Statement (PT) be able to transfer easier  -ER     Rehab Potential (PT) fair  -ER     Criteria for Skilled Interventions Met (PT) yes;meets criteria;skilled treatment is necessary  -ER     Therapy Frequency (PT) daily  -ER     Predicted Duration of Therapy Intervention (PT) 10 days  -ER       Row Name 03/24/25 1340          Vital Signs    Pre Systolic BP Rehab 131  -ER     Pre Treatment Diastolic BP 69  -ER     Posttreatment Heart Rate (beats/min) 79  -ER     O2 Delivery Pre Treatment room air  -ER     O2 Delivery Intra Treatment room air  -ER     Post SpO2 (%) 98  -ER     O2 Delivery Post Treatment room air  -ER      Pre Patient Position Supine  -ER     Intra Patient Position Standing  -ER     Post Patient Position Sitting  -ER       Row Name 03/24/25 1340          Positioning and Restraints    Pre-Treatment Position in bed  -ER     Post Treatment Position chair  -ER     In Chair notified nsg;reclined;call light within reach;encouraged to call for assist;exit alarm on;with family/caregiver;waffle cushion;on mechanical lift sling  -ER               User Key  (r) = Recorded By, (t) = Taken By, (c) = Cosigned By      Initials Name Provider Type    Kerry Mckeon, PT Physical Therapist                   Outcome Measures       Row Name 03/24/25 1343 03/24/25 0825       How much help from another person do you currently need...    Turning from your back to your side while in flat bed without using bedrails? 3  -ER 2  -SB    Moving from lying on back to sitting on the side of a flat bed without bedrails? 3  -ER 2  -SB    Moving to and from a bed to a chair (including a wheelchair)? 1  -ER 1  -SB    Standing up from a chair using your arms (e.g., wheelchair, bedside chair)? 1  -ER 1  -SB    Climbing 3-5 steps with a railing? 1  -ER 1  -SB    To walk in hospital room? 1  -ER 1  -SB    AM-PAC 6 Clicks Score (PT) 10  -ER 8  -SB    Highest Level of Mobility Goal 4 --> Transfer to chair/commode  -ER 3 --> Sit at edge of bed  -SB      Row Name 03/24/25 1343 03/24/25 1339       Functional Assessment    Outcome Measure Options AM-PAC 6 Clicks Basic Mobility (PT)  -ER AM-PAC 6 Clicks Daily Activity (OT)  -MICHAEL              User Key  (r) = Recorded By, (t) = Taken By, (c) = Cosigned By      Initials Name Provider Type    Kavitha Yang, OT Occupational Therapist    Kerry Mckeon, PT Physical Therapist    Selvin Monsivais, RN Registered Nurse                                 Physical Therapy Education       Title: PT OT SLP Therapies (In Progress)       Topic: Physical Therapy (In Progress)       Point: Mobility training (In Progress)        Learning Progress Summary            Patient Acceptance, E, VU by ER at 3/24/2025 1343    Comment: progress, POC, need for PT    Acceptance, E, NR by KE at 3/19/2025 1549    Comment: provided HEP handout for bed level exercises to family    Acceptance, E, NR by KE at 3/17/2025 1608    Acceptance, E, NR by KE at 3/12/2025 1606   Family Acceptance, E, NR by KE at 3/19/2025 1549    Comment: provided HEP handout for bed level exercises to family                      Point: Home exercise program (In Progress)       Learning Progress Summary            Patient Acceptance, E, VU by ER at 3/24/2025 1343    Comment: progress, POC, need for PT    Acceptance, E, NR by KE at 3/19/2025 1549    Comment: provided HEP handout for bed level exercises to family    Acceptance, E, NR by KE at 3/17/2025 1608    Acceptance, E, NR by KE at 3/12/2025 1606   Family Acceptance, E, NR by KE at 3/19/2025 1549    Comment: provided HEP handout for bed level exercises to family                      Point: Body mechanics (In Progress)       Learning Progress Summary            Patient Acceptance, E, VU by ER at 3/24/2025 1343    Comment: progress, POC, need for PT    Acceptance, E, NR by KE at 3/19/2025 1549    Comment: provided HEP handout for bed level exercises to family    Acceptance, E, NR by KE at 3/17/2025 1608    Acceptance, E, NR by KE at 3/12/2025 1606   Family Acceptance, E, NR by KE at 3/19/2025 1549    Comment: provided HEP handout for bed level exercises to family                      Point: Precautions (In Progress)       Learning Progress Summary            Patient Acceptance, E, VU by ER at 3/24/2025 1343    Comment: progress, POC, need for PT    Acceptance, E, NR by KE at 3/19/2025 1549    Comment: provided HEP handout for bed level exercises to family    Acceptance, E, NR by KE at 3/17/2025 1608    Acceptance, E, NR by KE at 3/12/2025 1606   Family Acceptance, E, NR by KE at 3/19/2025 1549    Comment: provided HEP handout  for bed level exercises to family                                      User Key       Initials Effective Dates Name Provider Type Discipline    ER 12/13/24 -  Kerry Guadarrama, PT Physical Therapist PT    KE 11/16/23 -  Flory Little PT Physical Therapist PT                  PT Recommendation and Plan  Planned Therapy Interventions (PT): balance training, bed mobility training, gait training, home exercise program, motor coordination training, postural re-education, patient/family education, neuromuscular re-education, strengthening, transfer training  Progress: improving  Outcome Evaluation: PT re-cert completed and goals updated. Pt able to perform STS w slightly less aid compared to previous sessions. Still requiring assist of 2. Pt remains limited by strength, functional endurance, and activity tolerance. continue PT POC as tolerated. continue to recommend SNF upon d/c.     Time Calculation:         PT Charges       Row Name 03/24/25 1344             Time Calculation    Start Time 1312  -ER      PT Received On 03/24/25  -ER      PT Goal Re-Cert Due Date 04/03/25  -ER         Timed Charges    37088 - PT Therapeutic Activity Minutes 11  -ER         Total Minutes    Timed Charges Total Minutes 11  -ER       Total Minutes 11  -ER                User Key  (r) = Recorded By, (t) = Taken By, (c) = Cosigned By      Initials Name Provider Type    ER Kerry Guadarrama, PT Physical Therapist                  Therapy Charges for Today       Code Description Service Date Service Provider Modifiers Qty    [id number] HC PT THERAPEUTIC ACT EA 15 MIN 3/24/2025 Kerry Guadarrama, PT GP 1            PT G-Codes  Outcome Measure Options: AM-PAC 6 Clicks Basic Mobility (PT)  AM-PAC 6 Clicks Score (PT): 10  AM-PAC 6 Clicks Score (OT): 13  PT Discharge Summary  Anticipated Discharge Disposition (PT): skilled nursing facility    Kerry Guadarrama PT  3/24/2025

## 2025-03-24 NOTE — THERAPY PROGRESS REPORT/RE-CERT
Patient Name: Carlos Preston  : 3/20/1928    MRN: [id number]                              Today's Date: 3/24/2025       Admit Date: 3/11/2025    Visit Dx:     ICD-10-CM ICD-9-CM   1. Pneumonia of both lungs due to infectious organism, unspecified part of lung  J18.9 483.8   2. Traumatic rhabdomyolysis, initial encounter  T79.6XXA 958.6   3. Elevated troponin  R79.89 790.6   4. RSV (acute bronchiolitis due to respiratory syncytial virus)  J21.0 466.11   5. Impaired mobility and ADLs  Z74.09 V49.89    Z78.9    6. Oropharyngeal dysphagia  R13.12 787.22     Patient Active Problem List   Diagnosis    Pulmonary HTN    Hyperlipemia    HTN (hypertension)    GERD (gastroesophageal reflux disease)    Hx of CABG    IHD (ischemic heart disease)    SOB (shortness of breath)    Abnormal chest x-ray    Esophageal stricture    Rhabdomyolysis    Moderate protein-calorie malnutrition    Urine incontinence     Past Medical History:   Diagnosis Date    Cancer     H/O prostatectomy     Hearing loss     Heart disease     History of back surgery     Hypercholesterolemia     Hyperlipidemia     Hypertension     IHD (ischemic heart disease)     S/P CABG    Osteoarthritis of spine with myelopathy, lumbar region     Paroxysmal atrial fibrillation 2018    Pulmonary hypertension     S/P knee replacement     Sinus disorder     thyroid ultrasound 2009    Normal     Past Surgical History:   Procedure Laterality Date    CARDIAC CATHETERIZATION  2007    Dr. Mace: 60% LM and 70% LCX.    CARDIAC CATHETERIZATION  2009    %, OM 85-90%,Patent grafts.    CARDIAC CATHETERIZATION  2018    Patent Grafts    CARDIOVASCULAR STRESS TEST  2009    [facility] George: Dr. Mace- EF 29%, Diffuse ischemia.    CARDIOVASCULAR STRESS TEST  2012    L. Myoview- Inferoseptal infarct with jon infarct ischemia. EF 41%.    CARDIOVASCULAR STRESS TEST  2015    L. Myoview- EF51%,fixed defect , no ischemia burden.     CARDIOVASCULAR STRESS TEST  10/11/2017    L.Myoview- EF 54%. Small inferior Ischemia.    CONVERTED (HISTORICAL) HOLTER  06/12/2023    3 days. A.Fib. Avg 60. . 3 VT. One 3 Sec Pause    CONVERTED (HISTORICAL) HOLTER  05/28/2024    3 Days. A.Fib. Avg 70. . 4.6% PVC. 2 VT    CORONARY ARTERY BYPASS GRAFT  05/17/2007    CABG:  SVG to LAD and LCX.    ECHO - CONVERTED  08/06/2009    Dr. Mace- EF 50%.    ECHO - CONVERTED  02/14/2011    EF 50%, Septal WMA.    ECHO - CONVERTED  11/07/2012    EF 40-45%, RVSP 33 mmHg.    ECHO - CONVERTED  03/23/2015    EF 45-50%, RVSP 40mmHg,mild MR, mild PHT    ECHO - CONVERTED  05/01/2017    EF 50-55%, mild MR    ECHO - CONVERTED  10/11/2017    EF 55%    ECHO - CONVERTED  03/21/2022    TLS. EF 55%. LA- 5.2 cm. Mild MR. RVSP- 44 mmHg    ECHO - CONVERTED  02/15/2024    TLS. EF 55%.RHE. LA- 5.0. Mild MR.AO- 3.8. RVSP- 56 mmHg    ENDOSCOPY W/ PEG TUBE PLACEMENT N/A 3/21/2025    Procedure: ESOPHAGOGASTRODUODENOSCOPY WITH PERCUTANEOUS ENDOSCOPIC GASTROSTOMY TUBE INSERTION;  Surgeon: Stanley Pa MD;  [facility]: [facility];  Service: General;  Laterality: N/A;    US CAROTID UNILATERAL  04/16/2007    Mild Plaque in Rt. bulb and RECA.      General Information       Row Name 03/24/25 1340          OT Time and Intention    Subjective Information no complaints  -MICHAEL     Document Type progress note/recertification  -MICHAEL     Mode of Treatment occupational therapy;co-treatment  -MICHAEL     Patient Effort good  -MICHAEL       Row Name 03/24/25 1340          General Information    Patient Profile Reviewed yes  -MICHAEL     Existing Precautions/Restrictions fall;other (see comments)  PEG  -MICHAEL     Barriers to Rehab medically complex  -MICHAEL       Row Name 03/24/25 1340          Cognition    Orientation Status (Cognition) oriented x 3  -MICHAEL       Row Name 03/24/25 1340          Safety Issues/Impairments Affecting Functional Mobility    Safety Issues Affecting Function (Mobility) insight into  deficits/self-awareness;safety precaution awareness;safety precautions follow-through/compliance;sequencing abilities  -     Impairments Affecting Function (Mobility) balance;coordination;endurance/activity tolerance;postural/trunk control;shortness of breath;strength;motor planning;range of motion (ROM)  -               User Key  (r) = Recorded By, (t) = Taken By, (c) = Cosigned By      Initials Name Provider Type     Kavitha Quesada OT Occupational Therapist                     Mobility/ADL's       Row Name 03/24/25 1341          Bed Mobility    Bed Mobility supine-sit  -     Supine-Sit [place] (Bed Mobility) minimum assist (75% patient effort);1 person assist;verbal cues  -     Bed Mobility, Safety Issues decreased use of legs for bridging/pushing;impaired trunk control for bed mobility  -     Assistive Device (Bed Mobility) bed rails;head of bed elevated  -       Row Name 03/24/25 1341          Transfers    Transfers sit-stand transfer;stand-sit transfer;bed-chair transfer  -       Row Name 03/24/25 1341          Bed-Chair Transfer    Bed-Chair [place] (Transfers) 2 person assist;maximum assist (25% patient effort);verbal cues  -     Assistive Device (Bed-Chair Transfers) other (see comments)  BUE support  -     Comment, (Bed-Chair Transfer) knees wanting to give way with steps  -       Row Name 03/24/25 1341          Sit-Stand Transfer    Sit-Stand [place] (Transfers) maximum assist (25% patient effort);2 person assist  -     Assistive Device (Sit-Stand Transfers) other (see comments)  BUE support or chair arm rest support  -     Comment, (Sit-Stand Transfer) Pt. max of 2 to stand from bed and first stand from chair, but last two stands progressing to mod of 2.  -       Row Name 03/24/25 1341          Stand-Sit Transfer    Stand-Sit [place] (Transfers) moderate assist (50% patient effort);2 person assist;verbal cues  -     Assistive Device (Stand-Sit Transfers)  other (see comments)  BUE support  -MICHAEL       Row Name 03/24/25 1341          Functional Mobility    Functional Mobility- Ind. Level unable to perform  -MICHAEL       Row Name 03/24/25 1341          Lower Body Dressing Assessment/Training    [place] Level (Lower Body Dressing) don;doff;socks;dependent (less than 25% patient effort)  -MICHAEL     Position (Lower Body Dressing) edge of bed sitting;supported sitting  -MICHAEL     Comment, (Lower Body Dressing) Pt. unable to cross up LE's to reach feet and unable to fully reach down to feet to straighten socks.  -MICHAEL       Row Name 03/24/25 1341          Toileting Assessment/Training    [place] Level (Toileting) perform perineal hygiene;dependent (less than 25% patient effort);change pad/brief  -MICHAEL     Position (Toileting) supported standing  -MICHAEL     Comment, (Toileting) with tow assist to stand a third person wiped pt.  -MICHAEL               User Key  (r) = Recorded By, (t) = Taken By, (c) = Cosigned By      Initials Name Provider Type    Kavitha Yang OT Occupational Therapist                   Obj/Interventions       Row Name 03/24/25 1344          Sensory Assessment (Somatosensory)    Sensory Assessment (Somatosensory) UE sensation intact  -MICHAEL       Row Name 03/24/25 1344          Vision Assessment/Intervention    Visual Impairment/Limitations corrective lenses full-time  -MICHAEL       Row Name 03/24/25 1344          Range of Motion Comprehensive    General Range of Motion upper extremity range of motion deficits identified  -MICHAEL     Comment, General Range of Motion B shoulder ROM grossly 90 degrees  -MICHAEL       Row Name 03/24/25 1344          Strength Comprehensive (MMT)    General Manual Muscle Testing (MMT) Assessment upper extremity strength deficits identified  -MICHAEL     Comment, General Manual Muscle Testing (MMT) Assessment BUE distally 4+ to 5/5, shoulders 3+ to 4/5  -MICHAEL       Row Name 03/24/25 1344          Shoulder (Therapeutic Exercise)    Shoulder (Therapeutic  Exercise) AROM (active range of motion)  -     Shoulder AROM (Therapeutic Exercise) bilateral;flexion;extension;aBduction;aDduction;horizontal aBduction/aDduction;sitting;supine  -MICHAEL       Row Name 03/24/25 1344          Elbow/Forearm (Therapeutic Exercise)    Elbow/Forearm (Therapeutic Exercise) strengthening exercise  -MICHAEL     Elbow/Forearm Strengthening (Therapeutic Exercise) bilateral;flexion;extension;10 repetitions;supine  mod resistance given  -       Row Name 03/24/25 1344          Motor Skills    Motor Skills functional endurance  -     Functional Endurance impaired  -     Therapeutic Exercise shoulder;elbow/forearm  -       Row Name 03/24/25 1344          Balance    Static Sitting Balance standby assist  -MICHAEL     Dynamic Sitting Balance minimal assist;1-person assist  -MICHAEL     Position, Sitting Balance unsupported;sitting edge of bed  -MICHAEL     Static Standing Balance maximum assist;2-person assist;non-verbal cues (demo/gesture);independent  -MICHAEL     Dynamic Standing Balance maximum assist;2-person assist;non-verbal cues (demo/gesture);verbal cues  -MICHAEL     Position/Device Used, Standing Balance supported  -MICHAEL     Balance Interventions sit to stand;weight shifting activity  -               User Key  (r) = Recorded By, (t) = Taken By, (c) = Cosigned By      Initials Name Provider Type    MICHAEL Kavitha Quesada, OT Occupational Therapist                   Goals/Plan       Row Name 03/24/25 0541          Bed Mobility Goal 1 (OT)    Activity/Assistive Device (Bed Mobility Goal 1, OT) rolling to left;rolling to right;scooting;sit to supine/supine to sit  -MICHAEL     [place] Level/Cues Needed (Bed Mobility Goal 1, OT) contact guard required;verbal cues required  -MICHAEL     Time Frame (Bed Mobility Goal 1, OT) long term goal (LTG);10 days  -MICHAEL     Progress/Outcomes (Bed Mobility Goal 1, OT) good progress toward goal;goal ongoing  -       Row Name 03/24/25 5599          Transfer Goal 1 (OT)    Activity/Assistive  Device (Transfer Goal 1, OT) sit-to-stand/stand-to-sit;bed-to-chair/chair-to-bed;toilet;walker, rolling;commode;commode, bedside without drop arms  -MICHAEL     [place] Level/Cues Needed (Transfer Goal 1, OT) verbal cues required;nonverbal cues (demo/gesture) required;moderate assist (50-74% patient effort)  -MICHAEL     Time Frame (Transfer Goal 1, OT) long term goal (LTG);10 days  -MICHAEL     Progress/Outcome (Transfer Goal 1, OT) goal revised this date;goal ongoing  -MICHAEL       Row Name 03/24/25 8597          Dressing Goal 1 (OT)    Activity/Device (Dressing Goal 1, OT) dressing skills, all  -MICHAEL     [place]/Cues Needed (Dressing Goal 1, OT) minimum assist (75% or more patient effort);verbal cues required  -MICHAEL     Time Frame (Dressing Goal 1, OT) long term goal (LTG);10 days  -MICHAEL     Progress/Outcome (Dressing Goal 1, OT) goal ongoing;goal no longer appropriate  -MICHAEL       Row Name 03/24/25 2176          Toileting Goal 1 (OT)    Activity/Device (Toileting Goal 1, OT) toileting skills, all;adjust/manage clothing;perform perineal hygiene;commode;commode chair;grab bar/safety frame  -MICHAEL     [place] Level/Cues Needed (Toileting Goal 1, OT) contact guard required;verbal cues required  -MICHAEL     Time Frame (Toileting Goal 1, OT) long term goal (LTG);10 days  -MICHAEL     Progress/Outcome (Toileting Goal 1, OT) goal no longer appropriate  -MICHAEL       Row Name 03/24/25 9831          Strength Goal 1 (OT)    Strength Goal 1 (OT) Pt. will complete 15-20 reps each yellow tband or AROM UE TE to support return to PLOF ADLs and related transfers.  -MICHAEL     Time Frame (Strength Goal 1, OT) short term goal (STG);5 days  -MICHAEL     Progress/Outcome (Strength Goal 1, OT) new goal  -MICHAEL       Row Name 03/24/25 2996          Problem Specific Goal 1 (OT)    Problem Specific Goal 1 (OT) Patient will stand with min A in rw wx one minute to support return to PLOF and support caregiver care.  -MICHAEL     Time Frame (Problem Specific Goal 1, OT) long term goal  (LTG);10 days  -MICHAEL     Progress/Outcome (Problem Specific Goal 1, OT) new goal  -       Row Name 03/24/25 1337          Therapy Assessment/Plan (OT)    Planned Therapy Interventions (OT) activity tolerance training;BADL retraining;functional balance retraining;occupation/activity based interventions;patient/caregiver education/training;ROM/therapeutic exercise;strengthening exercise;transfer/mobility retraining  -               User Key  (r) = Recorded By, (t) = Taken By, (c) = Cosigned By      Initials Name Provider Type    Kavitha Yang, OT Occupational Therapist                   Clinical Impression       Row Name 03/24/25 1346          Pain Assessment    Pretreatment Pain Rating 0/10 - no pain  -MICHAEL     Posttreatment Pain Rating 0/10 - no pain  -       Row Name 03/24/25 1346          Plan of Care Review    Plan of Care Reviewed With patient;caregiver  -     Progress improving  -     Outcome Evaluation Progress recertification completed.  Pt. continues with generalized weakness, limited endurance, ROM and balance impacting PLOF ADLs and related transfers warranting continued skilled OT services.  Recommend SNF at discharge.  -       Row Name 03/24/25 1346          Therapy Assessment/Plan (OT)    Patient/Family Therapy Goal Statement (OT) pt. would like to return to PLOF ADLs  -     Rehab Potential (OT) good  -MICHAEL     Criteria for Skilled Therapeutic Interventions Met (OT) yes;meets criteria;skilled treatment is necessary  -     Therapy Frequency (OT) daily  -MICHAEL     Predicted Duration of Therapy Intervention (OT) 10 days  -       Row Name 03/24/25 1346          Therapy Plan Review/Discharge Plan (OT)    Equipment Needs Upon Discharge (OT) other (see comments)  bathroom AD  -MICHAEL     Anticipated Discharge Disposition (OT) skilled nursing facility  -       Row Name 03/24/25 1346          Vital Signs    Pre Systolic BP Rehab 131  -MICHAEL     Pre Treatment Diastolic BP 69  -MICHAEL     Pre SpO2 (%) 96  -MICHAEL      O2 Delivery Pre Treatment room air  -MICHAEL     O2 Delivery Intra Treatment room air  -MICHAEL     Post SpO2 (%) 98  -MICHAEL     O2 Delivery Post Treatment room air  -MICHAEL     Pre Patient Position Supine  -MICHAEL     Intra Patient Position Standing  -MICHAEL     Post Patient Position Sitting  -MICHAEL       Row Name 03/24/25 1346          Positioning and Restraints    Pre-Treatment Position in bed  -MICHAEL     Post Treatment Position chair  -MICHAEL     In Chair notified nsg;reclined;call light within reach;encouraged to call for assist;exit alarm on;with family/caregiver;waffle cushion;on mechanical lift sling;legs elevated  -MICHAEL               User Key  (r) = Recorded By, (t) = Taken By, (c) = Cosigned By      Initials Name Provider Type    Kavitha Yang, JUAN LUIS Occupational Therapist                   Outcome Measures       Row Name 03/24/25 1339          How much help from another is currently needed...    Putting on and taking off regular lower body clothing? 2  -MICHAEL     Bathing (including washing, rinsing, and drying) 2  -MICHAEL     Toileting (which includes using toilet bed pan or urinal) 1  -MICHAEL     Putting on and taking off regular upper body clothing 2  -MICHAEL     Taking care of personal grooming (such as brushing teeth) 3  -MICHAEL     Eating meals 3  -MICHAEL     AM-PAC 6 Clicks Score (OT) 13  -MICHAEL       Row Name 03/24/25 1343 03/24/25 0825       How much help from another person do you currently need...    Turning from your back to your side while in flat bed without using bedrails? 3  -ER 2  -SB    Moving from lying on back to sitting on the side of a flat bed without bedrails? 3  -ER 2  -SB    Moving to and from a bed to a chair (including a wheelchair)? 1  -ER 1  -SB    Standing up from a chair using your arms (e.g., wheelchair, bedside chair)? 1  -ER 1  -SB    Climbing 3-5 steps with a railing? 1  -ER 1  -SB    To walk in hospital room? 1  -ER 1  -SB    AM-PAC 6 Clicks Score (PT) 10  -ER 8  -SB    Highest Level of Mobility Goal 4 --> Transfer to  chair/commode  -ER 3 --> Sit at edge of bed  -SB      Row Name 03/24/25 1343 03/24/25 1339       Functional Assessment    Outcome Measure Options AM-PAC 6 Clicks Basic Mobility (PT)  -ER AM-PAC 6 Clicks Daily Activity (OT)  -MICHAEL              User Key  (r) = Recorded By, (t) = Taken By, (c) = Cosigned By      Initials Name Provider Type    MICHAEL Kavitha Quesada, OT Occupational Therapist    ER Kerry Guadarrama, PT Physical Therapist    Selvin Monsivais, RN Registered Nurse                    Occupational Therapy Education       Title: PT OT SLP Therapies (In Progress)       Topic: Occupational Therapy (Done)       Point: ADL training (Done)       Learning Progress Summary            Patient Acceptance, E,D, VU,NR by MICHAEL at 3/24/2025 1340    Comment: bed mobility sequencing, transfer techniques, posture, UE TE   Caregiver Acceptance, E,D, VU,NR by MICHAEL at 3/24/2025 1340    Comment: bed mobility sequencing, transfer techniques, posture, UE TE                      Point: Home exercise program (Done)       Learning Progress Summary            Patient Acceptance, E,D, VU,NR by MICHAEL at 3/24/2025 1340    Comment: bed mobility sequencing, transfer techniques, posture, UE TE   Caregiver Acceptance, E,D, VU,NR by MICHAEL at 3/24/2025 1340    Comment: bed mobility sequencing, transfer techniques, posture, UE TE                      Point: Precautions (Done)       Learning Progress Summary            Patient Acceptance, E,D, VU,NR by MICHAEL at 3/24/2025 1340    Comment: bed mobility sequencing, transfer techniques, posture, UE TE   Caregiver Acceptance, E,D, VU,NR by MICHAEL at 3/24/2025 1340    Comment: bed mobility sequencing, transfer techniques, posture, UE TE                      Point: Body mechanics (Done)       Learning Progress Summary            Patient Acceptance, E,D, VU,NR by IMCHAEL at 3/24/2025 1340    Comment: bed mobility sequencing, transfer techniques, posture, UE TE   Caregiver Acceptance, E,D, VU,NR by MICHAEL at 3/24/2025 1340    Comment: bed  mobility sequencing, transfer techniques, posture, UE TE                                      User Key       Initials Effective Dates Name Provider Type Discipline     07/11/23 -  Kavitha Quesada OT Occupational Therapist OT                  OT Recommendation and Plan  Planned Therapy Interventions (OT): activity tolerance training, BADL retraining, functional balance retraining, occupation/activity based interventions, patient/caregiver education/training, ROM/therapeutic exercise, strengthening exercise, transfer/mobility retraining  Therapy Frequency (OT): daily  Plan of Care Review  Plan of Care Reviewed With: patient, caregiver  Progress: improving  Outcome Evaluation: Progress recertification completed.  Pt. continues with generalized weakness, limited endurance, ROM and balance impacting PLOF ADLs and related transfers warranting continued skilled OT services.  Recommend SNF at discharge.     Time Calculation:         Time Calculation- OT       Row Name 03/24/25 1350             Time Calculation- OT    OT Start Time 1306  -MICHAEL      OT Received On 03/24/25  -MICHAEL      OT Goal Re-Cert Due Date 04/03/25  -MICHAEL         Timed Charges    06286 - OT Therapeutic Exercise Minutes 6  -MICHAEL      58982 - OT Therapeutic Activity Minutes 4  -MICHAEL      63497 - OT Self Care/Mgmt Minutes 5  -MICHAEL         Total Minutes    Timed Charges Total Minutes 15  -MICHAEL       Total Minutes 15  -MICHAEL                User Key  (r) = Recorded By, (t) = Taken By, (c) = Cosigned By      Initials Name Provider Type    Kavitha Yang OT Occupational Therapist                  Therapy Charges for Today       Code Description Service Date Service Provider Modifiers Qty    [id number]  OT THER PROC EA 15 MIN 3/24/2025 Kavitha Quesada OT GO 1                 Kavitha Quesada OT  3/24/2025

## 2025-03-24 NOTE — NURSING NOTE
WOC consulted for wound to right intergluteal    Patient has moderate to severe MASD with scattered partial-thickness injuries at his bilateral upper gluteal/sacrococcygeal region.  He has been having issues with fecal incontinence that is likely been a major contributing factor to the skin breakdown noted today.  Area was gently cleansed and covered with an Allevyn dressing.  Topical barrier cream was applied to the lower gluteal region.    Alternating pressure, low-air-loss mattress settings adjusted.    Venelex ordered twice daily and should be applied to the wound and covered with an Allevyn dressing.  Z guard may be applied to the lower gluteal region.    Will reach out to registered dietitian about adding Ruddy to patient's diet regimen.    Pressure injury prevention protocol.    Turn patient using a foam wedge.  Utilize Allevyn dressings and turn every 2 hours.  Offload heels from bed.    Discussed plan of care with patient's family member.    WOC will sign off.    Carmelo Acevedo RN, BSN,  CWOCN  Wound, Ostomy and Continence (WOC) Department  [facility]

## 2025-03-24 NOTE — CASE MANAGEMENT/SOCIAL WORK
Case Management Discharge Note      Final Note: I spoke with Mr Preston and his daughter Bettye at bedside.  [facility] has all of the tube feeding orders that they need and will be able to provide tube feeding.  I have moved [facility] to today, March 24 at 1.  I have updated [facility] that Mr Preston will be returning home.  At this time there are no other discharge needs.     Dr Delgado has decided to keep Mr Preston for 3 more days for IV antibiotics.  I have cancelled the EMS for today and updated [facility] home [facility] in [place].  Case management will continue to follow.     Selected Continued Care - Admitted Since 3/11/2025       Destination    No services have been selected for the patient.                Durable Medical Equipment       Service Provider Services Address Phone Fax Patient Preferred    [facility] [address] [place] [address] [phone] --       Internal Comment last updated by Click, Agnes LEWIS RN 3/21/2025 1224    Suction equipment                           Dialysis/Infusion    No services have been selected for the patient.                Home Medical Care    No services have been selected for the patient.                Therapy    No services have been selected for the patient.                Community Resources    No services have been selected for the patient.                Community & DME    No services have been selected for the patient.                    Transportation Services  Ambulance: [facility]  [facility] Ambulance Status: Accepted (Sunday, March 23, 2025 9 AM)    Final Discharge Disposition Code: 06 - home with home health care

## 2025-03-25 LAB
ALBUMIN SERPL-MCNC: 2.3 G/DL (ref 3.5–5.2)
ALBUMIN/GLOB SERPL: 0.9 G/DL
ALP SERPL-CCNC: 110 U/L (ref 39–117)
ALT SERPL W P-5'-P-CCNC: 14 U/L (ref 1–41)
ANION GAP SERPL CALCULATED.3IONS-SCNC: 7 MMOL/L (ref 5–15)
AST SERPL-CCNC: 20 U/L (ref 1–40)
BASOPHILS # BLD AUTO: 0.02 10*3/MM3 (ref 0–0.2)
BASOPHILS NFR BLD AUTO: 0.2 % (ref 0–1.5)
BILIRUB SERPL-MCNC: 0.5 MG/DL (ref 0–1.2)
BUN SERPL-MCNC: 32 MG/DL (ref 8–23)
BUN/CREAT SERPL: 40 (ref 7–25)
CALCIUM SPEC-SCNC: 8.7 MG/DL (ref 8.2–9.6)
CHLORIDE SERPL-SCNC: 110 MMOL/L (ref 98–107)
CO2 SERPL-SCNC: 26 MMOL/L (ref 22–29)
CREAT SERPL-MCNC: 0.8 MG/DL (ref 0.76–1.27)
CRP SERPL-MCNC: 13.18 MG/DL (ref 0–0.5)
DEPRECATED RDW RBC AUTO: 55 FL (ref 37–54)
EGFRCR SERPLBLD CKD-EPI 2021: 80.5 ML/MIN/1.73
EOSINOPHIL # BLD AUTO: 0.03 10*3/MM3 (ref 0–0.4)
EOSINOPHIL NFR BLD AUTO: 0.3 % (ref 0.3–6.2)
ERYTHROCYTE [DISTWIDTH] IN BLOOD BY AUTOMATED COUNT: 16.4 % (ref 12.3–15.4)
GLOBULIN UR ELPH-MCNC: 2.7 GM/DL
GLUCOSE SERPL-MCNC: 82 MG/DL (ref 65–99)
HCT VFR BLD AUTO: 29.6 % (ref 37.5–51)
HGB BLD-MCNC: 9.1 G/DL (ref 13–17.7)
IMM GRANULOCYTES # BLD AUTO: 0.04 10*3/MM3 (ref 0–0.05)
IMM GRANULOCYTES NFR BLD AUTO: 0.4 % (ref 0–0.5)
LYMPHOCYTES # BLD AUTO: 0.79 10*3/MM3 (ref 0.7–3.1)
LYMPHOCYTES NFR BLD AUTO: 8.4 % (ref 19.6–45.3)
MCH RBC QN AUTO: 28.9 PG (ref 26.6–33)
MCHC RBC AUTO-ENTMCNC: 30.7 G/DL (ref 31.5–35.7)
MCV RBC AUTO: 94 FL (ref 79–97)
MONOCYTES # BLD AUTO: 0.96 10*3/MM3 (ref 0.1–0.9)
MONOCYTES NFR BLD AUTO: 10.2 % (ref 5–12)
NEUTROPHILS NFR BLD AUTO: 7.58 10*3/MM3 (ref 1.7–7)
NEUTROPHILS NFR BLD AUTO: 80.5 % (ref 42.7–76)
NRBC BLD AUTO-RTO: 0 /100 WBC (ref 0–0.2)
PLATELET # BLD AUTO: 130 10*3/MM3 (ref 140–450)
PMV BLD AUTO: 12.5 FL (ref 6–12)
POTASSIUM SERPL-SCNC: 4.3 MMOL/L (ref 3.5–5.2)
PROT SERPL-MCNC: 5 G/DL (ref 6–8.5)
RBC # BLD AUTO: 3.15 10*6/MM3 (ref 4.14–5.8)
SODIUM SERPL-SCNC: 143 MMOL/L (ref 136–145)
WBC NRBC COR # BLD AUTO: 9.42 10*3/MM3 (ref 3.4–10.8)

## 2025-03-25 PROCEDURE — 86140 C-REACTIVE PROTEIN: CPT | Performed by: INTERNAL MEDICINE

## 2025-03-25 PROCEDURE — 99232 SBSQ HOSP IP/OBS MODERATE 35: CPT | Performed by: INTERNAL MEDICINE

## 2025-03-25 PROCEDURE — 85025 COMPLETE CBC W/AUTO DIFF WBC: CPT | Performed by: INTERNAL MEDICINE

## 2025-03-25 PROCEDURE — 80053 COMPREHEN METABOLIC PANEL: CPT | Performed by: INTERNAL MEDICINE

## 2025-03-25 PROCEDURE — 25010000002 MEROPENEM PER 100 MG: Performed by: INTERNAL MEDICINE

## 2025-03-25 PROCEDURE — 92526 ORAL FUNCTION THERAPY: CPT

## 2025-03-25 PROCEDURE — 97530 THERAPEUTIC ACTIVITIES: CPT

## 2025-03-25 RX ADMIN — ACETAMINOPHEN 650 MG: 325 TABLET, FILM COATED ORAL at 12:30

## 2025-03-25 RX ADMIN — Medication 1 APPLICATION: at 15:45

## 2025-03-25 RX ADMIN — Medication 5 MG: at 21:39

## 2025-03-25 RX ADMIN — PANTOPRAZOLE SODIUM 40 MG: 40 TABLET, DELAYED RELEASE ORAL at 09:18

## 2025-03-25 RX ADMIN — Medication 2 PACKET: at 09:18

## 2025-03-25 RX ADMIN — Medication 10 ML: at 21:42

## 2025-03-25 RX ADMIN — MEROPENEM 1000 MG: 1 INJECTION INTRAVENOUS at 12:30

## 2025-03-25 RX ADMIN — Medication 30 ML: at 21:42

## 2025-03-25 RX ADMIN — Medication 30 ML: at 09:18

## 2025-03-25 RX ADMIN — POLYETHYLENE GLYCOL 3350 17 G: 17 POWDER, FOR SOLUTION ORAL at 09:18

## 2025-03-25 RX ADMIN — Medication 1 APPLICATION: at 21:39

## 2025-03-25 RX ADMIN — MEROPENEM 1000 MG: 1 INJECTION INTRAVENOUS at 03:23

## 2025-03-25 RX ADMIN — BUSPIRONE HYDROCHLORIDE 5 MG: 10 TABLET ORAL at 12:31

## 2025-03-25 RX ADMIN — Medication 10 ML: at 09:18

## 2025-03-25 RX ADMIN — ACETAMINOPHEN 650 MG: 325 TABLET, FILM COATED ORAL at 18:09

## 2025-03-25 RX ADMIN — MEROPENEM 1000 MG: 1 INJECTION INTRAVENOUS at 18:06

## 2025-03-25 RX ADMIN — RIVAROXABAN 20 MG: 20 TABLET, FILM COATED ORAL at 18:06

## 2025-03-25 RX ADMIN — Medication 2 PACKET: at 21:42

## 2025-03-25 RX ADMIN — DICYCLOMINE HYDROCHLORIDE 20 MG: 20 TABLET ORAL at 21:39

## 2025-03-25 RX ADMIN — FERROUS SULFATE TAB 325 MG (65 MG ELEMENTAL FE) 325 MG: 325 (65 FE) TAB at 09:18

## 2025-03-25 NOTE — THERAPY TREATMENT NOTE
Patient Name: Carlos Preston  : 3/20/1928    MRN: [id number]                              Today's Date: 3/25/2025       Admit Date: 3/11/2025    Visit Dx:     ICD-10-CM ICD-9-CM   1. Pneumonia of both lungs due to infectious organism, unspecified part of lung  J18.9 483.8   2. Traumatic rhabdomyolysis, initial encounter  T79.6XXA 958.6   3. Elevated troponin  R79.89 790.6   4. RSV (acute bronchiolitis due to respiratory syncytial virus)  J21.0 466.11   5. Impaired mobility and ADLs  Z74.09 V49.89    Z78.9    6. Oropharyngeal dysphagia  R13.12 787.22     Patient Active Problem List   Diagnosis    Pulmonary HTN    Hyperlipemia    HTN (hypertension)    GERD (gastroesophageal reflux disease)    Hx of CABG    IHD (ischemic heart disease)    SOB (shortness of breath)    Abnormal chest x-ray    Esophageal stricture    Rhabdomyolysis    Moderate protein-calorie malnutrition    Urine incontinence     Past Medical History:   Diagnosis Date    Cancer     H/O prostatectomy     Hearing loss     Heart disease     History of back surgery     Hypercholesterolemia     Hyperlipidemia     Hypertension     IHD (ischemic heart disease)     S/P CABG    Osteoarthritis of spine with myelopathy, lumbar region     Paroxysmal atrial fibrillation 2018    Pulmonary hypertension     S/P knee replacement     Sinus disorder     thyroid ultrasound 2009    Normal     Past Surgical History:   Procedure Laterality Date    CARDIAC CATHETERIZATION  2007    Dr. Mace: 60% LM and 70% LCX.    CARDIAC CATHETERIZATION  2009    %, OM 85-90%,Patent grafts.    CARDIAC CATHETERIZATION  2018    Patent Grafts    CARDIOVASCULAR STRESS TEST  2009    ROBIN George: Dr. Mace- EF 29%, Diffuse ischemia.    CARDIOVASCULAR STRESS TEST  2012    L. Myoview- Inferoseptal infarct with jon infarct ischemia. EF 41%.    CARDIOVASCULAR STRESS TEST  2015    L. Myoview- EF51%,fixed defect , no ischemia burden.     CARDIOVASCULAR STRESS TEST  10/11/2017    L.Myoview- EF 54%. Small inferior Ischemia.    CONVERTED (HISTORICAL) HOLTER  06/12/2023    3 days. A.Fib. Avg 60. . 3 VT. One 3 Sec Pause    CONVERTED (HISTORICAL) HOLTER  05/28/2024    3 Days. A.Fib. Avg 70. . 4.6% PVC. 2 VT    CORONARY ARTERY BYPASS GRAFT  05/17/2007    CABG:  SVG to LAD and LCX.    ECHO - CONVERTED  08/06/2009    Dr. Mace- EF 50%.    ECHO - CONVERTED  02/14/2011    EF 50%, Septal WMA.    ECHO - CONVERTED  11/07/2012    EF 40-45%, RVSP 33 mmHg.    ECHO - CONVERTED  03/23/2015    EF 45-50%, RVSP 40mmHg,mild MR, mild PHT    ECHO - CONVERTED  05/01/2017    EF 50-55%, mild MR    ECHO - CONVERTED  10/11/2017    EF 55%    ECHO - CONVERTED  03/21/2022    TLS. EF 55%. LA- 5.2 cm. Mild MR. RVSP- 44 mmHg    ECHO - CONVERTED  02/15/2024    TLS. EF 55%.RHE. LA- 5.0. Mild MR.AO- 3.8. RVSP- 56 mmHg    ENDOSCOPY W/ PEG TUBE PLACEMENT N/A 3/21/2025    Procedure: ESOPHAGOGASTRODUODENOSCOPY WITH PERCUTANEOUS ENDOSCOPIC GASTROSTOMY TUBE INSERTION;  Surgeon: Stanley Pa MD;  Location: [facility] ENDOSCOPY;  Service: General;  Laterality: N/A;    US CAROTID UNILATERAL  04/16/2007    Mild Plaque in Rt. bulb and RECA.      General Information       Row Name 03/25/25 1600          Physical Therapy Time and Intention    Document Type therapy note (daily note)  -ML     Mode of Treatment physical therapy  -ML       Row Name 03/25/25 1600          General Information    Patient Profile Reviewed yes  -ML     Existing Precautions/Restrictions fall;other (see comments)  PEG  -ML     Barriers to Rehab medically complex  -ML       Row Name 03/25/25 1600          Cognition    Orientation Status (Cognition) oriented x 3  -ML       Row Name 03/25/25 1600          Safety Issues/Impairments Affecting Functional Mobility    Safety Issues Affecting Function (Mobility) insight into deficits/self-awareness;safety precaution awareness;safety precautions  follow-through/compliance;sequencing abilities  -ML     Impairments Affecting Function (Mobility) balance;coordination;endurance/activity tolerance;postural/trunk control;strength;motor planning;range of motion (ROM)  -ML               User Key  (r) = Recorded By, (t) = Taken By, (c) = Cosigned By      Initials Name Provider Type    ML Arabella Zhao Physical Therapist                   Mobility       Row Name 03/25/25 1602          Bed Mobility    Bed Mobility rolling left;rolling right;supine-sit  -ML     Rolling Left Clare (Bed Mobility) nonverbal cues (demo/gesture);verbal cues;maximum assist (25% patient effort);2 person assist  -ML     Rolling Right Clare (Bed Mobility) verbal cues;nonverbal cues (demo/gesture);maximum assist (25% patient effort);2 person assist  -ML     Supine-Sit Clare (Bed Mobility) verbal cues;nonverbal cues (demo/gesture);maximum assist (25% patient effort);1 person assist  -ML     Assistive Device (Bed Mobility) bed rails;head of bed elevated  -ML       Row Name 03/25/25 1602          Bed-Chair Transfer    Bed-Chair Clare (Transfers) 2 person assist;maximum assist (25% patient effort);verbal cues;nonverbal cues (demo/gesture)  -ML     Assistive Device (Bed-Chair Transfers) other (see comments)  maxAx2  -ML     Comment, (Bed-Chair Transfer) therapist blocking knees during transfer  -ML       Row Name 03/25/25 1602          Sit-Stand Transfer    Sit-Stand Clare (Transfers) maximum assist (25% patient effort);2 person assist  -ML     Assistive Device (Sit-Stand Transfers) other (see comments)  BUE support  -ML     Comment, (Sit-Stand Transfer) cues for forward weight shift during sit to stand transfer  -ML               User Key  (r) = Recorded By, (t) = Taken By, (c) = Cosigned By      Initials Name Provider Type    Arabella Benitez Physical Therapist                   Obj/Interventions       Row Name 03/25/25 1603          Balance    Balance Assessment  sitting static balance;sitting dynamic balance;standing static balance;standing dynamic balance  -ML     Static Sitting Balance minimal assist  -ML     Dynamic Sitting Balance moderate assist  -ML     Position, Sitting Balance unsupported;sitting edge of bed  -ML     Static Standing Balance non-verbal cues (demo/gesture);verbal cues;maximum assist;2-person assist  -ML     Dynamic Standing Balance verbal cues;non-verbal cues (demo/gesture);maximum assist;2-person assist  -ML     Position/Device Used, Standing Balance supported  -ML     Balance Interventions sitting;standing;sit to stand;supported;weight shifting activity  -ML     Comment, Balance patient demonstrates posterior lean in unsupported sitting at EOB, required verbal cues to correct  -ML               User Key  (r) = Recorded By, (t) = Taken By, (c) = Cosigned By      Initials Name Provider Type    Arabella Benitez Physical Therapist                   Goals/Plan    No documentation.                  Clinical Impression       Row Name 03/25/25 1604          Pain    Pretreatment Pain Rating 0/10 - no pain  -ML     Posttreatment Pain Rating 0/10 - no pain  -ML       Row Name 03/25/25 1604          Plan of Care Review    Plan of Care Reviewed With patient;caregiver;child  -ML     Progress no change  -ML     Outcome Evaluation Patient continues to require 2 person assist to complete transfers. Patient demonstrates posterior lean while seated at EOB due to increased fatigue after being cleaned. Patient continues to present below baseline for mobility and would continue to benefit from skilled PT to address strength, balance and activity tolerance deficits. Continue current PT POC.  -ML       Row Name 03/25/25 1604          Positioning and Restraints    Pre-Treatment Position in bed  -ML     Post Treatment Position chair  -ML     In Chair notified nsg;reclined;call light within reach;encouraged to call for assist;exit alarm on;with family/caregiver;waffle  cushion;on mechanical lift sling;legs elevated;heels elevated  -ML               User Key  (r) = Recorded By, (t) = Taken By, (c) = Cosigned By      Initials Name Provider Type    Arabella Benitez Physical Therapist                   Outcome Measures       Row Name 03/25/25 1607 03/25/25 0918       How much help from another person do you currently need...    Turning from your back to your side while in flat bed without using bedrails? 2  -ML 2  -MICHAEL    Moving from lying on back to sitting on the side of a flat bed without bedrails? 2  -ML 2  -MICHAEL    Moving to and from a bed to a chair (including a wheelchair)? 2  -ML 2  -MICHAEL    Standing up from a chair using your arms (e.g., wheelchair, bedside chair)? 2  -ML 2  -MICHAEL    Climbing 3-5 steps with a railing? 1  -ML 1  -MICHAEL    To walk in hospital room? 1  -ML 1  -MICHAEL    AM-PAC 6 Clicks Score (PT) 10  -ML 10  -MICHAEL    Highest Level of Mobility Goal 4 --> Transfer to chair/commode  -ML 4 --> Transfer to chair/commode  -MICHAEL      Row Name 03/25/25 1607          Functional Assessment    Outcome Measure Options AM-PAC 6 Clicks Basic Mobility (PT)  -ML               User Key  (r) = Recorded By, (t) = Taken By, (c) = Cosigned By      Initials Name Provider Type    Arabella Benitez Physical Therapist    Maria E Salcido, RN Registered Nurse                                 Physical Therapy Education       Title: PT OT SLP Therapies (In Progress)       Topic: Physical Therapy (In Progress)       Point: Mobility training (In Progress)       Learning Progress Summary            Patient Acceptance, E, VU,NR by  at 3/25/2025 1607    Acceptance, E, VU by ER at 3/24/2025 1343    Comment: progress, POC, need for PT    Acceptance, E, NR by KE at 3/19/2025 1549    Comment: provided HEP handout for bed level exercises to family    Acceptance, E, NR by KE at 3/17/2025 1608    Acceptance, E, NR by KE at 3/12/2025 1606   Family Acceptance, E, NR by KE at 3/19/2025 1549    Comment: provided HEP  handout for bed level exercises to family                      Point: Home exercise program (In Progress)       Learning Progress Summary            Patient Acceptance, E, VU by ER at 3/24/2025 1343    Comment: progress, POC, need for PT    Acceptance, E, NR by KE at 3/19/2025 1549    Comment: provided HEP handout for bed level exercises to family    Acceptance, E, NR by KE at 3/17/2025 1608    Acceptance, E, NR by KE at 3/12/2025 1606   Family Acceptance, E, NR by KE at 3/19/2025 1549    Comment: provided HEP handout for bed level exercises to family                      Point: Body mechanics (In Progress)       Learning Progress Summary            Patient Acceptance, E, VU,NR by  at 3/25/2025 1607    Acceptance, E, VU by ER at 3/24/2025 1343    Comment: progress, POC, need for PT    Acceptance, E, NR by KE at 3/19/2025 1549    Comment: provided HEP handout for bed level exercises to family    Acceptance, E, NR by KE at 3/17/2025 1608    Acceptance, E, NR by KE at 3/12/2025 1606   Family Acceptance, E, NR by KE at 3/19/2025 1549    Comment: provided HEP handout for bed level exercises to family                      Point: Precautions (In Progress)       Learning Progress Summary            Patient Acceptance, E, VU,NR by  at 3/25/2025 1607    Acceptance, E, VU by ER at 3/24/2025 1343    Comment: progress, POC, need for PT    Acceptance, E, NR by KE at 3/19/2025 1549    Comment: provided HEP handout for bed level exercises to family    Acceptance, E, NR by KE at 3/17/2025 1608    Acceptance, E, NR by KE at 3/12/2025 1606   Family Acceptance, E, NR by KE at 3/19/2025 1549    Comment: provided HEP handout for bed level exercises to family                                      User Key       Initials Effective Dates Name Provider Type Discipline     04/22/21 -  Arabella Zhao Physical Therapist PT    ER 12/13/24 -  Kerry Guadarrama, PT Physical Therapist PT    KE 11/16/23 -  Flory Little PT Physical Therapist PT                   PT Recommendation and Plan     Progress: no change  Outcome Evaluation: Patient continues to require 2 person assist to complete transfers. Patient demonstrates posterior lean while seated at EOB due to increased fatigue after being cleaned. Patient continues to present below baseline for mobility and would continue to benefit from skilled PT to address strength, balance and activity tolerance deficits. Continue current PT POC.     Time Calculation:         PT Charges       Row Name 03/25/25 1608             Time Calculation    Start Time 1521  -ML      PT Received On 03/25/25  -ML         Timed Charges    61426 - PT Therapeutic Activity Minutes 39  -ML         Total Minutes    Timed Charges Total Minutes 39  -ML       Total Minutes 39  -ML                User Key  (r) = Recorded By, (t) = Taken By, (c) = Cosigned By      Initials Name Provider Type    Arabella Benitez Physical Therapist                  Therapy Charges for Today       Code Description Service Date Service Provider Modifiers Qty    [id number] HC PT THERAPEUTIC ACT EA 15 MIN 3/25/2025 Arabella Zhao GP 3            PT G-Codes  Outcome Measure Options: AM-PAC 6 Clicks Basic Mobility (PT)  AM-PAC 6 Clicks Score (PT): 10  AM-PAC 6 Clicks Score (OT): 13  PT Discharge Summary  Anticipated Discharge Disposition (PT): skilled nursing facility    Aarbella Zhao  3/25/2025

## 2025-03-25 NOTE — PROGRESS NOTES
[facility] Medicine Services  PROGRESS NOTE    Patient Name: Carlos Preston  : 3/20/1928  MRN: [id number]    Date of Admission: 3/11/2025  Primary Care Physician: Newton Hankins MD    Subjective     CC:   Follow-up for dysphagia     HPI:  Patient seen and examined this morning.  Feeling better today.  Cough is much less.  No fever overnight.  Overall improving and family at bedside happy with his progress    Objective     Vital Signs:   Temp:  [98.4 °F (36.9 °C)-98.7 °F (37.1 °C)] 98.4 °F (36.9 °C)  Heart Rate:  [59-73] 67  Resp:  [12-18] 14  BP: (120-135)/(68-72) 120/69     Physical Exam:  General: Chronically ill looking, debilitated, conversant and pleasant   Head: Atraumatic and normocephalic  Eyes: No Icterus. No pallor  Ears:  Ears appear intact with no abnormalities noted  Throat: No oral lesions, no thrush  Neck: Supple, trachea midline  Lungs: Improved air entry bilateral lung fields.  No crackles or wheezing.  Poor cough effort  Heart:  Normal S1 and S2, no murmur, no gallop, No JVD, no lower extremity swelling  Abdomen:  Soft, no tenderness, no organomegaly, normal bowel sounds, no organomegaly  Extremities: pulses equal bilaterally  Skin: No bleeding, bruising or rash, normal skin turgor and elasticity  Neurologic: Cranial nerves appear intact with no evidence of facial asymmetry, normal motor and sensory functions in all 4 extremities  Psych: Alert and oriented x 3, normal mood    Results Reviewed:  LAB RESULTS:      Lab 25  0810 25  0754 25  1355 25  0208 25  0517 25  0727   WBC 9.42 10.95*  --  11.20* 18.85* 17.52*   HEMOGLOBIN 9.1* 9.0*  --  9.1* 10.8* 9.8*   HEMATOCRIT 29.6* 30.1*  --  30.0* 35.3* 32.2*   PLATELETS 130* 115*  --  94* 101* 85*   NEUTROS ABS 7.58* 8.67*  --  8.63* 15.68*  --    IMMATURE GRANS (ABS) 0.04 0.04  --  0.04 0.12*  --    LYMPHS ABS 0.79 0.72  --  0.74 0.85  --    MONOS ABS 0.96* 1.46*  --  1.71* 2.00*  --     EOS ABS 0.03 0.04  --  0.07 0.18  --    MCV 94.0 95.6  --  92.9 93.6 93.1   CRP 13.18* 14.33*  --   --  1.32* 0.79*   PROCALCITONIN  --  0.56* 0.32*  --   --  0.09   LACTATE  --   --   --  1.3  --   --          Lab 03/25/25  0810 03/24/25  1803 03/24/25  0754 03/22/25  0208 03/21/25  0517 03/20/25  0727 03/19/25  0928   SODIUM 143  --  143 143 142 141 143   POTASSIUM 4.3 4.4 3.5 3.7 3.9 4.3 4.2   CHLORIDE 110*  --  108* 106 104 107 112*   CO2 26.0  --  27.0 29.0 29.0 24.0 23.0   ANION GAP 7.0  --  8.0 8.0 9.0 10.0 8.0   BUN 32*  --  37* 36* 33* 32* 36*   CREATININE 0.80  --  0.99 0.86 0.88 0.67* 0.72*   EGFR 80.5  --  69.3 78.8 78.2 84.9 83.6   GLUCOSE 82  --  141* 105* 81 115* 97   CALCIUM 8.7  --  9.0 9.3 9.7* 9.5 9.7*   MAGNESIUM  --   --  2.0 2.0 1.9 1.9 1.8   PHOSPHORUS  --   --  2.4* 3.1 2.9 2.5 2.5         Lab 03/25/25  0810 03/24/25  0754 03/22/25  0208 03/21/25  0517   TOTAL PROTEIN 5.0* 4.9* 5.1* 5.5*   ALBUMIN 2.3* 2.4* 2.3* 2.4*   GLOBULIN 2.7 2.5 2.8 3.1   ALT (SGPT) 14 12 16 23   AST (SGOT) 20 18 22 24   BILIRUBIN 0.5 0.6 0.7 0.7   ALK PHOS 110 100 98 102           Brief Urine Lab Results  (Last result in the past 365 days)        Color   Clarity   Blood   Leuk Est   Nitrite   Protein   CREAT   Urine HCG        03/11/25 1604 [place]   Clear   Moderate (2+)   Negative   Negative   30 mg/dL (1+)                 Microbiology Results Abnormal       Procedure Component Value - Date/Time    COVID-19, FLU A/B, RSV PCR 1 HR TAT - Swab, Nasopharynx [[id number]]  (Abnormal) Collected: 03/11/25 1431    Lab Status: Final result Specimen: Swab from Nasopharynx Updated: 03/11/25 1518     COVID19 Not Detected     Influenza A PCR Not Detected     Influenza B PCR Not Detected     RSV, PCR Detected    Narrative:      Fact sheet for providers: https://www.fda.gov/media/427135/download    Fact sheet for patients: https://www.fda.gov/media/705910/download    Test performed by PCR.          CT Chest Without Contrast  Diagnostic  Result Date: 3/24/2025  CT CHEST WO CONTRAST DIAGNOSTIC Date of Exam: 3/24/2025 10:30 AM EDT Indication: SOB r/o PNA. Comparison: Chest x-ray March 22, 2025, CT chest March 11, 2025 Technique: Axial CT images were obtained of the chest without contrast administration.  Reconstructed coronal and sagittal images were also obtained. Automated exposure control and iterative construction methods were used. Findings: There is a mosaic attenuation to the lungs which could reflect small airway disease. There are interstitial changes more peripherally in the lower lobes and in the basilar region that could reflect more chronic interstitial lung disease. There is peribronchial thickening in the lower lobes that could relate to more of a bronchitis with some underlying consolidation with interval worsening since the prior study that could reflect the bilateral lower lobe pneumonia. In the interval since the prior exam a left pleural effusion has developed. There is trace right pleural effusion. There are some mediastinal lymph nodes index precarinal lymph node measuring 2.1 x 1.6 cm and prevascular lymph node measuring 2.3 x 0.87 cm which have been suggested and may be reactive in nature. There is coronary artery calcification. The heart looks enlarged. There are degenerative changes involving the dorsal spine. Lower slices through the upper arm reveal a G-tube. There is a left renal cyst.     Impression: Impression: 1.There is a mosaic attenuation to the lungs which could reflect small airway disease. 2.There are interstitial changes more peripherally in the lower lobes and in the basilar region that could reflect more chronic interstitial lung disease. 3.There is peribronchial thickening in the lower lobes that could relate to more of a bronchitis with some underlying consolidation in the lower lobes that could reflect the bilateral lower lobe pneumonia. 4.Interval development of a left pleural effusion and trace  right pleural effusion. 5.There are some mediastinal lymph nodes which have been suggested and may be reactive in nature. 6.Coronary artery calcification is present. The heart looks enlarged. 7.Left renal cyst. Electronically Signed: Virgil Leon MD  3/24/2025 11:16 AM EDT  Workstation ID: [id number]      Results for orders placed during the hospital encounter of 02/15/24    Adult Transthoracic Echo Complete W/ Cont if Necessary Per Protocol    Interpretation Summary    Atrial fibrillation was the predominant rhythm observed during the procedure.    The study is technically difficult    Left ventricular wall thickness is consistent with mild concentric hypertrophy.    Left ventricular ejection fraction appears to be 51 - 55%.    Left ventricular diastolic function is consistent with (grade I) impaired relaxation.    The right ventricular cavity is borderline dilated.    The left atrial cavity is moderately dilated.  5.0 cm    The right atrial cavity is mildly  dilated.    No aortic valve regurgitation or stenosis is present    Mild mitral valve regurgitation with MAC is present.    Moderate tricuspid valve regurgitation is present.  RVSP- 56 mmHg    Borderline dilation of the aortic root is present.  3.8 cm    Current medications:  Scheduled Meds:busPIRone, 5 mg, Nasogastric, Daily With Lunch  castor oil-balsam peru, 1 Application, Topical, Q12H  [Held by provider] cetirizine, 10 mg, Oral, Daily  dicyclomine, 20 mg, Oral, 4x Daily  ferrous sulfate, 325 mg, Oral, Daily With Breakfast  [Held by provider] isosorbide mononitrate, 30 mg, Oral, QAM  melatonin, 5 mg, Nasogastric, Nightly  meropenem, 1,000 mg, Intravenous, Q8H  [Held by provider] NIFEdipine XL, 30 mg, Oral, Daily  Nutrisource fiber, 2 packet, Per G Tube, BID  pantoprazole, 40 mg, Oral, Q AM  polyethylene glycol, 17 g, Nasogastric, Daily  ProSource No Carb, 30 mL, Per PEG Tube, BID  rivaroxaban, 20 mg, Nasogastric, Daily With Dinner  senna-docusate sodium, 2  tablet, Nasogastric, Nightly  sodium chloride, 10 mL, Intravenous, Q12H      Continuous Infusions:       PRN Meds:.  acetaminophen    senna-docusate sodium **AND** [DISCONTINUED] polyethylene glycol **AND** [DISCONTINUED] bisacodyl **AND** bisacodyl    Calcium Replacement - Follow Nurse / BPA Driven Protocol    dextrose    dextrose    glucagon (human recombinant)    hyoscyamine    ipratropium-albuterol    Magnesium Standard Dose Replacement - Follow Nurse / BPA Driven Protocol    nitroglycerin    ondansetron    Phosphorus Replacement - Follow Nurse / BPA Driven Protocol    Potassium Replacement - Follow Nurse / BPA Driven Protocol    sodium chloride    sodium chloride    sodium chloride    traMADol    trolamine salicylate    Assessment & Plan   Assessment & Plan     Active Hospital Problems    Diagnosis  POA    **Rhabdomyolysis [M62.82]  Yes    Urine incontinence [R32]  Unknown    Moderate protein-calorie malnutrition [E44.0]  Yes      Resolved Hospital Problems   No resolved problems to display.     Brief Hospital Course to date:  Carlos Preston is a 89 y.o. male with a history of heart disease, hyperlipidemia, ischemic heart disease status post CABG, paroxysmal A-fib on Eliquis presented to the hospital with progressive weakness.  Found to have bilateral pneumonia and acute rhabdomyolysis    Acute hypoxia respiratory insufficiency, resolved   RSV infection, resolved  Superimposed left lower lobe bacterial pneumonia, unspecified, improved  Bilateral basal lung fibrosis concerning for chronic dysphagia / aspiration  CT chest with bilateral chronic fibrotic changes in both lung bases and developing left lower lobe pneumonia.  Patient is forced for RSV  Was hypoxic, requiring 4 L nasal cannula - has weaned to room air   Completed 7 days Zosyn / Doxycycline  Status post IV Solu-Medrol and PO Prednisone  Negative Legionella antigen, strep antigen and MRSA swab  Sputum culture negative to date  DuoNebs, Mucinex and  IS    On 3/24/2025 patient with worsening dyspnea and excessive sputum production.  CRP is elevated at 14.  Had a fever of 101.4.  CT chest 3/24/2025 with bilateral pneumonia.  I gave the family the options to keep the plan of discharging home today with oral antibiotic versus holding to discharge and initiating IV antibiotics given his frailty.  They opted to postpone the discharge  Continue IV Merrem  Negative MRSA swab.  Follow sputum culture  Mucinex, DuoNebs and I-S as tolerated    Chronic dysphagia  History of esophageal stricture  Poor oral intake  SLP following   MBS 3/20/2025 --> nectar thick liquids.  Continues to have poor oral intake.  Family interested in PEG tube.  Dr. Pa/general surgery consulted.  Status post PEG tube placement 3/21/2025  Nutrition managing tube feed   Case management consulted to arrange for home health and home tube feed arrangements.  Per case management, his nocturnal tube feed and feeding pump will not be approved at length he fails bolus feed    Acute rhabdomyolysis, resolved   Elevated troponin  Patient fell x 2 and was on the floor for 4-5 hours  CPK on admission 2500.  Trended down to 1200 with IV fluids by 3/12  Elevated troponin felt to be demand ischemia and secondary to rhabdomyolysis. Patient with no chest pain or ischemic changes in EKG.  Defer ischemic workup given his frailty and age       Severe iron deficiency anemia   Symptomatic anemia   Patient was anemic with Hgb 7.6 on admission - s/p 1 unit PRBCs    s/p IV iron x 3 doses. Continue p.o. iron  Hemoglobin stable around 9-10  No evidence of GI bleed    Elevated liver enzymes, improved  CBD dilation  Abnormal CT abdomen with common bile duct dilation as well as pancreas duct dilation.   No focal mass or obstructive process seen on noncontrast CT  Total bilirubin is normal  GI following and recommended MRCP.  MRCP done and showed dilated CBD at 18 mm but bilirubin is normal.  Also showed  IPMN    A-fib  Hypertension of CABG  HTN  Continue Xarelto 20 mg HS  CT head no acute intracranial abnormality identified  ECHO: 02/2024: EF: 51-55%     Debility  PT/OT and OT recommended rehab but the patient and his family declined despite extensive counseling  Family arrange for caregiver at home.  Tentative plan to discharge him on Sunday, 3/23/2025    Hypoactive delirium, resolved  Sleep hygiene - limit interruptions at night  Continue Seroquel 25 mg QHS, Melatonin 10 mg QHS and Hydroxyzine 25 mg QHS    Expected Discharge Location and Transportation: Home with home health / 24/7 caregivers   Expected Discharge Expected Discharge Date: 3/26/2025; Expected Discharge Time:      VTE Prophylaxis:Pharmacologic & mechanical VTE prophylaxis orders are present.    AM-PAC 6 Clicks Score (PT): 10 (03/25/25 0915)    CODE STATUS:   Code Status and Medical Interventions: CPR (Attempt to Resuscitate); Full Support   Ordered at: 03/11/25 0926     Code Status (Patient has no pulse and is not breathing):    CPR (Attempt to Resuscitate)     Medical Interventions (Patient has pulse or is breathing):    Full Support     Level Of Support Discussed With:    Patient     Maksim Delgado MD  03/25/25

## 2025-03-25 NOTE — PLAN OF CARE
Goal Outcome Evaluation:  Plan of Care Reviewed With: patient, caregiver, child        Progress: no change  Outcome Evaluation: Patient continues to require 2 person assist to complete transfers. Patient demonstrates posterior lean while seated at EOB due to increased fatigue after being cleaned. Patient continues to present below baseline for mobility and would continue to benefit from skilled PT to address strength, balance and activity tolerance deficits. Continue current PT POC.    Anticipated Discharge Disposition (PT): skilled nursing facility

## 2025-03-25 NOTE — PLAN OF CARE
Goal Outcome Evaluation:  Plan of Care Reviewed With: patient, caregiver, child                Anticipated Discharge Disposition (SLP): skilled nursing facility       Pt's dtr wishes to continue modified PO diet for comfort. Accepts aspiration risk. Updated copy of dysphagia exercises and reviewed with pt's caregiver.         Treatment Assessment (SLP): continued, suspected, pharyngeal dysphagia (03/25/25 1430)     Plan for Continued Treatment (SLP): continue treatment per plan of care (03/25/25 1430)

## 2025-03-25 NOTE — PLAN OF CARE
Problem: Adult Inpatient Plan of Care  Goal: Plan of Care Review  Outcome: Progressing  Flowsheets (Taken 3/25/2025 1915)  Progress: no change  Outcome Evaluation: Patient in NAD this shift, remains on RA, A.fib on tele and VSS. Patient tolerating bolus TF. PRN tylenol given for back pain. Will continue with POC.  Plan of Care Reviewed With:   patient   family   Goal Outcome Evaluation:  Plan of Care Reviewed With: patient, family        Progress: no change  Outcome Evaluation: Patient in NAD this shift, remains on RA, A.fib on tele and VSS. Patient tolerating bolus TF. PRN tylenol given for back pain. Will continue with POC.

## 2025-03-25 NOTE — PLAN OF CARE
Goal Outcome Evaluation:  Plan of Care Reviewed With: patient           Plan of Care Reviewed With: patient  Progress: no change  Outcome Evaluation: No acute events overnight. Slept most of the night. Afib  on tele. No request for pain med or c/o pain or any discomfort. No adverse reactioin from ABX therapy noted. Afebrile. VSS. Cont current POC               `1

## 2025-03-25 NOTE — THERAPY TREATMENT NOTE
Acute Care - Speech Language Pathology   Swallow Treatment Note [facility]     Patient Name: Carlos Preston  : 3/20/1928  MRN: [id number]  Today's Date: 3/25/2025               Admit Date: 3/11/2025    Visit Dx:     ICD-10-CM ICD-9-CM   1. Pneumonia of both lungs due to infectious organism, unspecified part of lung  J18.9 483.8   2. Traumatic rhabdomyolysis, initial encounter  T79.6XXA 958.6   3. Elevated troponin  R79.89 790.6   4. RSV (acute bronchiolitis due to respiratory syncytial virus)  J21.0 466.11   5. Impaired mobility and ADLs  Z74.09 V49.89    Z78.9    6. Oropharyngeal dysphagia  R13.12 787.22     Patient Active Problem List   Diagnosis    Pulmonary HTN    Hyperlipemia    HTN (hypertension)    GERD (gastroesophageal reflux disease)    Hx of CABG    IHD (ischemic heart disease)    SOB (shortness of breath)    Abnormal chest x-ray    Esophageal stricture    Rhabdomyolysis    Moderate protein-calorie malnutrition    Urine incontinence     Past Medical History:   Diagnosis Date    Cancer     H/O prostatectomy     Hearing loss     Heart disease     History of back surgery     Hypercholesterolemia     Hyperlipidemia     Hypertension     IHD (ischemic heart disease)     S/P CABG    Osteoarthritis of spine with myelopathy, lumbar region     Paroxysmal atrial fibrillation 2018    Pulmonary hypertension     S/P knee replacement     Sinus disorder     thyroid ultrasound 2009    Normal     Past Surgical History:   Procedure Laterality Date    CARDIAC CATHETERIZATION  2007    Dr. Mace: 60% LM and 70% LCX.    CARDIAC CATHETERIZATION  2009    %, OM 85-90%,Patent grafts.    CARDIAC CATHETERIZATION  2018    Patent Grafts    CARDIOVASCULAR STRESS TEST  2009    ROBIN Geogre: Dr. Mace- EF 29%, Diffuse ischemia.    CARDIOVASCULAR STRESS TEST  2012    L. Myoview- Inferoseptal infarct with jon infarct ischemia. EF 41%.    CARDIOVASCULAR STRESS TEST  2015    L.  Myoview- EF51%,fixed defect , no ischemia burden.    CARDIOVASCULAR STRESS TEST  10/11/2017    L.Myoview- EF 54%. Small inferior Ischemia.    CONVERTED (HISTORICAL) HOLTER  06/12/2023    3 days. A.Fib. Avg 60. . 3 VT. One 3 Sec Pause    CONVERTED (HISTORICAL) HOLTER  05/28/2024    3 Days. A.Fib. Avg 70. . 4.6% PVC. 2 VT    CORONARY ARTERY BYPASS GRAFT  05/17/2007    CABG:  SVG to LAD and LCX.    ECHO - CONVERTED  08/06/2009    Dr. Mace- EF 50%.    ECHO - CONVERTED  02/14/2011    EF 50%, Septal WMA.    ECHO - CONVERTED  11/07/2012    EF 40-45%, RVSP 33 mmHg.    ECHO - CONVERTED  03/23/2015    EF 45-50%, RVSP 40mmHg,mild MR, mild PHT    ECHO - CONVERTED  05/01/2017    EF 50-55%, mild MR    ECHO - CONVERTED  10/11/2017    EF 55%    ECHO - CONVERTED  03/21/2022    TLS. EF 55%. LA- 5.2 cm. Mild MR. RVSP- 44 mmHg    ECHO - CONVERTED  02/15/2024    TLS. EF 55%.RHE. LA- 5.0. Mild MR.AO- 3.8. RVSP- 56 mmHg    ENDOSCOPY W/ PEG TUBE PLACEMENT N/A 3/21/2025    Procedure: ESOPHAGOGASTRODUODENOSCOPY WITH PERCUTANEOUS ENDOSCOPIC GASTROSTOMY TUBE INSERTION;  Surgeon: Stanley Pa MD;  Location: [facility] ENDOSCOPY;  Service: General;  Laterality: N/A;    US CAROTID UNILATERAL  04/16/2007    Mild Plaque in Rt. bulb and RECA.       SLP Recommendation and Plan                                               Daily Summary of Progress (SLP): progress toward functional goals as expected (03/25/25 1430)               Treatment Assessment (SLP): continued, suspected, pharyngeal dysphagia (03/25/25 1430)     Plan for Continued Treatment (SLP): continue treatment per plan of care (03/25/25 1430)                SWALLOW EVALUATION (Last 72 Hours)       SLP Adult Swallow Evaluation       Row Name 03/25/25 1430                   Rehab Evaluation    Document Type therapy note (daily note)  -SM        Subjective Information no complaints  -SM        Patient Observations alert;cooperative  -SM        Patient Effort good  -SM            Pain Scale: FACES Pre/Post-Treatment    Pain: FACES Scale, Pretreatment 0-->no hurt  -SM        Posttreatment Pain Rating 0-->no hurt  -SM           SLP Treatment Clinical Impressions    Treatment Assessment (SLP) continued;suspected;pharyngeal dysphagia  -SM        Daily Summary of Progress (SLP) progress toward functional goals as expected  -SM        Barriers to Overall Progress (SLP) Advanced age  -SM        Plan for Continued Treatment (SLP) continue treatment per plan of care  -SM        Care Plan Review evaluation/treatment results reviewed;care plan/treatment goals reviewed;risks/benefits reviewed;current/potential barriers reviewed;patient/other agree to care plan  -SM        Care Plan Review, Other Participant(s) daughter;caregiver  -                  User Key  (r) = Recorded By, (t) = Taken By, (c) = Cosigned By      Initials Name Effective Dates    Araceli Stanley, MS CCC-SLP 01/20/25 -                     EDUCATION  The patient's dtr and caregiver has been educated in the following areas:   Dysphagia (Swallowing Impairment) Oral Care/Hydration Modified Diet Instruction.        SLP GOALS       Row Name 03/25/25 1430             (LTG) Patient will demonstrate functional swallow for    Diet Texture (Demonstrate functional swallow) desired comfort diet  -SM      Liquid viscosity (Demonstrate functional swallow) desired comfort liquid viscosity  -SM      [place] (Demonstrate functional swallow) with 1:1 assist/ supervision  -SM      Time Frame (Demonstrate functional swallow) 1 week  -SM      Progress/Outcomes (Demonstrate functional swallow) goal revised this date;continuing progress toward goal  -SM      Comment (Demonstrate functional swallow) BLL PNA, IV antibiotics restarted. GOC/POC discussion with pt's dtr. Likely some degree aspiration component. Dtr understands risks and does not wish to make pt NPO with PEG only. Accepts risks and wishes to continue modified diet.  Pt has been  showing intermittent aversion/dislike to nectar-thick liquids though not signficiant per dtr. Discussed safe method for thin liquids between meals, if desried, with thin H2O being safest option to balance safety with comfort. Stressed improtance of oral care. Answered all questions.  -SM         (STG) Lingual Strengthening Goal 1 (SLP)    Activity (Lingual Strengthening Goal 1, SLP) increase lingual tone/sensation/control/coordination/movement;increase tongue back strength  -SM      Increase Lingual Tone/Sensation/Control/Coordination/Movement lingual resistance exercises  -SM      Increase Tongue Back Strength lingual resistance exercises  -SM      [place]/Accuracy (Lingual Strengthening Goal 1, SLP) with minimal cues (75-90% accuracy)  -SM      Time Frame (Lingual Strengthening Goal 1, SLP) 1 week  -SM      Progress/Outcomes (Lingual Strengthening Goal 1, SLP) goal revised this date;continuing progress toward goal  -SM         (STG) Pharyngeal Strengthening Exercise Goal 1 (SLP)    Increase Superior Movement of the Hyolaryngeal Complex effortful pitch glide (falsetto + pharyngeal squeeze)  -SM      Increase Anterior Movement of the Hyolaryngeal Complex chin tuck against resistance (CTAR)  -SM      Increase Squeeze/Positive Pressure Generation hard effortful swallow  -SM      Increase Tongue Base Retraction edith  -SM      [place]/Accuracy (Pharyngeal Strengthening Goal 1, SLP) with minimal cues (75-90% accuracy)  -SM      Time Frame (Pharyngeal Strengthening Goal 1, SLP) 1 week  -SM      Progress/Outcomes (Pharyngeal Strengthening Goal 1, SLP) goal revised this date  -SM      Comment (Pharyngeal Strengthening Goal 1, SLP) Dtr and caregiver present. New copy of exercises printed to pair down to assist with ease for pt. Demonstrated all exercises with caregiver teach back. Discussed ways to attempt and increase for endurnace. Dtr wishes to continue strengthening. Main focus to be CTAR should pt not feel  up to do remaining.  -SM                User Key  (r) = Recorded By, (t) = Taken By, (c) = Cosigned By      Initials Name Provider Type    Araceli Stanley MS CCC-SLP Speech and Language Pathologist                         Time Calculation:    Time Calculation- SLP       Row Name 03/25/25 1536             Time Calculation- SLP    SLP Start Time 1430  -SM      SLP Received On 03/25/25  -SM         Untimed Charges    22141-UA Treatment Swallow Minutes 69  -SM         Total Minutes    Untimed Charges Total Minutes 69  -SM       Total Minutes 69  -SM                User Key  (r) = Recorded By, (t) = Taken By, (c) = Cosigned By      Initials Name Provider Type    Araceli Stanley MS CCC-SLP Speech and Language Pathologist                    Therapy Charges for Today       Code Description Service Date Service Provider Modifiers Qty    [id number] HC ST TREATMENT SWALLOW 5 3/25/2025 Araceli Martins MS CCC-SLP GN 1                 Araceli Martins MS CCC-SALIMA  3/25/2025

## 2025-03-26 ENCOUNTER — APPOINTMENT (OUTPATIENT)
Dept: OTHER | Facility: HOSPITAL | Age: OVER 89
End: 2025-03-26
Payer: MEDICARE

## 2025-03-26 ENCOUNTER — APPOINTMENT (OUTPATIENT)
Dept: GENERAL RADIOLOGY | Facility: HOSPITAL | Age: OVER 89
End: 2025-03-26
Payer: MEDICARE

## 2025-03-26 ENCOUNTER — READMISSION MANAGEMENT (OUTPATIENT)
Dept: CALL CENTER | Facility: HOSPITAL | Age: OVER 89
End: 2025-03-26
Payer: MEDICARE

## 2025-03-26 VITALS
TEMPERATURE: 98.2 F | HEART RATE: 63 BPM | HEIGHT: 72 IN | RESPIRATION RATE: 18 BRPM | OXYGEN SATURATION: 98 % | BODY MASS INDEX: 26.09 KG/M2 | SYSTOLIC BLOOD PRESSURE: 112 MMHG | DIASTOLIC BLOOD PRESSURE: 54 MMHG | WEIGHT: 192.6 LBS

## 2025-03-26 LAB
ALBUMIN SERPL-MCNC: 2.2 G/DL (ref 3.5–5.2)
ALBUMIN/GLOB SERPL: 0.7 G/DL
ALP SERPL-CCNC: 147 U/L (ref 39–117)
ALT SERPL W P-5'-P-CCNC: 18 U/L (ref 1–41)
ANION GAP SERPL CALCULATED.3IONS-SCNC: 9 MMOL/L (ref 5–15)
AST SERPL-CCNC: 25 U/L (ref 1–40)
BASOPHILS # BLD AUTO: 0.04 10*3/MM3 (ref 0–0.2)
BASOPHILS NFR BLD AUTO: 0.5 % (ref 0–1.5)
BILIRUB SERPL-MCNC: 0.5 MG/DL (ref 0–1.2)
BUN SERPL-MCNC: 35 MG/DL (ref 8–23)
BUN/CREAT SERPL: 46.1 (ref 7–25)
CALCIUM SPEC-SCNC: 9.1 MG/DL (ref 8.2–9.6)
CHLORIDE SERPL-SCNC: 111 MMOL/L (ref 98–107)
CO2 SERPL-SCNC: 24 MMOL/L (ref 22–29)
CREAT SERPL-MCNC: 0.76 MG/DL (ref 0.76–1.27)
CRP SERPL-MCNC: 9.58 MG/DL (ref 0–0.5)
DEPRECATED RDW RBC AUTO: 54.8 FL (ref 37–54)
EGFRCR SERPLBLD CKD-EPI 2021: 81.7 ML/MIN/1.73
EOSINOPHIL # BLD AUTO: 0.11 10*3/MM3 (ref 0–0.4)
EOSINOPHIL NFR BLD AUTO: 1.4 % (ref 0.3–6.2)
ERYTHROCYTE [DISTWIDTH] IN BLOOD BY AUTOMATED COUNT: 16.3 % (ref 12.3–15.4)
GLOBULIN UR ELPH-MCNC: 3.3 GM/DL
GLUCOSE SERPL-MCNC: 82 MG/DL (ref 65–99)
HCT VFR BLD AUTO: 29 % (ref 37.5–51)
HGB BLD-MCNC: 8.8 G/DL (ref 13–17.7)
IMM GRANULOCYTES # BLD AUTO: 0.03 10*3/MM3 (ref 0–0.05)
IMM GRANULOCYTES NFR BLD AUTO: 0.4 % (ref 0–0.5)
LYMPHOCYTES # BLD AUTO: 0.83 10*3/MM3 (ref 0.7–3.1)
LYMPHOCYTES NFR BLD AUTO: 10.7 % (ref 19.6–45.3)
MAGNESIUM SERPL-MCNC: 2.1 MG/DL (ref 1.7–2.3)
MCH RBC QN AUTO: 28.7 PG (ref 26.6–33)
MCHC RBC AUTO-ENTMCNC: 30.3 G/DL (ref 31.5–35.7)
MCV RBC AUTO: 94.5 FL (ref 79–97)
MONOCYTES # BLD AUTO: 0.49 10*3/MM3 (ref 0.1–0.9)
MONOCYTES NFR BLD AUTO: 6.3 % (ref 5–12)
NEUTROPHILS NFR BLD AUTO: 6.23 10*3/MM3 (ref 1.7–7)
NEUTROPHILS NFR BLD AUTO: 80.7 % (ref 42.7–76)
NRBC BLD AUTO-RTO: 0 /100 WBC (ref 0–0.2)
PHOSPHATE SERPL-MCNC: 2.3 MG/DL (ref 2.5–4.5)
PLATELET # BLD AUTO: 137 10*3/MM3 (ref 140–450)
PMV BLD AUTO: 13 FL (ref 6–12)
POTASSIUM SERPL-SCNC: 4.1 MMOL/L (ref 3.5–5.2)
PROT SERPL-MCNC: 5.5 G/DL (ref 6–8.5)
RBC # BLD AUTO: 3.07 10*6/MM3 (ref 4.14–5.8)
SODIUM SERPL-SCNC: 144 MMOL/L (ref 136–145)
WBC NRBC COR # BLD AUTO: 7.73 10*3/MM3 (ref 3.4–10.8)

## 2025-03-26 PROCEDURE — 71045 X-RAY EXAM CHEST 1 VIEW: CPT

## 2025-03-26 PROCEDURE — 25010000002 MEROPENEM PER 100 MG: Performed by: INTERNAL MEDICINE

## 2025-03-26 PROCEDURE — 84100 ASSAY OF PHOSPHORUS: CPT | Performed by: INTERNAL MEDICINE

## 2025-03-26 PROCEDURE — 99239 HOSP IP/OBS DSCHRG MGMT >30: CPT | Performed by: INTERNAL MEDICINE

## 2025-03-26 PROCEDURE — 80053 COMPREHEN METABOLIC PANEL: CPT | Performed by: INTERNAL MEDICINE

## 2025-03-26 PROCEDURE — 85025 COMPLETE CBC W/AUTO DIFF WBC: CPT | Performed by: INTERNAL MEDICINE

## 2025-03-26 PROCEDURE — 83735 ASSAY OF MAGNESIUM: CPT | Performed by: INTERNAL MEDICINE

## 2025-03-26 PROCEDURE — 86140 C-REACTIVE PROTEIN: CPT | Performed by: INTERNAL MEDICINE

## 2025-03-26 RX ORDER — SACCHAROMYCES BOULARDII 250 MG
250 CAPSULE ORAL 2 TIMES DAILY
Qty: 14 CAPSULE | Refills: 0 | Status: SHIPPED | OUTPATIENT
Start: 2025-03-26 | End: 2025-04-02

## 2025-03-26 RX ORDER — TRAMADOL HYDROCHLORIDE 50 MG/1
25 TABLET ORAL EVERY 8 HOURS PRN
Qty: 20 TABLET | Refills: 0 | Status: SHIPPED | OUTPATIENT
Start: 2025-03-26

## 2025-03-26 RX ORDER — PANTOPRAZOLE SODIUM 40 MG/1
TABLET, DELAYED RELEASE ORAL
Qty: 90 TABLET | Refills: 0 | Status: SHIPPED | OUTPATIENT
Start: 2025-03-27

## 2025-03-26 RX ADMIN — TRAMADOL HYDROCHLORIDE 25 MG: 50 TABLET, COATED ORAL at 00:22

## 2025-03-26 RX ADMIN — Medication 10 ML: at 08:38

## 2025-03-26 RX ADMIN — FERROUS SULFATE TAB 325 MG (65 MG ELEMENTAL FE) 325 MG: 325 (65 FE) TAB at 08:36

## 2025-03-26 RX ADMIN — MEROPENEM 1000 MG: 1 INJECTION INTRAVENOUS at 03:46

## 2025-03-26 RX ADMIN — MEROPENEM 1000 MG: 1 INJECTION INTRAVENOUS at 10:31

## 2025-03-26 RX ADMIN — ACETAMINOPHEN 650 MG: 325 TABLET, FILM COATED ORAL at 09:04

## 2025-03-26 RX ADMIN — PANTOPRAZOLE SODIUM 40 MG: 40 TABLET, DELAYED RELEASE ORAL at 08:36

## 2025-03-26 RX ADMIN — Medication 2 PACKET: at 08:37

## 2025-03-26 RX ADMIN — Medication 1 APPLICATION: at 08:36

## 2025-03-26 RX ADMIN — Medication 30 ML: at 08:38

## 2025-03-26 RX ADMIN — BUSPIRONE HYDROCHLORIDE 5 MG: 10 TABLET ORAL at 12:03

## 2025-03-26 NOTE — PLAN OF CARE
Goal Outcome Evaluation:  Plan of Care Reviewed With: patient        Progress: no change  Outcome Evaluation: No acute events overnight. Pain controlled with PRN med. Afib on tele. VSS. Cont current POC until discharge

## 2025-03-26 NOTE — CASE MANAGEMENT/SOCIAL WORK
Case Management Discharge Note      Final Note: Discussed Mr Preston today in MDR.  The plan is for him to return home.  I have arranged for [facility] to pick him up at 1515 today to transport him home.  The EMS PCS is in the drop box.  [facility], and they are aware that Mr Preston is going home today. I have called and left a voicemail with Lyman/[facility] letting her know that Mr Preston will be going home today and he will need tube feeding starting today. I spoke with Mr Preston and his daughter Bettye at bedside.  I have updated them on the plan of care.  There are no other discharge needs at this time.         Selected Continued Care - Admitted Since 3/11/2025       Destination    No services have been selected for the patient.                Durable Medical Equipment       Service Provider Services Address Phone Fax Patient Preferred    [facility] [address] [place] [address] [phone] --       Internal Comment last updated by Ayesha, Agnes LEWIS RN 3/21/2025 1222    Suction equipment                           Dialysis/Infusion    No services have been selected for the patient.                Home Medical Care    No services have been selected for the patient.                Therapy    No services have been selected for the patient.                Community Resources    No services have been selected for the patient.                Community & DME    No services have been selected for the patient.                    Transportation Services  Ambulance: [facility] (Wednesday March 26, 2025 at 1515)  [facility] Ambulance Status: Accepted (Sunday, March 23, 2025 9 AM)    Final Discharge Disposition Code: 06 - home with home health care

## 2025-03-26 NOTE — DISCHARGE INSTR - APPOINTMENTS
Newton Hankins MD PCP - General Consulting Physician Family Medicine [phone] [address] [place] [address]  NOTE: Dr will see him when he is back to a nursing home

## 2025-03-26 NOTE — CASE MANAGEMENT/SOCIAL WORK
Continued Stay Note  [facility]     Patient Name: Carlos Preston  MRN: [id number]  Today's Date: 3/26/2025    Admit Date: 3/11/2025    Plan: [facility]   Discharge Plan       Row Name 03/26/25 1608       Plan    Plan [facility]    Plan Comments I received a phone call from Shikha/[facility].  Virgilio Cuellar is attempting to get in touch with Bettye to confirm Mr Preston address to drop off tube feedings.  I have been advised to also attempt to get in contact with Bettye and have her call [phone].    I was able to leave a voicemail on daughter Bettye's phone    Final Discharge Disposition Code 06 - home with home health care                   Discharge Codes    No documentation.                 Expected Discharge Date and Time       Expected Discharge Date Expected Discharge Time    Mar 26, 2025               Agnes Hodge RN

## 2025-03-26 NOTE — DISCHARGE SUMMARY
[facility] Medicine Services  DISCHARGE SUMMARY    Patient Name: Carlos Preston  : 3/20/1928  MRN: [id number]    Date of Admission: 3/11/2025  2:08 PM  Date of Discharge: 3/26/2025  Primary Care Physician: Newton Hankins MD    Consults       Date and Time Order Name Status Description    3/20/2025 10:46 AM Inpatient General Surgery Consult Completed     3/11/2025 11:29 PM Inpatient Gastroenterology Consult Completed             Hospital Course     Presenting Problem:     Active Hospital Problems    Diagnosis  POA    **Rhabdomyolysis [M62.82]  Yes    Urine incontinence [R32]  Unknown    Moderate protein-calorie malnutrition [E44.0]  Yes      Resolved Hospital Problems   No resolved problems to display.        Hospital Course:  Carlos Preston is a 89 y.o. male with a history of heart disease, hyperlipidemia, ischemic heart disease status post CABG, paroxysmal A-fib on Eliquis presented to the hospital with progressive weakness.  Found to have bilateral pneumonia bacterial pneumonia on top of RSV infection and acute rhabdomyolysis.  Treated with broad-spectrum antibiotics, steroids and significantly improved.  Additionally, found to have severe dysphagia and diet was modified to nectar thick liquid based on modified barium swallow.  Hospital stay was complicated with acute delirium that improved with low-dose antipsychotics.  Patient was debilitated because of his multiple acute issues and PT recommended inpatient rehab/SNF but family opted to take him home with home health and home PT.  He was discharged from the hospital in a stable condition     Acute hypoxia respiratory insufficiency, resolved   RSV infection, resolved  Superimposed left lower lobe bacterial pneumonia, unspecified, improved  Bilateral basal lung fibrosis concerning for chronic dysphagia / aspiration  CT chest with bilateral chronic fibrotic changes in both lung bases and developing left lower lobe pneumonia.   Patient is forced for RSV  Was hypoxic, requiring 4 L nasal cannula - has weaned to room air   Completed 7 days Zosyn / Doxycycline  Status post IV Solu-Medrol and PO Prednisone  Negative Legionella antigen, strep antigen and MRSA swab  Sputum culture negative to date  DuoNebs, Mucinex and IS   On 3/24/2025 patient with worsening dyspnea and excessive sputum production.  CRP is elevated at 14.  Had a fever of 101.4.  CT chest 3/24/2025 with bilateral pneumonia.  He was started on Merrem with significant improvement of his respiratory symptoms.  No fever.  Sputum improved.  CRP trended down from 14-9.  Was eventually discharged home on p.o. Augmentin x 7 days  I explained to the family that he is high risk for aspiration and recurrent pneumonia and to contact his PCP or bring him to the hospital if you start having fever, worsening cough, hypoxia with oxygen saturation less than 88%.  He voiced understanding     Chronic dysphagia  History of esophageal stricture  Poor oral intake  SLP following   MBS 3/20/2025 --> nectar thick liquids.  Continues to have poor oral intake.  Family interested in PEG tube.  Dr. Pa/general surgery consulted.  Status post PEG tube placement 3/21/2025 for supplementary tube bolus feed.  Case management arrange for his bolus tube feed     Acute rhabdomyolysis, resolved   Elevated troponin  Patient fell x 2 and was on the floor for 4-5 hours  CPK on admission 2500.  Trended down to 1200 with IV fluids by 3/12  Elevated troponin felt to be demand ischemia and secondary to rhabdomyolysis. Patient with no chest pain or ischemic changes in EKG.  Defer ischemic workup given his frailty and age       Severe iron deficiency anemia   Symptomatic anemia   Patient was anemic with Hgb 7.6 on admission - s/p 1 unit PRBCs    s/p IV iron x 3 doses. Continue p.o. iron  Hemoglobin stable around 9-10  No evidence of GI bleed     Elevated liver enzymes, improved  CBD dilation  Abnormal CT abdomen with  common bile duct dilation as well as pancreas duct dilation.   No focal mass or obstructive process seen on noncontrast CT  Total bilirubin is normal  GI following and recommended MRCP.  MRCP done and showed dilated CBD at 18 mm but bilirubin is normal.  Also showed IPMN     A-fib  Hypertension of CABG  HTN  Continue Xarelto 20 mg HS  CT head no acute intracranial abnormality identified  ECHO: 02/2024: EF: 51-55%  Discontinued nifedipine upon discharge.  Blood pressure has been within normal limits of any antihypertensive medications     Debility  PT/OT and OT recommended rehab but the patient and his family declined despite extensive counseling  Family arrange for caregiver at home.     Hypoactive delirium, resolved  Improved with sleep hygiene - limit interruptions at night  Improved with short course of p.o. Seroquel 25 mg QHS, Melatonin 10 mg QHS and Hydroxyzine 25 mg QHS.  Dose medications discontinued upon discharge      Discharge Follow Up Recommendations for outpatient labs/diagnostics:  PCP 1 week    Day of Discharge     HPI:   Patient seen and examined this morning.  Feeling great.  Cough and sputum significantly improved.  No fever overnight.  Hemodynamics are stable.  Family agreeable to take him home today    Vital Signs:   Temp:  [97.4 °F (36.3 °C)-99 °F (37.2 °C)] 98.2 °F (36.8 °C)  Heart Rate:  [65-74] 65  Resp:  [12-18] 18  BP: (112-133)/(54-84) 112/54    Physical Exam:  General: Chronically ill looking, debilitated, conversant and pleasant   Head: Atraumatic and normocephalic  Eyes: No Icterus. No pallor  Ears:  Ears appear intact with no abnormalities noted  Throat: No oral lesions, no thrush  Neck: Supple, trachea midline  Lungs: Improved air entry bilateral lung fields.  No crackles or wheezing.  Poor cough effort  Heart:  Normal S1 and S2, no murmur, no gallop, No JVD, no lower extremity swelling  Abdomen:  Soft, no tenderness, no organomegaly, normal bowel sounds, no organomegaly  Extremities:  pulses equal bilaterally  Skin: No bleeding, bruising or rash, normal skin turgor and elasticity  Neurologic: Cranial nerves appear intact with no evidence of facial asymmetry, normal motor and sensory functions in all 4 extremities  Psych: Alert and oriented x 3, normal mood     Pertinent  and/or Most Recent Results     LAB RESULTS:      Lab 03/26/25  0754 03/25/25  0810 03/24/25  0754 03/23/25  1355 03/22/25  0208 03/21/25  0517 03/20/25  0727   WBC 7.73 9.42 10.95*  --  11.20* 18.85* 17.52*   HEMOGLOBIN 8.8* 9.1* 9.0*  --  9.1* 10.8* 9.8*   HEMATOCRIT 29.0* 29.6* 30.1*  --  30.0* 35.3* 32.2*   PLATELETS 137* 130* 115*  --  94* 101* 85*   NEUTROS ABS 6.23 7.58* 8.67*  --  8.63* 15.68*  --    IMMATURE GRANS (ABS) 0.03 0.04 0.04  --  0.04 0.12*  --    LYMPHS ABS 0.83 0.79 0.72  --  0.74 0.85  --    MONOS ABS 0.49 0.96* 1.46*  --  1.71* 2.00*  --    EOS ABS 0.11 0.03 0.04  --  0.07 0.18  --    MCV 94.5 94.0 95.6  --  92.9 93.6 93.1   CRP 9.58* 13.18* 14.33*  --   --  1.32* 0.79*   PROCALCITONIN  --   --  0.56* 0.32*  --   --  0.09   LACTATE  --   --   --   --  1.3  --   --          Lab 03/26/25  0754 03/25/25  0810 03/24/25  1803 03/24/25  0754 03/22/25  0208 03/21/25  0517 03/20/25  0727   SODIUM 144 143  --  143 143 142 141   POTASSIUM 4.1 4.3 4.4 3.5 3.7 3.9 4.3   CHLORIDE 111* 110*  --  108* 106 104 107   CO2 24.0 26.0  --  27.0 29.0 29.0 24.0   ANION GAP 9.0 7.0  --  8.0 8.0 9.0 10.0   BUN 35* 32*  --  37* 36* 33* 32*   CREATININE 0.76 0.80  --  0.99 0.86 0.88 0.67*   EGFR 81.7 80.5  --  69.3 78.8 78.2 84.9   GLUCOSE 82 82  --  141* 105* 81 115*   CALCIUM 9.1 8.7  --  9.0 9.3 9.7* 9.5   MAGNESIUM 2.1  --   --  2.0 2.0 1.9 1.9   PHOSPHORUS 2.3*  --   --  2.4* 3.1 2.9 2.5         Lab 03/26/25  0754 03/25/25  0810 03/24/25  0754 03/22/25  0208 03/21/25  0517   TOTAL PROTEIN 5.5* 5.0* 4.9* 5.1* 5.5*   ALBUMIN 2.2* 2.3* 2.4* 2.3* 2.4*   GLOBULIN 3.3 2.7 2.5 2.8 3.1   ALT (SGPT) 18 14 12 16 23   AST (SGOT) 25 20 18 22  24   BILIRUBIN 0.5 0.5 0.6 0.7 0.7   ALK PHOS 147* 110 100 98 102                     Brief Urine Lab Results  (Last result in the past 365 days)        Color   Clarity   Blood   Leuk Est   Nitrite   Protein   CREAT   Urine HCG        03/11/25 1604 [place]   Clear   Moderate (2+)   Negative   Negative   30 mg/dL (1+)                 Microbiology Results (last 10 days)       Procedure Component Value - Date/Time    MRSA Screen, PCR (Inpatient) - Swab, Nares [[id number]]  (Normal) Collected: 03/24/25 1356    Lab Status: Final result Specimen: Swab from Nares Updated: 03/24/25 1644     MRSA PCR Negative    Narrative:      The negative predictive value of this diagnostic test is high and should only be used to consider de-escalating anti-MRSA therapy. A positive result may indicate colonization with MRSA and must be correlated clinically.  MRSA Negative            XR Chest 1 View  Result Date: 3/26/2025  XR CHEST 1 VW Date of Exam: 3/26/2025 2:38 AM EDT Indication: SOB Comparison: 3/24/2025 CT Findings: Cardiac surgical changes with median sternotomy wires. Enlarged cardiac silhouette. Low lung volumes. Persistent diffuse interstitial opacities and mild perihilar airspace opacities. Trace right pleural effusion with fluid extending into the minor fissure. Small left pleural effusion. No pneumothorax. No acute bone abnormality.     Impression: 1.Enlarged cardiac silhouette. Persistent interstitial and airspace opacities. No new airspace opacity. 2.Small bilateral pleural effusions, left greater than right. Electronically Signed: Sukhwinder Chávez MD  3/26/2025 7:26 AM EDT  Workstation ID: [id number]    CT Chest Without Contrast Diagnostic  Result Date: 3/24/2025  CT CHEST WO CONTRAST DIAGNOSTIC Date of Exam: 3/24/2025 10:30 AM EDT Indication: SOB r/o PNA. Comparison: Chest x-ray March 22, 2025, CT chest March 11, 2025 Technique: Axial CT images were obtained of the chest without contrast administration.  Reconstructed coronal and  sagittal images were also obtained. Automated exposure control and iterative construction methods were used. Findings: There is a mosaic attenuation to the lungs which could reflect small airway disease. There are interstitial changes more peripherally in the lower lobes and in the basilar region that could reflect more chronic interstitial lung disease. There is peribronchial thickening in the lower lobes that could relate to more of a bronchitis with some underlying consolidation with interval worsening since the prior study that could reflect the bilateral lower lobe pneumonia. In the interval since the prior exam a left pleural effusion has developed. There is trace right pleural effusion. There are some mediastinal lymph nodes index precarinal lymph node measuring 2.1 x 1.6 cm and prevascular lymph node measuring 2.3 x 0.87 cm which have been suggested and may be reactive in nature. There is coronary artery calcification. The heart looks enlarged. There are degenerative changes involving the dorsal spine. Lower slices through the upper arm reveal a G-tube. There is a left renal cyst.     Impression: 1.There is a mosaic attenuation to the lungs which could reflect small airway disease. 2.There are interstitial changes more peripherally in the lower lobes and in the basilar region that could reflect more chronic interstitial lung disease. 3.There is peribronchial thickening in the lower lobes that could relate to more of a bronchitis with some underlying consolidation in the lower lobes that could reflect the bilateral lower lobe pneumonia. 4.Interval development of a left pleural effusion and trace right pleural effusion. 5.There are some mediastinal lymph nodes which have been suggested and may be reactive in nature. 6.Coronary artery calcification is present. The heart looks enlarged. 7.Left renal cyst. Electronically Signed: Virgil Leon MD  3/24/2025 11:16 AM EDT  Workstation ID: [id number]    MRI abdomen wo  contrast mrcp  Result Date: 3/23/2025  MRI ABDOMEN WO CONTRAST MRCP Date of Exam: 3/23/2025 12:54 AM EDT Indication: Dilated pancreatic duct.  Comparison: CT scan of the abdomen and pelvis 3/11/2025. Technique:  Routine multiplanar/multisequence images of the abdomen were obtained with MRCP sequences without contrast administration. Findings: ABDOMEN: Motion artifact obscures the images. There are minimal pleural effusions. The unenhanced liver, spleen and adrenal glands are normal. There is mild bilateral hydronephrosis. There is a 5.5 cm left renal cyst. The visualized urinary bladder is distended. There are numerous cystic areas throughout the pancreas suspicious for IPMN. There are no peripancreatic inflammatory changes. MRCP: Motion artifact obscures the images. There is intrahepatic and extrahepatic biliary ductal dilatation. The common duct measures 1.8 cm.     Impression: 1. Motion artifact obscures the images. 2. Minimal pleural effusions. 3. The visualized urinary bladder is distended. Mild bilateral hydronephrosis. 4. Dilated common bile duct measuring 1.8 cm. Numerous cystic areas throughout the pancreas suspicious for IPMN. Electronically Signed: Jeff Panda MD  3/23/2025 9:06 AM EDT  Workstation ID: [id number]    XR Chest 1 View  Result Date: 3/22/2025  XR CHEST 1 VW Date of Exam: 3/22/2025 10:23 AM EDT Indication: SOB Comparison: Chest x-ray 3/17/2025 Findings: Enteric feeding tube has been removed. Stable cardiomediastinal silhouette with top normal size of the heart, and evidence of prior CABG. Similar mild diffuse interstitial prominence without evidence of focal consolidation. Previously seen left retrocardiac opacities have improved. No pleural effusion or pneumothorax.     Impression: Improvement of previously seen retrocardiac opacities which may reflect improved aeration/decreased atelectasis. Similar interstitial prominence. Removal of enteric feeding tube. Electronically Signed: Preston  MD Rashaad  3/22/2025 11:47 AM EDT  Workstation ID: [id number]    Duplex Venous Upper Extremity - Right CAR  Result Date: 3/20/2025    Sub-acute right upper extremity superficial thrombophlebitis noted in the basilic (upper arm) and basilic (forearm).   All other right sided vessels appear normal.     FL Video Swallow With Speech Single Contrast  Result Date: 3/20/2025  FL VIDEO SWALLOW W SPEECH SINGLE-CONTRAST Date of Exam: 3/20/2025 10:04 AM EDT Indication: aspiration.   Comparison: None available. Technique:   The speech pathologist administered food and/or liquid mixed with barium to the patient with cine/video imaging.  Imaging assistance was provided to the speech pathologist and an image was saved. Fluoroscopic Time: 1 minute and 54 seconds Number of Images: 13 associated fluoroscopic loops were saved Findings: Aspiration was seen during fluoroscopic guided modified barium swallowing series. Please see speech therapy report for full details and recommendations.     Impression: Fluoroscopy provided for a modified barium swallow. Aspiration was seen during swallowing evaluation. Please see speech therapy report for full details and recommendations. Report dictated by: Shira Renner PA-c  I have personally reviewed this case and agree with the findings above: Electronically Signed: Preston Neil MD  3/20/2025 11:13 AM EDT  Workstation ID: [id number]    FL Video Swallow With Speech Single Contrast  Result Date: 3/17/2025  FL VIDEO SWALLOW W SPEECH SINGLE-CONTRAST Date of Exam: 3/17/2025 10:34 AM EDT Indication: aspiration.   Comparison: None available. Technique:   The speech pathologist administered food and/or liquid mixed with barium to the patient with cine/video imaging.  Imaging assistance was provided to the speech pathologist and an image was saved. Fluoroscopic Time: 54 seconds Number of Images: 7 associated fluoroscopic loops were saved Findings: Aspiration was seen during fluoroscopic guided modified  barium swallowing series. Please see speech therapy report for full details and recommendations.     Impression: Fluoroscopy provided for a modified barium swallow. Aspiration of barium was seen during swallowing evaluation. Please see speech therapy report for full details and recommendations. Report dictated by: Shira Renner PA-c  I have personally reviewed this case and agree with the findings above: Electronically Signed: Mendez Navarro MD  3/17/2025 3:24 PM EDT  Workstation ID: [id number]    XR Chest 1 View  Result Date: 3/17/2025  XR CHEST 1 VW Date of Exam: 3/17/2025 3:26 AM EDT Indication: SOB Comparison: 3/11/2025 Findings: Patient is status post sternotomy. Heart is mildly enlarged. Flexible feeding tube is noted in the fundus of the stomach. Interstitial changes are noted in the lungs, particularly in the left lower lung compatible with fibrosis seen by recent chest CT. No new infiltrates. No pneumothorax. Gas-filled loops of colon are noted below the diaphragm.     Interstitial changes in the lungs compatible with fibrosis seen by recent chest CT. No new infiltrates. Electronically Signed: Newton Keene MD  3/17/2025 3:47 AM EDT  Workstation ID: [id number]      Results for orders placed during the hospital encounter of 03/11/25    Duplex Venous Upper Extremity - Right CAR    Interpretation Summary    Sub-acute right upper extremity superficial thrombophlebitis noted in the basilic (upper arm) and basilic (forearm).    All other right sided vessels appear normal.      Results for orders placed during the hospital encounter of 03/11/25    Duplex Venous Upper Extremity - Right CAR    Interpretation Summary    Sub-acute right upper extremity superficial thrombophlebitis noted in the basilic (upper arm) and basilic (forearm).    All other right sided vessels appear normal.      Results for orders placed during the hospital encounter of 02/15/24    Adult Transthoracic Echo Complete W/ Cont if Necessary Per  Protocol    Interpretation Summary    Atrial fibrillation was the predominant rhythm observed during the procedure.    The study is technically difficult    Left ventricular wall thickness is consistent with mild concentric hypertrophy.    Left ventricular ejection fraction appears to be 51 - 55%.    Left ventricular diastolic function is consistent with (grade I) impaired relaxation.    The right ventricular cavity is borderline dilated.    The left atrial cavity is moderately dilated.  5.0 cm    The right atrial cavity is mildly  dilated.    No aortic valve regurgitation or stenosis is present    Mild mitral valve regurgitation with MAC is present.    Moderate tricuspid valve regurgitation is present.  RVSP- 56 mmHg    Borderline dilation of the aortic root is present.  3.8 cm      Plan for Follow-up of Pending Labs/Results:     Discharge Details        Discharge Medications        New Medications        Instructions Start Date   amoxicillin-clavulanate 875-125 MG per tablet  Commonly known as: AUGMENTIN   1 tablet, Oral, 2 Times Daily      pantoprazole 40 MG EC tablet  Commonly known as: PROTONIX   Take 1 tablet twice daily for 30 days then 1 tablet therafter   Start Date: March 27, 2025     saccharomyces boulardii 250 MG capsule  Commonly known as: Florastor   250 mg, Oral, 2 Times Daily      traMADol 50 MG tablet  Commonly known as: ULTRAM   25 mg, Oral, Every 8 Hours PRN             Continue These Medications        Instructions Start Date   albuterol sulfate  (90 Base) MCG/ACT inhaler  Commonly known as: PROVENTIL HFA;VENTOLIN HFA;PROAIR HFA   2 puffs, Inhalation, Every 4 Hours PRN      busPIRone 5 MG tablet  Commonly known as: BUSPAR   5 mg, Oral, 2 Times Daily      Edarbi 80 MG tablet tablet  Generic drug: azilsartan medoxomil   TAKE 1/2 TO 1 TABLET BY MOUTH ONCE DAILY      ferrous sulfate 324 MG tablet delayed-release   324 mg, Oral, Daily With Breakfast      HYDROcodone-acetaminophen 5-325 MG per  tablet  Commonly known as: NORCO   2 tablets, Oral, Every 8 Hours PRN      ipratropium 17 MCG/ACT inhaler  Commonly known as: ATROVENT HFA   2 puffs, Inhalation, 4 Times Daily - RT      ipratropium-albuterol 0.5-2.5 mg/3 ml nebulizer  Commonly known as: DUO-NEB   3 mL, Nebulization, 4 Times Daily - RT      isosorbide mononitrate 30 MG 24 hr tablet  Commonly known as: IMDUR   30 mg, Oral, Every Morning      loratadine 10 MG tablet  Commonly known as: CLARITIN   10 mg, Oral, Daily      nitroglycerin 0.4 MG SL tablet  Commonly known as: NITROSTAT   1 under the tongue as needed for angina, may repeat q5mins for up three doses      rivaroxaban 20 MG tablet  Commonly known as: Xarelto   20 mg, Oral, Daily      Vision Vitamins tablet tablet  Generic drug: multivitamin with minerals   Oral, 2 Times Daily      vitamin C 500 MG tablet  Commonly known as: ASCORBIC ACID   1,000 mg, Oral, Daily      Vitamin D3 50 MCG (2000 UT) tablet   Oral, Daily             Stop These Medications      ibuprofen 800 MG tablet  Commonly known as: ADVIL,MOTRIN     NIFEdipine XL 30 MG 24 hr tablet  Commonly known as: PROCARDIA XL     omeprazole 20 MG capsule  Commonly known as: priLOSEC              No Known Allergies      Discharge Disposition:  Home or Self Care    Diet:  Hospital:  Diet Order   Procedures    Diet: Regular/House; Texture: Mechanical Ground (NDD 2); Fluid Consistency: Nectar Thick       Diet Instructions       Diet: Regular/House Diet; Mechanical Ground (NDD 2); [place] Thick      Discharge Diet: Regular/House Diet    Texture: Mechanical Ground (NDD 2)    Fluid Consistency: Nectar Thick             Activity:  As tolerated    Restrictions or Other Recommendations:  None        CODE STATUS:    Code Status and Medical Interventions: CPR (Attempt to Resuscitate); Full Support   Ordered at: 03/11/25 9838     Code Status (Patient has no pulse and is not breathing):    CPR (Attempt to Resuscitate)     Medical Interventions (Patient has  pulse or is breathing):    Full Support     Level Of Support Discussed With:    Patient       Future Appointments   Date Time Provider Department Center   3/26/2025  3:15 PM MED [facility]   4/29/2025  1:30 PM Ella Escalante APRN [facility] Delgado MD  03/26/25      Time Spent on Discharge:  I spent  40  minutes on this discharge activity which included: face-to-face encounter with the patient, reviewing the data in the system, coordination of the care with the nursing staff as well as consultants, documentation, and entering orders.

## 2025-03-27 ENCOUNTER — TELEPHONE (OUTPATIENT)
Dept: CARDIOLOGY | Facility: CLINIC | Age: OVER 89
End: 2025-03-27
Payer: MEDICARE

## 2025-03-27 NOTE — OUTREACH NOTE
Prep Survey      Flowsheet Row Responses   [facility] facility patient discharged from? [place]   Is LACE score < 7 ? No   Eligibility Readm Mgmt   Discharge diagnosis Rhabdomyolysis-Gastrostomy tube insertion   Does the patient have one of the following disease processes/diagnoses(primary or secondary)? General Surgery   Does the patient have Home health ordered? Yes   What is the Home health agency?  [facility] pending at discharge   Is there a DME ordered? Yes   What DME was ordered? [facility] for DME   Prep survey completed? Yes            CRISTAL BUSTILLO - Registered Nurse

## 2025-03-27 NOTE — TELEPHONE ENCOUNTER
Patient had been in hospital for last 3 weeks with pneumonia , his daughter  states they  had held his edarbi during hospital stay and was told at discharge to continue holding it. His BP is running 130/80  , should he continue holding it ?  He had been taking  edarbi 80 mg   1/4 tablet

## 2025-03-31 ENCOUNTER — TELEPHONE (OUTPATIENT)
Dept: CARDIOLOGY | Facility: CLINIC | Age: OVER 89
End: 2025-03-31
Payer: MEDICARE

## 2025-03-31 NOTE — TELEPHONE ENCOUNTER
I spoke with pt's daughter, she would like Ella to look at discharge summary and see if she agrees with medication changes on his blood pressure medication. After reviewing the chart Brenna had already spoke with her on Friday. I will call her back tomorrow to discuss.

## 2025-03-31 NOTE — TELEPHONE ENCOUNTER
Caller: Bettye Hollingsworth    Relationship: Emergency Contact    Best call back number: [phone]    What is the best time to reach you: ANY    Who are you requesting to speak with (clinical staff, provider,  specific staff member): CLOINICAL    What was the call regarding: PATIENT IS IN THE HOSPITAL DAUGHTER REQUESTING A CALL TO ADVISE HER WHAT MEDICATIONS HE IS ON AND OR BEEN TAKEN OFF OF. PLEASE CALL THE ABOVE.     Is it okay if the provider responds through MyChart: NO

## 2025-04-02 NOTE — TELEPHONE ENCOUNTER
I called Bettye back. She was on her way to his house. She is going to send list of meds and vitals to his MyChart for Ella to review and give recommendations.

## 2025-04-03 ENCOUNTER — READMISSION MANAGEMENT (OUTPATIENT)
Dept: CALL CENTER | Facility: HOSPITAL | Age: OVER 89
End: 2025-04-03
Payer: MEDICARE

## 2025-04-03 NOTE — OUTREACH NOTE
General Surgery Week 1 Survey      Flowsheet Row Responses   [facility] facility patient discharged from? [place]   Does the patient have one of the following disease processes/diagnoses(primary or secondary)? General Surgery   Week 1 attempt successful? No   Unsuccessful attempts Attempt 1            Alexandra HOLLOWAY - Registered Nurse

## 2025-04-08 ENCOUNTER — READMISSION MANAGEMENT (OUTPATIENT)
Dept: CALL CENTER | Facility: HOSPITAL | Age: OVER 89
End: 2025-04-08
Payer: MEDICARE

## 2025-04-08 NOTE — OUTREACH NOTE
General Surgery Week 1 Survey      Flowsheet Row Responses   [facility] patient discharged from? [place]   Does the patient have one of the following disease processes/diagnoses(primary or secondary)? General Surgery   Week 1 attempt successful? Yes   Call start time 1438   Call end time 1443   Discharge diagnosis Rhabdomyolysis-Gastrostomy tube insertion, LLL pneumonia, RSV   Is patient permission given to speak with other caregiver? Yes   Person spoke with today (if not patient) and relationship daughterBettye reviewed with patient/caregiver? Yes   Does the patient have all medications related to this admission filled (includes all antibiotics, pain medications, etc.) Yes   Is the patient taking all medications as directed (includes completed medication regime)? Yes   Does the patient have a follow up appointment scheduled with their surgeon? No   Has the patient kept scheduled appointments due by today? Yes  [PCP comes to the home.]   What is the Home health agency?  [facility]   Has home health visited the patient within 72 hours of discharge? Yes   DME comments Tube feeding / supplies, Hospital bed and oral suction   Psychosocial issues? No   Psychosocial comments patient has around the clock caregivers.   Did the patient receive a copy of their discharge instructions? Yes   Nursing interventions Reviewed instructions with patient  [daughter]   What is the patient's perception of their health status since discharge? Improving  [Daughter reports patient showing good improvement.]   Is the patient/caregiver able to teach back steps to recovery at home? Set small, achievable goals for return to baseline health, Rest and rebuild strength, gradually increase activity   If the patient is a current smoker, are they able to teach back resources for cessation? Not a smoker   Is the patient/caregiver able to teach back the hierarchy of who to call/visit for symptoms/problems? PCP, Specialist, Home health  nurse, Urgent Care, ED, 911 Yes   Week 1 call completed? Yes   Is the patient interested in additional calls from an ambulatory ? No   Would this patient benefit from a Referral to [facility] Social Work? No   Call end time 1443            HENRY REECE - Registered Nurse

## 2025-04-17 ENCOUNTER — READMISSION MANAGEMENT (OUTPATIENT)
Dept: CALL CENTER | Facility: HOSPITAL | Age: OVER 89
End: 2025-04-17
Payer: MEDICARE

## 2025-04-29 ENCOUNTER — OFFICE VISIT (OUTPATIENT)
Dept: CARDIOLOGY | Facility: CLINIC | Age: OVER 89
End: 2025-04-29
Payer: MEDICARE

## 2025-04-29 VITALS
HEIGHT: 70 IN | OXYGEN SATURATION: 100 % | WEIGHT: 170 LBS | HEART RATE: 72 BPM | SYSTOLIC BLOOD PRESSURE: 110 MMHG | BODY MASS INDEX: 24.34 KG/M2 | DIASTOLIC BLOOD PRESSURE: 70 MMHG

## 2025-04-29 DIAGNOSIS — I48.20 CHRONIC ATRIAL FIBRILLATION: ICD-10-CM

## 2025-04-29 DIAGNOSIS — I25.9 IHD (ISCHEMIC HEART DISEASE): ICD-10-CM

## 2025-04-29 DIAGNOSIS — I10 ESSENTIAL HYPERTENSION: Primary | ICD-10-CM

## 2025-04-29 DIAGNOSIS — E78.00 PURE HYPERCHOLESTEROLEMIA: ICD-10-CM

## 2025-04-29 DIAGNOSIS — Z95.1 HX OF CABG: ICD-10-CM

## 2025-04-29 PROCEDURE — 99214 OFFICE O/P EST MOD 30 MIN: CPT | Performed by: NURSE PRACTITIONER

## 2025-04-29 RX ORDER — OMEPRAZOLE 20 MG/1
20 CAPSULE, DELAYED RELEASE ORAL DAILY
COMMUNITY

## 2025-04-29 NOTE — PROGRESS NOTES
Chief Complaint   Patient presents with    Follow-up     Follow up for cardiac management. Pt was in the hospital for 2 weeks (not cardiac related)     Labs Only     Labs done at [facility] during hospital stay      Med Refill     Pt needing medication refills, 90 day supply sent to [facility] [place]    Nausea     Pt has been nauseous since leaving the hospital about 3-4 weeks ago       Subjective       Carlos Preston is a 89 y.o. male with HTN, hyperlipidemia, PAF, IHD diagnosed 2007 when he underwent CABG.  Cardiac catheterization 2018 showed EF 55% and patent grafts.  In 2023 beta-blocker discontinued secondary to 3-second pause on cardiac monitor.  Due to SOA supplemental oxygen advised per pulmonologist.  On 3/6/2025 he is advised to discontinue Edarbi due to low blood pressure.  He experienced a fall at home and was later hospitalized at  and diagnosed with bilateral lobe pneumonia, RSV, and acute rhabdomyolysis.  Testing revealed severe dysphagia and ultimately underwent placement of gastric feeding tube.  He opted for outpatient rehabilitation and home environment.    Today he is seen in the office for follow-up visit accompanied by his daughter and caregiver. He now has a 24-hour day caregiver and is receiving OT/PT therapy at home.  His legs remain weak and is using wheelchair most often but admits he has regained some strength.  He has some oral intake and is able to swallow pills with applesauce.  He also has feeding tube in place and using without difficulty.  From a cardiac standpoint he denies recent chest pain or palpitations.  Swelling in his lower legs has improved.  His main complaint is persistent generalized weakness.      Cardiac History:    Past Surgical History:   Procedure Laterality Date    CARDIAC CATHETERIZATION  05/16/2007    Dr. Mace: 60% LM and 70% LCX.    CARDIAC CATHETERIZATION  08/19/2009    %, OM 85-90%,Patent grafts.    CARDIAC CATHETERIZATION  07/23/2018     Patent Grafts    CARDIOVASCULAR STRESS TEST  08/05/2009    [facility]: Dr. Mace- EF 29%, Diffuse ischemia.    CARDIOVASCULAR STRESS TEST  11/07/2012    [facility] [place]- Inferoseptal infarct with jon infarct ischemia. EF 41%.    CARDIOVASCULAR STRESS TEST  03/23/2015    L. Myoview- EF51%,fixed defect , no ischemia burden.    CARDIOVASCULAR STRESS TEST  10/11/2017    L.Myoview- EF 54%. Small inferior Ischemia.    CONVERTED (HISTORICAL) HOLTER  06/12/2023    3 days. A.Fib. Avg 60. . 3 VT. One 3 Sec Pause    CONVERTED (HISTORICAL) HOLTER  05/28/2024    3 Days. A.Fib. Avg 70. . 4.6% PVC. 2 VT    CORONARY ARTERY BYPASS GRAFT  05/17/2007    CABG:  SVG to LAD and LCX.    ECHO - CONVERTED  08/06/2009    Dr. Mace- EF 50%.    ECHO - CONVERTED  02/14/2011    EF 50%, Septal WMA.    ECHO - CONVERTED  11/07/2012    EF 40-45%, RVSP 33 mmHg.    ECHO - CONVERTED  03/23/2015    EF 45-50%, RVSP 40mmHg,mild MR, mild PHT    ECHO - CONVERTED  05/01/2017    EF 50-55%, mild MR    ECHO - CONVERTED  10/11/2017    EF 55%    ECHO - CONVERTED  03/21/2022    TLS. EF 55%. LA- 5.2 cm. Mild MR. RVSP- 44 mmHg    ECHO - CONVERTED  02/15/2024    TLS. EF 55%.RHE. LA- 5.0. Mild MR.AO- 3.8. RVSP- 56 mmHg    ENDOSCOPY W/ PEG TUBE PLACEMENT N/A 03/21/2025    Procedure: ESOPHAGOGASTRODUODENOSCOPY WITH PERCUTANEOUS ENDOSCOPIC GASTROSTOMY TUBE INSERTION;  Surgeon: Stanley Pa MD;  Location: [facility] ENDOSCOPY;  Service: General;  Laterality: N/A;    GASTROSTOMY W/ FEEDING TUBE      US CAROTID UNILATERAL  04/16/2007    Mild Plaque in Rt. bulb and RECA.       Current Outpatient Medications   Medication Sig Dispense Refill    albuterol sulfate  (90 Base) MCG/ACT inhaler Inhale 2 puffs Every 4 (Four) Hours As Needed for Wheezing.      busPIRone (BUSPAR) 5 MG tablet Take 1 tablet by mouth 2 (Two) Times a Day. 180 tablet 3    Cholecalciferol (VITAMIN D3) 2000 UNITS tablet Take  by mouth daily.      ferrous sulfate 324 MG tablet delayed-release  Take 1 tablet by mouth Daily With Breakfast.      HYDROcodone-acetaminophen (NORCO) 5-325 MG per tablet Take 2 tablets by mouth Every 8 (Eight) Hours As Needed for Severe Pain.      isosorbide mononitrate (IMDUR) 30 MG 24 hr tablet Take 1 tablet by mouth Every Morning. 90 tablet 3    loratadine (CLARITIN) 10 MG tablet Take 1 tablet by mouth Daily.      Multiple Vitamins-Minerals (VISION VITAMINS) tablet Take  by mouth 2 (two) times a day.      nitroglycerin (NITROSTAT) 0.4 MG SL tablet 1 under the tongue as needed for angina, may repeat q5mins for up three doses 30 tablet 1    omeprazole (priLOSEC) 20 MG capsule Take 1 capsule by mouth Daily.      pantoprazole (PROTONIX) 40 MG EC tablet Take 1 tablet twice daily for 30 days then 1 tablet therafter 90 tablet 0    traMADol (ULTRAM) 50 MG tablet Take 0.5 tablets by mouth Every 8 (Eight) Hours As Needed for Moderate Pain. 20 tablet 0    vitamin C (ASCORBIC ACID) 500 MG tablet Take 2 tablets by mouth Daily.       No current facility-administered medications for this visit.       Patient has no known allergies.    Past Medical History:   Diagnosis Date    Cancer     H/O prostatectomy     Hearing loss     Heart disease     History of back surgery     Hypercholesterolemia     Hyperlipidemia     Hypertension     IHD (ischemic heart disease)     S/P CABG    Osteoarthritis of spine with myelopathy, lumbar region     Paroxysmal atrial fibrillation 2018    Pneumonia     Pulmonary hypertension     S/P knee replacement     Sinus disorder     thyroid ultrasound 2009    Normal       Social History     Socioeconomic History    Marital status:    Tobacco Use    Smoking status: Former     Current packs/day: 0.00     Average packs/day: 1 pack/day for 52.7 years (52.7 ttl pk-yrs)     Types: Cigarettes     Start date:      Quit date: 1996     Years since quittin.6     Passive exposure: Past    Smokeless tobacco: Never   Vaping Use    Vaping status: Never  "Used   Substance and Sexual Activity    Alcohol use: No    Drug use: No    Sexual activity: Defer       Family History   Problem Relation Age of Onset    No Known Problems Mother     No Known Problems Father        Review of Systems   Constitutional: Positive for malaise/fatigue. Negative for diaphoresis and fever.   Cardiovascular:  Negative for chest pain, near-syncope and palpitations.   Respiratory:  Positive for shortness of breath.    Hematologic/Lymphatic: Negative for bleeding problem.   Musculoskeletal:  Positive for muscle weakness.   Genitourinary:  Negative for hematuria.   Neurological:  Positive for light-headedness, loss of balance and weakness. Negative for dizziness.   Psychiatric/Behavioral:  Negative for memory loss. The patient is not nervous/anxious.         BP Readings from Last 5 Encounters:   04/29/25 110/70   03/26/25 112/54   08/21/24 142/70   02/14/24 126/60   01/10/24 110/68       Wt Readings from Last 5 Encounters:   04/29/25 77.1 kg (170 lb)   03/24/25 87.4 kg (192 lb 9.6 oz)   08/21/24 88.5 kg (195 lb 3.2 oz)   02/15/24 95.1 kg (209 lb 10.5 oz)   02/14/24 95.1 kg (209 lb 9.6 oz)       Objective     /70 (BP Location: Left arm, Patient Position: Sitting, Cuff Size: Adult)   Pulse 72   Ht 177.8 cm (70\")   Wt 77.1 kg (170 lb)   SpO2 100%   BMI 24.39 kg/m²     Vitals and nursing note reviewed.   Constitutional:       Appearance: Well-groomed and not in distress. Frail.   HENT:      Head: Normocephalic.   Neck:      Vascular: No carotid bruit.   Pulmonary:      Effort: Pulmonary effort is normal.      Breath sounds: Normal breath sounds. No rales.   Cardiovascular:      PMI at left midclavicular line. Normal rate. Irregular rhythm.      Murmurs: There is a systolic murmur.   Pulses:     Intact distal pulses.   Edema:     Peripheral edema absent.   Abdominal:      General: Bowel sounds are normal.      Palpations: Abdomen is soft.   Musculoskeletal:      Cervical back: Neck supple. " Skin:     General: Skin is warm and dry.   Neurological:      Mental Status: Alert, oriented to person, place, and time and oriented to person, place and time.   Psychiatric:         Behavior: Behavior is cooperative.          Procedures: none today         Assessment & Plan   Diagnoses and all orders for this visit:    1. Essential hypertension (Primary)    2. IHD (ischemic heart disease)    3. Hx of CABG    4. Pure hypercholesterolemia    5. Chronic atrial fibrillation      HTN  -BP stable at 110/60  -Remains on Imdur, off other antihypertensive agents  -Vital signs currently monitored per home health services    Atrial fibrillation  -Heart rate normal, rhythm irregular  -No longer on Xarelto due to bleeding risk    IHD/history CABG  -Continue low-dose Imdur    Hypercholesterolemia  -Continue dietary management    Routine labs per PCP    Edarbi discontinued for medication list.  Continue current treatment plan.  No repeat cardiac workup advised at this time.    Follow-up visit scheduled.  Please call sooner for cardiac concerns.             Electronically signed by JESSA Oh,  May 1, 2025 10:08 EDT    Dictated Utilizing Dragon Dictation: Part of this note may be an electronic transcription/translation of spoken language to printed text using the Dragon Dictation System.

## 2025-05-06 ENCOUNTER — TELEPHONE (OUTPATIENT)
Dept: CARDIOLOGY | Facility: CLINIC | Age: OVER 89
End: 2025-05-06
Payer: MEDICARE

## 2025-05-06 NOTE — TELEPHONE ENCOUNTER
Patient's daughter Bettye called in and has questions about medications. When they checked his blood pressure it was 84/43. He was sitting when it was checked and feet was elevated.I told his daughter a nurse would call her this afternoon after clinic.Thank you.

## 2025-05-06 NOTE — TELEPHONE ENCOUNTER
I spoke with patient's daughter, Bettye.  BP rechecked, now 100/upper 40's, HR 78. O2 97%  They elevated his feet and put on compression stockings.  Patient states he is feeling better.    Earlier today BP reported as 84/43 (see Lindsey's note).  Patient drinking fluids.  Denies any blood loss.  Asking if Imdur needs to be decreased or stopped?

## 2025-08-05 ENCOUNTER — OFFICE VISIT (OUTPATIENT)
Dept: CARDIOLOGY | Facility: CLINIC | Age: OVER 89
End: 2025-08-05
Payer: MEDICARE

## 2025-08-05 VITALS
HEART RATE: 64 BPM | HEIGHT: 70 IN | BODY MASS INDEX: 25.91 KG/M2 | SYSTOLIC BLOOD PRESSURE: 120 MMHG | DIASTOLIC BLOOD PRESSURE: 70 MMHG | WEIGHT: 181 LBS

## 2025-08-05 DIAGNOSIS — I27.20 PULMONARY HTN: ICD-10-CM

## 2025-08-05 DIAGNOSIS — E55.9 VITAMIN D DEFICIENCY, UNSPECIFIED: ICD-10-CM

## 2025-08-05 DIAGNOSIS — I25.9 IHD (ISCHEMIC HEART DISEASE): Primary | ICD-10-CM

## 2025-08-05 DIAGNOSIS — Z79.899 MEDICATION MANAGEMENT: ICD-10-CM

## 2025-08-05 DIAGNOSIS — R53.1 GENERALIZED WEAKNESS: ICD-10-CM

## 2025-08-05 DIAGNOSIS — K21.9 GASTROESOPHAGEAL REFLUX DISEASE, UNSPECIFIED WHETHER ESOPHAGITIS PRESENT: ICD-10-CM

## 2025-08-05 DIAGNOSIS — R06.02 SHORTNESS OF BREATH: ICD-10-CM

## 2025-08-05 DIAGNOSIS — I48.20 CHRONIC ATRIAL FIBRILLATION: ICD-10-CM

## 2025-08-05 DIAGNOSIS — E78.00 PURE HYPERCHOLESTEROLEMIA: ICD-10-CM

## 2025-08-05 DIAGNOSIS — I10 PRIMARY HYPERTENSION: ICD-10-CM

## 2025-08-05 PROCEDURE — 1159F MED LIST DOCD IN RCRD: CPT | Performed by: NURSE PRACTITIONER

## 2025-08-05 PROCEDURE — 1160F RVW MEDS BY RX/DR IN RCRD: CPT | Performed by: NURSE PRACTITIONER

## 2025-08-05 PROCEDURE — 99214 OFFICE O/P EST MOD 30 MIN: CPT | Performed by: NURSE PRACTITIONER

## 2025-08-05 RX ORDER — NITROGLYCERIN 0.4 MG/1
TABLET SUBLINGUAL
Qty: 30 TABLET | Refills: 1 | Status: SHIPPED | OUTPATIENT
Start: 2025-08-05

## 2025-08-05 RX ORDER — OMEPRAZOLE 20 MG/1
20 CAPSULE, DELAYED RELEASE ORAL 2 TIMES DAILY
Qty: 180 CAPSULE | Refills: 2 | Status: SHIPPED | OUTPATIENT
Start: 2025-08-05

## 2025-08-25 ENCOUNTER — LAB (OUTPATIENT)
Dept: LAB | Facility: HOSPITAL | Age: OVER 89
End: 2025-08-25
Payer: MEDICARE

## 2025-08-25 DIAGNOSIS — Z79.899 MEDICATION MANAGEMENT: ICD-10-CM

## 2025-08-25 DIAGNOSIS — E55.9 VITAMIN D DEFICIENCY, UNSPECIFIED: ICD-10-CM

## 2025-08-25 DIAGNOSIS — I10 PRIMARY HYPERTENSION: ICD-10-CM

## 2025-08-25 DIAGNOSIS — R53.1 GENERALIZED WEAKNESS: ICD-10-CM

## 2025-08-25 DIAGNOSIS — E78.00 PURE HYPERCHOLESTEROLEMIA: ICD-10-CM

## 2025-08-25 DIAGNOSIS — I48.20 CHRONIC ATRIAL FIBRILLATION: ICD-10-CM

## 2025-08-25 DIAGNOSIS — R06.02 SHORTNESS OF BREATH: ICD-10-CM

## 2025-08-25 LAB
ALBUMIN SERPL-MCNC: 3.7 G/DL (ref 3.5–5.2)
ALBUMIN/GLOB SERPL: 1 G/DL
ALP SERPL-CCNC: 129 U/L (ref 39–117)
ALT SERPL W P-5'-P-CCNC: 18 U/L (ref 1–41)
ANION GAP SERPL CALCULATED.3IONS-SCNC: 10.1 MMOL/L (ref 5–15)
AST SERPL-CCNC: 24 U/L (ref 1–40)
BASOPHILS # BLD AUTO: 0.02 10*3/MM3 (ref 0–0.2)
BASOPHILS NFR BLD AUTO: 0.3 % (ref 0–1.5)
BILIRUB SERPL-MCNC: 0.5 MG/DL (ref 0–1.2)
BUN SERPL-MCNC: 20.9 MG/DL (ref 8–23)
BUN/CREAT SERPL: 22 (ref 7–25)
CALCIUM SPEC-SCNC: 10.2 MG/DL (ref 8.2–9.6)
CHLORIDE SERPL-SCNC: 100 MMOL/L (ref 98–107)
CHOLEST SERPL-MCNC: 101 MG/DL (ref 0–200)
CO2 SERPL-SCNC: 24.9 MMOL/L (ref 22–29)
CREAT SERPL-MCNC: 0.95 MG/DL (ref 0.76–1.27)
DEPRECATED RDW RBC AUTO: 51.7 FL (ref 37–54)
EGFRCR SERPLBLD CKD-EPI 2021: 72.8 ML/MIN/1.73
EOSINOPHIL # BLD AUTO: 0.08 10*3/MM3 (ref 0–0.4)
EOSINOPHIL NFR BLD AUTO: 1.2 % (ref 0.3–6.2)
ERYTHROCYTE [DISTWIDTH] IN BLOOD BY AUTOMATED COUNT: 15.3 % (ref 12.3–15.4)
GLOBULIN UR ELPH-MCNC: 3.6 GM/DL
GLUCOSE SERPL-MCNC: 137 MG/DL (ref 65–99)
HCT VFR BLD AUTO: 41.8 % (ref 37.5–51)
HDLC SERPL-MCNC: 50 MG/DL (ref 40–60)
HGB BLD-MCNC: 13.1 G/DL (ref 13–17.7)
IMM GRANULOCYTES # BLD AUTO: 0.02 10*3/MM3 (ref 0–0.05)
IMM GRANULOCYTES NFR BLD AUTO: 0.3 % (ref 0–0.5)
LDLC SERPL CALC-MCNC: 39 MG/DL (ref 0–100)
LDLC/HDLC SERPL: 0.82 {RATIO}
LYMPHOCYTES # BLD AUTO: 1.14 10*3/MM3 (ref 0.7–3.1)
LYMPHOCYTES NFR BLD AUTO: 17.7 % (ref 19.6–45.3)
MAGNESIUM SERPL-MCNC: 2.3 MG/DL (ref 1.7–2.3)
MCH RBC QN AUTO: 28.9 PG (ref 26.6–33)
MCHC RBC AUTO-ENTMCNC: 31.3 G/DL (ref 31.5–35.7)
MCV RBC AUTO: 92.3 FL (ref 79–97)
MONOCYTES # BLD AUTO: 0.61 10*3/MM3 (ref 0.1–0.9)
MONOCYTES NFR BLD AUTO: 9.5 % (ref 5–12)
NEUTROPHILS NFR BLD AUTO: 4.57 10*3/MM3 (ref 1.7–7)
NEUTROPHILS NFR BLD AUTO: 71 % (ref 42.7–76)
NRBC BLD AUTO-RTO: 0 /100 WBC (ref 0–0.2)
PLATELET # BLD AUTO: 95 10*3/MM3 (ref 140–450)
PMV BLD AUTO: 12.9 FL (ref 6–12)
POTASSIUM SERPL-SCNC: 4.6 MMOL/L (ref 3.5–5.2)
PROT SERPL-MCNC: 7.3 G/DL (ref 6–8.5)
RBC # BLD AUTO: 4.53 10*6/MM3 (ref 4.14–5.8)
SODIUM SERPL-SCNC: 135 MMOL/L (ref 136–145)
TRIGL SERPL-MCNC: 49 MG/DL (ref 0–150)
TSH SERPL DL<=0.05 MIU/L-ACNC: 1.33 UIU/ML (ref 0.27–4.2)
VLDLC SERPL-MCNC: 12 MG/DL (ref 5–40)
WBC NRBC COR # BLD AUTO: 6.44 10*3/MM3 (ref 3.4–10.8)

## 2025-08-25 PROCEDURE — 83735 ASSAY OF MAGNESIUM: CPT

## 2025-08-25 PROCEDURE — 84443 ASSAY THYROID STIM HORMONE: CPT

## 2025-08-25 PROCEDURE — 80061 LIPID PANEL: CPT

## 2025-08-25 PROCEDURE — 82607 VITAMIN B-12: CPT

## 2025-08-25 PROCEDURE — 85025 COMPLETE CBC W/AUTO DIFF WBC: CPT

## 2025-08-25 PROCEDURE — 80053 COMPREHEN METABOLIC PANEL: CPT

## 2025-08-25 PROCEDURE — 36415 COLL VENOUS BLD VENIPUNCTURE: CPT

## 2025-08-25 PROCEDURE — 82306 VITAMIN D 25 HYDROXY: CPT

## 2025-08-26 LAB
25(OH)D3 SERPL-MCNC: 76.1 NG/ML (ref 30–100)
VIT B12 BLD-MCNC: 370 PG/ML (ref 211–946)

## (undated) DEVICE — SPNG VERSALON 4X4 4PLY NONSTRL LF BG/200

## (undated) DEVICE — LUBE GEL ENDOGLIDE 1.1OZ

## (undated) DEVICE — SOL IRR H2O BO 1000ML STRL

## (undated) DEVICE — DRAINBAG,ANTI-REFLUX TOWER,L/F,2000ML,LL: Brand: MEDLINE

## (undated) DEVICE — KT PEG ENDOVIVE ENFIT STD PULL 20F 1P/U

## (undated) DEVICE — SYR LUERLOK 5CC

## (undated) DEVICE — CONTN GRAD MEAS TRIANG 32OZ BLK

## (undated) DEVICE — KT ORCA ORCAPOD DISP STRL

## (undated) DEVICE — HYBRID CO2 TUBING/CAP SET FOR OLYMPUS® SCOPES & CO2 SOURCE: Brand: ERBE

## (undated) DEVICE — NDL HYPO ECLPS SFTY 25G 1 1/2IN

## (undated) DEVICE — TUBING, SUCTION, 1/4" X 10', STRAIGHT: Brand: MEDLINE

## (undated) DEVICE — SYR LUERLOK 50ML

## (undated) DEVICE — THE BITE BLOCK MAXI, LATEX FREE STRAP IS USED TO PROTECT THE ENDOSCOPE INSERTION TUBE FROM BEING BITTEN BY THE PATIENT.

## (undated) DEVICE — INTRO ACCSR BLNT TP

## (undated) DEVICE — TUBING,OXYGEN,CRUSH RES,7',CLEAR,UC: Brand: MEDLINE INDUSTRIES, INC.